# Patient Record
Sex: MALE | Race: WHITE | NOT HISPANIC OR LATINO | Employment: FULL TIME | URBAN - METROPOLITAN AREA
[De-identification: names, ages, dates, MRNs, and addresses within clinical notes are randomized per-mention and may not be internally consistent; named-entity substitution may affect disease eponyms.]

---

## 2017-01-04 ENCOUNTER — OFFICE VISIT (OUTPATIENT)
Dept: CARDIOLOGY | Facility: CLINIC | Age: 67
End: 2017-01-04
Payer: COMMERCIAL

## 2017-01-04 VITALS
SYSTOLIC BLOOD PRESSURE: 121 MMHG | WEIGHT: 274.25 LBS | HEIGHT: 74 IN | HEART RATE: 71 BPM | BODY MASS INDEX: 35.2 KG/M2 | DIASTOLIC BLOOD PRESSURE: 63 MMHG

## 2017-01-04 DIAGNOSIS — I48.0 PAROXYSMAL ATRIAL FIBRILLATION: Primary | ICD-10-CM

## 2017-01-04 DIAGNOSIS — I48.91 ATRIAL FIBRILLATION, UNSPECIFIED TYPE: Primary | ICD-10-CM

## 2017-01-04 DIAGNOSIS — Z79.4 TYPE 2 DIABETES MELLITUS WITH STAGE 3 CHRONIC KIDNEY DISEASE, WITH LONG-TERM CURRENT USE OF INSULIN: ICD-10-CM

## 2017-01-04 DIAGNOSIS — E11.22 TYPE 2 DIABETES MELLITUS WITH STAGE 3 CHRONIC KIDNEY DISEASE, WITH LONG-TERM CURRENT USE OF INSULIN: ICD-10-CM

## 2017-01-04 DIAGNOSIS — E78.5 HYPERLIPIDEMIA ASSOCIATED WITH TYPE 2 DIABETES MELLITUS: ICD-10-CM

## 2017-01-04 DIAGNOSIS — I15.2 HYPERTENSION ASSOCIATED WITH DIABETES: Chronic | ICD-10-CM

## 2017-01-04 DIAGNOSIS — E11.69 HYPERLIPIDEMIA ASSOCIATED WITH TYPE 2 DIABETES MELLITUS: ICD-10-CM

## 2017-01-04 DIAGNOSIS — I50.22 CHRONIC SYSTOLIC CONGESTIVE HEART FAILURE: ICD-10-CM

## 2017-01-04 DIAGNOSIS — N13.30 HYDRONEPHROSIS, UNSPECIFIED HYDRONEPHROSIS TYPE: ICD-10-CM

## 2017-01-04 DIAGNOSIS — E11.59 HYPERTENSION ASSOCIATED WITH DIABETES: Chronic | ICD-10-CM

## 2017-01-04 DIAGNOSIS — N18.30 TYPE 2 DIABETES MELLITUS WITH STAGE 3 CHRONIC KIDNEY DISEASE, WITH LONG-TERM CURRENT USE OF INSULIN: ICD-10-CM

## 2017-01-04 DIAGNOSIS — E66.9 OBESITY (BMI 30-39.9): ICD-10-CM

## 2017-01-04 PROCEDURE — 2022F DILAT RTA XM EVC RTNOPTHY: CPT | Mod: S$GLB,,, | Performed by: INTERNAL MEDICINE

## 2017-01-04 PROCEDURE — 3078F DIAST BP <80 MM HG: CPT | Mod: S$GLB,,, | Performed by: INTERNAL MEDICINE

## 2017-01-04 PROCEDURE — 3074F SYST BP LT 130 MM HG: CPT | Mod: S$GLB,,, | Performed by: INTERNAL MEDICINE

## 2017-01-04 PROCEDURE — 3066F NEPHROPATHY DOC TX: CPT | Mod: S$GLB,,, | Performed by: INTERNAL MEDICINE

## 2017-01-04 PROCEDURE — 99213 OFFICE O/P EST LOW 20 MIN: CPT | Mod: S$GLB,,, | Performed by: INTERNAL MEDICINE

## 2017-01-04 PROCEDURE — 3044F HG A1C LEVEL LT 7.0%: CPT | Mod: S$GLB,,, | Performed by: INTERNAL MEDICINE

## 2017-01-04 PROCEDURE — 99999 PR PBB SHADOW E&M-EST. PATIENT-LVL III: CPT | Mod: PBBFAC,,, | Performed by: INTERNAL MEDICINE

## 2017-01-04 PROCEDURE — 1159F MED LIST DOCD IN RCRD: CPT | Mod: S$GLB,,, | Performed by: INTERNAL MEDICINE

## 2017-01-04 NOTE — Clinical Note
Thank you for referring Jose Cruz Lira for evaluation of paroxysmal atrial fibrillation and congestive heart failure. Please see my note for details of this encounter. If you have any questions, please contact me.  Thank you again for the referral.

## 2017-01-04 NOTE — PROGRESS NOTES
Chart has been dictated using voice recognition software.  It is not been reviewed carefully for any transcriptional errors due to this technology.   Subjective:   Patient ID:  Jose Cruz Lira is a 66 y.o. 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 He was in his usual state of health until approximately 6 months ago when he began to notice exertional shortness of breath, fatigue and increasing weight. He presented to the ER for evaluation last month (11/2016) and was found to be in atrial fibrillation with rapid ventricular response and new onset systolic heart failure. Patient underwent DCCV 29-Nov-2016 and has been in sinus rhythm since then.  BP at home initially in the high 130 to low 140 systolic range, but recently in the 145-155 systolic range.  Diastolic BP has been controlled    Currently has 2 flight JAMES, sleeps on 45 degree wedge and on reclining sofa without dyspnea, but not clear if he can lay flat,  No PND, edema.  No palpitations, but had no palpitations with atrial fibrillation.  On the DASH diet - has lost 22-23 pounds since initially hospitalized     Echo (11/28/2016)    1 - Moderate left atrial enlargement.     2 - Severely depressed left ventricular systolic function (EF 20-25%).     3 - Normal left ventricular diastolic function.     4 - Normal right ventricular systolic function .     5 - The estimated PA systolic pressure is 39 mmHg.     6 - Mild to moderate mitral regurgitation.     7 - Mild tricuspid regurgitation.     8 - Intermediate central venous pressure    Cardiac risk factors: diabetes, hyperlipidemia, hypertension, obesity, positive family history    Past Medical History   Diagnosis Date    Diabetes mellitus, type 2 2010    Hydronephrosis of left  "kidney     Hypertension     Non-functioning kidney      left    Obesity (BMI 30-39.9)     Unspecified disorder of kidney and ureter        Past Surgical History   Procedure Laterality Date    Knee surgeory N/A 4 to 5 years ago    Arm surgery      Cardioversion  11/29/2016       Social History   Substance Use Topics    Smoking status: Never Smoker    Smokeless tobacco: Never Used    Alcohol use Yes         Current Outpatient Prescriptions:     amlodipine (NORVASC) 10 MG tablet, Take 1 tablet (10 mg total) by mouth once daily., Disp: 90 tablet, Rfl: 4    apixaban 5 mg Tab, Take 1 tablet (5 mg total) by mouth 2 (two) times daily., Disp: 180 tablet, Rfl: 3    blood sugar diagnostic Strp, For accucheck Patient checks BS twice a day. Please provide a 3 month supply., Disp: 200 strip, Rfl: 11    BYDUREON 2 mg/0.65 mL PnIj, INJECT 2MG WEEKLY, Disp: 4 each, Rfl: 11    carvedilol (COREG) 25 MG tablet, Take 2 tablets (50 mg total) by mouth 2 (two) times daily with meals., Disp: 360 tablet, Rfl: 3    doxazosin (CARDURA) 4 MG tablet, Take 0.5 tablets (2 mg total) by mouth once daily., Disp: , Rfl:     furosemide (LASIX) 40 MG tablet, Take 1 tablet (40 mg total) by mouth 2 (two) times daily., Disp: 180 tablet, Rfl: 3    insulin needles, disposable, (BD ULTRA-FINE RODRIGUEZ PEN NEEDLES) 32 x 5/32 " Ndle, Patient injects insulin once a day. Please provide 3 month supply., Disp: 90 each, Rfl: 4    lancets Misc, Patient checks BS twice a day. Please provide a 3 month supply., Disp: 180 each, Rfl: 10    LEVEMIR FLEXTOUCH 100 unit/mL (3 mL) InPn pen, INJECT 25 UNITS INTO THE SKIN EVERY EVENING, Disp: 15 mL, Rfl: 8    simvastatin (ZOCOR) 20 MG tablet, Take 1 tablet (20 mg total) by mouth every evening., Disp: 90 tablet, Rfl: 4    Review of patient's allergies indicates:  No Known Allergies    Review of Systems   Constitution: Negative for weight gain and weight loss.   HENT: Negative for headaches and nosebleeds.  " "  Eyes: Negative for blurred vision, double vision, photophobia, redness, vision loss in left eye, vision loss in right eye and visual disturbance.   Respiratory: Negative for sputum production.    Endocrine: Negative for polydipsia and polyuria.   Skin: Negative for color change.   Musculoskeletal: Negative for muscle cramps, muscle weakness and myalgias.   Gastrointestinal: Negative for abdominal pain, change in bowel habit, bowel incontinence, dysphagia, excessive appetite, heartburn and hematemesis.   Neurological: Negative for disturbances in coordination, dizziness, focal weakness, loss of balance, paresthesias and sensory change.   Psychiatric/Behavioral: Negative.    Allergic/Immunologic: Negative for environmental allergies.     Objective:   Physical Exam   Constitutional: He is oriented to person, place, and time. He appears well-developed and well-nourished.   /63 (BP Location: Left arm, Patient Position: Sitting, BP Method: Automatic)  Pulse 71  Ht 6' 2" (1.88 m)  Wt 124.4 kg (274 lb 4 oz)  BMI 35.21 kg/m2     Neck: Neck supple. No JVD present. Carotid bruit is not present.   Cardiovascular: Normal rate, regular rhythm, normal heart sounds and intact distal pulses.   Occasional extrasystoles are present. Exam reveals no gallop and no friction rub.    No murmur heard.  Pulmonary/Chest: Effort normal and breath sounds normal. He has no wheezes. He has no rales.   Abdominal: Soft. Bowel sounds are normal. He exhibits no abdominal bruit. There is no hepatomegaly. There is no tenderness.   Musculoskeletal: He exhibits no edema (1-2+ bilateral pretibial edema).   Neurological: He is alert and oriented to person, place, and time. He has normal strength.   Skin: No cyanosis. Nails show no clubbing.       Lab Results   Component Value Date    WBC 8.22 11/30/2016    HGB 13.9 (L) 11/30/2016    HCT 41.2 11/30/2016    MCV 90 11/30/2016     11/30/2016       Lab Results   Component Value Date     " 12/06/2016    K 4.4 12/06/2016    BUN 24 (H) 12/06/2016    CREATININE 1.8 (H) 12/06/2016     (H) 12/06/2016    HGBA1C 6.9 (H) 11/27/2016    CHOL 102 (L) 11/27/2016    HDL 35 (L) 11/27/2016    LDLCALC 37.4 (L) 11/27/2016    TRIG 148 11/27/2016    CHOLHDL 34.3 11/27/2016    HGB 13.9 (L) 11/30/2016    HCT 41.2 11/30/2016     11/30/2016     ECG (28-Dec-2016) ECG showed sinus rhythm and was essentially a normal ECG.  Assessment:     1. Paroxysmal atrial fibrillation    2. Hypertension associated with diabetes    3. Chronic systolic congestive heart failure    4. Type 2 diabetes mellitus with stage 3 chronic kidney disease, with long-term current use of insulin    5. Hydronephrosis, unspecified hydronephrosis type    6. Obesity (BMI 30-39.9)    7. Hyperlipidemia associated with type 2 diabetes mellitus      The patient remains in sinus rhythm with minimal symptoms of heart failure (NYHA class II).  He appears to be tolerating his current medical therapy.  He has no signs of ischemia or recurrent atrial fibrillation.  He has not had an evaluation for myocardial ischemia since presenting to the hospital.  Therefore, the patient will be evaluated using SPECT imaging and will have his LV function reevaluated by an echo.  These are scheduled, however, the stress test will be changed to an exercise stress test from a drug stimulated stress test and the echo will be done with 2-D color Doppler.  No changes in the patient's medication will be made at this time.  Plan:     Jose Cruz was seen today for atrial fibrillation.    Diagnoses and all orders for this visit:    Paroxysmal atrial fibrillation  -     NM Multi Tread Stress Cardiac Component; Future  -     EKG 12-LEAD; Future    Hypertension associated with diabetes  -     NM Multi Tread Stress Cardiac Component; Future  -     EKG 12-LEAD; Future    Chronic systolic congestive heart failure  -     NM Multi Tread Stress Cardiac Component; Future  -     EKG 12-LEAD;  Future    Type 2 diabetes mellitus with stage 3 chronic kidney disease, with long-term current use of insulin    Hydronephrosis, unspecified hydronephrosis type    Obesity (BMI 30-39.9)    Hyperlipidemia associated with type 2 diabetes mellitus      Patient to return in 6 weeks

## 2017-01-04 NOTE — MR AVS SNAPSHOT
[facility]  [address]  [facility] [address]  Phone: [phone]                  Jose Cruz Lira   2017 8:30 AM   Office Visit    Description:  Male : 1950   Provider:  Irvin Castanon MD   Department:  [facility]           Reason for Visit     Atrial Fibrillation           Diagnoses this Visit        Comments    Paroxysmal atrial fibrillation    -  Primary     Hypertension associated with diabetes         Chronic systolic congestive heart failure         Type 2 diabetes mellitus with stage 3 chronic kidney disease, with long-term current use of insulin         Hydronephrosis, unspecified hydronephrosis type         Obesity (BMI 30-39.9)         Hyperlipidemia associated with type 2 diabetes mellitus                To Do List           Future Appointments        Provider Department Dept Phone    2017 7:15 AM [facility] [phone]    2017 1:45 PM [facility] [phone]    2017 8:00 AM Grover Miranda NP [facility] - Sleep Clinic [phone]    3/28/2017 8:00 AM EKG, AP[facility] - [phone]    3/28/2017 8:40 AM Jamey Brown MD [facility] - Arrhythmia [phone]      Goals (5 Years of Data)     None      Follow-Up and Disposition     Return in about 6 weeks (around 2/15/2017).      [facility] On [facility]     [facility] Care [facility] - / Assistance  Registered nurses in the [facility] On Call Center provide clinical advisement, health education, appointment booking, and other advisory services.  Call for this free service at [phone].             Medications           Message regarding Medications     Verify the changes and/or additions to your medication regime listed below are the same as discussed with your clinician today.  If any of these changes or additions are incorrect, please notify your healthcare provider.             Verify that the  "below list of medications is an accurate representation of the medications you are currently taking.  If none reported, the list may be blank. If incorrect, please contact your healthcare provider. Carry this list with you in case of emergency.           Current Medications     amlodipine (NORVASC) 10 MG tablet Take 1 tablet (10 mg total) by mouth once daily.    apixaban 5 mg Tab Take 1 tablet (5 mg total) by mouth 2 (two) times daily.    blood sugar diagnostic Strp For accucheck Patient checks BS twice a day. Please provide a 3 month supply.    BYDUREON 2 mg/0.65 mL PnIj INJECT 2MG WEEKLY    carvedilol (COREG) 25 MG tablet Take 2 tablets (50 mg total) by mouth 2 (two) times daily with meals.    doxazosin (CARDURA) 4 MG tablet Take 0.5 tablets (2 mg total) by mouth once daily.    furosemide (LASIX) 40 MG tablet Take 1 tablet (40 mg total) by mouth 2 (two) times daily.    insulin needles, disposable, (BD ULTRA-FINE RODRIGUEZ PEN NEEDLES) 32 x 5/32 " Ndle Patient injects insulin once a day. Please provide 3 month supply.    lancets Misc Patient checks BS twice a day. Please provide a 3 month supply.    LEVEMIR FLEXTOUCH 100 unit/mL (3 mL) InPn pen INJECT 25 UNITS INTO THE SKIN EVERY EVENING    simvastatin (ZOCOR) 20 MG tablet Take 1 tablet (20 mg total) by mouth every evening.           Clinical Reference Information           Vital Signs - Last Recorded  Most recent update: 1/4/2017  8:36 AM by Annabelle Morrissey MA    BP Pulse Ht Wt BMI    121/63 (BP Location: Left arm, Patient Position: Sitting, BP Method: Automatic) 71 6' 2" (1.88 m) 124.4 kg (274 lb 4 oz) 35.21 kg/m2      Blood Pressure          Most Recent Value    Right Arm BP - Sitting  120/64    Left Arm BP - Sitting  121/63    BP  121/63      Allergies as of 1/4/2017     No Known Allergies      Immunizations Administered on Date of Encounter - 1/4/2017     None      "

## 2017-01-04 NOTE — LETTER
January 4, 2017      Tena Phillips MD  [address]  [facility] [address]           [facility]  [address]  [facility] [address]  Phone: [phone]          Patient: Jose Cruz Lira   MR Number: [id number]   YOB: 1950   Date of Visit: 1/4/2017       Dear Dr. Tena Phillips:    Thank you for referring Jose Cruz Lira to me for evaluation. Attached you will find relevant portions of my assessment and plan of care.    If you have questions, please do not hesitate to call me. I look forward to following Jose Cruz Lira along with you.    Sincerely,    Irvin Castanon MD    Enclosure  CC:  No Recipients    If you would like to receive this communication electronically, please contact [email or web address] or [phone] to request more information on [facility] Link access.    For providers and/or their staff who would like to refer a patient to [facility], please contact us through our one-stop-shop provider referral line, [facility] , at [phone].    If you feel you have received this communication in error or would no longer like to receive these types of communications, please e-mail [email or web address]

## 2017-01-17 ENCOUNTER — CLINICAL SUPPORT (OUTPATIENT)
Dept: CARDIOLOGY | Facility: CLINIC | Age: 67
End: 2017-01-17
Payer: COMMERCIAL

## 2017-01-17 DIAGNOSIS — I15.2 HYPERTENSION ASSOCIATED WITH DIABETES: Chronic | ICD-10-CM

## 2017-01-17 DIAGNOSIS — E11.59 HYPERTENSION ASSOCIATED WITH DIABETES: Chronic | ICD-10-CM

## 2017-01-17 DIAGNOSIS — I50.22 CHRONIC SYSTOLIC CONGESTIVE HEART FAILURE: ICD-10-CM

## 2017-01-17 DIAGNOSIS — I48.0 PAROXYSMAL ATRIAL FIBRILLATION: ICD-10-CM

## 2017-01-17 LAB — DIASTOLIC DYSFUNCTION: NO

## 2017-01-17 PROCEDURE — A9502 TC99M TETROFOSMIN: HCPCS | Mod: S$GLB,,, | Performed by: INTERNAL MEDICINE

## 2017-01-17 PROCEDURE — 78452 HT MUSCLE IMAGE SPECT MULT: CPT | Mod: S$GLB,,, | Performed by: INTERNAL MEDICINE

## 2017-01-17 PROCEDURE — 93015 CV STRESS TEST SUPVJ I&R: CPT | Mod: S$GLB,,, | Performed by: INTERNAL MEDICINE

## 2017-01-18 ENCOUNTER — TELEPHONE (OUTPATIENT)
Dept: CARDIOLOGY | Facility: CLINIC | Age: 67
End: 2017-01-18

## 2017-01-18 ENCOUNTER — HOSPITAL ENCOUNTER (OUTPATIENT)
Dept: CARDIOLOGY | Facility: CLINIC | Age: 67
Discharge: HOME | End: 2017-01-18
Payer: COMMERCIAL

## 2017-01-18 DIAGNOSIS — I50.22 CHRONIC SYSTOLIC CONGESTIVE HEART FAILURE: ICD-10-CM

## 2017-01-18 DIAGNOSIS — I48.0 PAROXYSMAL ATRIAL FIBRILLATION: Primary | ICD-10-CM

## 2017-01-18 DIAGNOSIS — I48.91 ATRIAL FIBRILLATION, UNSPECIFIED TYPE: ICD-10-CM

## 2017-01-18 LAB
DIASTOLIC DYSFUNCTION: YES
MITRAL VALVE MOBILITY: NORMAL
MITRAL VALVE REGURGITATION: ABNORMAL
RETIRED EF AND QEF - SEE NOTES: 55 (ref 55–65)

## 2017-01-18 PROCEDURE — 93306 TTE W/DOPPLER COMPLETE: CPT | Mod: S$GLB,,, | Performed by: INTERNAL MEDICINE

## 2017-01-18 NOTE — TELEPHONE ENCOUNTER
----- Message from Luis Miguel Clark sent at 1/18/2017  9:21 AM CST -----  Contact: patient wife/Jessica cmkeon pt wife at [phone]. Need results of Nuclear Treadmill test done on 01/17/17    Thank you

## 2017-01-19 ENCOUNTER — TELEPHONE (OUTPATIENT)
Dept: CARDIOLOGY | Facility: CLINIC | Age: 67
End: 2017-01-19

## 2017-01-19 NOTE — TELEPHONE ENCOUNTER
----- Message from Irvin Castanon MD sent at 1/18/2017  5:54 PM CST -----  Contact: patient wife/Jessica  Spoke with wife.  PET stress oedered.  Please see if it can be done by mid next week.  Thank you  ----- Message -----     From: Alesha Cervantes LPN     Sent: 1/18/2017   3:24 PM       To: Irvin Castanon MD        ----- Message -----     From: Luis Miguel Clark     Sent: 1/18/2017   9:21 AM       To: [facility] Staff    Please mike pt wife at [phone]. Need results of Nuclear Treadmill test done on 01/17/17    Thank you

## 2017-01-23 ENCOUNTER — OFFICE VISIT (OUTPATIENT)
Dept: SLEEP MEDICINE | Facility: CLINIC | Age: 67
End: 2017-01-23
Payer: COMMERCIAL

## 2017-01-23 ENCOUNTER — TELEPHONE (OUTPATIENT)
Dept: SLEEP MEDICINE | Facility: CLINIC | Age: 67
End: 2017-01-23

## 2017-01-23 ENCOUNTER — TELEPHONE (OUTPATIENT)
Dept: CARDIOLOGY | Facility: CLINIC | Age: 67
End: 2017-01-23

## 2017-01-23 VITALS
SYSTOLIC BLOOD PRESSURE: 130 MMHG | DIASTOLIC BLOOD PRESSURE: 80 MMHG | BODY MASS INDEX: 35.32 KG/M2 | HEIGHT: 74 IN | HEART RATE: 67 BPM | WEIGHT: 275.19 LBS

## 2017-01-23 DIAGNOSIS — E66.9 OBESITY (BMI 30-39.9): ICD-10-CM

## 2017-01-23 DIAGNOSIS — G47.30 SLEEP APNEA, UNSPECIFIED: Primary | ICD-10-CM

## 2017-01-23 PROCEDURE — 3079F DIAST BP 80-89 MM HG: CPT | Mod: S$GLB,,, | Performed by: NURSE PRACTITIONER

## 2017-01-23 PROCEDURE — 99999 PR PBB SHADOW E&M-EST. PATIENT-LVL IV: CPT | Mod: PBBFAC,,, | Performed by: NURSE PRACTITIONER

## 2017-01-23 PROCEDURE — 3075F SYST BP GE 130 - 139MM HG: CPT | Mod: S$GLB,,, | Performed by: NURSE PRACTITIONER

## 2017-01-23 PROCEDURE — 1159F MED LIST DOCD IN RCRD: CPT | Mod: S$GLB,,, | Performed by: NURSE PRACTITIONER

## 2017-01-23 PROCEDURE — 99204 OFFICE O/P NEW MOD 45 MIN: CPT | Mod: S$GLB,,, | Performed by: NURSE PRACTITIONER

## 2017-01-23 NOTE — PROGRESS NOTES
Jose Cruz Lira  was seen as a new patient at the request of  Jamey Brown MD for the evaluation of obstructive sleep apnea.    CHIEF COMPLAINT:    Chief Complaint   Patient presents with    Sleep Apnea       HISTORY OF PRESENT ILLNESS: Jose Cruz Lira is a 66 y.o. male is here for sleep evaluation.       Patient complaints include: loud snoring, air gasping, and daytime fatigue. Nocturia 4x (on diuretics). Side sleeper. No morning headaches. Reports sleep apnea symptoms started ~ 5 years ago and despite PCP advise, he never got evaluated for sleep apnea. After a fib event and dx heart failure, now highly motivated to treat MONICA.     Medical co-morbidities: HTN, atrial fibrillation, systolic CHF (1/18/17 EF 55-60%), DM2, HLD, obesity    Denies symptoms of restless legs or kicking during sleep.    Occupation: Salesperson, [employer]. Plan to retire in 3 years.     [place] Sleepiness Scale score during initial sleep evaluation was 4.    SLEEP ROUTINE:  Activity the hour prior to sleep: watch tv, occasionally read   Bed partner:  Wife  Time to bed:  9:30  pm  Lights off:  9:30 pm   Sleep onset latency:  <10 minutes        Disruptions or awakenings:    4 to 5 for bathroom    Wakeup time:  4:45 - 5 am   Perceived sleep quality:  2/5       Daytime naps:   none  Weekend sleep routine:   Bedtime at 10 pm, up at 5 am  Caffeine use: decaf coffee only   Exercise habit:  Stationary bike x2 weekly     01/18/2016 2D Echo:  CONCLUSIONS     1 - Eccentric LVH with normal left ventricular systolic function (EF 55-60%).     2 - Severe left atrial enlargement.     3 - Left ventricular diastolic dysfunction.     4 - Normal right ventricular systolic function .     PAST MEDICAL HISTORY:    Active Ambulatory Problems     Diagnosis Date Noted    Obesity (BMI 30-39.9) 05/28/2014    Hyperlipidemia associated with type 2 diabetes mellitus 05/28/2014    Type 2 diabetes mellitus with stage 3 chronic kidney disease, with long-term  current use of insulin 05/28/2014    Hypertension associated with diabetes 05/28/2014    Proteinuria 11/03/2014    Rectus diastasis 02/12/2015    Lipoma of abdominal wall 02/12/2015    UPJ (ureteropelvic junction) obstruction 04/16/2015    Non-functioning kidney 04/16/2015    Hydronephrosis 05/14/2015    Shortness of breath 11/27/2016    Paroxysmal atrial fibrillation 11/28/2016    Atrial fibrillation 11/29/2016    Chronic systolic congestive heart failure 12/28/2016    Sleep apnea, unspecified 01/23/2017     Resolved Ambulatory Problems     Diagnosis Date Noted    Type 2 diabetes, uncontrolled, with renal manifestation 05/28/2014    Acute decompensated heart failure secondary to afib with RVR  11/28/2016     Past Medical History   Diagnosis Date    Diabetes mellitus, type 2 2010    Hydronephrosis of left kidney     Hypertension     Unspecified disorder of kidney and ureter                 PAST SURGICAL HISTORY:    Past Surgical History   Procedure Laterality Date    Knee surgeory N/A 4 to 5 years ago    Arm surgery      Cardioversion  11/29/2016         FAMILY HISTORY:                Family History   Problem Relation Age of Onset    Cancer Father     Cancer Sister     Cancer Brother     Amblyopia Neg Hx     Blindness Neg Hx     Cataracts Neg Hx     Diabetes Neg Hx     Glaucoma Neg Hx     Hypertension Neg Hx     Macular degeneration Neg Hx     Retinal detachment Neg Hx     Strabismus Neg Hx     Stroke Neg Hx     Thyroid disease Neg Hx        SOCIAL HISTORY:          Tobacco:   History   Smoking Status    Never Smoker   Smokeless Tobacco    Never Used       Alcohol use:    History   Alcohol Use    Yes                 ALLERGIES:  Review of patient's allergies indicates:  No Known Allergies    CURRENT MEDICATIONS:    Current Outpatient Prescriptions   Medication Sig Dispense Refill    amlodipine (NORVASC) 10 MG tablet Take 1 tablet (10 mg total) by mouth once daily. 90 tablet 4  "   blood sugar diagnostic Strp For accucheck Patient checks BS twice a day. Please provide a 3 month supply. 200 strip 11    BYDUREON 2 mg/0.65 mL PnIj INJECT 2MG WEEKLY 4 each 11    carvedilol (COREG) 25 MG tablet Take 2 tablets (50 mg total) by mouth 2 (two) times daily with meals. 360 tablet 3    doxazosin (CARDURA) 4 MG tablet Take 0.5 tablets (2 mg total) by mouth once daily.      furosemide (LASIX) 40 MG tablet Take 1 tablet (40 mg total) by mouth 2 (two) times daily. 180 tablet 3    insulin needles, disposable, (BD ULTRA-FINE RODRIGUEZ PEN NEEDLES) 32 x 5/32 " Ndle Patient injects insulin once a day. Please provide 3 month supply. 90 each 4    lancets Misc Patient checks BS twice a day. Please provide a 3 month supply. 180 each 10    LEVEMIR FLEXTOUCH 100 unit/mL (3 mL) InPn pen INJECT 25 UNITS INTO THE SKIN EVERY EVENING 15 mL 8    simvastatin (ZOCOR) 20 MG tablet Take 1 tablet (20 mg total) by mouth every evening. 90 tablet 4    apixaban 5 mg Tab Take 1 tablet (5 mg total) by mouth 2 (two) times daily. 180 tablet 3     No current facility-administered medications for this visit.                   REVIEW OF SYSTEMS:     Sleep related symptoms as per HPI. CONST: Intentional weight loss ~20 lb (monitoring fluid intake and diet) HEENT: Denies sinus congestion PULM: Denies dyspnea CARD:  Denies palpitations GI:  Denies acid reflux : Denies polyuria  NEURO: Denies headaches PSYCH: Denies mood disturbance HEME: Denies anemia Otherwise, a balance of systems reviewed is negative.          PHYSICAL EXAM:  Vitals:    01/23/17 0824   BP: 130/80   Pulse: 67   Weight: 124.8 kg (275 lb 3.2 oz)   Height: 6' 2" (1.88 m)   PainSc: 0-No pain     Body mass index is 35.33 kg/(m^2).      GENERAL: Obese body habitus, well groomed  HEENT:  Conjunctivae are non-erythematous; Pupils equal, round, and reactive to light; Nose is symmetrical; Nasal mucosa is pink and moist; Septum is midline; Inferior turbinates are normal; " Nasal airflow is normal; Posterior pharynx is pink; Modified Mallampati: IV; Posterior palate is normal; Tonsils +1; Uvula is normal and pink;Tongue is normal; Dentition is fair; No TMJ tenderness; Jaw opening and protrusion without click and without discomfort.  NECK: Supple. Neck circumference is 19 inches. No thyromegaly. No palpable nodes.  SKIN: On face and neck: No abrasions, no rashes, no lesions.  No subcutaneous nodules are palpable.  RESPIRATORY: Chest is clear to auscultation.  Normal chest expansion and non-labored breathing at rest.  CARDIOVASCULAR: Normal S1, S2.  No murmurs, gallops or rubs. No carotid bruits bilaterally.  EXTREMITIES: No edema. No clubbing. No cyanosis. Station normal. Gait normal.        NEURO/PSYCH: Oriented to time, place and person. Normal attention span and concentration. Affect is full. Mood is normal.                                              ASSESSMENT:    Sleep apnea, unspecified. The patient symptomatically has loud snoring, air gasping, and daytime fatigue with findings of crowded oral airway and elevated body mass index. Medical co-morbidities: HTN, atrial fibrillation, systolic CHF, DM2, HLD, obesity This warrants further investigation for possible obstructive sleep apnea.      PLAN:    - Diagnostic: Polysomnogram. The nature of this procedure and its indication was discussed with the patient. HST will be ordered, if PSG denied by insurance. Patient will be contacted after sleep study is done and return to clinic to discuss results.      - Education: During our discussion today, we talked about the etiology of obstructive sleep apnea as well as the potential ramifications of untreated sleep apnea, which could include daytime sleepiness, hypertension, heart disease and/or stroke. We discussed potential treatment options, which could include weight loss, body positioning, continuous positive airway pressure (CPAP), or referral for surgical consideration.     -Referral  to [facility] [place].     - Precautions: The patient was advised to abstain from driving should they feel sleepy  or drowsy.     Thank you for allowing me the opportunity to participate in the care of your patient.

## 2017-01-23 NOTE — MR AVS SNAPSHOT
[facility]  [address]  [facility] [address]  Phone: [phone]                  Jose Cruz Lira   2017 8:00 AM   Office Visit    Description:  Male : 1950   Provider:  Grover Miranda NP   Department:  [facility]           Reason for Visit     Sleep Apnea           Diagnoses this Visit        Comments    Sleep apnea, unspecified    -  Primary     Obesity (BMI 30-39.9)                To Do List           Future Appointments        Provider Department Dept Phone    2017 7:45 AM CARDIAC, PET IMAGING [facility] Cardiac -[phone]    2017 8:30 AM Irvin Castanon MD [facility] - Cardiology [phone]    3/27/2017 1:40 PM Ilene Sanderson MD [facility] - Internal Medicine [phone]    3/28/2017 8:00 AM EKG, APPT [facility] -[phone]    3/28/2017 8:40 AM Jamey Brown MD [facility] - Arrhythmia [phone]      Goals (5 Years of Data)     None      Follow-Up and Disposition     Return if symptoms worsen or fail to improve.      [facility] On Call     [facility] On Call Nurse Care Line -  Assistance  Registered nurses in the [facility] On Call Center provide clinical advisement, health education, appointment booking, and other advisory services.  Call for this free service at [phone].             Medications           Message regarding Medications     Verify the changes and/or additions to your medication regime listed below are the same as discussed with your clinician today.  If any of these changes or additions are incorrect, please notify your healthcare provider.             Verify that the below list of medications is an accurate representation of the medications you are currently taking.  If none reported, the list may be blank. If incorrect, please contact your healthcare provider. Carry this list with you in case of emergency.           Current Medications     amlodipine (NORVASC) 10 MG tablet Take 1 tablet (10 mg total)  "by mouth once daily.    blood sugar diagnostic Strp For accucheck Patient checks BS twice a day. Please provide a 3 month supply.    BYDUREON 2 mg/0.65 mL PnIj INJECT 2MG WEEKLY    carvedilol (COREG) 25 MG tablet Take 2 tablets (50 mg total) by mouth 2 (two) times daily with meals.    doxazosin (CARDURA) 4 MG tablet Take 0.5 tablets (2 mg total) by mouth once daily.    furosemide (LASIX) 40 MG tablet Take 1 tablet (40 mg total) by mouth 2 (two) times daily.    insulin needles, disposable, (BD ULTRA-FINE RODRIGUEZ PEN NEEDLES) 32 x 5/32 " Ndle Patient injects insulin once a day. Please provide 3 month supply.    lancets Misc Patient checks BS twice a day. Please provide a 3 month supply.    LEVEMIR FLEXTOUCH 100 unit/mL (3 mL) InPn pen INJECT 25 UNITS INTO THE SKIN EVERY EVENING    simvastatin (ZOCOR) 20 MG tablet Take 1 tablet (20 mg total) by mouth every evening.    apixaban 5 mg Tab Take 1 tablet (5 mg total) by mouth 2 (two) times daily.           Clinical Reference Information           Vital Signs - Last Recorded  Most recent update: 1/23/2017  8:26 AM by Darrian Zuleta MA    BP Pulse Ht Wt BMI    130/80 67 6' 2" (1.88 m) 124.8 kg (275 lb 3.2 oz) 35.33 kg/m2      Blood Pressure          Most Recent Value    BP  130/80      Allergies as of 1/23/2017     No Known Allergies      Immunizations Administered on Date of Encounter - 1/23/2017     None      Orders Placed During Today's Visit      Normal Orders This Visit    Ambulatory consult to Bariatric Medicine     Ambulatory Referral to [facility] - [facility]     Future Labs/Procedures Expected by Expires    Polysomnogram (CPAP will be added if patient meets diagnostic criteria.)  As directed 1/23/2018      Instructions    Silvana or Tony will contact you to schedule your sleep study. Their number is [phone] (ext 1). The [facility] is located on 7th floor of the [facility].    We will call you when the sleep study results are ready - " if you have not heard from us by 2 weeks from the date of the study, please call [phone] (ext 2).    You are advised to abstain from driving should you feel sleepy or drowsy.

## 2017-01-23 NOTE — PATIENT INSTRUCTIONS
Silvana or Tony will contact you to schedule your sleep study. Their number is [phone] (ext 1). The [facility] is located on 7th floor of the [facility].    We will call you when the sleep study results are ready - if you have not heard from us by 2 weeks from the date of the study, please call [phone] (ext 2).    You are advised to abstain from driving should you feel sleepy or drowsy.

## 2017-01-23 NOTE — LETTER
January 23, 2017      Jamey Brown MD  [address]  [facility] [address]           [facility]  [address]  [facility] [address]  Phone: [phone]          Patient: Jose Cruz Lira   MR Number: [id number]   YOB: 1950   Date of Visit: 1/23/2017       Dear Dr. Jamey Brown:    Thank you for referring Jose Cruz Lira to me for evaluation. Attached you will find relevant portions of my assessment and plan of care.    If you have questions, please do not hesitate to call me. I look forward to following Jose Cruz Lira along with you.    Sincerely,    Grover Miranda, NP    Enclosure  CC:  No Recipients    If you would like to receive this communication electronically, please contact [email or web address] or [phone] to request more information on [employer] access.    For providers and/or their staff who would like to refer a patient to [facility], please contact us through our one-stop-shop provider referral line, [facility], at [phone].    If you feel you have received this communication in error or would no longer like to receive these types of communications, please e-mail [email or web address]

## 2017-01-23 NOTE — TELEPHONE ENCOUNTER
Called and spoke with pt wife regarding scheduling an appt to bariatric clinic. Mrs. Lira stated that her  does not need to go to the bariatric clinic. The pt goes to the cardiologist at the [facility] to maintain weight control due to CHF and insurance will not cover bariatrics. Pt wife rec a sleep study to be done and insurance will cover due to CHF.

## 2017-01-23 NOTE — TELEPHONE ENCOUNTER
----- Message from Staci Carlson sent at 1/23/2017  8:58 AM CST -----  Contact: Wife of pt called  Wife of pt called, requesting to reschedule pt's cardiac pet scan. She states they will be in court and is needing to speak with you at ph [phone]. Pt of Dr. Castanon and LOV 1/4/17. Thank you

## 2017-02-06 ENCOUNTER — OFFICE VISIT (OUTPATIENT)
Dept: INTERNAL MEDICINE | Facility: CLINIC | Age: 67
End: 2017-02-06
Payer: COMMERCIAL

## 2017-02-06 ENCOUNTER — HOSPITAL ENCOUNTER (OUTPATIENT)
Dept: RADIOLOGY | Facility: HOSPITAL | Age: 67
Discharge: HOME | End: 2017-02-06
Attending: INTERNAL MEDICINE
Payer: COMMERCIAL

## 2017-02-06 VITALS
BODY MASS INDEX: 35.53 KG/M2 | SYSTOLIC BLOOD PRESSURE: 122 MMHG | WEIGHT: 276.88 LBS | TEMPERATURE: 98 F | DIASTOLIC BLOOD PRESSURE: 80 MMHG | OXYGEN SATURATION: 96 % | HEART RATE: 70 BPM | HEIGHT: 74 IN

## 2017-02-06 DIAGNOSIS — J40 BRONCHITIS: Primary | ICD-10-CM

## 2017-02-06 DIAGNOSIS — E11.9 DIABETES MELLITUS TYPE 2, INSULIN DEPENDENT: ICD-10-CM

## 2017-02-06 DIAGNOSIS — Z79.4 DIABETES MELLITUS TYPE 2, INSULIN DEPENDENT: ICD-10-CM

## 2017-02-06 DIAGNOSIS — J40 BRONCHITIS: ICD-10-CM

## 2017-02-06 PROCEDURE — 3044F HG A1C LEVEL LT 7.0%: CPT | Mod: S$GLB,,, | Performed by: INTERNAL MEDICINE

## 2017-02-06 PROCEDURE — 3079F DIAST BP 80-89 MM HG: CPT | Mod: S$GLB,,, | Performed by: INTERNAL MEDICINE

## 2017-02-06 PROCEDURE — 3066F NEPHROPATHY DOC TX: CPT | Mod: S$GLB,,, | Performed by: INTERNAL MEDICINE

## 2017-02-06 PROCEDURE — 99213 OFFICE O/P EST LOW 20 MIN: CPT | Mod: S$GLB,,, | Performed by: INTERNAL MEDICINE

## 2017-02-06 PROCEDURE — 71020 XR CHEST PA AND LATERAL: CPT | Mod: 26,,, | Performed by: RADIOLOGY

## 2017-02-06 PROCEDURE — 71020 XR CHEST PA AND LATERAL: CPT | Mod: TC

## 2017-02-06 PROCEDURE — 2022F DILAT RTA XM EVC RTNOPTHY: CPT | Mod: S$GLB,,, | Performed by: INTERNAL MEDICINE

## 2017-02-06 PROCEDURE — 3074F SYST BP LT 130 MM HG: CPT | Mod: S$GLB,,, | Performed by: INTERNAL MEDICINE

## 2017-02-06 PROCEDURE — 99999 PR PBB SHADOW E&M-EST. PATIENT-LVL III: CPT | Mod: PBBFAC,,, | Performed by: INTERNAL MEDICINE

## 2017-02-06 RX ORDER — AMOXICILLIN 875 MG/1
875 TABLET, FILM COATED ORAL 2 TIMES DAILY
Qty: 20 TABLET | Refills: 0 | Status: SHIPPED | OUTPATIENT
Start: 2017-02-06 | End: 2017-02-16

## 2017-02-06 RX ORDER — PROMETHAZINE HYDROCHLORIDE AND CODEINE PHOSPHATE 6.25; 1 MG/5ML; MG/5ML
5 SOLUTION ORAL EVERY 6 HOURS PRN
Qty: 180 ML | Refills: 0 | Status: SHIPPED | OUTPATIENT
Start: 2017-02-06 | End: 2017-02-16

## 2017-02-06 NOTE — MR AVS SNAPSHOT
[facility]  [address] [facility] [address]  Phone: [phone]  Fax: [phone]                  Jose Cruz Lira   2017 6:40 PM   Office Visit    Description:  Male : 1950   Provider:  Alaina Murray MD   Department:  [facility]           Reason for Visit     URI           Diagnoses this Visit        Comments    Bronchitis    -  Primary     Diabetes mellitus type 2, insulin dependent                To Do List           Future Appointments        Provider Department Dept Phone    2017 10:00 AM CARDIAC, PET IMAGING [facility] [address] Cardiac [phone]    2017 8:30 AM Irvin Castanon MD [facility] [address] Cardiology [phone]    3/27/2017 1:40 PM Ilene Sanderson MD [facility] [address] Internal Medicine [phone]    3/28/2017 8:00 AM EKG, APPT [facility] [address] [phone]    3/28/2017 8:40 AM Jamey Brown MD [facility] [address] Arrhythmia [phone]      Goals (5 Years of Data)     None       These Medications        Disp Refills Start End    amoxicillin (AMOXIL) 875 MG tablet 20 tablet 0 2017    Take 1 tablet (875 mg total) by mouth 2 (two) times daily. - Oral    [email or web address] [id number] [place] [address] [address] AT [facility] Ph #: [phone]       promethazine-codeine 6.25-10 mg/5 ml (PHENERGAN WITH CODEINE) 6.25-10 mg/5 mL syrup 180 mL 0 2017    Take 5 mLs by mouth every 6 (six) hours as needed. - Oral    Pharmacy: [facility] [id number] [place] [address] [address] AT [facility] Ph #: [phone]         [facility] On [facility]     [facility] On Call Nurse Care Line -  Assistance  Registered nurses in the [facility] On Call Center provide clinical advisement, health education, appointment booking, and other advisory services.  Call for this free service at [phone].             Medications           Message regarding Medications     Verify  "the changes and/or additions to your medication regime listed below are the same as discussed with your clinician today.  If any of these changes or additions are incorrect, please notify your healthcare provider.        START taking these NEW medications        Refills    amoxicillin (AMOXIL) 875 MG tablet 0    Sig: Take 1 tablet (875 mg total) by mouth 2 (two) times daily.    Class: Normal    Route: Oral    promethazine-codeine 6.25-10 mg/5 ml (PHENERGAN WITH CODEINE) 6.25-10 mg/5 mL syrup 0    Sig: Take 5 mLs by mouth every 6 (six) hours as needed.    Class: Normal    Route: Oral      STOP taking these medications     apixaban 5 mg Tab Take 1 tablet (5 mg total) by mouth 2 (two) times daily.           Verify that the below list of medications is an accurate representation of the medications you are currently taking.  If none reported, the list may be blank. If incorrect, please contact your healthcare provider. Carry this list with you in case of emergency.           Current Medications     amlodipine (NORVASC) 10 MG tablet Take 1 tablet (10 mg total) by mouth once daily.    amoxicillin (AMOXIL) 875 MG tablet Take 1 tablet (875 mg total) by mouth 2 (two) times daily.    blood sugar diagnostic Strp For accucheck Patient checks BS twice a day. Please provide a 3 month supply.    BYDUREON 2 mg/0.65 mL PnIj INJECT 2MG WEEKLY    carvedilol (COREG) 25 MG tablet Take 2 tablets (50 mg total) by mouth 2 (two) times daily with meals.    doxazosin (CARDURA) 4 MG tablet Take 0.5 tablets (2 mg total) by mouth once daily.    furosemide (LASIX) 40 MG tablet Take 1 tablet (40 mg total) by mouth 2 (two) times daily.    insulin needles, disposable, (BD ULTRA-FINE RODRIGUEZ PEN NEEDLES) 32 x 5/32 " Ndle Patient injects insulin once a day. Please provide 3 month supply.    lancets Misc Patient checks BS twice a day. Please provide a 3 month supply.    LEVEMIR FLEXTOUCH 100 unit/mL (3 mL) InPn pen INJECT 25 UNITS INTO THE SKIN EVERY " "EVENING    promethazine-codeine 6.25-10 mg/5 ml (PHENERGAN WITH CODEINE) 6.25-10 mg/5 mL syrup Take 5 mLs by mouth every 6 (six) hours as needed.    simvastatin (ZOCOR) 20 MG tablet Take 1 tablet (20 mg total) by mouth every evening.           Clinical Reference Information           Your Vitals Were     BP Pulse Temp Height Weight SpO2    122/80 (BP Location: Right arm, Patient Position: Sitting, BP Method: Manual) 70 97.9 °F (36.6 °C) (Oral) 6' 2" (1.88 m) 125.6 kg (276 lb 14.4 oz) 96%    BMI                35.55 kg/m2          Blood Pressure          Most Recent Value    BP  122/80      Allergies as of 2/6/2017     No Known Allergies      Immunizations Administered on Date of Encounter - 2/6/2017     None      Orders Placed During Today's Visit     Future Labs/Procedures Expected by Expires    X-Ray Chest PA And Lateral  2/6/2017 2/6/2018      Language Assistance Services     ATTENTION: Language assistance services are available, free of charge. Please call [phone].      ATENCIÓN: Si habgay quezada, tiene a limon disposición servicios gratuitos de asistencia lingüística. Llame al [phone].     MARISSA Ý: N?u b?n nói Ti?ng Vi?t, có các d?ch v? h? tr? ngôn ng? mi?n phí dành cho b?n. G?i s? [phone].         [facility] complies with applicable Federal civil rights laws and does not discriminate on the basis of race, color, national origin, age, disability, or sex.        "

## 2017-02-07 ENCOUNTER — TELEPHONE (OUTPATIENT)
Dept: SLEEP MEDICINE | Facility: OTHER | Age: 67
End: 2017-02-07

## 2017-02-07 NOTE — PROGRESS NOTES
Subjective:       Patient ID: Jose Cruz Lira is a 66 y.o. male.    Chief Complaint: URI    HPI Comments: Recently dx'd with CHF    URI    This is a new problem. The current episode started in the past 7 days. The problem has been gradually worsening. There has been no fever. Associated symptoms include congestion and coughing. Pertinent negatives include no abdominal pain, chest pain, diarrhea, headaches, nausea, sore throat, vomiting or wheezing. Associated symptoms comments: Cough productive of green/brown sputum. He has tried nothing for the symptoms. The treatment provided no relief.     Review of Systems   Constitutional: Negative for activity change, appetite change and fever.   HENT: Positive for congestion. Negative for postnasal drip and sore throat.    Respiratory: Positive for cough. Negative for shortness of breath and wheezing.    Cardiovascular: Negative for chest pain and palpitations.   Gastrointestinal: Negative for abdominal pain, blood in stool, constipation, diarrhea, nausea and vomiting.   Genitourinary: Negative for decreased urine volume, difficulty urinating, flank pain and frequency.   Musculoskeletal: Negative for arthralgias.   Neurological: Negative for dizziness, weakness and headaches.       Objective:      Physical Exam   Constitutional: He is oriented to person, place, and time. He appears well-developed and well-nourished. No distress.   HENT:   Head: Normocephalic and atraumatic.   Right Ear: External ear normal.   Left Ear: External ear normal.   Eyes: Conjunctivae and EOM are normal. Pupils are equal, round, and reactive to light.   Neck: Normal range of motion. Neck supple. No thyromegaly present.   Cardiovascular: Normal rate and regular rhythm.    Pulmonary/Chest: Effort normal. He has rhonchi in the right lower field and the left lower field.   Abdominal: Soft. Bowel sounds are normal. He exhibits no mass. There is no tenderness. There is no rebound and no guarding.    Musculoskeletal: Normal range of motion.   Lymphadenopathy:     He has no cervical adenopathy.   Neurological: He is alert and oriented to person, place, and time. He has normal reflexes. He displays normal reflexes. No cranial nerve deficit. He exhibits normal muscle tone. Coordination normal.   Skin: Skin is warm and dry.       Assessment:       1. Bronchitis    2. Diabetes mellitus type 2, insulin dependent        Plan:   Jose Cruz was seen today for uri.    Diagnoses and all orders for this visit:    Bronchitis  -     X-Ray Chest PA And Lateral; Future    Diabetes mellitus type 2, insulin dependent    Other orders  -     amoxicillin (AMOXIL) 875 MG tablet; Take 1 tablet (875 mg total) by mouth 2 (two) times daily.  -     promethazine-codeine 6.25-10 mg/5 ml (PHENERGAN WITH CODEINE) 6.25-10 mg/5 mL syrup; Take 5 mLs by mouth every 6 (six) hours as needed.

## 2017-02-16 ENCOUNTER — CLINICAL SUPPORT (OUTPATIENT)
Dept: CARDIOLOGY | Facility: CLINIC | Age: 67
End: 2017-02-16
Payer: COMMERCIAL

## 2017-02-16 DIAGNOSIS — I48.0 PAROXYSMAL ATRIAL FIBRILLATION: ICD-10-CM

## 2017-02-16 DIAGNOSIS — I50.22 CHRONIC SYSTOLIC CONGESTIVE HEART FAILURE: ICD-10-CM

## 2017-02-16 LAB — DIASTOLIC DYSFUNCTION: NO

## 2017-02-16 PROCEDURE — A9555 RB82 RUBIDIUM: HCPCS | Mod: S$GLB,,, | Performed by: INTERNAL MEDICINE

## 2017-02-16 PROCEDURE — 93015 CV STRESS TEST SUPVJ I&R: CPT | Mod: S$GLB,,, | Performed by: INTERNAL MEDICINE

## 2017-02-16 PROCEDURE — 78492 MYOCRD IMG PET MLT RST&STRS: CPT | Mod: S$GLB,,, | Performed by: INTERNAL MEDICINE

## 2017-02-20 ENCOUNTER — PATIENT MESSAGE (OUTPATIENT)
Dept: CARDIOLOGY | Facility: HOSPITAL | Age: 67
End: 2017-02-20

## 2017-02-20 ENCOUNTER — OFFICE VISIT (OUTPATIENT)
Dept: CARDIOLOGY | Facility: CLINIC | Age: 67
End: 2017-02-20
Payer: COMMERCIAL

## 2017-02-20 ENCOUNTER — TELEPHONE (OUTPATIENT)
Dept: ELECTROPHYSIOLOGY | Facility: CLINIC | Age: 67
End: 2017-02-20

## 2017-02-20 VITALS
SYSTOLIC BLOOD PRESSURE: 108 MMHG | DIASTOLIC BLOOD PRESSURE: 58 MMHG | WEIGHT: 280 LBS | BODY MASS INDEX: 35.94 KG/M2 | HEIGHT: 74 IN | HEART RATE: 78 BPM

## 2017-02-20 DIAGNOSIS — Z79.4 TYPE 2 DIABETES MELLITUS WITH STAGE 3 CHRONIC KIDNEY DISEASE, WITH LONG-TERM CURRENT USE OF INSULIN: ICD-10-CM

## 2017-02-20 DIAGNOSIS — I15.2 HYPERTENSION ASSOCIATED WITH DIABETES: Chronic | ICD-10-CM

## 2017-02-20 DIAGNOSIS — E11.22 TYPE 2 DIABETES MELLITUS WITH STAGE 3 CHRONIC KIDNEY DISEASE, WITH LONG-TERM CURRENT USE OF INSULIN: ICD-10-CM

## 2017-02-20 DIAGNOSIS — E66.9 OBESITY (BMI 30-39.9): ICD-10-CM

## 2017-02-20 DIAGNOSIS — I48.19 PERSISTENT ATRIAL FIBRILLATION: Primary | ICD-10-CM

## 2017-02-20 DIAGNOSIS — I50.22 CHRONIC SYSTOLIC CONGESTIVE HEART FAILURE: ICD-10-CM

## 2017-02-20 DIAGNOSIS — E11.59 HYPERTENSION ASSOCIATED WITH DIABETES: Chronic | ICD-10-CM

## 2017-02-20 DIAGNOSIS — E78.5 HYPERLIPIDEMIA ASSOCIATED WITH TYPE 2 DIABETES MELLITUS: ICD-10-CM

## 2017-02-20 DIAGNOSIS — N13.30 HYDRONEPHROSIS, UNSPECIFIED HYDRONEPHROSIS TYPE: ICD-10-CM

## 2017-02-20 DIAGNOSIS — E11.69 HYPERLIPIDEMIA ASSOCIATED WITH TYPE 2 DIABETES MELLITUS: ICD-10-CM

## 2017-02-20 DIAGNOSIS — N18.30 TYPE 2 DIABETES MELLITUS WITH STAGE 3 CHRONIC KIDNEY DISEASE, WITH LONG-TERM CURRENT USE OF INSULIN: ICD-10-CM

## 2017-02-20 PROCEDURE — 99999 PR PBB SHADOW E&M-EST. PATIENT-LVL III: CPT | Mod: PBBFAC,,, | Performed by: INTERNAL MEDICINE

## 2017-02-20 PROCEDURE — 3078F DIAST BP <80 MM HG: CPT | Mod: S$GLB,,, | Performed by: INTERNAL MEDICINE

## 2017-02-20 PROCEDURE — 3044F HG A1C LEVEL LT 7.0%: CPT | Mod: S$GLB,,, | Performed by: INTERNAL MEDICINE

## 2017-02-20 PROCEDURE — 2022F DILAT RTA XM EVC RTNOPTHY: CPT | Mod: S$GLB,,, | Performed by: INTERNAL MEDICINE

## 2017-02-20 PROCEDURE — 99213 OFFICE O/P EST LOW 20 MIN: CPT | Mod: S$GLB,,, | Performed by: INTERNAL MEDICINE

## 2017-02-20 PROCEDURE — 3066F NEPHROPATHY DOC TX: CPT | Mod: S$GLB,,, | Performed by: INTERNAL MEDICINE

## 2017-02-20 PROCEDURE — 3074F SYST BP LT 130 MM HG: CPT | Mod: S$GLB,,, | Performed by: INTERNAL MEDICINE

## 2017-02-20 RX ORDER — CARVEDILOL 25 MG/1
25 TABLET ORAL 2 TIMES DAILY WITH MEALS
Qty: 360 TABLET | Refills: 3 | Status: SHIPPED | OUTPATIENT
Start: 2017-02-20 | End: 2017-08-30 | Stop reason: SDUPTHER

## 2017-02-20 RX ORDER — FLECAINIDE ACETATE 100 MG/1
100 TABLET ORAL EVERY 12 HOURS
Qty: 60 TABLET | Refills: 11 | Status: SHIPPED | OUTPATIENT
Start: 2017-02-20 | End: 2017-10-03 | Stop reason: SDUPTHER

## 2017-02-20 NOTE — TELEPHONE ENCOUNTER
Called patient to follow-up now that his cardiac testing is complete.  EF normalized with sinus rhythm and his PET stress was negative for ischemia/infarct.  Will start Flecainide 100mg q12h and continue his beta-blocker and eliquis as part of a rhythm control strategy.  Rx for flecainide was sent to his pharmacist.

## 2017-02-20 NOTE — Clinical Note
Thank you for referring Jose Cruz Lira for follow-up of paroxysmal atrial fibrillation. If you have any questions, please contact me.  Thank you again for the referral.

## 2017-02-20 NOTE — PROGRESS NOTES
Chart has been dictated using voice recognition software.  It is not been reviewed carefully for any transcriptional errors due to this technology.   Subjective:   Patient ID:  Jose Cruz Lira is a 66 y.o. male who presents for follow-up of Atrial Fibrillation (6 week f/u )      HPI: Patient in [employer] with paroxysmal atrial fibrillation, Hypertension, Chronic systolic congestive heart failure,Type 2 diabetes mellitus, stage 3 chronic kidney disease, Hydronephrosis, Obesity, and Hyperlipidemia associated with type 2 diabetes mellitus.  Patient recently underwent cardiovascular testing which showed:    Cardiac stress SPECT:  The patient exercised on a High Ramp protocol, corresponding to a functional capacity of 9 estimated METS, achieving a peak heart rate of 105 bpm, which is 67% of the age predicted maximum heart rate.  EKG Conclusions:  1. The EKG portion of this study is uninterpretable for ischemia at a moderate workload, and peak heart rate of 105 bpm (67% of predicted).   2. Sensitivity is impaired due to beta blocker therapy.   3. Blood pressure remained stable throughout the protocol  (Presenting BP: 150/91 Peak BP: 155/80).   4. No significant arrhythmias were present.   5. There were no symptoms of chest discomfort or significant dyspnea throughout the protocol.   Nuclear Quantitative Functional Analysis:   LVEF: 54 % (normal is >= 51%)  LVED Volume: 133 ml (normal is <=171)  LVES Volume: 62 ml (normal is <=70)  Impression: EQUIVOCAL MYOCARDIAL PERFUSION  1. There is a moderate mostly reversible inferoapical wall defect of uncertain significance. Consider PET-Stress imaging as a preferred modality. Sensitivity is impaired due to beta blocker therapy. Specificity is reduced secondary to body habitus.   2. Resting wall motion is physiologic.   3. Resting LV function is normal.  (normal is >= 51%)  4. The ventricular volumes are normal at rest and stress.   5. The extracardiac distribution of  radioactivity is normal.     Echo:    1 - Eccentric LVH (septum 1.2; posterior wall 1.1) with normal left ventricular systolic function (EF 55-60%).     2 - Severe left atrial enlargement.     3 - Left ventricular diastolic dysfunction.     4 - Normal right ventricular systolic function.    Cardiac stress PET:  EKG Conclusions:  1. The EKG portion of this study is negative for ischemia at a peak heart rate of 73 bpm (47% of predicted).   2. Blood pressure remained stable throughout the protocol  (Presenting BP: 117/74 Peak BP: 115/66).   3. The following arrhythmias were present: rare PVCs.   4. There were no symptoms of chest discomfort or significant dyspnea throughout the protocol.   Nuclear Quantitative Functional Analysis:   At rest:  LVEF: >= 70 % (normal is >= 51%)  LVED Volume: 124 ml (normal is <=171)  LVES Volume: 37 ml (normal is <=70)  At stress:  LVEF: >= 70 % (normal is >= 51%)  LVED Volume: 114 ml (normal is <=171)  LVES Volume: 34 ml (normal is <=70)  CONCLUSIONS: NO CLINICALLY SIGNIFICANT STRESS INDUCED PERFUSION DEFECTS as assessed with PET perfusion imaging.  1. There is no evidence of a discrete hemodynamically significant coronary stenosis.   2. Although no clinically significant stress defect is seen, there is resting flow heterogeneity that improves after Dipyridamole. These results suggest mild endothelial dysfunction due to elevated cholesterol, high blood pressure and diabetes without   clinically significant, localized perfusion defects.   3. Resting wall motion is physiologic. Stress wall motion is physiologic.   4. LV function is normal at rest and stress.  (normal is >= 51%)  5. The ventricular volumes are normal at rest and stress.   6. The extracardiac distribution of radioactivity is normal.     Patient notes he has been feeling tired lately.  Has 2 flight [place].  No orthopnea or PND. Patient denies any chest discomfort on exertion or at rest. Patient denies any palpitations,  "lightheadedness, or syncope. Patient denies lower extremity claudication.       Current Outpatient Prescriptions:     amlodipine (NORVASC) 10 MG tablet, Take 1 tablet (10 mg total) by mouth once daily., Disp: 90 tablet, Rfl: 4    apixaban (ELIQUIS) 5 mg Tab, Take 5 mg by mouth 2 (two) times daily., Disp: , Rfl:     blood sugar diagnostic Strp, For accucheck Patient checks BS twice a day. Please provide a 3 month supply., Disp: 200 strip, Rfl: 11    BYDUREON 2 mg/0.65 mL PnIj, INJECT 2MG WEEKLY, Disp: 4 each, Rfl: 11    carvedilol (COREG) 25 MG tablet, Take 1 tablet (25 mg total) by mouth 2 (two) times daily with meals., Disp: 360 tablet, Rfl: 3    doxazosin (CARDURA) 4 MG tablet, Take 0.5 tablets (2 mg total) by mouth once daily., Disp: , Rfl:     furosemide (LASIX) 40 MG tablet, Take 1 tablet (40 mg total) by mouth 2 (two) times daily., Disp: 180 tablet, Rfl: 3    insulin needles, disposable, (BD ULTRA-FINE RODRIGUEZ PEN NEEDLES) 32 x 5/32 " Ndle, Patient injects insulin once a day. Please provide 3 month supply., Disp: 90 each, Rfl: 4    lancets Misc, Patient checks BS twice a day. Please provide a 3 month supply., Disp: 180 each, Rfl: 10    LEVEMIR FLEXTOUCH 100 unit/mL (3 mL) InPn pen, INJECT 25 UNITS INTO THE SKIN EVERY EVENING, Disp: 15 mL, Rfl: 8    simvastatin (ZOCOR) 20 MG tablet, Take 1 tablet (20 mg total) by mouth every evening., Disp: 90 tablet, Rfl: 4    ROS - No change from prior visit in neurologic, respiratory, endocrine, GI, hematologic, or constitutional complaints   Objective:   Physical Exam   Constitutional: He is oriented to person, place, and time. He appears well-developed and well-nourished.   Obese  BP (!) 108/58 (BP Location: Left arm, Patient Position: Sitting, BP Method: Automatic)  Pulse 78  Ht 6' 2" (1.88 m)  Wt 127 kg (279 lb 15.8 oz)  BMI 35.95 kg/m2   Eyes: Pupils are equal, round, and reactive to light.   Neck: Neck supple. No JVD present. Carotid bruit is not present. No " thyromegaly present.   Cardiovascular: Normal rate, regular rhythm and intact distal pulses.   No extrasystoles are present. PMI is not displaced.  Exam reveals gallop and S4. Exam reveals no friction rub.    No murmur heard.  Pulmonary/Chest: Effort normal and breath sounds normal. He has no wheezes. He has no rales.   Abdominal: Soft. Bowel sounds are normal. There is no hepatosplenomegaly. There is no tenderness.   Musculoskeletal: He exhibits no edema.   Neurological: He is alert and oriented to person, place, and time. He has normal strength.   Skin: No cyanosis. Nails show no clubbing.         Lab Results   Component Value Date     12/06/2016    K 4.4 12/06/2016    BUN 24 (H) 12/06/2016    CREATININE 1.8 (H) 12/06/2016     (H) 12/06/2016    HGBA1C 6.9 (H) 11/27/2016    CHOL 102 (L) 11/27/2016    HDL 35 (L) 11/27/2016    LDLCALC 37.4 (L) 11/27/2016    TRIG 148 11/27/2016    CHOLHDL 34.3 11/27/2016    HGB 13.9 (L) 11/30/2016    HCT 41.2 11/30/2016     11/30/2016       Assessment:     1. Persistent atrial fibrillation    2. Hypertension associated with diabetes    3. Chronic systolic congestive heart failure    4. Type 2 diabetes mellitus with stage 3 chronic kidney disease, with long-term current use of insulin    5. Hydronephrosis, unspecified hydronephrosis type    6. Obesity (BMI 30-39.9)    7. Hyperlipidemia associated with type 2 diabetes mellitus      The patient appears to be doing well without evidence of ischemia or heart failure.  However, the patient is getting lightheaded and fatigued.  His blood pressure is relatively low.  This may be due to his beta blocker therapy as the patient is on doses slightly above recommended dosing.  Therefore, his carvedilol will be decreased.  The patient also shows no evidence of ischemia on stress testing.  The patient also has resolution of his systolic dysfunction suggesting that that may have been secondary to atrial fibrillation induced  tachycardia.  The patient will thus be reevaluated in 3 months there are no intervening problems.  Patient continues to try and lose weight and was instructed to exercise more, a recommendation strongly supported by his wife    Plan:     Jose Cruz was seen today for atrial fibrillation.    Diagnoses and all orders for this visit:    Persistent atrial fibrillation  -     carvedilol (COREG) 25 MG tablet; Take 1 tablet (25 mg total) by mouth 2 (two) times daily with meals.  -     Holter monitor - 48 hour    Hypertension associated with diabetes  -     carvedilol (COREG) 25 MG tablet; Take 1 tablet (25 mg total) by mouth 2 (two) times daily with meals.    Chronic systolic congestive heart failure  -     carvedilol (COREG) 25 MG tablet; Take 1 tablet (25 mg total) by mouth 2 (two) times daily with meals.    Type 2 diabetes mellitus with stage 3 chronic kidney disease, with long-term current use of insulin    Hydronephrosis, unspecified hydronephrosis type    Obesity (BMI 30-39.9)    Hyperlipidemia associated with type 2 diabetes mellitus    Other orders  -     apixaban (ELIQUIS) 5 mg Tab; Take 5 mg by mouth 2 (two) times daily.

## 2017-02-21 ENCOUNTER — TELEPHONE (OUTPATIENT)
Dept: SLEEP MEDICINE | Facility: OTHER | Age: 67
End: 2017-02-21

## 2017-03-03 ENCOUNTER — TELEPHONE (OUTPATIENT)
Dept: SLEEP MEDICINE | Facility: OTHER | Age: 67
End: 2017-03-03

## 2017-03-13 ENCOUNTER — PATIENT MESSAGE (OUTPATIENT)
Dept: ADMINISTRATIVE | Facility: OTHER | Age: 67
End: 2017-03-13

## 2017-03-13 ENCOUNTER — TELEPHONE (OUTPATIENT)
Dept: SLEEP MEDICINE | Facility: OTHER | Age: 67
End: 2017-03-13

## 2017-03-20 ENCOUNTER — TELEPHONE (OUTPATIENT)
Dept: SLEEP MEDICINE | Facility: OTHER | Age: 67
End: 2017-03-20

## 2017-03-20 NOTE — TELEPHONE ENCOUNTER
Talked to patient a few times and sent out a message through my [facility] to schedule.  No response.

## 2017-04-04 ENCOUNTER — LAB VISIT (OUTPATIENT)
Dept: LAB | Facility: HOSPITAL | Age: 67
End: 2017-04-04
Attending: INTERNAL MEDICINE
Payer: COMMERCIAL

## 2017-04-04 ENCOUNTER — TELEPHONE (OUTPATIENT)
Dept: CARDIOLOGY | Facility: CLINIC | Age: 67
End: 2017-04-04

## 2017-04-04 DIAGNOSIS — E11.22 TYPE 2 DIABETES MELLITUS WITH STAGE 3 CHRONIC KIDNEY DISEASE, WITH LONG-TERM CURRENT USE OF INSULIN: ICD-10-CM

## 2017-04-04 DIAGNOSIS — Z79.4 TYPE 2 DIABETES MELLITUS WITH STAGE 3 CHRONIC KIDNEY DISEASE, WITH LONG-TERM CURRENT USE OF INSULIN: ICD-10-CM

## 2017-04-04 DIAGNOSIS — N18.30 TYPE 2 DIABETES MELLITUS WITH STAGE 3 CHRONIC KIDNEY DISEASE, WITH LONG-TERM CURRENT USE OF INSULIN: ICD-10-CM

## 2017-04-04 LAB
ALBUMIN SERPL BCP-MCNC: 3.7 G/DL
ANION GAP SERPL CALC-SCNC: 10 MMOL/L
BUN SERPL-MCNC: 21 MG/DL
CALCIUM SERPL-MCNC: 9.1 MG/DL
CHLORIDE SERPL-SCNC: 107 MMOL/L
CO2 SERPL-SCNC: 27 MMOL/L
CREAT SERPL-MCNC: 1.7 MG/DL
EST. GFR  (AFRICAN AMERICAN): 48.2 ML/MIN/1.73 M^2
EST. GFR  (NON AFRICAN AMERICAN): 41.7 ML/MIN/1.73 M^2
GLUCOSE SERPL-MCNC: 128 MG/DL
PHOSPHATE SERPL-MCNC: 3.4 MG/DL
POTASSIUM SERPL-SCNC: 4.1 MMOL/L
SODIUM SERPL-SCNC: 144 MMOL/L

## 2017-04-04 PROCEDURE — 36415 COLL VENOUS BLD VENIPUNCTURE: CPT | Mod: PO

## 2017-04-04 PROCEDURE — 83036 HEMOGLOBIN GLYCOSYLATED A1C: CPT

## 2017-04-04 PROCEDURE — 80069 RENAL FUNCTION PANEL: CPT

## 2017-04-05 ENCOUNTER — OFFICE VISIT (OUTPATIENT)
Dept: ELECTROPHYSIOLOGY | Facility: CLINIC | Age: 67
End: 2017-04-05
Payer: COMMERCIAL

## 2017-04-05 ENCOUNTER — HOSPITAL ENCOUNTER (OUTPATIENT)
Dept: CARDIOLOGY | Facility: CLINIC | Age: 67
Discharge: HOME | End: 2017-04-05
Payer: COMMERCIAL

## 2017-04-05 VITALS
SYSTOLIC BLOOD PRESSURE: 136 MMHG | WEIGHT: 287.94 LBS | HEART RATE: 65 BPM | DIASTOLIC BLOOD PRESSURE: 78 MMHG | BODY MASS INDEX: 36.95 KG/M2 | HEIGHT: 74 IN

## 2017-04-05 DIAGNOSIS — E11.22 TYPE 2 DIABETES MELLITUS WITH STAGE 3 CHRONIC KIDNEY DISEASE, WITH LONG-TERM CURRENT USE OF INSULIN: ICD-10-CM

## 2017-04-05 DIAGNOSIS — I15.2 HYPERTENSION ASSOCIATED WITH DIABETES: Chronic | ICD-10-CM

## 2017-04-05 DIAGNOSIS — I48.19 PERSISTENT ATRIAL FIBRILLATION: Primary | ICD-10-CM

## 2017-04-05 DIAGNOSIS — Z79.4 TYPE 2 DIABETES MELLITUS WITH STAGE 3 CHRONIC KIDNEY DISEASE, WITH LONG-TERM CURRENT USE OF INSULIN: ICD-10-CM

## 2017-04-05 DIAGNOSIS — I48.91 ATRIAL FIBRILLATION, UNSPECIFIED TYPE: ICD-10-CM

## 2017-04-05 DIAGNOSIS — G47.30 SLEEP APNEA, UNSPECIFIED: ICD-10-CM

## 2017-04-05 DIAGNOSIS — E66.9 OBESITY (BMI 30-39.9): ICD-10-CM

## 2017-04-05 DIAGNOSIS — E11.69 HYPERLIPIDEMIA ASSOCIATED WITH TYPE 2 DIABETES MELLITUS: ICD-10-CM

## 2017-04-05 DIAGNOSIS — N18.30 TYPE 2 DIABETES MELLITUS WITH STAGE 3 CHRONIC KIDNEY DISEASE, WITH LONG-TERM CURRENT USE OF INSULIN: ICD-10-CM

## 2017-04-05 DIAGNOSIS — E78.5 HYPERLIPIDEMIA ASSOCIATED WITH TYPE 2 DIABETES MELLITUS: ICD-10-CM

## 2017-04-05 DIAGNOSIS — E11.59 HYPERTENSION ASSOCIATED WITH DIABETES: Chronic | ICD-10-CM

## 2017-04-05 LAB
ESTIMATED AVG GLUCOSE: 140 MG/DL
HBA1C MFR BLD HPLC: 6.5 %

## 2017-04-05 PROCEDURE — 3044F HG A1C LEVEL LT 7.0%: CPT | Mod: S$GLB,,, | Performed by: INTERNAL MEDICINE

## 2017-04-05 PROCEDURE — 3075F SYST BP GE 130 - 139MM HG: CPT | Mod: S$GLB,,, | Performed by: INTERNAL MEDICINE

## 2017-04-05 PROCEDURE — 3078F DIAST BP <80 MM HG: CPT | Mod: S$GLB,,, | Performed by: INTERNAL MEDICINE

## 2017-04-05 PROCEDURE — 3066F NEPHROPATHY DOC TX: CPT | Mod: S$GLB,,, | Performed by: INTERNAL MEDICINE

## 2017-04-05 PROCEDURE — 1160F RVW MEDS BY RX/DR IN RCRD: CPT | Mod: S$GLB,,, | Performed by: INTERNAL MEDICINE

## 2017-04-05 PROCEDURE — 93000 ELECTROCARDIOGRAM COMPLETE: CPT | Mod: S$GLB,,, | Performed by: INTERNAL MEDICINE

## 2017-04-05 PROCEDURE — 99999 PR PBB SHADOW E&M-EST. PATIENT-LVL III: CPT | Mod: PBBFAC,,, | Performed by: INTERNAL MEDICINE

## 2017-04-05 PROCEDURE — 99213 OFFICE O/P EST LOW 20 MIN: CPT | Mod: S$GLB,,, | Performed by: INTERNAL MEDICINE

## 2017-04-05 NOTE — MR AVS SNAPSHOT
[facility] - Arrhythmia  [address]  [facility] [address]  Phone: [phone]  Fax: [phone]                  Jose Cruz Lira   2017 9:40 AM   Office Visit    Description:  Male : 1950   Provider:  Jamey Brown MD   Department:  [facility] - Arrhythmia           Reason for Visit     Atrial Fibrillation           Diagnoses this Visit        Comments    Persistent atrial fibrillation    -  Primary     Obesity (BMI 30-39.9)         Hyperlipidemia associated with type 2 diabetes mellitus         Hypertension associated with diabetes         Type 2 diabetes mellitus with stage 3 chronic kidney disease, with long-term current use of insulin         Sleep apnea, unspecified                To Do List           Future Appointments        Provider Department Dept Phone    2017 10:30 AM Tena Phillips MD [facility] - Endo/Diab/Metab [phone]    5/10/2017 9:30 AM Lisa Capone MD [facility] - Internal Medicine [phone]    2017 8:30 AM Irvin Castanon MD [facility] - Cardiology [phone]      Goals (5 Years of Data)     None      Follow-Up and Disposition     Return in about 6 months (around 10/5/2017).       These Medications        Disp Refills Start End    apixaban (ELIQUIS) 5 mg Tab 60 tablet 11 2017     Take 1 tablet (5 mg total) by mouth 2 (two) times daily. - Oral    Pharmacy: [facility] [address] [place] [address] [address] [facility] Ph #: [phone]         [facility]     [facility] On Call Nurse Care Line -  Assistance  Unless otherwise directed by your provider, please contact [facility], our nurse care line that is available for  assistance.     Registered nurses in the [facility] provide: appointment scheduling, clinical advisement, health education, and other advisory services.  Call: [phone] (toll free)               Medications           Message regarding  "Medications     Verify the changes and/or additions to your medication regime listed below are the same as discussed with your clinician today.  If any of these changes or additions are incorrect, please notify your healthcare provider.        CHANGE how you are taking these medications     Start Taking Instead of    apixaban (ELIQUIS) 5 mg Tab apixaban (ELIQUIS) 5 mg Tab    Dosage:  Take 1 tablet (5 mg total) by mouth 2 (two) times daily. Dosage:  Take 5 mg by mouth 2 (two) times daily.    Reason for Change:  Reorder            Verify that the below list of medications is an accurate representation of the medications you are currently taking.  If none reported, the list may be blank. If incorrect, please contact your healthcare provider. Carry this list with you in case of emergency.           Current Medications     amlodipine (NORVASC) 10 MG tablet Take 1 tablet (10 mg total) by mouth once daily.    apixaban (ELIQUIS) 5 mg Tab Take 1 tablet (5 mg total) by mouth 2 (two) times daily.    blood sugar diagnostic Strp For accucheck Patient checks BS twice a day. Please provide a 3 month supply.    BYDUREON 2 mg/0.65 mL PnIj INJECT 2MG WEEKLY    carvedilol (COREG) 25 MG tablet Take 1 tablet (25 mg total) by mouth 2 (two) times daily with meals.    doxazosin (CARDURA) 4 MG tablet Take 0.5 tablets (2 mg total) by mouth once daily.    flecainide (TAMBOCOR) 100 MG Tab Take 1 tablet (100 mg total) by mouth every 12 (twelve) hours.    furosemide (LASIX) 40 MG tablet Take 1 tablet (40 mg total) by mouth 2 (two) times daily.    insulin needles, disposable, (BD ULTRA-FINE RODRIGUEZ PEN NEEDLES) 32 x 5/32 " Ndle Patient injects insulin once a day. Please provide 3 month supply.    lancets Misc Patient checks BS twice a day. Please provide a 3 month supply.    LEVEMIR FLEXTOUCH 100 unit/mL (3 mL) InPn pen INJECT 25 UNITS INTO THE SKIN EVERY EVENING    simvastatin (ZOCOR) 20 MG tablet Take 1 tablet (20 mg total) by mouth every evening. " "          Clinical Reference Information           Your Vitals Were     BP Pulse Height Weight BMI    136/78 65 6' 2" (1.88 m) 130.6 kg (287 lb 14.7 oz) 36.97 kg/m2      Blood Pressure          Most Recent Value    BP  136/78      Allergies as of 4/5/2017     No Known Allergies      Immunizations Administered on Date of Encounter - 4/5/2017     None      Orders Placed During Today's Visit     Future Labs/Procedures Expected by Expires    Basic metabolic panel  10/5/2017 6/4/2018    CBC auto differential  10/5/2017 6/4/2018      Language Assistance Services     ATTENTION: Language assistance services are available, free of charge. Please call [phone].      ATENCIÓN: Si habla pilar, tiene a limon disposición servicios gratuitos de asistencia lingüística. Llame al [phone].     MARISSA Ý: N?u b?n nói Ti?ng Vi?t, có các d?ch v? h? tr? ngôn ng? mi?n phí dành cho b?n. G?i s? [phone].         [facility] complies with applicable Federal civil rights laws and does not discriminate on the basis of race, color, national origin, age, disability, or sex.        "

## 2017-04-05 NOTE — PROGRESS NOTES
Subjective:    Patient ID:  Jose Cruz Lira is a 66 y.o. male who presents for follow-up of Atrial Fibrillation    Primary Care Physician: Tena Phillips MD  Primary Cardiologist: Irvin Castanon MD    HPI  Prior Hx:  I had the pleasure of seeing Mr. Lira in our electrophysiology clinic in follow-up for his atrial fibrillation. As you are aware he is a pleasant 66 year-old man with hypertension, obesity, chronic kidney disease with chronic hydronephrosis and type 2 diabetes mellitus. He was in his usual state of health until mid-2016 when he began to notice exertional shortness of breath, fatigue and increasing weight. He presented to the ER for evaluation (11/2016) and was found to be in atrial fibrillation with rapid ventricular response and new onset systolic heart failure. His EF was measured around 20% when in AF with RVR. He was diuresed and rate controlled. He underwent KADEN guided DCCV at which his EF appeared to be ~35% with improved rate control. He was cardioverted and presented for follow-up 12/2016. At that visit he noted he has lost ~20 pounds since his initial presentation. He had been watching his fluid intake and diet. He reported he felt he was back to normal and noted no palpitations, exertional dyspnea or chest discomfort. He had no orthopnea/PND. Wife noted symptoms consistent with sleep apnea when he was asleep (snoring, apneic spells). His 1 and 4 week post-DCCV ECG's show sinus rhythm.     Interim Hx:  Mr. Lira had a repeat echocardiogram after ~2 months of sinus rhythm that revealed a normal LVEF (results below).  PET stress was also performed showing no ischemia (results below).  We started him on daily bid flecainide.  A holter monitor was performed showing no atrial fibrillation but did show a few episodes of nonsustained atrial tachycardia.  He was seen in sleep clinic and is to undergo a sleep study.  He notes feeling well.  He is tolerating eliquis without melena or bleeding.  He is  attempting to exercise and lose weight.    ECHO 1/2017 CONCLUSIONS     1 - Eccentric LVH with normal left ventricular systolic function (EF 55-60%).     2 - Severe left atrial enlargement.     3 - Left ventricular diastolic dysfunction.     4 - Normal right ventricular systolic function     PET stress 2/2017: CONCLUSIONS: NO CLINICALLY SIGNIFICANT STRESS INDUCED PERFUSION DEFECTS as assessed with PET perfusion imaging.  1. There is no evidence of a discrete hemodynamically significant coronary stenosis.   2. Although no clinically significant stress defect is seen, there is resting flow heterogeneity that improves after Dipyridamole. These results suggest mild endothelial dysfunction due to elevated cholesterol, high blood pressure and diabetes without   clinically significant, localized perfusion defects.   3. Resting wall motion is physiologic. Stress wall motion is physiologic.   4. LV function is normal at rest and stress.  (normal is >= 51%)  5. The ventricular volumes are normal at rest and stress.   6. The extracardiac distribution of radioactivity is normal.   7. There was no previous study available to compare.    My interpretation of today's in clinic electrocardiogram is normal sinus rhythm at a rate of 65 bpm and normal intervals.     Review of Systems   Constitution: Negative. Negative for weakness and malaise/fatigue.   HENT: Negative.  Negative for congestion and headaches.    Eyes: Negative.  Negative for blurred vision and visual disturbance.   Cardiovascular: Negative.  Negative for chest pain, dyspnea on exertion, irregular heartbeat, leg swelling, near-syncope, orthopnea, palpitations and paroxysmal nocturnal dyspnea.   Respiratory: Positive for snoring. Negative for cough.    Endocrine: Negative.    Hematologic/Lymphatic: Negative.  Negative for bleeding problem. Does not bruise/bleed easily.   Skin: Negative.    Musculoskeletal: Negative.    Gastrointestinal: Negative.  Negative for hematemesis  and melena.   Genitourinary: Negative.    Neurological: Negative.  Negative for dizziness, focal weakness and light-headedness.   Psychiatric/Behavioral: Negative.         Objective:    Physical Exam   Constitutional: He is oriented to person, place, and time. He appears well-developed and well-nourished. No distress.   HENT:   Head: Normocephalic and atraumatic.   Eyes: Conjunctivae are normal. Right eye exhibits no discharge. Left eye exhibits no discharge. No scleral icterus.   Neck: Neck supple. No JVD present.   Cardiovascular: Normal rate, regular rhythm, normal heart sounds and intact distal pulses.  Exam reveals no gallop and no friction rub.    No murmur heard.  Pulmonary/Chest: Effort normal and breath sounds normal. No respiratory distress. He has no wheezes. He has no rales.   Abdominal: Soft. Bowel sounds are normal. He exhibits no distension. There is no tenderness.   Musculoskeletal: Normal range of motion. He exhibits no edema or tenderness.   Neurological: He is alert and oriented to person, place, and time.   Skin: Skin is warm and dry. No rash noted. He is not diaphoretic. No erythema. No pallor.   Psychiatric: He has a normal mood and affect. His behavior is normal. Judgment and thought content normal.   Vitals reviewed.        Assessment:       1. Persistent atrial fibrillation    2. Obesity (BMI 30-39.9)    3. Hyperlipidemia associated with type 2 diabetes mellitus    4. Hypertension associated with diabetes    5. Type 2 diabetes mellitus with stage 3 chronic kidney disease, with long-term current use of insulin    6. Sleep apnea, unspecified         Plan:       In summary, Mr. Lira is a pleasant 66 year-old man with hypertension, obesity, chronic kidney disease with chronic hydronephrosis and type 2 diabetes mellitus presenting for follow-up for newly diagnosed persistent atrial fibrillation with RVR and decompensated systolic heart failure (EF 20-35%). His EF normalized with sinus rhythm and  stress test showed no ischemia.  He has maintained sinus rhythm with flecainide/carvedilol.  He is on eliquis for primary stroke prevention and tolerating it well.  I had a long discussion with the patient about the pathophysiology and risks of atrial fibrillation and its basic pathophysiology, including its health implications and treatment options with the patient. Specifically, I addressed the need for CVA (stroke) prophylaxis with aspirin versus oral anticoagulation (warfarin vs NOACs, discussed bleeding risks, irreversibility of NOACs vs Vitamin K/plasma reversal of warfarin, and need to come to the ER for any head trauma for CT scanning even if asymptomatic).  I also discussed the goal to reduce symptomatic arrhythmic episodes by pharmacologic and/or procedural methods and utilizing a rhythm versus a rate control strategy.  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  Indicated at some point he may have breakthrough atrial fibrillation on flecainide at which point I would recommend PVI rather than trial of a second anti-arrhythmic due to improved efficacy of an ablation strategy in that situation.  For now continue pharmacologic rhythm control. We discuss warning symptoms of a stroke and he should call 911 immediately if he has any such symptoms.    RTC in 6 months with preclinic CBC/BMP    Thank you for allowing me to participate in the care of this patient. Please do not hesitate to call me with any questions or concerns.    Jamey Brown MD, PhD  Cardiac Electrophysiology

## 2017-04-11 ENCOUNTER — OFFICE VISIT (OUTPATIENT)
Dept: ENDOCRINOLOGY | Facility: CLINIC | Age: 67
End: 2017-04-11
Payer: COMMERCIAL

## 2017-04-11 VITALS
BODY MASS INDEX: 36.64 KG/M2 | DIASTOLIC BLOOD PRESSURE: 72 MMHG | SYSTOLIC BLOOD PRESSURE: 124 MMHG | HEART RATE: 72 BPM | HEIGHT: 74 IN | WEIGHT: 285.5 LBS

## 2017-04-11 DIAGNOSIS — I15.2 HYPERTENSION ASSOCIATED WITH DIABETES: Chronic | ICD-10-CM

## 2017-04-11 DIAGNOSIS — E78.5 HYPERLIPIDEMIA ASSOCIATED WITH TYPE 2 DIABETES MELLITUS: ICD-10-CM

## 2017-04-11 DIAGNOSIS — E11.59 HYPERTENSION ASSOCIATED WITH DIABETES: Chronic | ICD-10-CM

## 2017-04-11 DIAGNOSIS — E11.69 HYPERLIPIDEMIA ASSOCIATED WITH TYPE 2 DIABETES MELLITUS: ICD-10-CM

## 2017-04-11 DIAGNOSIS — Z79.4 TYPE 2 DIABETES MELLITUS WITH STAGE 3 CHRONIC KIDNEY DISEASE, WITH LONG-TERM CURRENT USE OF INSULIN: Primary | ICD-10-CM

## 2017-04-11 DIAGNOSIS — E66.9 OBESITY (BMI 30-39.9): ICD-10-CM

## 2017-04-11 DIAGNOSIS — E11.29 TYPE 2 DIABETES MELLITUS WITH OTHER DIABETIC KIDNEY COMPLICATION: ICD-10-CM

## 2017-04-11 DIAGNOSIS — E11.22 TYPE 2 DIABETES MELLITUS WITH STAGE 3 CHRONIC KIDNEY DISEASE, WITH LONG-TERM CURRENT USE OF INSULIN: Primary | ICD-10-CM

## 2017-04-11 DIAGNOSIS — N18.30 TYPE 2 DIABETES MELLITUS WITH STAGE 3 CHRONIC KIDNEY DISEASE, WITH LONG-TERM CURRENT USE OF INSULIN: Primary | ICD-10-CM

## 2017-04-11 PROCEDURE — 3044F HG A1C LEVEL LT 7.0%: CPT | Mod: S$GLB,,, | Performed by: INTERNAL MEDICINE

## 2017-04-11 PROCEDURE — 99999 PR PBB SHADOW E&M-EST. PATIENT-LVL III: CPT | Mod: PBBFAC,,, | Performed by: INTERNAL MEDICINE

## 2017-04-11 PROCEDURE — 3074F SYST BP LT 130 MM HG: CPT | Mod: S$GLB,,, | Performed by: INTERNAL MEDICINE

## 2017-04-11 PROCEDURE — 1160F RVW MEDS BY RX/DR IN RCRD: CPT | Mod: S$GLB,,, | Performed by: INTERNAL MEDICINE

## 2017-04-11 PROCEDURE — 3078F DIAST BP <80 MM HG: CPT | Mod: S$GLB,,, | Performed by: INTERNAL MEDICINE

## 2017-04-11 PROCEDURE — 3066F NEPHROPATHY DOC TX: CPT | Mod: S$GLB,,, | Performed by: INTERNAL MEDICINE

## 2017-04-11 PROCEDURE — 99214 OFFICE O/P EST MOD 30 MIN: CPT | Mod: S$GLB,,, | Performed by: INTERNAL MEDICINE

## 2017-04-11 RX ORDER — AMLODIPINE BESYLATE 10 MG/1
10 TABLET ORAL DAILY
Qty: 90 TABLET | Refills: 4 | Status: SHIPPED | OUTPATIENT
Start: 2017-04-11 | End: 2017-12-14 | Stop reason: SDUPTHER

## 2017-04-11 RX ORDER — SIMVASTATIN 20 MG/1
20 TABLET, FILM COATED ORAL NIGHTLY
Qty: 90 TABLET | Refills: 4 | Status: SHIPPED | OUTPATIENT
Start: 2017-04-11 | End: 2017-12-14 | Stop reason: SDUPTHER

## 2017-04-11 NOTE — MR AVS SNAPSHOT
[facility] - Endo/Diab/Metab  [address]  [facility] [address]  Phone: [phone]  Fax: [phone]                  Jose Cruz Lira   2017 10:30 AM   Office Visit    Description:  Male : 1950   Provider:  Tena Phillips MD   Department:  [facility] - Endo/Diab/Metab           Reason for Visit     Diabetes Mellitus           Diagnoses this Visit        Comments    Type 2 diabetes mellitus with stage 3 chronic kidney disease, with long-term current use of insulin    -  Primary     Hyperlipidemia associated with type 2 diabetes mellitus         Hypertension associated with diabetes                To Do List           Future Appointments        Provider Department Dept Phone    5/10/2017 9:30 AM Lisa Capone MD [facility] - Internal Medicine [phone]    2017 8:30 AM Irvin Castanon MD [facility] - Cardiology [phone]      Goals (5 Years of Data)     None      Follow-Up and Disposition     Return in about 6 months (around 10/11/2017).    Follow-up and Disposition History      [facility]     [facility] On Call Nurse Care Line -  Assistance  Unless otherwise directed by your provider, please contact [facility] On-Call, our nurse care line that is available for  assistance.     Registered nurses in the [facility] provide: appointment scheduling, clinical advisement, health education, and other advisory services.  Call: [phone] (toll free)               Medications           Message regarding Medications     Verify the changes and/or additions to your medication regime listed below are the same as discussed with your clinician today.  If any of these changes or additions are incorrect, please notify your healthcare provider.             Verify that the below list of medications is an accurate representation of the medications you are currently taking.  If none reported, the list may be blank. If incorrect, please contact your healthcare  "provider. Carry this list with you in case of emergency.           Current Medications     amlodipine (NORVASC) 10 MG tablet Take 1 tablet (10 mg total) by mouth once daily.    apixaban (ELIQUIS) 5 mg Tab Take 1 tablet (5 mg total) by mouth 2 (two) times daily.    blood sugar diagnostic Strp 1 strip by Misc.(Non-Drug; Combo Route) route once daily. For accucheck Patient checks BS twice a day. Please provide a 3 month supply.    BYDUREON 2 mg/0.65 mL PnIj INJECT 2MG WEEKLY    carvedilol (COREG) 25 MG tablet Take 1 tablet (25 mg total) by mouth 2 (two) times daily with meals.    doxazosin (CARDURA) 4 MG tablet Take 0.5 tablets (2 mg total) by mouth once daily.    flecainide (TAMBOCOR) 100 MG Tab Take 1 tablet (100 mg total) by mouth every 12 (twelve) hours.    furosemide (LASIX) 40 MG tablet Take 1 tablet (40 mg total) by mouth 2 (two) times daily.    insulin detemir (LEVEMIR FLEXTOUCH) 100 unit/mL (3 mL) SubQ InPn pen INJECT 25 UNITS INTO THE SKIN EVERY EVENING    insulin needles, disposable, (BD ULTRA-FINE RODRIGUEZ PEN NEEDLES) 32 x 5/32 " Ndle Patient injects insulin once a day. Please provide 3 month supply.    lancets Misc Patient checks BS twice a day. Please provide a 3 month supply.    simvastatin (ZOCOR) 20 MG tablet Take 1 tablet (20 mg total) by mouth every evening.           Clinical Reference Information           Your Vitals Were     BP Pulse Height Weight BMI    124/72 (BP Location: Right arm, Patient Position: Sitting, BP Method: Manual) 72 6' 2" (1.88 m) 129.5 kg (285 lb 7.9 oz) 36.66 kg/m2      Blood Pressure          Most Recent Value    BP  124/72      Allergies as of 4/11/2017     No Known Allergies      Immunizations Administered on Date of Encounter - 4/11/2017     None      Orders Placed During Today's Visit     Future Labs/Procedures Expected by Expires    Basic metabolic panel  4/11/2017 6/10/2018    Hemoglobin A1c  4/11/2017 6/10/2018    Vitamin D  4/11/2017 4/11/2018      Language Assistance " Services     ATTENTION: Language assistance services are available, free of charge. Please call [phone].      ATENCIÓN: Si habla español, tiene a limon disposición servicios gratuitos de asistencia lingüística. Llame al [phone].     CHÚ Ý: N?u b?n nói Ti?ng Vi?t, có các d?ch v? h? tr? ngôn ng? mi?n phí dành cho b?n. G?i s? [phone].         [facility] complies with applicable Federal civil rights laws and does not discriminate on the basis of race, color, national origin, age, disability, or sex.

## 2017-04-11 NOTE — PROGRESS NOTES
"Subjective:     Patient ID: Jose Cruz Khan is a 66 y.o. male.    Chief Complaint: Diabetes Mellitus    HPI:   Mr. Khan is a 66 y.o. male who is here for a follow-up visit for evaluation type 2 diabetes mellitus that is controlled and complicated by CKD stage 3.     Current medications:  Bydureon 2 mg weekly   levemir 25 units at bedtime    Denies any readings below 60. Denies symptoms of hypoglycemia.   Checking blood sugar    Admitted to hospital for atrial fibrillation,and is currently on anti arrhythmic with good response. He gained a bit of weight on his recent cruise, but his A1C has been well controlled. Reports excellent compliance with diet, salt, carbohydrates and checking BG.   Also has hydronephrosis and CKD stage 3 with close f/u with Dr. Anderson. Has nocturia every 3 to four times at night.      Review of Systems   Constitutional: Negative for chills and fever.   HENT: Negative for congestion and sinus pressure.    Eyes: Negative for visual disturbance.   Respiratory: Negative for chest tightness and shortness of breath.    Cardiovascular: Negative for chest pain and palpitations.   Gastrointestinal: Positive for diarrhea ( after eating out and eating new food, occurs one to two times a month). Negative for abdominal distention, nausea and vomiting.   Genitourinary: Negative for dysuria and flank pain.   Musculoskeletal: Negative for back pain.   Skin: Negative for rash.   Neurological: Negative for weakness.   Hematological: Does not bruise/bleed easily.   Psychiatric/Behavioral: Negative for sleep disturbance.          Objective:     Physical Exam    Vitals:    04/11/17 1023   BP: 124/72   BP Location: Right arm   Patient Position: Sitting   BP Method: Manual   Pulse: 72   Weight: 129.5 kg (285 lb 7.9 oz)   Height: 6' 2" (1.88 m)     Results for JOSE CRUZ KHAN (MRN [id number]) as of 4/11/2017 10:41   Ref. Range 4/4/2017 09:16   Sodium Latest Ref Range: 136 - 145 mmol/L 144   Potassium Latest " Ref Range: 3.5 - 5.1 mmol/L 4.1   Chloride Latest Ref Range: 95 - 110 mmol/L 107   CO2 Latest Ref Range: 23 - 29 mmol/L 27   Anion Gap Latest Ref Range: 8 - 16 mmol/L 10   BUN, Bld Latest Ref Range: 8 - 23 mg/dL 21   Creatinine Latest Ref Range: 0.5 - 1.4 mg/dL 1.7 (H)   eGFR if non African American Latest Ref Range: >60 mL/min/1.73 m^2 41.7 (A)   eGFR if African American Latest Ref Range: >60 mL/min/1.73 m^2 48.2 (A)   Glucose Latest Ref Range: 70 - 110 mg/dL 128 (H)   Calcium Latest Ref Range: 8.7 - 10.5 mg/dL 9.1   Phosphorus Latest Ref Range: 2.7 - 4.5 mg/dL 3.4   Albumin Latest Ref Range: 3.5 - 5.2 g/dL 3.7   Hemoglobin A1C Latest Ref Range: 4.5 - 6.2 % 6.5 (H)   Estimated Avg Glucose Latest Ref Range: 68 - 131 mg/dL 140 (H)   Results for ERIN ALIRIO DAVIES (MRN [id number]) as of 4/11/2017 10:41   Ref. Range 11/27/2016 18:11   Cholesterol Latest Ref Range: 120 - 199 mg/dL 102 (L)   HDL Latest Ref Range: 40 - 75 mg/dL 35 (L)   LDL Cholesterol Latest Ref Range: 63.0 - 159.0 mg/dL 37.4 (L)   Total Cholesterol/HDL Ratio Latest Ref Range: 2.0 - 5.0  2.9   Triglycerides Latest Ref Range: 30 - 150 mg/dL 148     Assessment/Plan:     1. Type 2 diabetes mellitus with stage 3 chronic kidney disease, with long-term current use of insulin  - continue bydureon and levemir at current dosing  - encouraged exercise  - Hemoglobin A1c; Future  - Basic metabolic panel; Future  - Vitamin D; Future    2. Hyperlipidemia associated with type 2 diabetes mellitus  - well controlled on simvastatin 20 mg daily   - LFTs normal  - Vitamin D; Future    3. Hypertension associated with diabetes  - well controlled amlodipine  - Vitamin D; Future

## 2017-06-19 ENCOUNTER — OFFICE VISIT (OUTPATIENT)
Dept: NEPHROLOGY | Facility: CLINIC | Age: 67
End: 2017-06-19
Payer: COMMERCIAL

## 2017-06-19 VITALS
WEIGHT: 281.31 LBS | TEMPERATURE: 98 F | BODY MASS INDEX: 36.1 KG/M2 | HEIGHT: 74 IN | HEART RATE: 65 BPM | OXYGEN SATURATION: 95 % | SYSTOLIC BLOOD PRESSURE: 136 MMHG | DIASTOLIC BLOOD PRESSURE: 84 MMHG

## 2017-06-19 DIAGNOSIS — E11.21 UNCONTROLLED TYPE 2 DIABETES MELLITUS WITH DIABETIC NEPHROPATHY, UNSPECIFIED LONG TERM INSULIN USE STATUS: ICD-10-CM

## 2017-06-19 DIAGNOSIS — I15.2 HYPERTENSION ASSOCIATED WITH DIABETES: ICD-10-CM

## 2017-06-19 DIAGNOSIS — E11.65 UNCONTROLLED TYPE 2 DIABETES MELLITUS WITH DIABETIC NEPHROPATHY, UNSPECIFIED LONG TERM INSULIN USE STATUS: ICD-10-CM

## 2017-06-19 DIAGNOSIS — N28.9 NON-FUNCTIONING KIDNEY: ICD-10-CM

## 2017-06-19 DIAGNOSIS — E11.59 HYPERTENSION ASSOCIATED WITH DIABETES: ICD-10-CM

## 2017-06-19 DIAGNOSIS — N18.30 CHRONIC KIDNEY DISEASE, STAGE III (MODERATE): Primary | ICD-10-CM

## 2017-06-19 DIAGNOSIS — R80.9 PROTEINURIA, UNSPECIFIED TYPE: ICD-10-CM

## 2017-06-19 PROCEDURE — 3066F NEPHROPATHY DOC TX: CPT | Mod: S$GLB,,, | Performed by: INTERNAL MEDICINE

## 2017-06-19 PROCEDURE — 3044F HG A1C LEVEL LT 7.0%: CPT | Mod: S$GLB,,, | Performed by: INTERNAL MEDICINE

## 2017-06-19 PROCEDURE — 99999 PR PBB SHADOW E&M-EST. PATIENT-LVL III: CPT | Mod: PBBFAC,,, | Performed by: INTERNAL MEDICINE

## 2017-06-19 PROCEDURE — 99214 OFFICE O/P EST MOD 30 MIN: CPT | Mod: S$GLB,,, | Performed by: INTERNAL MEDICINE

## 2017-06-19 RX ORDER — PENICILLIN V POTASSIUM 500 MG/1
TABLET, FILM COATED ORAL
Refills: 0 | COMMUNITY
Start: 2017-05-16 | End: 2017-06-19

## 2017-06-19 NOTE — PROGRESS NOTES
"Subjective:       Patient ID: Jose Cruz Lira is a 66 y.o. White male who presents for return patient evaluation for chronic renal failure.    He has been having some hematuria over the past two weeks along with some left flank pain.  There have been no recent illnesses, hospitalizations or procedures.  His edema has improved a bit.      Review of Systems   Constitutional: Positive for fatigue. Negative for appetite change, chills and fever.   Eyes: Negative for visual disturbance.   Respiratory: Positive for shortness of breath (with exertion). Negative for cough.    Cardiovascular: Positive for leg swelling. Negative for chest pain.   Gastrointestinal: Negative for nausea.   Genitourinary: Positive for flank pain (L at times), frequency (nocturia x 3-4) and hematuria. Negative for decreased urine volume, difficulty urinating and dysuria.   Musculoskeletal: Positive for arthralgias and back pain.   Skin: Negative for rash.         /84 (BP Location: Left arm, Patient Position: Sitting)   Pulse 65   Temp 98 °F (36.7 °C)   Ht 6' 2" (1.88 m)   Wt 127.6 kg (281 lb 4.9 oz)   SpO2 95%   BMI 36.12 kg/m²     Objective:      Physical Exam   Constitutional: He appears well-developed and well-nourished. No distress.   HENT:   Head: Normocephalic and atraumatic.   Eyes: Conjunctivae are normal. No scleral icterus.   Neck: Normal range of motion. No JVD present.   Cardiovascular: Normal rate, regular rhythm and normal heart sounds.  Exam reveals no gallop and no friction rub.    No murmur heard.  Pulmonary/Chest: Effort normal and breath sounds normal. No respiratory distress. He has no wheezes.   Abdominal: Soft. Bowel sounds are normal. He exhibits no distension (obese). There is no tenderness.   Musculoskeletal: He exhibits edema (1+ BLE).   Skin: Skin is warm and dry. No rash noted.   Psychiatric: He has a normal mood and affect.   Vitals reviewed.      Assessment:       1. Chronic kidney disease, stage III " (moderate)    2. Hypertension associated with diabetes    3. Uncontrolled type 2 diabetes mellitus with diabetic nephropathy, unspecified long term insulin use status    4. Non-functioning kidney    5. Proteinuria, unspecified type        Plan:   Return to clinic in 4 months.  Labs for next visit include rp, pth, ua, upc.  Baseline creatinine is 1.4-1.8 sine 2014.  I agree that he should have his kidney removed now that he is having symptoms.  He requests that it be done laparoscopically if possible since he needs to continue to work and he does not want to be out of work for a prolonged period of time. The split function study from 2015 showed R 92%, L 8%.    I will call [facility] to discuss his care plan and ask that they see his ASAP to evaluate this.

## 2017-06-22 ENCOUNTER — TELEPHONE (OUTPATIENT)
Dept: UROLOGY | Facility: CLINIC | Age: 67
End: 2017-06-22

## 2017-06-22 ENCOUNTER — OFFICE VISIT (OUTPATIENT)
Dept: UROLOGY | Facility: CLINIC | Age: 67
End: 2017-06-22
Payer: COMMERCIAL

## 2017-06-22 ENCOUNTER — HOSPITAL ENCOUNTER (OUTPATIENT)
Dept: RADIOLOGY | Facility: HOSPITAL | Age: 67
Discharge: HOME | End: 2017-06-22
Attending: UROLOGY
Payer: COMMERCIAL

## 2017-06-22 VITALS
WEIGHT: 271.63 LBS | DIASTOLIC BLOOD PRESSURE: 65 MMHG | HEIGHT: 74 IN | HEART RATE: 68 BPM | BODY MASS INDEX: 34.86 KG/M2 | SYSTOLIC BLOOD PRESSURE: 122 MMHG

## 2017-06-22 DIAGNOSIS — R31.0 GROSS HEMATURIA: Primary | ICD-10-CM

## 2017-06-22 DIAGNOSIS — N13.5 UPJ (URETEROPELVIC JUNCTION) OBSTRUCTION: ICD-10-CM

## 2017-06-22 DIAGNOSIS — N13.30 HYDRONEPHROSIS, UNSPECIFIED HYDRONEPHROSIS TYPE: ICD-10-CM

## 2017-06-22 DIAGNOSIS — N28.9 NON-FUNCTIONING KIDNEY: ICD-10-CM

## 2017-06-22 DIAGNOSIS — R31.0 HEMATURIA, GROSS: ICD-10-CM

## 2017-06-22 DIAGNOSIS — R31.0 HEMATURIA, GROSS: Primary | ICD-10-CM

## 2017-06-22 PROCEDURE — 99999 PR PBB SHADOW E&M-EST. PATIENT-LVL III: CPT | Mod: PBBFAC,,, | Performed by: UROLOGY

## 2017-06-22 PROCEDURE — 76770 US EXAM ABDO BACK WALL COMP: CPT | Mod: 26,,, | Performed by: RADIOLOGY

## 2017-06-22 PROCEDURE — 99214 OFFICE O/P EST MOD 30 MIN: CPT | Mod: S$GLB,,, | Performed by: UROLOGY

## 2017-06-22 PROCEDURE — 76770 US EXAM ABDO BACK WALL COMP: CPT | Mod: TC

## 2017-06-22 NOTE — PROGRESS NOTES
CHIEF COMPLAINT:    Mr. Lira is a 66 y.o. male presenting with a L non functioning kidney.    PRESENTING ILLNESS:    Jose Cruz Lira is a 66 y.o. male who had a renal u/s done, and an incidental L UPJ-O was found.  He underwent a CT and a mag 3.  I reviewed them personally.  The L kidney has 8% function.  The T1/2 on the L is infinity.  The R kidney has 92% function with a normal T 1/2.      A CT at that time showed massive L hydro. The L kidney crosses the midline and occupies part of the pelvis.  Because of this we discussed that a nephrectomy would be very difficult and require a large incision.  Therefore, we decided to place a JJ stent to see if the kidney would collapse down to a manageable size.  This was done on 5/14/15.  He then underwent a repeat CT on 6/16/15.  I reviewed this personally.  The L kidney has shrunk in size, but it is still very large.  It still occupies a large portion of the pelvis. He would likely need a very large incision.  Because of this, he was reluctant to undergo nephrectomy.  He now presents c/o hematuria and intermittent flank pain on the L.  He would like a nephrectomy.    He has nocturia x 3 and is on flomax.  No dysuria.    REVIEW OF SYSTEMS:    Patient denies bleeding diathesis, chills, decreased size/force of stream, dysuria, fever, flank pain, frequency or urgency, hematuria, hesitancy, intermittency or feeling of incomplete emptying, stones, stress or urgency incontinence, TB or genitourinary trauma and urethral discharge.    NOEMI  1. 1  2. 0  3. 0  4. 0  5. 0      PATIENT HISTORY:    Past Medical History:   Diagnosis Date    Diabetes mellitus, type 2 2010    Hydronephrosis of left kidney     Hypertension     Non-functioning kidney     left    Obesity (BMI 30-39.9)     Unspecified disorder of kidney and ureter        Past Surgical History:   Procedure Laterality Date    arm surgery      CARDIOVERSION  11/29/2016    knee surgeory N/A 4 to 5 years ago        Family History   Problem Relation Age of Onset    Cancer Father     Cancer Sister     Cancer Brother     Amblyopia Neg Hx     Blindness Neg Hx     Cataracts Neg Hx     Diabetes Neg Hx     Glaucoma Neg Hx     Hypertension Neg Hx     Macular degeneration Neg Hx     Retinal detachment Neg Hx     Strabismus Neg Hx     Stroke Neg Hx     Thyroid disease Neg Hx        Social History     Social History    Marital status:      Spouse name: N/A    Number of children: N/A    Years of education: N/A     Occupational History    Not on file.     Social History Main Topics    Smoking status: Never Smoker    Smokeless tobacco: Never Used    Alcohol use Yes    Drug use: No    Sexual activity: No      Comment:       Other Topics Concern    Not on file     Social History Narrative     in [employer]. No physical activities.     with 2 kids       Allergies:  Review of patient's allergies indicates no known allergies.    Medications:    Current Outpatient Prescriptions:     amlodipine (NORVASC) 10 MG tablet, Take 1 tablet (10 mg total) by mouth once daily., Disp: 90 tablet, Rfl: 4    apixaban (ELIQUIS) 5 mg Tab, Take 1 tablet (5 mg total) by mouth 2 (two) times daily., Disp: 60 tablet, Rfl: 11    blood sugar diagnostic Strp, 1 strip by Misc.(Non-Drug; Combo Route) route once daily. For accucheck Patient checks BS twice a day. Please provide a 3 month supply., Disp: 180 strip, Rfl: 4    BYDUREON 2 mg/0.65 mL PnIj, INJECT 2MG WEEKLY, Disp: 4 each, Rfl: 11    carvedilol (COREG) 25 MG tablet, Take 1 tablet (25 mg total) by mouth 2 (two) times daily with meals., Disp: 360 tablet, Rfl: 3    doxazosin (CARDURA) 4 MG tablet, Take 0.5 tablets (2 mg total) by mouth once daily., Disp: , Rfl:     flecainide (TAMBOCOR) 100 MG Tab, Take 1 tablet (100 mg total) by mouth every 12 (twelve) hours., Disp: 60 tablet, Rfl: 11    furosemide (LASIX) 40 MG tablet, Take 1 tablet (40  "mg total) by mouth 2 (two) times daily., Disp: 180 tablet, Rfl: 3    insulin detemir (LEVEMIR FLEXTOUCH) 100 unit/mL (3 mL) SubQ InPn pen, INJECT 25 UNITS INTO THE SKIN EVERY EVENING, Disp: 45 mL, Rfl: 4    insulin needles, disposable, (BD ULTRA-FINE RODRIGUEZ PEN NEEDLES) 32 x 5/32 " Ndle, Patient injects insulin once a day. Please provide 3 month supply., Disp: 90 each, Rfl: 4    lancets Misc, Patient checks BS twice a day. Please provide a 3 month supply., Disp: 180 each, Rfl: 10    simvastatin (ZOCOR) 20 MG tablet, Take 1 tablet (20 mg total) by mouth every evening., Disp: 90 tablet, Rfl: 4    PHYSICAL EXAMINATION:    The patient generally appears in good health, is appropriately interactive, and is in no apparent distress.    Skin: No lesions.    Mental: Cooperative with normal affect.    Neuro: Grossly intact.    HEENT: Normal. No evidence of lymphadenopathy.    Chest: Clear to ascultation bilaterally, normal inspiratory effort.    Heart: Regular rhythm.  No murmurs    Abdomen: BS active. Soft, non-tender. No masses or organomegaly. Bladder is not palpable. No evidence of flank discomfort. No evidence of inguinal hernia.    Genitourinary: Deferred as it was just done by Jarrod Simental.    Extremities: No clubbing, cyanosis. 2+ pitting edema bilaterally.      LABS:    UA dipped negative today    Lab Results   Component Value Date    PSA 1.5 05/09/2016    PSADIAG 1.1 03/13/2015       IMPRESSION:    Encounter Diagnoses   Name Primary?    Hematuria, gross Yes    UPJ (ureteropelvic junction) obstruction     Non-functioning kidney     Hydronephrosis, unspecified hydronephrosis type          PLAN:    1. Discussed that his L kidney does not function.  Recommend a nephrectomy.  However, before the nephrectomy, will workup the hematuria.  Will need a renal u/s and cysto, bilateral RPG.  Possible URS, possible TURBT. I  have explained the risk, benefits, and alternatives of the procedure in detail. The patient voices " understanding and all questions have been answered. The patient agrees to proceed as planned.   2. Will not place a stent at the time of his cysto as his pelvis did not decrease much last time.  3. If hematuria workup is negative, will consult Dr. Curtis for possible robotic nephrectomy as it is too large for me to do via a lap approach.  4. Will draw a PSA today.    Copy to:

## 2017-06-26 ENCOUNTER — TELEPHONE (OUTPATIENT)
Dept: UROLOGY | Facility: CLINIC | Age: 67
End: 2017-06-26

## 2017-06-27 ENCOUNTER — PATIENT MESSAGE (OUTPATIENT)
Dept: UROLOGY | Facility: CLINIC | Age: 67
End: 2017-06-27

## 2017-07-05 ENCOUNTER — OFFICE VISIT (OUTPATIENT)
Dept: UROLOGY | Facility: CLINIC | Age: 67
End: 2017-07-05
Payer: COMMERCIAL

## 2017-07-05 DIAGNOSIS — N28.9 NON-FUNCTIONING KIDNEY: Primary | ICD-10-CM

## 2017-07-05 PROCEDURE — 99499 UNLISTED E&M SERVICE: CPT | Mod: S$GLB,,, | Performed by: STUDENT IN AN ORGANIZED HEALTH CARE EDUCATION/TRAINING PROGRAM

## 2017-07-05 PROCEDURE — 99999 PR PBB SHADOW E&M-EST. PATIENT-LVL II: CPT | Mod: PBBFAC,,,

## 2017-07-05 NOTE — PROGRESS NOTES
Urology ([facility]) H&P  Staff: Dr. Erick Polanco MD    Is this patient in a research study?  No    CC: gross hematuria    HPI:  Jose Cruz Lira is a 66 y.o. male with history of DM, HTN, hydronephrosis and nonfunctioning of left kidney who presents with gross hematuria. He has had L sided flank pain that has become more severe recently. He has seen blood in his urine on occasion, but not every time he voids. He has no history of kidney stones or dysuria. He has a history of a very large left kidney with massive hydronephrosis. Was found to have L UPJ obstruction on renal US in 04/2015. CT and mag 3 show 8% functionality of left kidney, T1/2 on the L is infinity. Stent placement did not result in significant shrinking of the large kidney and the stent has since been removed. He will likely need a L nephrectomy but will need hematuria workup first.     He is not a smoker.   Has nocturia x 3, on flomax.    He is on eliquis 5mg daily for Afib.    ROS:  Neg except per HPI    Past Medical History:   Diagnosis Date    Diabetes mellitus, type 2 2010    Hydronephrosis of left kidney     Hypertension     Non-functioning kidney     left    Obesity (BMI 30-39.9)     Unspecified disorder of kidney and ureter        Past Surgical History:   Procedure Laterality Date    arm surgery      CARDIOVERSION  11/29/2016    knee surgeory N/A 4 to 5 years ago       Social History     Social History    Marital status:      Spouse name: N/A    Number of children: N/A    Years of education: N/A     Social History Main Topics    Smoking status: Never Smoker    Smokeless tobacco: Never Used    Alcohol use Yes    Drug use: No    Sexual activity: No      Comment:       Other Topics Concern    Not on file     Social History Narrative     [place] [employer] No physical activities.     with 2 kids       Family History   Problem Relation Age of Onset    Cancer Father     Cancer Sister   "   Cancer Brother     Amblyopia Neg Hx     Blindness Neg Hx     Cataracts Neg Hx     Diabetes Neg Hx     Glaucoma Neg Hx     Hypertension Neg Hx     Macular degeneration Neg Hx     Retinal detachment Neg Hx     Strabismus Neg Hx     Stroke Neg Hx     Thyroid disease Neg Hx        Review of patient's allergies indicates:  No Known Allergies    Current Outpatient Prescriptions on File Prior to Visit   Medication Sig Dispense Refill    amlodipine (NORVASC) 10 MG tablet Take 1 tablet (10 mg total) by mouth once daily. 90 tablet 4    apixaban (ELIQUIS) 5 mg Tab Take 1 tablet (5 mg total) by mouth 2 (two) times daily. 60 tablet 11    blood sugar diagnostic Strp 1 strip by Misc.(Non-Drug; Combo Route) route once daily. For accucheck Patient checks BS twice a day. Please provide a 3 month supply. 180 strip 4    BYDUREON 2 mg/0.65 mL PnIj INJECT 2MG WEEKLY 4 each 11    carvedilol (COREG) 25 MG tablet Take 1 tablet (25 mg total) by mouth 2 (two) times daily with meals. 360 tablet 3    doxazosin (CARDURA) 4 MG tablet Take 0.5 tablets (2 mg total) by mouth once daily.      flecainide (TAMBOCOR) 100 MG Tab Take 1 tablet (100 mg total) by mouth every 12 (twelve) hours. 60 tablet 11    furosemide (LASIX) 40 MG tablet Take 1 tablet (40 mg total) by mouth 2 (two) times daily. 180 tablet 3    insulin detemir (LEVEMIR FLEXTOUCH) 100 unit/mL (3 mL) SubQ InPn pen INJECT 25 UNITS INTO THE SKIN EVERY EVENING 45 mL 4    insulin needles, disposable, (BD ULTRA-FINE RODRIGUEZ PEN NEEDLES) 32 x 5/32 " Ndle Patient injects insulin once a day. Please provide 3 month supply. 90 each 4    lancets Misc Patient checks BS twice a day. Please provide a 3 month supply. 180 each 10    simvastatin (ZOCOR) 20 MG tablet Take 1 tablet (20 mg total) by mouth every evening. 90 tablet 4     No current facility-administered medications on file prior to visit.        Anticoagulation:  Yes Eliquis 5mg daily    Physical Exam:  weight 271 lb/123 " kg  BMI 34.8    AAOx4, NAD, WDWN  NC/AT, EOMI, PER, sclerae anicteric, speech normal, tongue midline  Nl effort, CTAB  RRR  Soft, non-tender, non-distended  Penis circumcised, no skin breakdown or erythema noted  DESEAN: 35g, no nodules, landmarks palpable    Labs:    Urine dipstick today - negative    Lab Results   Component Value Date    WBC 8.22 11/30/2016    HGB 13.9 (L) 11/30/2016    HCT 41.2 11/30/2016    MCV 90 11/30/2016     11/30/2016       BMP  Lab Results   Component Value Date     04/04/2017    K 4.1 04/04/2017     04/04/2017    CO2 27 04/04/2017    BUN 21 04/04/2017    CREATININE 1.7 (H) 04/04/2017    CALCIUM 9.1 04/04/2017    ANIONGAP 10 04/04/2017    ESTGFRAFRICA 48.2 (A) 04/04/2017    EGFRNONAA 41.7 (A) 04/04/2017       Lab Results   Component Value Date    PSA 1.5 05/09/2016       Imaging:    US Kidney 6/2017: Chronic severe left hydronephrosis. 3 simple appearing right renal cysts     Assessment:  66 y.o. male with history of DM, HTN, hydronephrosis and nonfunctioning of left kidney who presents with gross hematuria.    Plan:     1. To OR on 7/11/17 for cystoscopy with RGP, possible URS, possible TURBT  2. Consents signed   3. I have explained the risk, benefits, and alternatives of the procedure in detail. The patient voices understanding and all questions have been answered. The patient agrees to proceed as planned.       Shaheen Law MD

## 2017-07-07 ENCOUNTER — ANESTHESIA EVENT (OUTPATIENT)
Dept: SURGERY | Facility: HOSPITAL | Age: 67
End: 2017-07-07
Payer: COMMERCIAL

## 2017-07-07 DIAGNOSIS — Z01.818 PREOPERATIVE TESTING: Primary | ICD-10-CM

## 2017-07-07 NOTE — PRE ADMISSION SCREENING
Anesthesia Assessment: Preoperative EQUATION    Planned Procedure: Procedure(s) (LRB):  CYSTOSCOPY WITH RETROGRADE PYELOGRAM (Bilateral)  URETEROSCOPY  (possible) (N/A)  EXCISION-BLADDER TUMOR-TRANSURETHRAL (TURBT)   possible (N/A)  Requested Anesthesia Type:Monitor Anesthesia Care  Surgeon: Erick Polanco MD  Service: Urology  Known or anticipated Date of Surgery:7/11/2017    Surgeon notes: reviewed    Electronic QUestionnaire Assessment completed via nurse interview with patient.        NO AQ      Triage considerations:     The patient has no apparent active cardiac condition (No unstable coronary Syndrome such as severe unstable angina or recent [<1 month] myocardial infarction, decompensated CHF, severe valvular   disease or significant arrhythmia)    Previous anesthesia records:GETA, MAC and No problems   11/29/16 cardioversion  Airway/Jaw/Neck:  Airway Findings: Mouth Opening: Normal Tongue: Normal  General Airway Assessment: Adult  Mallampati: III  Improves to II with phonation.  TM Distance: 4 - 6 cm  Jaw/Neck Findings:  Neck ROM: Extension Decreased, Mild      Dental:  Dental Findings: In tact     Last PCP note: within 3 months , within [facility]   Subspecialty notes: Cardiology: General, Endocrinology, Nephrology, sleep medicine    Other important co-morbidities: CHF, Afib, HTN/HLP, Dm2, CKD3, possible MONICA     Tests already available:  Available tests,  3-6 months ago . 4/4/17 renal fxn panel. 11/2016 CBC. 2/2017 stress test. 1/2017 echo and EKG.            Instructions given. (See in Nurse's note)    Optimization:  Anesthesia Preop Clinic Assessment  Indicated-not required for this procedure    Medical Opinion Indicated-will ask cards for clearance and Eliquis instructions         Plan:    Testing:  Hematology Profile and BMP     Consultation:cards     Patient  has previously scheduled Medical Appointment:7/10 cards    Navigation: Tests Scheduled. 7/10             Consults scheduled.             Results  will be tracked by Preop Clinic.

## 2017-07-07 NOTE — ANESTHESIA PREPROCEDURE EVALUATION
Pre Admission Screening  Karina Mae RN      []Hide copied text  Anesthesia Assessment: Preoperative EQUATION  Patient Active Problem List   Diagnosis    Obesity (BMI 30-39.9)    Hyperlipidemia associated with type 2 diabetes mellitus    Type 2 diabetes mellitus with stage 3 chronic kidney disease, with long-term current use of insulin    Hypertension associated with diabetes    Proteinuria    Rectus diastasis    Lipoma of abdominal wall    UPJ (ureteropelvic junction) obstruction    Non-functioning kidney    Hydronephrosis    Shortness of breath    Persistent atrial fibrillation    Sleep apnea, unspecified    Hematuria, unspecified     echocardiogramCONCLUSIONS: NO CLINICALLY SIGNIFICANT STRESS INDUCED PERFUSION DEFECTS as assessed with PET  perfusion imaging.  1. There is no evidence of a discrete hemodynamically significant coronary stenosis.  2. Although no clinically significant stress defect is seen, there is resting flow heterogeneity that improves after  Dipyridamole. These results suggest mild endothelial dysfunction due to elevated cholesterol, high blood pressure  and diabetes without clinically significant, localized perfusion defects.  3. Resting wall motion is physiologic. Stress wall motion is physiologic.  4. LV function is normal at rest and stress. (normal is >= 51%)  5. The ventricular volumes are normal at rest and stress.  6. The extracardiac distribution of radioactivity is normal.  7. There was no previous study available to compare.    Planned Procedure: Procedure(s) (LRB):  CYSTOSCOPY WITH RETROGRADE PYELOGRAM (Bilateral)  URETEROSCOPY  (possible) (N/A)  EXCISION-BLADDER TUMOR-TRANSURETHRAL (TURBT)   possible (N/A)  Requested Anesthesia Type:Monitor Anesthesia Care  Surgeon: Erick Polanco MD  Service: Urology  Known or anticipated Date of Surgery:7/11/2017     Surgeon notes: reviewed     Electronic QUestionnaire Assessment completed via nurse interview with patient.          NO AQ        Triage considerations:      The patient has no apparent active cardiac condition (No unstable coronary Syndrome such as severe unstable angina or recent [<1 month] myocardial infarction, decompensated CHF, severe valvular   disease or significant arrhythmia)     Previous anesthesia records:GETA, MAC and No problems   11/29/16 cardioversion  Airway/Jaw/Neck:  Airway Findings: Mouth Opening: Normal Tongue: Normal  General Airway Assessment: Adult  Mallampati: III  Improves to II with phonation.  TM Distance: 4 - 6 cm  Jaw/Neck Findings:  Neck ROM: Extension Decreased, Mild      Dental:  Dental Findings: In tact      Last PCP note: within 3 months , within [facility]   Subspecialty notes: Cardiology: General, Endocrinology, Nephrology, sleep medicine     Other important co-morbidities: CHF, Afib, HTN/HLP, Dm2, CKD3, possible MONICA, obesity     Tests already available:  Available tests,  3-6 months ago . 4/4/17 renal fxn panel. 11/2016 CBC. 2/2017 stress test. 1/2017 echo and EKG.                                                Instructions given. (See in Nurse's note)     Optimization:  Anesthesia Preop Clinic Assessment  Indicated-not required for this procedure    Medical Opinion Indicated-will ask cards for clearance and Eliquis instructions                                                                      Plan:              Testing:  Hematology Profile and BMP                                               Consultation:cards                                               Patient  has previously scheduled Medical Appointment:7/10 cards     Navigation: Tests Scheduled. 7/10                                  Consults scheduled.-7/7 S/W MD Alesha typically lets pt hold Eliquis 48h prior to surgery, ok to give pt those instructions and will see pt 7/10                                  Results will be tracked by Preop Clinic.                                           Electronically signed by Karina  Tu, RN at 7/7/2017  9:46 AM        Pre-admit on 7/11/2017            Detailed Report         7/10/17 lab results noted, Dr Castanon note pendind                                                                                                               07/07/2017  Jose Cruz Lira is a 66 y.o., male  Anesthesia Evaluation    I have reviewed the Patient Summary Reports.    I have reviewed the Nursing Notes.   I have reviewed the Medications.     Review of Systems  Anesthesia Hx:  No problems with previous Anesthesia  History of prior surgery of interest to airway management or planning: Previous anesthesia: MAC  cardioversion 2016 with MAC.  Procedure performed at an [facility]. Denies Family Hx of Anesthesia complications.   Denies Personal Hx of Anesthesia complications.   Social:  Non-Smoker    Hematology/Oncology:  Hematology Normal   Oncology Normal     EENT/Dental:EENT/Dental Normal   Cardiovascular:    Denies Angina. hyperlipidemia  Functional Capacity good / => 4 METS  Congestive Heart Failure (CHF) , on chronic Rx, no recent change Hypertension  Disorder of Cardiac Rhythm, Atrial Fibrillation (on Eliquis), S/P electrical cardioversion    Pulmonary:   Denies Recent URI.  Pulmonary Symptoms:  are shortness of breath with activity.  Possible Obstructive Sleep Apnea    Renal/:  Kidney Function/Disease, Chronic Kidney Disease (CKD) , CKD Stage III (GFR 30-59)    Hepatic/GI:  Hepatic/GI Normal    Musculoskeletal:  Musculoskeletal Normal    Endocrine:   Diabetes, well controlled  Diabetes, Type 2 Diabetes , controlled by insulin. , most recent HgA1c value was 6.5    Dermatological:  Skin Normal    Psych:  Psychiatric Normal           Physical Exam  General:  Obesity, Well nourished    Airway/Jaw/Neck:  Airway Findings: Mouth Opening: Normal Tongue: Normal  General Airway Assessment: Adult  Mallampati: II  Improves to II with phonation.  TM Distance: Normal, at least 6 cm      Dental:  Dental  Findings: In tact   Chest/Lungs:  Chest/Lungs Findings: Clear to auscultation     Heart/Vascular:  Heart Findings: Rate: Normal  Rhythm: Regular Rhythm  Sounds: Normal        Mental Status:  Mental Status Findings:  Cooperative, Alert and Oriented         Anesthesia Plan  Type of Anesthesia, risks & benefits discussed:  Anesthesia Type:  general  Patient's Preference:   Intra-op Monitoring Plan: standard ASA monitors  Intra-op Monitoring Plan Comments:   Post Op Pain Control Plan:   Post Op Pain Control Plan Comments:   Induction:   IV  Beta Blocker:  Patient is on a Beta-Blocker and has received one dose within the past 24 hours (No further documentation required).       Informed Consent: Patient understands risks and agrees with Anesthesia plan.  Questions answered. Anesthesia consent signed with patient.  ASA Score: 3     Day of Surgery Review of History & Physical:            Ready For Surgery From Anesthesia Perspective.

## 2017-07-10 ENCOUNTER — TELEPHONE (OUTPATIENT)
Dept: UROLOGY | Facility: CLINIC | Age: 67
End: 2017-07-10

## 2017-07-10 ENCOUNTER — OFFICE VISIT (OUTPATIENT)
Dept: CARDIOLOGY | Facility: CLINIC | Age: 67
End: 2017-07-10
Payer: COMMERCIAL

## 2017-07-10 ENCOUNTER — LAB VISIT (OUTPATIENT)
Dept: LAB | Facility: HOSPITAL | Age: 67
End: 2017-07-10
Attending: ANESTHESIOLOGY
Payer: COMMERCIAL

## 2017-07-10 VITALS
WEIGHT: 279.13 LBS | BODY MASS INDEX: 35.82 KG/M2 | HEIGHT: 74 IN | SYSTOLIC BLOOD PRESSURE: 165 MMHG | DIASTOLIC BLOOD PRESSURE: 77 MMHG | HEART RATE: 69 BPM

## 2017-07-10 DIAGNOSIS — E78.5 HYPERLIPIDEMIA ASSOCIATED WITH TYPE 2 DIABETES MELLITUS: ICD-10-CM

## 2017-07-10 DIAGNOSIS — E11.59 HYPERTENSION ASSOCIATED WITH DIABETES: Chronic | ICD-10-CM

## 2017-07-10 DIAGNOSIS — Z79.4 TYPE 2 DIABETES MELLITUS WITH STAGE 3 CHRONIC KIDNEY DISEASE, WITH LONG-TERM CURRENT USE OF INSULIN: ICD-10-CM

## 2017-07-10 DIAGNOSIS — R31.9 HEMATURIA, UNSPECIFIED: ICD-10-CM

## 2017-07-10 DIAGNOSIS — E66.9 OBESITY (BMI 30-39.9): ICD-10-CM

## 2017-07-10 DIAGNOSIS — N18.30 TYPE 2 DIABETES MELLITUS WITH STAGE 3 CHRONIC KIDNEY DISEASE, WITH LONG-TERM CURRENT USE OF INSULIN: ICD-10-CM

## 2017-07-10 DIAGNOSIS — Z01.818 PREOPERATIVE TESTING: ICD-10-CM

## 2017-07-10 DIAGNOSIS — E11.22 TYPE 2 DIABETES MELLITUS WITH STAGE 3 CHRONIC KIDNEY DISEASE, WITH LONG-TERM CURRENT USE OF INSULIN: ICD-10-CM

## 2017-07-10 DIAGNOSIS — E11.69 HYPERLIPIDEMIA ASSOCIATED WITH TYPE 2 DIABETES MELLITUS: ICD-10-CM

## 2017-07-10 DIAGNOSIS — I48.19 PERSISTENT ATRIAL FIBRILLATION: Primary | ICD-10-CM

## 2017-07-10 DIAGNOSIS — G47.30 SLEEP APNEA, UNSPECIFIED: ICD-10-CM

## 2017-07-10 DIAGNOSIS — I15.2 HYPERTENSION ASSOCIATED WITH DIABETES: Chronic | ICD-10-CM

## 2017-07-10 LAB
ANION GAP SERPL CALC-SCNC: 9 MMOL/L
BUN SERPL-MCNC: 22 MG/DL
CALCIUM SERPL-MCNC: 8.6 MG/DL
CHLORIDE SERPL-SCNC: 108 MMOL/L
CO2 SERPL-SCNC: 24 MMOL/L
CREAT SERPL-MCNC: 1.6 MG/DL
ERYTHROCYTE [DISTWIDTH] IN BLOOD BY AUTOMATED COUNT: 13.9 %
EST. GFR  (AFRICAN AMERICAN): 51.9 ML/MIN/1.73 M^2
EST. GFR  (NON AFRICAN AMERICAN): 44.9 ML/MIN/1.73 M^2
GLUCOSE SERPL-MCNC: 185 MG/DL
HCT VFR BLD AUTO: 42.3 %
HGB BLD-MCNC: 14.5 G/DL
MCH RBC QN AUTO: 30.2 PG
MCHC RBC AUTO-ENTMCNC: 34.3 %
MCV RBC AUTO: 88 FL
PLATELET # BLD AUTO: 236 K/UL
PMV BLD AUTO: 10.1 FL
POTASSIUM SERPL-SCNC: 3.7 MMOL/L
RBC # BLD AUTO: 4.8 M/UL
SODIUM SERPL-SCNC: 141 MMOL/L
WBC # BLD AUTO: 8.09 K/UL

## 2017-07-10 PROCEDURE — 99999 PR PBB SHADOW E&M-EST. PATIENT-LVL III: CPT | Mod: PBBFAC,,, | Performed by: INTERNAL MEDICINE

## 2017-07-10 PROCEDURE — 36415 COLL VENOUS BLD VENIPUNCTURE: CPT

## 2017-07-10 PROCEDURE — 3044F HG A1C LEVEL LT 7.0%: CPT | Mod: S$GLB,,, | Performed by: INTERNAL MEDICINE

## 2017-07-10 PROCEDURE — 85027 COMPLETE CBC AUTOMATED: CPT

## 2017-07-10 PROCEDURE — 80048 BASIC METABOLIC PNL TOTAL CA: CPT

## 2017-07-10 PROCEDURE — 3066F NEPHROPATHY DOC TX: CPT | Mod: S$GLB,,, | Performed by: INTERNAL MEDICINE

## 2017-07-10 PROCEDURE — 99214 OFFICE O/P EST MOD 30 MIN: CPT | Mod: S$GLB,,, | Performed by: INTERNAL MEDICINE

## 2017-07-10 NOTE — TELEPHONE ENCOUNTER
Called pt to confirm arrival time 6:45am for procedure. Gave pt NPO instructions and gave pt opportunity to ask questions. Pt verbalized understanding.

## 2017-07-10 NOTE — PROGRESS NOTES
Chart has been dictated using voice recognition software.  It is not been reviewed carefully for any transcriptional errors due to this technology.   Subjective:   Patient ID:  Jose Cruz Lira is a 66 y.o. male who presents for follow-up of Pre-op Exam (cystoscopy)      HPI: Patient with hypertension, obesity, chronic kidney disease with chronic hydronephrosis, type 2 diabetes mellitus, and persistentl atrial fibrillation s/p DCCV Dec-2016.  Echo in Jan-2017 showed LVH with normal EF, diastolic dysfunction, and severe LA enlargement.  PET in Feb 2017 showed no hemodynamically significant stenosis with resting flow heterogeneity.      Patient denies any dyspnea at rest or on exertion, orthopnea, PND, or edema.  Patient continues to lose weight.  Able to ride bike without significant shortness of breath.  Patient denies any dyspnea at rest or on exertion, orthopnea, PND, or edema.  Patient denies any chest discomfort on exertion or at rest. Patient denies any palpitations, lightheadedness, or syncope.     Past Medical History:   Diagnosis Date    Diabetes mellitus, type 2 2010    Hydronephrosis of left kidney     Hypertension     Non-functioning kidney     left    Obesity (BMI 30-39.9)     Unspecified disorder of kidney and ureter        Outpatient Medications Prior to Visit   Medication Sig Dispense Refill    amlodipine (NORVASC) 10 MG tablet Take 1 tablet (10 mg total) by mouth once daily. 90 tablet 4    apixaban (ELIQUIS) 5 mg Tab Take 1 tablet (5 mg total) by mouth 2 (two) times daily. 60 tablet 11    blood sugar diagnostic Strp 1 strip by Misc.(Non-Drug; Combo Route) route once daily. For accucheck Patient checks BS twice a day. Please provide a 3 month supply. 180 strip 4    BYDUREON 2 mg/0.65 mL PnIj INJECT 2MG WEEKLY 4 each 11    carvedilol (COREG) 25 MG tablet Take 1 tablet (25 mg total) by mouth 2 (two) times daily with meals. 360 tablet 3    doxazosin (CARDURA) 4 MG tablet Take 0.5 tablets  "(2 mg total) by mouth once daily.      flecainide (TAMBOCOR) 100 MG Tab Take 1 tablet (100 mg total) by mouth every 12 (twelve) hours. 60 tablet 11    furosemide (LASIX) 40 MG tablet Take 1 tablet (40 mg total) by mouth 2 (two) times daily. 180 tablet 3    insulin detemir (LEVEMIR FLEXTOUCH) 100 unit/mL (3 mL) SubQ InPn pen INJECT 25 UNITS INTO THE SKIN EVERY EVENING 45 mL 4    insulin needles, disposable, (BD ULTRA-FINE RODRIGUEZ PEN NEEDLES) 32 x 5/32 " Ndle Patient injects insulin once a day. Please provide 3 month supply. 90 each 4    lancets Misc Patient checks BS twice a day. Please provide a 3 month supply. 180 each 10    simvastatin (ZOCOR) 20 MG tablet Take 1 tablet (20 mg total) by mouth every evening. 90 tablet 4     No facility-administered medications prior to visit.        Review of Systems   Constitution: Negative for weight gain and weight loss.   HENT: Negative for nosebleeds.    Eyes: Negative for vision loss in left eye and vision loss in right eye.   Cardiovascular: Negative for claudication.        As above   Respiratory: Negative for hemoptysis, shortness of breath, snoring, sputum production and wheezing.    Endocrine: Negative for polydipsia and polyuria.   Hematologic/Lymphatic: Does not bruise/bleed easily.   Musculoskeletal: Negative for myalgias.   Gastrointestinal: Negative for change in bowel habit, hematemesis, hematochezia, melena, nausea and vomiting.   Genitourinary: Positive for hematuria.   Neurological: Negative for focal weakness and numbness.     Objective:   Physical Exam   Constitutional: He is oriented to person, place, and time. He appears well-developed and well-nourished.   BP (!) 165/77 (BP Location: Left arm, Patient Position: Sitting, BP Method: Automatic)   Pulse 69   Ht 6' 2" (1.88 m)   Wt 126.6 kg (279 lb 1.6 oz)   BMI 35.83 kg/m²      Neck: Neck supple. No JVD present. Carotid bruit is not present.   Cardiovascular: Normal rate, regular rhythm, normal heart " sounds and intact distal pulses.  Exam reveals no gallop and no friction rub.    No murmur heard.  Pulmonary/Chest: Effort normal and breath sounds normal. He has no wheezes. He has no rales.   Abdominal: Soft. Bowel sounds are normal. He exhibits no abdominal bruit. There is no hepatomegaly. There is no tenderness.   Mid abdominal hernia   Musculoskeletal: He exhibits no edema.   Neurological: He is alert and oriented to person, place, and time. He has normal strength.   Skin: No cyanosis. Nails show no clubbing.         Lab Results   Component Value Date     07/10/2017    K 3.7 07/10/2017    BUN 22 07/10/2017    CREATININE 1.6 (H) 07/10/2017     (H) 07/10/2017    HGBA1C 6.5 (H) 04/04/2017    CHOL 102 (L) 11/27/2016    HDL 35 (L) 11/27/2016    LDLCALC 37.4 (L) 11/27/2016    TRIG 148 11/27/2016    CHOLHDL 34.3 11/27/2016    HGB 14.5 07/10/2017    HCT 42.3 07/10/2017     07/10/2017       Assessment:     1. Persistent atrial fibrillation    2. Sleep apnea, unspecified    3. Type 2 diabetes mellitus with stage 3 chronic kidney disease, with long-term current use of insulin    4. Hyperlipidemia associated with type 2 diabetes mellitus    5. Hypertension associated with diabetes    6. Obesity (BMI 30-39.9)    7. Hematuria, unspecified         The patient appears to be stable from a cardiovascular point of view.  Patient has no symptoms of cardiac ischemia, heart failure, or significant arrhythmias. His heart rate is regular and appears to be sinus on physical examination.  Patient remains anticoagulated for persistent atrial fibrillation.  However, with the upcoming cystoscopy on 11-July-2017 and subsequent left nephrectomy, his apixaban should be stopped 48 hours prior to the surgical procedure and restarted when adequate hemostasis basis has been achieved.    His estimated risk for an adverse cardiac outcome (myocardial infarction, pulmonary edema, ventricular fibrillation, cardiac arrest, or  complete heart block) with the proposed surgery is low (Revised Cardiac Risk Index [RCRI] - Myles Criteria - 0.9%).  No further cardiovascular evaluation is needed prior to his surgery.    Patient should return for reevaluation in 6 months if there are no intervening cardiac problems.    Plan:     Jose Cruz was seen today for pre-op exam.    Diagnoses and all orders for this visit:    Persistent atrial fibrillation    Sleep apnea, unspecified    Type 2 diabetes mellitus with stage 3 chronic kidney disease, with long-term current use of insulin    Hyperlipidemia associated with type 2 diabetes mellitus    Hypertension associated with diabetes    Obesity (BMI 30-39.9)    Hematuria, unspecified

## 2017-07-11 ENCOUNTER — ANESTHESIA (OUTPATIENT)
Dept: SURGERY | Facility: HOSPITAL | Age: 67
End: 2017-07-11
Payer: COMMERCIAL

## 2017-07-11 ENCOUNTER — SURGERY (OUTPATIENT)
Age: 67
End: 2017-07-11

## 2017-07-11 ENCOUNTER — TELEPHONE (OUTPATIENT)
Dept: UROLOGY | Facility: CLINIC | Age: 67
End: 2017-07-11

## 2017-07-11 ENCOUNTER — HOSPITAL ENCOUNTER (OUTPATIENT)
Facility: HOSPITAL | Age: 67
Discharge: HOME | End: 2017-07-11
Attending: UROLOGY | Admitting: UROLOGY
Payer: COMMERCIAL

## 2017-07-11 VITALS
WEIGHT: 270 LBS | DIASTOLIC BLOOD PRESSURE: 71 MMHG | OXYGEN SATURATION: 100 % | SYSTOLIC BLOOD PRESSURE: 138 MMHG | HEIGHT: 74 IN | TEMPERATURE: 98 F | HEART RATE: 62 BPM | BODY MASS INDEX: 34.65 KG/M2 | RESPIRATION RATE: 18 BRPM

## 2017-07-11 DIAGNOSIS — R31.0 HEMATURIA, GROSS: Primary | ICD-10-CM

## 2017-07-11 DIAGNOSIS — R31.9 HEMATURIA: ICD-10-CM

## 2017-07-11 DIAGNOSIS — N28.9 NON-FUNCTIONING KIDNEY: ICD-10-CM

## 2017-07-11 LAB
POCT GLUCOSE: 148 MG/DL (ref 70–110)
POCT GLUCOSE: 152 MG/DL (ref 70–110)

## 2017-07-11 PROCEDURE — 71000039 HC RECOVERY, EACH ADD'L HOUR: Performed by: UROLOGY

## 2017-07-11 PROCEDURE — 25000003 PHARM REV CODE 250

## 2017-07-11 PROCEDURE — 71000033 HC RECOVERY, INTIAL HOUR: Performed by: UROLOGY

## 2017-07-11 PROCEDURE — 25000003 PHARM REV CODE 250: Performed by: NURSE ANESTHETIST, CERTIFIED REGISTERED

## 2017-07-11 PROCEDURE — 37000009 HC ANESTHESIA EA ADD 15 MINS: Performed by: UROLOGY

## 2017-07-11 PROCEDURE — 25500020 PHARM REV CODE 255: Performed by: UROLOGY

## 2017-07-11 PROCEDURE — 37000008 HC ANESTHESIA 1ST 15 MINUTES: Performed by: UROLOGY

## 2017-07-11 PROCEDURE — C1758 CATHETER, URETERAL: HCPCS | Performed by: UROLOGY

## 2017-07-11 PROCEDURE — D9220A PRA ANESTHESIA: Mod: ANES,,, | Performed by: ANESTHESIOLOGY

## 2017-07-11 PROCEDURE — 25000003 PHARM REV CODE 250: Performed by: ANESTHESIOLOGY

## 2017-07-11 PROCEDURE — 36000707: Performed by: UROLOGY

## 2017-07-11 PROCEDURE — 82962 GLUCOSE BLOOD TEST: CPT | Performed by: UROLOGY

## 2017-07-11 PROCEDURE — D9220A PRA ANESTHESIA: Mod: CRNA,,, | Performed by: NURSE ANESTHETIST, CERTIFIED REGISTERED

## 2017-07-11 PROCEDURE — 71000015 HC POSTOP RECOV 1ST HR: Performed by: UROLOGY

## 2017-07-11 PROCEDURE — 74420 UROGRAPHY RTRGR +-KUB: CPT | Mod: 26,,, | Performed by: UROLOGY

## 2017-07-11 PROCEDURE — 63600175 PHARM REV CODE 636 W HCPCS: Performed by: STUDENT IN AN ORGANIZED HEALTH CARE EDUCATION/TRAINING PROGRAM

## 2017-07-11 PROCEDURE — 63600175 PHARM REV CODE 636 W HCPCS: Performed by: NURSE ANESTHETIST, CERTIFIED REGISTERED

## 2017-07-11 PROCEDURE — 36000706: Performed by: UROLOGY

## 2017-07-11 PROCEDURE — 52005 CYSTO W/URTRL CATHJ: CPT | Mod: ,,, | Performed by: UROLOGY

## 2017-07-11 RX ORDER — POLYETHYLENE GLYCOL 3350 17 G/17G
17 POWDER, FOR SOLUTION ORAL DAILY
Qty: 527 G | Refills: 0 | Status: SHIPPED | OUTPATIENT
Start: 2017-07-11 | End: 2017-08-11

## 2017-07-11 RX ORDER — LIDOCAINE HYDROCHLORIDE 10 MG/ML
1 INJECTION, SOLUTION EPIDURAL; INFILTRATION; INTRACAUDAL; PERINEURAL ONCE
Status: COMPLETED | OUTPATIENT
Start: 2017-07-11 | End: 2017-07-11

## 2017-07-11 RX ORDER — CEFAZOLIN SODIUM 2 G/50ML
2 SOLUTION INTRAVENOUS
Status: COMPLETED | OUTPATIENT
Start: 2017-07-11 | End: 2017-07-11

## 2017-07-11 RX ORDER — PROPOFOL 10 MG/ML
VIAL (ML) INTRAVENOUS CONTINUOUS PRN
Status: DISCONTINUED | OUTPATIENT
Start: 2017-07-11 | End: 2017-07-11

## 2017-07-11 RX ORDER — SODIUM CHLORIDE 0.9 % (FLUSH) 0.9 %
3 SYRINGE (ML) INJECTION
Status: DISCONTINUED | OUTPATIENT
Start: 2017-07-11 | End: 2017-07-11 | Stop reason: HOSPADM

## 2017-07-11 RX ORDER — FENTANYL CITRATE 50 UG/ML
INJECTION, SOLUTION INTRAMUSCULAR; INTRAVENOUS
Status: DISCONTINUED | OUTPATIENT
Start: 2017-07-11 | End: 2017-07-11

## 2017-07-11 RX ORDER — CEFAZOLIN SODIUM 2 G/50ML
2 SOLUTION INTRAVENOUS
Status: DISCONTINUED | OUTPATIENT
Start: 2017-07-11 | End: 2017-07-11 | Stop reason: SDUPTHER

## 2017-07-11 RX ORDER — SODIUM CHLORIDE 9 MG/ML
INJECTION, SOLUTION INTRAVENOUS CONTINUOUS
Status: DISCONTINUED | OUTPATIENT
Start: 2017-07-11 | End: 2017-07-11 | Stop reason: HOSPADM

## 2017-07-11 RX ORDER — LIDOCAINE HYDROCHLORIDE 10 MG/ML
INJECTION, SOLUTION EPIDURAL; INFILTRATION; INTRACAUDAL; PERINEURAL
Status: COMPLETED
Start: 2017-07-11 | End: 2017-07-11

## 2017-07-11 RX ORDER — OXYCODONE AND ACETAMINOPHEN 5; 325 MG/1; MG/1
1 TABLET ORAL EVERY 4 HOURS PRN
Qty: 31 TABLET | Refills: 0 | Status: SHIPPED | OUTPATIENT
Start: 2017-07-11 | End: 2017-09-07 | Stop reason: SDUPTHER

## 2017-07-11 RX ORDER — MIDAZOLAM HYDROCHLORIDE 1 MG/ML
INJECTION, SOLUTION INTRAMUSCULAR; INTRAVENOUS
Status: DISCONTINUED | OUTPATIENT
Start: 2017-07-11 | End: 2017-07-11

## 2017-07-11 RX ORDER — LIDOCAINE HCL/PF 100 MG/5ML
SYRINGE (ML) INTRAVENOUS
Status: DISCONTINUED | OUTPATIENT
Start: 2017-07-11 | End: 2017-07-11

## 2017-07-11 RX ORDER — HYDROCODONE BITARTRATE AND ACETAMINOPHEN 5; 325 MG/1; MG/1
1 TABLET ORAL EVERY 4 HOURS PRN
Status: DISCONTINUED | OUTPATIENT
Start: 2017-07-11 | End: 2017-07-11 | Stop reason: HOSPADM

## 2017-07-11 RX ADMIN — FENTANYL CITRATE 50 MCG: 50 INJECTION, SOLUTION INTRAMUSCULAR; INTRAVENOUS at 08:07

## 2017-07-11 RX ADMIN — FENTANYL CITRATE 25 MCG: 50 INJECTION, SOLUTION INTRAMUSCULAR; INTRAVENOUS at 08:07

## 2017-07-11 RX ADMIN — MIDAZOLAM HYDROCHLORIDE 2 MG: 1 INJECTION, SOLUTION INTRAMUSCULAR; INTRAVENOUS at 08:07

## 2017-07-11 RX ADMIN — LIDOCAINE HYDROCHLORIDE 0.1 ML: 10 INJECTION, SOLUTION EPIDURAL; INFILTRATION; INTRACAUDAL; PERINEURAL at 07:07

## 2017-07-11 RX ADMIN — LIDOCAINE HYDROCHLORIDE 100 MG: 20 INJECTION, SOLUTION INTRAVENOUS at 08:07

## 2017-07-11 RX ADMIN — PROPOFOL 150 MCG/KG/MIN: 10 INJECTION, EMULSION INTRAVENOUS at 08:07

## 2017-07-11 RX ADMIN — SODIUM CHLORIDE: 0.9 INJECTION, SOLUTION INTRAVENOUS at 07:07

## 2017-07-11 RX ADMIN — IOHEXOL 150 ML: 300 INJECTION, SOLUTION INTRAVENOUS at 08:07

## 2017-07-11 RX ADMIN — CEFAZOLIN SODIUM 2 G: 2 SOLUTION INTRAVENOUS at 08:07

## 2017-07-11 NOTE — OP NOTE
[facility]  Operative Note    Date: 07/11/2017    Pre-Op Diagnosis:   1. Hematuria  2. Left UPJ obstruction  3. Left non-functioning kidney  4. Left hydronephrosis    Patient Active Problem List   Diagnosis    Obesity (BMI 30-39.9)    Hyperlipidemia associated with type 2 diabetes mellitus    Type 2 diabetes mellitus with stage 3 chronic kidney disease, with long-term current use of insulin    Hypertension associated with diabetes    Proteinuria    Rectus diastasis    Lipoma of abdominal wall    UPJ (ureteropelvic junction) obstruction    Non-functioning kidney    Hydronephrosis    Shortness of breath    Persistent atrial fibrillation    Sleep apnea, unspecified    Hematuria, unspecified    Hematuria, gross       Post-Op Diagnosis: same    Procedure(s) Performed:   1.  Cystoscopy with bilateral retrograde pyelograms  2.  Fluoroscopy < 1 hour    Specimen(s): none    Staff Surgeon: Erick Polanco MD    Assistant Surgeon: Winifred Salugero MD    Anesthesia: General mask inhalational anesthesia    Indications: Jose Cruz Lira is a 66 y.o. male with hx of left UPJ obstruction resulting in severe hydronephrosis and left non-functioning kidney. Now presenting with gross hematuria.     Findings:   1. No urethral strictures or masses  2. Prostate with trilobar hyperplasia, large median lobe, some bleeding noted from prostate prior to entrance into bladder  3. Bilateral ureteral orifices in normal position, no bladder tumors or areas suspicious for malignancies   4. Right RGP showed no hydroureteronephrosis, no filling defects, delicate calyces   5. Left RPG showed no dilation of the ureter, severe dilation noted proximal to the UPJ, filled with approx 250 cc of contrast, no filling defects noted    Estimated Blood Loss: min    Drains: none    Procedure in Detail:  After risks, benefits and possible complications of cystoscopy were explained, the patient elected to undergo the procedure and  informed consent was obtained. All questions were answered in the thierry-operative area. The patient was transferred to the cystoscopy suite and placed in the supine position.  SCDs were applied and working.  MAC anesthesia was administered.  Once the patient was adequately sedated, he was placed in the dorsal lithotomy position and prepped and draped in the usual sterile fashion.  Time out was performed, thierry-procedural antibiotics were confirmed.     A rigid cystoscope in a 22 Fr sheath was introduced into the bladder per urethra. This passed easily.  The entire urethra was visualized which showed no masses or strictures. Trilobar hyperplasia of the prostate, large median lobe, some bleeding noted from prostate prior to entrance into bladder were noted. The right and left ureteral orifices were identified in the normal anatomic position and were seen effluxing clear urine. Both UO's were normal. Formal cystoscopy was performed no masses or lesions suspicious for malignancy, no trabeculations, no bladder stones and no bladder diverticuli.      The right UO was identified and cannulated with a Rutner tip catheter.  A RGP was performed which showed a no hydroureteronephrosis, no filling defects, delicate calyces  Left RPG showed no dilation of the ureter, severe hydronephrosis and dilation noted proximal to the UPJ, filled with appox 250 cc of contrast, no filling defects noted    The bladder was drained, and the patient was removed from lithotomy.     The patient tolerated the procedure well and was transferred to recovery in stable condition.    Disposition:  The patient will follow up with Dr. Curtis for discussion of left robotic nephrectomy.  The patient was given prescriptions for percocet.      Winifred Salguero MD

## 2017-07-11 NOTE — TRANSFER OF CARE
"Anesthesia Transfer of Care Note    Patient: Jose Cruz Lira    Procedure(s) Performed: Procedure(s) (LRB):  CYSTOSCOPY WITH RETROGRADE PYELOGRAM (Bilateral)    Patient location: [facility]    Anesthesia Type: general    Transport from OR: Transported from OR on 6-10 L/min O2 by face mask with adequate spontaneous ventilation    Post pain: adequate analgesia    Post assessment: no apparent anesthetic complications and tolerated procedure well    Post vital signs: stable    Level of consciousness: awake    Nausea/Vomiting: no nausea/vomiting    Complications: none    Transfer of care protocol was followed      Last vitals:   Visit Vitals  BP (!) 166/86   Pulse 63   Temp 37.1 °C (98.8 °F) (Oral)   Resp 16   Ht 6' 2" (1.88 m)   Wt 122.5 kg (270 lb)   SpO2 96%   BMI 34.67 kg/m²     "

## 2017-07-11 NOTE — TELEPHONE ENCOUNTER
appt made for 7/18 with dr gibbs to schedule this.  Spoke to pts wife - they are aware of the appt.    Ester will be with dr gibbs until 8/4/17

## 2017-07-11 NOTE — H&P (VIEW-ONLY)
Urology ([facility]) H&P  Staff: Dr. Erick Polanco MD    Is this patient in a research study?  No    CC: gross hematuria    HPI:  Jose Cruz Lira is a 66 y.o. male with history of DM, HTN, hydronephrosis and nonfunctioning of left kidney who presents with gross hematuria. He has had L sided flank pain that has become more severe recently. He has seen blood in his urine on occasion, but not every time he voids. He has no history of kidney stones or dysuria. He has a history of a very large left kidney with massive hydronephrosis. Was found to have L UPJ obstruction on renal US in 04/2015. CT and mag 3 show 8% functionality of left kidney, T1/2 on the L is infinity. Stent placement did not result in significant shrinking of the large kidney and the stent has since been removed. He will likely need a L nephrectomy but will need hematuria workup first.     He is not a smoker.   Has nocturia x 3, on flomax.    He is on eliquis 5mg daily for Afib.    ROS:  Neg except per HPI    Past Medical History:   Diagnosis Date    Diabetes mellitus, type 2 2010    Hydronephrosis of left kidney     Hypertension     Non-functioning kidney     left    Obesity (BMI 30-39.9)     Unspecified disorder of kidney and ureter        Past Surgical History:   Procedure Laterality Date    arm surgery      CARDIOVERSION  11/29/2016    knee surgeory N/A 4 to 5 years ago       Social History     Social History    Marital status:      Spouse name: N/A    Number of children: N/A    Years of education: N/A     Social History Main Topics    Smoking status: Never Smoker    Smokeless tobacco: Never Used    Alcohol use Yes    Drug use: No    Sexual activity: No      Comment:       Other Topics Concern    Not on file     Social History Narrative     in [employer]. No physical activities.     with 2 kids       Family History   Problem Relation Age of Onset    Cancer Father     Cancer Sister   "   Cancer Brother     Amblyopia Neg Hx     Blindness Neg Hx     Cataracts Neg Hx     Diabetes Neg Hx     Glaucoma Neg Hx     Hypertension Neg Hx     Macular degeneration Neg Hx     Retinal detachment Neg Hx     Strabismus Neg Hx     Stroke Neg Hx     Thyroid disease Neg Hx        Review of patient's allergies indicates:  No Known Allergies    Current Outpatient Prescriptions on File Prior to Visit   Medication Sig Dispense Refill    amlodipine (NORVASC) 10 MG tablet Take 1 tablet (10 mg total) by mouth once daily. 90 tablet 4    apixaban (ELIQUIS) 5 mg Tab Take 1 tablet (5 mg total) by mouth 2 (two) times daily. 60 tablet 11    blood sugar diagnostic Strp 1 strip by Misc.(Non-Drug; Combo Route) route once daily. For accucheck Patient checks BS twice a day. Please provide a 3 month supply. 180 strip 4    BYDUREON 2 mg/0.65 mL PnIj INJECT 2MG WEEKLY 4 each 11    carvedilol (COREG) 25 MG tablet Take 1 tablet (25 mg total) by mouth 2 (two) times daily with meals. 360 tablet 3    doxazosin (CARDURA) 4 MG tablet Take 0.5 tablets (2 mg total) by mouth once daily.      flecainide (TAMBOCOR) 100 MG Tab Take 1 tablet (100 mg total) by mouth every 12 (twelve) hours. 60 tablet 11    furosemide (LASIX) 40 MG tablet Take 1 tablet (40 mg total) by mouth 2 (two) times daily. 180 tablet 3    insulin detemir (LEVEMIR FLEXTOUCH) 100 unit/mL (3 mL) SubQ InPn pen INJECT 25 UNITS INTO THE SKIN EVERY EVENING 45 mL 4    insulin needles, disposable, (BD ULTRA-FINE RODRIGUEZ PEN NEEDLES) 32 x 5/32 " Ndle Patient injects insulin once a day. Please provide 3 month supply. 90 each 4    lancets Misc Patient checks BS twice a day. Please provide a 3 month supply. 180 each 10    simvastatin (ZOCOR) 20 MG tablet Take 1 tablet (20 mg total) by mouth every evening. 90 tablet 4     No current facility-administered medications on file prior to visit.        Anticoagulation:  Yes Eliquis 5mg daily    Physical Exam:  weight 271 lb/123 " kg  BMI 34.8    AAOx4, NAD, WDWN  NC/AT, EOMI, PER, sclerae anicteric, speech normal, tongue midline  Nl effort, CTAB  RRR  Soft, non-tender, non-distended  Penis circumcised, no skin breakdown or erythema noted  DESEAN: 35g, no nodules, landmarks palpable    Labs:    Urine dipstick today - negative    Lab Results   Component Value Date    WBC 8.22 11/30/2016    HGB 13.9 (L) 11/30/2016    HCT 41.2 11/30/2016    MCV 90 11/30/2016     11/30/2016       BMP  Lab Results   Component Value Date     04/04/2017    K 4.1 04/04/2017     04/04/2017    CO2 27 04/04/2017    BUN 21 04/04/2017    CREATININE 1.7 (H) 04/04/2017    CALCIUM 9.1 04/04/2017    ANIONGAP 10 04/04/2017    ESTGFRAFRICA 48.2 (A) 04/04/2017    EGFRNONAA 41.7 (A) 04/04/2017       Lab Results   Component Value Date    PSA 1.5 05/09/2016       Imaging:    US Kidney 6/2017: Chronic severe left hydronephrosis. 3 simple appearing right renal cysts     Assessment:  66 y.o. male with history of DM, HTN, hydronephrosis and nonfunctioning of left kidney who presents with gross hematuria.    Plan:     1. To OR on 7/11/17 for cystoscopy with RGP, possible URS, possible TURBT  2. Consents signed   3. I have explained the risk, benefits, and alternatives of the procedure in detail. The patient voices understanding and all questions have been answered. The patient agrees to proceed as planned.       Shaheen Law MD

## 2017-07-11 NOTE — ANESTHESIA POSTPROCEDURE EVALUATION
"Anesthesia Post Evaluation    Patient: Jose Cruz Lira    Procedure(s) Performed: Procedure(s) (LRB):  CYSTOSCOPY WITH RETROGRADE PYELOGRAM (Bilateral)    Final Anesthesia Type: general  Patient location during evaluation: PACU  Patient participation: Yes- Able to Participate  Level of consciousness: awake and alert  Post-procedure vital signs: reviewed and stable  Pain management: adequate  Airway patency: patent  PONV status at discharge: No PONV  Anesthetic complications: no      Cardiovascular status: blood pressure returned to baseline  Respiratory status: unassisted  Hydration status: euvolemic  Follow-up not needed.        Visit Vitals  /71   Pulse 62   Temp 36.5 °C (97.7 °F) (Temporal)   Resp 18   Ht 6' 2" (1.88 m)   Wt 122.5 kg (270 lb)   SpO2 100%   BMI 34.67 kg/m²       Pain/Vinny Score: Pain Assessment Performed: Yes (7/11/2017 10:48 AM)  Presence of Pain: denies (7/11/2017 10:48 AM)  Vinny Score: 10 (7/11/2017 10:48 AM)      "

## 2017-07-11 NOTE — INTERVAL H&P NOTE
The patient has been examined and the H&P has been reviewed:    I concur with the findings and no changes have occurred since H&P was written.    Anesthesia/Surgery risks, benefits and alternative options discussed and understood by patient/family.    Eliquis last taken Friday, 7/7/2017.  UA today: 1015, 6.5, +leuk, nitrite neg, ++ protein, > 250 RBC    Active Hospital Problems    Diagnosis  POA    Non-functioning kidney [N28.9]  Yes      Resolved Hospital Problems    Diagnosis Date Resolved POA   No resolved problems to display.

## 2017-07-11 NOTE — TELEPHONE ENCOUNTER
----- Message from Erick Polanco MD sent at 7/11/2017 11:11 AM CDT -----  Ester doesn't work with Dr. Curtis anymore.     Sara, please set this up.    Erick  ----- Message -----  From: Jodi Webster MD  Sent: 7/11/2017  10:20 AM  To: Erick Polanco MD, Ester [place]LER [place], N, #    Hey Ester,    Can you please schedule this patient with Dr. Curtis to discuss robotic nephrectomy of a non-functioning left kidney?    Thanks,   Jodi

## 2017-07-11 NOTE — DISCHARGE SUMMARY
[facility]  Discharge Note  Short Stay    Admit Date: 7/11/2017    Discharge Date and Time: 07/11/2017    Attending Physician: Erick Polacno MD     Discharge Provider: Jodi Webster    Diagnoses:  Active Hospital Problems    Diagnosis  POA    *Non-functioning kidney [N28.9]  Yes    Hematuria, gross [R31.0]  Yes    Hematuria, unspecified [R31.9]  Yes    Sleep apnea, unspecified [G47.30]  Yes    Persistent atrial fibrillation [I48.1]  Yes    Hypertension associated with diabetes [E11.59, I10]  Yes     Chronic    Hyperlipidemia associated with type 2 diabetes mellitus [E11.69, E78.5]  Yes    Type 2 diabetes mellitus with stage 3 chronic kidney disease, with long-term current use of insulin [E11.22, N18.3, Z79.4]  Not Applicable      Resolved Hospital Problems    Diagnosis Date Resolved POA   No resolved problems to display.       Discharged Condition: good    Hospital Course: Patient was admitted for an outpatient procedure and tolerated the procedure well with no complications.    Final Diagnoses: Same as principal problem.    Disposition: Home or Self Care    Follow up/Patient Instructions:    Medications:  Reconciled Home Medications:   Current Discharge Medication List      START taking these medications    Details   oxycodone-acetaminophen (PERCOCET) 5-325 mg per tablet Take 1 tablet by mouth every 4 (four) hours as needed for Pain.  Qty: 31 tablet, Refills: 0      polyethylene glycol (GLYCOLAX) 17 gram/dose powder Take 17 g by mouth once daily.  Qty: 527 g, Refills: 0         CONTINUE these medications which have NOT CHANGED    Details   amlodipine (NORVASC) 10 MG tablet Take 1 tablet (10 mg total) by mouth once daily.  Qty: 90 tablet, Refills: 4    Associated Diagnoses: Type 2 diabetes mellitus with other diabetic kidney complication      apixaban (ELIQUIS) 5 mg Tab Take 1 tablet (5 mg total) by mouth 2 (two) times daily.  Qty: 60 tablet, Refills: 11    Associated Diagnoses: Persistent  "atrial fibrillation      blood sugar diagnostic Strp 1 strip by Misc.(Non-Drug; Combo Route) route once daily. For accucheck Patient checks BS twice a day. Please provide a 3 month supply.  Qty: 180 strip, Refills: 4    Associated Diagnoses: Obesity (BMI 30-39.9); Type 2 diabetes, uncontrolled, with neuropathy; Hypertension associated with diabetes      BYDUREON 2 mg/0.65 mL PnIj INJECT 2MG WEEKLY  Qty: 4 each, Refills: 11      carvedilol (COREG) 25 MG tablet Take 1 tablet (25 mg total) by mouth 2 (two) times daily with meals.  Qty: 360 tablet, Refills: 3    Associated Diagnoses: Persistent atrial fibrillation; Hypertension associated with diabetes; Chronic systolic congestive heart failure      doxazosin (CARDURA) 4 MG tablet Take 0.5 tablets (2 mg total) by mouth once daily.      flecainide (TAMBOCOR) 100 MG Tab Take 1 tablet (100 mg total) by mouth every 12 (twelve) hours.  Qty: 60 tablet, Refills: 11    Associated Diagnoses: Persistent atrial fibrillation      furosemide (LASIX) 40 MG tablet Take 1 tablet (40 mg total) by mouth 2 (two) times daily.  Qty: 180 tablet, Refills: 3      insulin detemir (LEVEMIR FLEXTOUCH) 100 unit/mL (3 mL) SubQ InPn pen INJECT 25 UNITS INTO THE SKIN EVERY EVENING  Qty: 45 mL, Refills: 4      insulin needles, disposable, (BD ULTRA-FINE RODRIGUEZ PEN NEEDLES) 32 x 5/32 " Ndle Patient injects insulin once a day. Please provide 3 month supply.  Qty: 90 each, Refills: 4    Associated Diagnoses: Type 2 diabetes, uncontrolled, with renal manifestation; Type 2 diabetes, uncontrolled, with neuropathy      lancets Misc Patient checks BS twice a day. Please provide a 3 month supply.  Qty: 180 each, Refills: 10    Associated Diagnoses: Type 2 diabetes, uncontrolled, with renal manifestation; Obesity (BMI 30-39.9); Hyperlipidemia LDL goal < 100; Type 2 diabetes, uncontrolled, with neuropathy; Hypertension associated with diabetes      simvastatin (ZOCOR) 20 MG tablet Take 1 tablet (20 mg total) by " mouth every evening.  Qty: 90 tablet, Refills: 4    Associated Diagnoses: Type 2 diabetes mellitus with other diabetic kidney complication             Discharge Procedure Orders  Diet general     Activity as tolerated     Call MD for:  temperature >100.4     Call MD for:  persistent nausea and vomiting     Call MD for:  severe uncontrolled pain     Call MD for:   Order Comments: Urine that goes from clear yellow to red.       Follow-up Information     Schedule an appointment as soon as possible for a visit with Ernesto Curtis MD.    Specialty:  Urology  Why:  discuss nephrectomy  Contact information:  [address]  [address]  [facility] [address] [phone]                     Jodi Webster MD  Urology, PGY-3  Pager# [phone]

## 2017-07-17 ENCOUNTER — OFFICE VISIT (OUTPATIENT)
Dept: UROLOGY | Facility: CLINIC | Age: 67
End: 2017-07-17
Payer: COMMERCIAL

## 2017-07-17 ENCOUNTER — TELEPHONE (OUTPATIENT)
Dept: UROLOGY | Facility: CLINIC | Age: 67
End: 2017-07-17

## 2017-07-17 VITALS
DIASTOLIC BLOOD PRESSURE: 77 MMHG | SYSTOLIC BLOOD PRESSURE: 158 MMHG | RESPIRATION RATE: 16 BRPM | HEIGHT: 74 IN | WEIGHT: 289.44 LBS | HEART RATE: 65 BPM | BODY MASS INDEX: 37.15 KG/M2

## 2017-07-17 DIAGNOSIS — N28.9 NONFUNCTIONING KIDNEY: ICD-10-CM

## 2017-07-17 DIAGNOSIS — Q62.39 UPJ OBSTRUCTION, CONGENITAL: Primary | ICD-10-CM

## 2017-07-17 DIAGNOSIS — N28.9 NONFUNCTIONING KIDNEY: Primary | ICD-10-CM

## 2017-07-17 LAB
BACTERIA #/AREA URNS AUTO: ABNORMAL /HPF
BILIRUB UR QL STRIP: NEGATIVE
CLARITY UR REFRACT.AUTO: ABNORMAL
COLOR UR AUTO: YELLOW
GLUCOSE UR QL STRIP: NEGATIVE
HGB UR QL STRIP: ABNORMAL
HYALINE CASTS UR QL AUTO: 0 /LPF
KETONES UR QL STRIP: NEGATIVE
LEUKOCYTE ESTERASE UR QL STRIP: ABNORMAL
MICROSCOPIC COMMENT: ABNORMAL
NITRITE UR QL STRIP: NEGATIVE
PH UR STRIP: 6 [PH] (ref 5–8)
PROT UR QL STRIP: ABNORMAL
RBC #/AREA URNS AUTO: 16 /HPF (ref 0–4)
SP GR UR STRIP: 1.01 (ref 1–1.03)
URN SPEC COLLECT METH UR: ABNORMAL
UROBILINOGEN UR STRIP-ACNC: NEGATIVE EU/DL
WBC #/AREA URNS AUTO: 25 /HPF (ref 0–5)

## 2017-07-17 PROCEDURE — 99214 OFFICE O/P EST MOD 30 MIN: CPT | Mod: S$GLB,,, | Performed by: UROLOGY

## 2017-07-17 PROCEDURE — 81001 URINALYSIS AUTO W/SCOPE: CPT

## 2017-07-17 PROCEDURE — 99999 PR PBB SHADOW E&M-EST. PATIENT-LVL IV: CPT | Mod: PBBFAC,,, | Performed by: UROLOGY

## 2017-07-17 PROCEDURE — 87086 URINE CULTURE/COLONY COUNT: CPT

## 2017-07-17 NOTE — PROGRESS NOTES
Subjective:       Patient ID: Jose Cruz Lira is a 66 y.o. male.    Chief Complaint:  kidney nephrectomy (consult)      History of Present Illness  HPI  Patient is a 66 y.o. male who is established to our clinic and was initially referred by Dr. Polanco for evaluation of left non-functioning kidney.  He recently underwent a gross hematuria workup which was negative for any malignant causes.  He's had long-standing left-sided severe hydronephrosis with renal parenchymal thinning.  He is undergone previous stent placement in the remote past with no improvement in his hydronephrosis.  He occasionally has left-sided flank pain.  This is intermittent.  It is severe at onset.  However this does not occur often.  Nothing really seems to make it better or worse.    He takes Eliquis for atrial fibrillation.  He quit drinking.  He has lost a proximally 40 pounds over the course the past year through diet next size.    Denies any previous abdominal surgery.  He does think he has a hernia.       Review of Systems  Review of Systems  All other systems reviewed and negative except pertinent positives noted in HPI.       Objective:     Physical Exam   Constitutional: He is oriented to person, place, and time. He appears well-developed and well-nourished. No distress.   HENT:   Head: Normocephalic and atraumatic.   Eyes: No scleral icterus.   Neck: No tracheal deviation present.   Pulmonary/Chest: Effort normal. No respiratory distress.   Abdominal:       Neurological: He is alert and oriented to person, place, and time.   Psychiatric: He has a normal mood and affect. His behavior is normal. Judgment and thought content normal.       Lab Review  Lab Results   Component Value Date    COLORU Yellow 11/27/2016    SPECGRAV 1.015 11/27/2016    PHUR 6.0 11/27/2016    WBCUR n 03/13/2015    NITRITE Negative 11/27/2016    PROTEINUR + 03/13/2015    GLUCOSEUR n 03/13/2015    KETONESU Negative 11/27/2016    UROBILINOGEN Negative  11/27/2016    BILIRUBINUR n 03/13/2015    RBCUR n 03/13/2015         Assessment:        1. UPJ obstruction, congenital    2. Nonfunctioning kidney          Plan:     UPJ obstruction, congenital    Nonfunctioning kidney  -     Urinalysis  -     Urine culture    -I have explained the risk, benefits, and alternatives of the procedure in detail.  The risks including but not limited to bleeding, infection, injury to adjacent structures including the spleen, liver, lung, pancreas, colon and intestines, blood vessels in the abdomen including the renal artery or renal vein, urinoma, or need for conversion to open nephrectomy were explained to the patient in depth. The patient voices understanding and all questions have been answered. The patient agrees to proceed as planned with left robotic nephrectomy.    -Cardiology clearance  -Preop H&P clinic  -Plan for surgery on August 25.  -I spent 25 minutes with the patient of which more than half was spent in direct consultation with the patient in regards to our treatment and plan.

## 2017-07-18 LAB — BACTERIA UR CULT: NO GROWTH

## 2017-07-31 ENCOUNTER — TELEPHONE (OUTPATIENT)
Dept: ENDOCRINOLOGY | Facility: CLINIC | Age: 67
End: 2017-07-31

## 2017-07-31 NOTE — TELEPHONE ENCOUNTER
Left pt a message to give us a call back in regards to the following message below. Call back number was provided.     ----- Message from Kenyetta Tang sent at 7/29/2017  9:17 AM CDT -----  Contact: Pt's wife Jessica  Pt called asking for advice about scheduling an appointment per recall letter.  Pt is looking for an appt in August.      Please call pt at [phone].        Thanks!

## 2017-08-17 ENCOUNTER — OFFICE VISIT (OUTPATIENT)
Dept: UROLOGY | Facility: CLINIC | Age: 67
End: 2017-08-17
Payer: COMMERCIAL

## 2017-08-17 DIAGNOSIS — N28.9 NON-FUNCTIONING KIDNEY: ICD-10-CM

## 2017-08-17 PROCEDURE — 99999 PR PBB SHADOW E&M-EST. PATIENT-LVL II: CPT | Mod: PBBFAC,,,

## 2017-08-17 PROCEDURE — 99499 UNLISTED E&M SERVICE: CPT | Mod: S$GLB,,, | Performed by: ANESTHESIOLOGY

## 2017-08-17 NOTE — PROGRESS NOTES
Urology ([facility]) H&P for upcoming procedure  Staff:  Dr. Ernesto Curtis MD    Is this patient in a research study?  No    CC: left nonfunctional kidney    HPI:  Jose Cruz Lira is a 66 y.o. male with Afib,  insulin dependent DM2, CKD, and a left non-functional kidney.    He was initially referred to Dr. Curtis, by Dr. Polanco for evaluation of left non-functioning kidney. Recent gross hematuria workup was negative for any malignant causes. He's had long-standing left-sided severe hydronephrosis with renal parenchymal thinning.  He has undergone previous stent placement in the remote past with no improvement in his hydronephrosis.      He occasionally has left-sided flank aching pain. It is exacerbated by movement. However this does not occur often.     He takes Eliquis for atrial fibrillation and is s/p ablation. Hx of CHF.      He quit drinking.  He has lost a proximally 40 pounds over the course the past year through diet.     Denies any previous abdominal surgery.  He does think he has a hernia.        ROS: Negative except for as stated above    Past Medical History:   Diagnosis Date    Diabetes mellitus, type 2 2010    Hydronephrosis of left kidney     Hypertension     Non-functioning kidney     left    Obesity (BMI 30-39.9)     Unspecified disorder of kidney and ureter        Past Surgical History:   Procedure Laterality Date    arm surgery      CARDIOVERSION  11/29/2016    knee surgeory N/A 4 to 5 years ago       Social History     Social History    Marital status:      Spouse name: N/A    Number of children: N/A    Years of education: N/A     Social History Main Topics    Smoking status: Never Smoker    Smokeless tobacco: Never Used    Alcohol use Yes    Drug use: No    Sexual activity: No      Comment:       Other Topics Concern    Not on file     Social History Narrative     in [employer]. No physical activities.     with 2 kids  "      Family History   Problem Relation Age of Onset    Cancer Father     Prostate cancer Father     Cancer Sister     Cancer Brother     Amblyopia Neg Hx     Blindness Neg Hx     Cataracts Neg Hx     Diabetes Neg Hx     Glaucoma Neg Hx     Hypertension Neg Hx     Macular degeneration Neg Hx     Retinal detachment Neg Hx     Strabismus Neg Hx     Stroke Neg Hx     Thyroid disease Neg Hx        Review of patient's allergies indicates:  No Known Allergies    Current Outpatient Prescriptions on File Prior to Visit   Medication Sig Dispense Refill    amlodipine (NORVASC) 10 MG tablet Take 1 tablet (10 mg total) by mouth once daily. 90 tablet 4    apixaban (ELIQUIS) 5 mg Tab Take 1 tablet (5 mg total) by mouth 2 (two) times daily. 60 tablet 11    blood sugar diagnostic Strp 1 strip by Misc.(Non-Drug; Combo Route) route once daily. For accucheck Patient checks BS twice a day. Please provide a 3 month supply. 180 strip 4    BYDUREON 2 mg/0.65 mL PnIj INJECT 2MG WEEKLY 4 each 11    carvedilol (COREG) 25 MG tablet Take 1 tablet (25 mg total) by mouth 2 (two) times daily with meals. 360 tablet 3    doxazosin (CARDURA) 4 MG tablet Take 0.5 tablets (2 mg total) by mouth once daily.      flecainide (TAMBOCOR) 100 MG Tab Take 1 tablet (100 mg total) by mouth every 12 (twelve) hours. 60 tablet 11    furosemide (LASIX) 40 MG tablet Take 1 tablet (40 mg total) by mouth 2 (two) times daily. 180 tablet 3    insulin detemir (LEVEMIR FLEXTOUCH) 100 unit/mL (3 mL) SubQ InPn pen INJECT 25 UNITS INTO THE SKIN EVERY EVENING 45 mL 4    insulin needles, disposable, (BD ULTRA-FINE RODRIGUEZ PEN NEEDLES) 32 x 5/32 " Ndle Patient injects insulin once a day. Please provide 3 month supply. 90 each 4    lancets Misc Patient checks BS twice a day. Please provide a 3 month supply. 180 each 10    oxycodone-acetaminophen (PERCOCET) 5-325 mg per tablet Take 1 tablet by mouth every 4 (four) hours as needed for Pain. 31 tablet 0    " simvastatin (ZOCOR) 20 MG tablet Take 1 tablet (20 mg total) by mouth every evening. 90 tablet 4     No current facility-administered medications on file prior to visit.        Anticoagulation:  Yes, Eliquis    Physical Exam:  Weight 289 lb/131.3 kg  BMI 37.18    AAOx4, NAD  NC/AT, EOMI, PER, sclerae anicteric, speech normal, tongue midline  Nl effort, unlabored respirations  Regular rate  Soft, non-tender, distended abd, diastasis recti, no CVAT  Scars: none      Labs:    Urine dipstick today shows negative for all components.    Chemistry        Component Value Date/Time     07/10/2017 1348    K 3.7 07/10/2017 1348     07/10/2017 1348    CO2 24 07/10/2017 1348    BUN 22 07/10/2017 1348    CREATININE 1.6 (H) 07/10/2017 1348     (H) 07/10/2017 1348        Component Value Date/Time    CALCIUM 8.6 (L) 07/10/2017 1348    ALKPHOS 85 11/30/2016 0608    AST 12 11/30/2016 0608    ALT 17 11/30/2016 0608    BILITOT 1.6 (H) 11/30/2016 0608    ESTGFRAFRICA 51.9 (A) 07/10/2017 1348    EGFRNONAA 44.9 (A) 07/10/2017 1348        Lab Results   Component Value Date    WBC 8.09 07/10/2017    HGB 14.5 07/10/2017    HCT 42.3 07/10/2017    MCV 88 07/10/2017     07/10/2017         Lab Results   Component Value Date    PSA 1.5 05/09/2016       Imaging:   CTAP (date: 6/16/2015) - 1 left renal artery/ies, 1 left renal veins      Assessment: Jose Cruz Lira is a 66 y.o. male with left non-functional kidney.      Plan:   1. To OR on 8/25/2017 for left robotic nephrectomy.  2. Consents for surgery and blood transfusion signed.  3. I have explained the risk, benefits, and alternatives of the procedure in detail. The patient voices understanding and all questions have been answered. The patient agrees to proceed as planned.   4. Appreciate cardiovascular clearance - to be provided by patient's cardiologist, Dr. Irvin Castanon.  5. Type and screen ordered preoperatively.  6. Patient advised to have last dose of Eliquis  on Tuesday, 8/22/2017, pre-operatively.  7. Patient needs to see Anesthesia pre op clinic. Patient has requested he be contacted via his wife given the nature of his work.       Winifred Salguero MD

## 2017-08-18 ENCOUNTER — ANESTHESIA EVENT (OUTPATIENT)
Dept: SURGERY | Facility: HOSPITAL | Age: 67
DRG: 657 | End: 2017-08-18
Payer: COMMERCIAL

## 2017-08-18 DIAGNOSIS — Z01.818 PREOPERATIVE TESTING: Primary | ICD-10-CM

## 2017-08-18 NOTE — PRE ADMISSION SCREENING
Anesthesia Assessment: Preoperative EQUATION    Planned Procedure: Procedure(s) (LRB):  ROBOTIC ASSISTED LAPAROSCOPIC NEPHRECTOMY (Left)  Requested Anesthesia Type:General  Surgeon: Ernesto Curtis MD  Service: Urology  Known or anticipated Date of Surgery:8/25/2017    Surgeon notes: reviewed    Electronic QUestionnaire Assessment completed via nurse interview with patient.        NO AQ      Triage considerations:     The patient has no apparent active cardiac condition (No unstable coronary Syndrome such as severe unstable angina or recent [<1 month] myocardial infarction, decompensated CHF, severe valvular   disease or significant arrhythmia)    Previous anesthesia records:GETA, MAC and No problems   7/11/17 cysto:  Airway/Jaw/Neck:  Airway Findings: Mouth Opening: Normal Tongue: Normal  General Airway Assessment: Adult  Mallampati: II  Improves to II with phonation.  TM Distance: Normal, at least 6 cm      Dental:  Dental Findings: In tact   Last PCP note: 3-6 months ago , within [facility]   Subspecialty notes: Cardiology: General, Endocrinology, Nephrology    Other important co-morbidities: CHF, Afib, HTN/HLP, Dm2, CKD3, possible MONICA, obesity     Tests already available:  Available tests,  within 3 months . 7/10/17 BMP, heme prof. 4/4 A1c. 1/2017 EKG and echo            Instructions given. (See in Nurse's note)    Optimization:  Anesthesia Preop Clinic Assessment  Indicated-POC 8/24    Medical Opinion Indicated-will clarify eliquis instructions with cards. Discussed case with Dr Good, cards clearance 7/10/17 is relevant for this surgery, will not ask for another clearance.          Plan:    Testing:  T&S   Pre-anesthesia  visit       Visit focus: concerns in complex and/or prolonged anesthesia         Patient  has previously scheduled Medical Appointment:none    Navigation: Tests Scheduled. 8/24             Consults scheduled.             Results will be tracked by Preop Clinic.

## 2017-08-18 NOTE — ANESTHESIA PREPROCEDURE EVALUATION
Pre Admission Screening  Karina Mae RN      []Hide copied text  Anesthesia Assessment: Preoperative EQUATION     Planned Procedure: Procedure(s) (LRB):  ROBOTIC ASSISTED LAPAROSCOPIC NEPHRECTOMY (Left)  Requested Anesthesia Type:General  Surgeon: Ernesto Curtis MD  Service: Urology  Known or anticipated Date of Surgery:8/25/2017     Surgeon notes: reviewed     Electronic QUestionnaire Assessment completed via nurse interview with patient.         NO AQ        Triage considerations:      The patient has no apparent active cardiac condition (No unstable coronary Syndrome such as severe unstable angina or recent [<1 month] myocardial infarction, decompensated CHF, severe valvular   disease or significant arrhythmia)     Previous anesthesia records:GETA, MAC and No problems-PT CAN NOT LIE FLAT (has CHF and large hernia-needs head propped on pillow)   7/11/17 cysto:  Airway/Jaw/Neck:  Airway Findings: Mouth Opening: Normal Tongue: Normal  General Airway Assessment: Adult  Mallampati: II  Improves to II with phonation.  TM Distance: Normal, at least 6 cm      Dental:  Dental Findings: In tact   Last PCP note: 3-6 months ago , within [facility]   Subspecialty notes: Cardiology: General, Endocrinology, Nephrology  Dr Castanon cards clearance and Eliquis instructions 7/10/17:  The patient appears to be stable from a cardiovascular point of view.  Patient has no symptoms of cardiac ischemia, heart failure, or significant arrhythmias. His heart rate is regular and appears to be sinus on physical examination.  Patient remains anticoagulated for persistent atrial fibrillation.  However, with the upcoming cystoscopy on 11-July-2017 and subsequent left nephrectomy, his apixaban should be stopped 48 hours prior to the surgical procedure and restarted when adequate hemostasis basis has been achieved.     His estimated risk for an adverse cardiac outcome (myocardial infarction, pulmonary edema, ventricular fibrillation,  cardiac arrest, or complete heart block) with the proposed surgery is low (Revised Cardiac Risk Index [RCRI] - Myles Criteria - 0.9%).  No further cardiovascular evaluation is needed prior to his surgery.     Other important co-morbidities: CHF, Afib, HTN/HLP, Dm2, CKD3, possible MONICA, obesity     Tests already available:  Available tests,  within 3 months . 7/10/17 BMP, heme prof. 4/4 A1c. 1/2017 EKG and echo                                                Instructions given. (See in Nurse's note)     Optimization:  Anesthesia Preop Clinic Assessment  Indicated-POC 8/24    Medical Opinion Indicated-will clarify eliquis instructions with cards. Discussed case with Dr Good, cards clearance 7/10/17 is relevant for this surgery, will not ask for another clearance.                                                                      Plan:              Testing:  T&S   Pre-anesthesia  visit                                                                      Visit focus: concerns in complex and/or prolonged anesthesia                                                                                             Patient  has previously scheduled Medical Appointment:none     Navigation: Tests Scheduled. 8/24                                  Consults scheduled.                                  Results will be tracked by Preop Clinic.                                         Electronically signed by Karina Mae RN at 8/18/2017  3:15 PM        Pre-admit on 8/25/2017            Detailed Report     8/21/17-cards clearance and Eliquis instructions:   MD Karina Peralta, RONNY             As long as the patient has no cardiac symptoms, everything in my note from 10-Jul-2017 still applies, including instructions on how to handle his apixaban.                                                                                                                       08/18/2017  Jose Cruz Lira is a 66 y.o.,  male.    Anesthesia Evaluation         Review of Systems  Anesthesia Hx:  Hx of Anesthetic complications Hx of nausea after colonoscopy   Social:  Non-Smoker, Social Alcohol Use   Hematology/Oncology:  Hematology Normal   Oncology Normal     EENT/Dental:EENT/Dental Normal   Cardiovascular:   Hypertension hyperlipidemia Rides stationary bike for 20  mins 3 days a week. Denies chest pain or sob    Negative cardiac pet stress 2/2017 Congestive Heart Failure (CHF) (11/27/2016. Patient reports breathing better since hospitalization 11/2016.  Sleeps on 2 pillows. ) , LV Diastolic HF, on chronic Rx, no recent change EF was 20% in 11/2016 and now 1/2017 Hypertension  Disorder of Cardiac Rhythm, Atrial Fibrillation (on Eliquis), S/P electrical cardioversion    Pulmonary:   Denies Shortness of breath.  Pulmonary Symptoms:  are shortness of breath with activity.  Possible Obstructive Sleep Apnea , (STOP/BANG) Symptoms S - Snoring (loud), P - Pressure being treated for high BP, A - Age > 50, G - Gender (Male > Female), N - Neck circumference > 40 cms, B - BMI > 35 and T - Tired / daytime fatigue / somnolence 7/8 risk factors.  Never tested   Renal/:  Kidney Function/Disease, Chronic Kidney Disease (CKD) , CKD Stage III (GFR 30-59) Hydronephrosis and hematuria    Hepatic/GI:  Hepatic/GI Normal    Musculoskeletal:  Musculoskeletal Normal    Neurological:   Denies CVA. Denies Seizures.    Endocrine:   Diabetes, well controlled, type 2, using insulin A1c 7.1   Dermatological:  Skin Normal    Psych:  Psychiatric Normal           Physical Exam  General:  Obesity, Well nourished    Airway/Jaw/Neck:  Airway Findings: Mouth Opening: Normal Tongue: Normal  General Airway Assessment: Adult  Mallampati: III  Improves to II with phonation.  TM Distance: Normal, at least 6 cm  Jaw/Neck Findings:  Neck ROM: Normal ROM      Dental:  Dental Findings: In tact, molar caps   Chest/Lungs:  Chest/Lungs Findings: Clear to auscultation, Normal  Respiratory Rate     Heart/Vascular:  Heart Findings: Rate: Normal  Rhythm: Regular Rhythm  Sounds: Normal        Mental Status:  Mental Status Findings:  Cooperative, Alert and Oriented         Anesthesia Plan  Type of Anesthesia, risks & benefits discussed:  Anesthesia Type:  general  Patient's Preference: General   Intra-op Monitoring Plan: arterial line and standard ASA monitors  Intra-op Monitoring Plan Comments:   Post Op Pain Control Plan: multimodal analgesia  Post Op Pain Control Plan Comments:   Induction:   IV  Beta Blocker:  Patient is on a Beta-Blocker and has received one dose within the past 24 hours (No further documentation required).       Informed Consent: Patient understands risks and agrees with Anesthesia plan.  Questions answered. Anesthesia consent signed with patient.  ASA Score: 3     Day of Surgery Review of History & Physical:    H&P update referred to the surgeon.     Anesthesia Plan Notes: NPO confirmed.   No history of anesthesia problems.         Ready For Surgery From Anesthesia Perspective.        Labs pending    Cardiac clearance with Dr. Castanon. Instructed to hold Eliquis 48 hrs before sx.  Last dose 8/22    Nga Celeste RN 08/24/2017

## 2017-08-22 RX ORDER — ZOLPIDEM TARTRATE 10 MG/1
10 TABLET ORAL NIGHTLY PRN
Qty: 1 TABLET | Refills: 1 | Status: SHIPPED | OUTPATIENT
Start: 2017-08-22 | End: 2017-08-30 | Stop reason: HOSPADM

## 2017-08-24 ENCOUNTER — TELEPHONE (OUTPATIENT)
Dept: UROLOGY | Facility: CLINIC | Age: 67
End: 2017-08-24

## 2017-08-24 ENCOUNTER — HOSPITAL ENCOUNTER (OUTPATIENT)
Dept: PREADMISSION TESTING | Facility: HOSPITAL | Age: 67
Discharge: HOME | DRG: 657 | End: 2017-08-24
Attending: ANESTHESIOLOGY
Payer: COMMERCIAL

## 2017-08-24 VITALS
TEMPERATURE: 98 F | BODY MASS INDEX: 35.68 KG/M2 | DIASTOLIC BLOOD PRESSURE: 70 MMHG | SYSTOLIC BLOOD PRESSURE: 145 MMHG | RESPIRATION RATE: 18 BRPM | HEIGHT: 74 IN | HEART RATE: 58 BPM | WEIGHT: 278 LBS

## 2017-08-24 NOTE — DISCHARGE INSTRUCTIONS
Your surgery has been scheduled for:__________________________________________    You should report to:  ____[facility], located on the [place] side of the first floor of the           [facility] ([phone])  ____[facility], located on the [facility] side of the            Second floor of the [facility] ([phone])  ____3rd Floor [facility] located on the [facility] side of the [facility] [phone]  Please Note   - Tell your doctor if you take Aspirin, products containing Aspirin, herbal medications  or blood thinners, such as Coumadin, Ticlid, or Plavix.  (Consult your provider regarding holding or stopping before surgery).  - Arrange for someone to drive you home following surgery.  You will not be allowed to leave the surgical facility alone or drive yourself home following sedation and anesthesia.  Before Surgery  - Stop taking all herbal medications 14days prior to surgery  - No Motrin/Advil (Ibuprofen) 7 days before surgery  - No Aleve (Naproxen) 7 days before surgery  - Stop Taking Asprin, products containing Asprin _____days before surgery  - Stop taking blood thinners_______days before surgery  - Refrain from drinking alcoholic beverages for 24hours before and after surgery  - Stop or limit smoking _________days before surgery  Night before Surgery  - DO NOT EAT OR DRINK ANYTHING AFTER MIDNIGHT, INCLUDING GUM, HARD CANDY, MINTS, OR CHEWING TOBACCO.  - Take a shower or bath (shower is recommended).  Bathe with Hibiclens soap or an antibacterial soap from the neck down.  If not supplied by your surgeon, hibiclens soap will need to be purchased over the counter in pharmacy.  Rinse soap off thoroughly.  - Shampoo your hair with your regular shampoo  The Day of Surgery  - Take another bath or shower with hibiclens or any antibacterial soap, to reduce the chance of infection.  - Take heart and blood  pressure medications with a small sip of water, as advised by the perioperative team.  - Do not take fluid pills  - You may brush your teeth and rinse your mouth, but do not swall any additional water.   - Do not apply perfumes, powder, body lotions or deodorant on the day of surgery.  - Nail polish should be removed.  - Do not wear makeup or moisturizer  - Wear comfortable clothes, such as a button front shirt and loose fitting pants.  - Leave all jewelry, including body piercings, and valuables at home.    - Bring any devices you will neeed after surgery such as crutches or canes.  - If you have sleep apnea, please bring your CPAP machine  In the event that your physical condition changes including the onset of a cold or respiratory illness, or if you have to delay or cancel your surgery, please notify your surgeon.  Anesthesia: General Anesthesia  Youre due to have surgery. During surgery, youll be given medication called anesthesia. (It is also called anesthetic.) This will keep you comfortable and pain-free. Your anesthesia provider will use general anesthesia. This sheet tells you more about it.  What is general anesthesia?     You are watched continuously during your procedure by the anesthesia provider   General anesthesia puts you into a state like deep sleep. It goes into the bloodstream (IV anesthetics), into the lungs (gas anesthetics), or both. You feel nothing during the procedure. You will not remember it. During the procedure, the anesthesia provider monitors you continuously. He or she checks your heart rate and rhythm, blood pressure, breathing, and blood oxygen.  · IV Anesthetics. IV anesthetics are given through an IV line in your arm. Theyre often given first. This is so you are asleep before a gas anesthetic is started. Some kinds of IV anesthetics relieve pain. Others relax you. Your doctor will decide which kind is best in your case.  · Gas Anesthetics. Gas anesthetics are breathed into  the lungs. They are often used to keep you asleep. They can be given through a facemask or a tube placed in your larynx or trachea (breathing tube).  ? If you have a facemask, your anesthesia provider will most likely place it over your nose and mouth while youre still awake. Youll breathe oxygen through the mask as your IV anesthetic is started. Gas anesthetic may be added through the mask.  ? If you have a tube in the larynx or trachea, it will be inserted into your throat after youre asleep.  Anesthesia tools and medications  You will likely have:  · IV anesthetics. These are put into an IV line into your bloodstream.  · Gas anesthetics. You breathe these anesthetics into your lungs, where they pass into your bloodstream.  · Pulse oximeter. This is a small clip that is attached to the end of your finger. This measures your blood oxygen level.  · Electrocardiography leads (electrodes). These are small sticky pads that are placed on your chest. They record your heart rate and rhythm.  · Blood pressure cuff. This reads your blood pressure.  Risks and possible complications  General anesthesia has some risks. These include:  · Breathing problems  · Nausea and vomiting  · Sore throat or hoarseness (usually temporary)  · Allergic reaction to the anesthetic  · Irregular heartbeat (rare)  · Cardiac arrest (rare)   Anesthesia safety  · Follow all instructions you are given for how long not to eat or drink before your procedure.  · Be sure your doctor knows what medications and drugs you take. This includes over-the-counter medications, herbs, supplements, alcohol or other drugs. You will be asked when those were last taken.  · Have an adult family member or friend drive you home after the procedure.  · For the first 24 hours after your surgery:  ? Do not drive or use heavy equipment.  ? Have a trusted family member or spouse make important decisions or sign documents.  ? Avoid alcohol.  ? Have a responsible adult stay  with you. He or she can watch for problems and help keep you safe.  Date Last Reviewed: 10/16/2014  © 4329-5658 [employer]. [address] [place] [address], [place] [address]. All rights reserved. This information is not intended as a substitute for professional medical care. Always follow your healthcare professional's instructions.

## 2017-08-25 ENCOUNTER — HOSPITAL ENCOUNTER (INPATIENT)
Facility: HOSPITAL | Age: 67
LOS: 3 days | Discharge: HOME | DRG: 657 | End: 2017-08-28
Attending: UROLOGY | Admitting: UROLOGY
Payer: COMMERCIAL

## 2017-08-25 ENCOUNTER — ANESTHESIA (OUTPATIENT)
Dept: SURGERY | Facility: HOSPITAL | Age: 67
DRG: 657 | End: 2017-08-25
Payer: COMMERCIAL

## 2017-08-25 DIAGNOSIS — N28.9 NON-FUNCTIONING KIDNEY: ICD-10-CM

## 2017-08-25 LAB
ANION GAP SERPL CALC-SCNC: 12 MMOL/L
BASOPHILS # BLD AUTO: 0.01 K/UL
BASOPHILS NFR BLD: 0.1 %
BUN SERPL-MCNC: 27 MG/DL
CALCIUM SERPL-MCNC: 8.7 MG/DL
CHLORIDE SERPL-SCNC: 107 MMOL/L
CO2 SERPL-SCNC: 23 MMOL/L
CREAT SERPL-MCNC: 2.5 MG/DL
DIFFERENTIAL METHOD: ABNORMAL
EOSINOPHIL # BLD AUTO: 0 K/UL
EOSINOPHIL NFR BLD: 0.1 %
ERYTHROCYTE [DISTWIDTH] IN BLOOD BY AUTOMATED COUNT: 14.3 %
EST. GFR  (AFRICAN AMERICAN): 30.2 ML/MIN/1.73 M^2
EST. GFR  (NON AFRICAN AMERICAN): 26.2 ML/MIN/1.73 M^2
GLUCOSE SERPL-MCNC: 207 MG/DL
HCT VFR BLD AUTO: 38.6 %
HGB BLD-MCNC: 13.3 G/DL
LYMPHOCYTES # BLD AUTO: 0.6 K/UL
LYMPHOCYTES NFR BLD: 3.8 %
MCH RBC QN AUTO: 29.9 PG
MCHC RBC AUTO-ENTMCNC: 34.5 G/DL
MCV RBC AUTO: 87 FL
MONOCYTES # BLD AUTO: 0.9 K/UL
MONOCYTES NFR BLD: 6.3 %
NEUTROPHILS # BLD AUTO: 13 K/UL
NEUTROPHILS NFR BLD: 89.2 %
PLATELET # BLD AUTO: 198 K/UL
PMV BLD AUTO: 10.4 FL
POCT GLUCOSE: 172 MG/DL (ref 70–110)
POCT GLUCOSE: 176 MG/DL (ref 70–110)
POCT GLUCOSE: 208 MG/DL (ref 70–110)
POTASSIUM SERPL-SCNC: 4.6 MMOL/L
RBC # BLD AUTO: 4.45 M/UL
SODIUM SERPL-SCNC: 142 MMOL/L
WBC # BLD AUTO: 14.56 K/UL

## 2017-08-25 PROCEDURE — 25000003 PHARM REV CODE 250: Performed by: NURSE ANESTHETIST, CERTIFIED REGISTERED

## 2017-08-25 PROCEDURE — 80048 BASIC METABOLIC PNL TOTAL CA: CPT

## 2017-08-25 PROCEDURE — D9220A PRA ANESTHESIA: Mod: ANES,,, | Performed by: ANESTHESIOLOGY

## 2017-08-25 PROCEDURE — 71000033 HC RECOVERY, INTIAL HOUR: Performed by: UROLOGY

## 2017-08-25 PROCEDURE — 8E0W4CZ ROBOTIC ASSISTED PROCEDURE OF TRUNK REGION, PERCUTANEOUS ENDOSCOPIC APPROACH: ICD-10-PCS | Performed by: UROLOGY

## 2017-08-25 PROCEDURE — 88307 TISSUE EXAM BY PATHOLOGIST: CPT | Performed by: PATHOLOGY

## 2017-08-25 PROCEDURE — 94799 UNLISTED PULMONARY SVC/PX: CPT

## 2017-08-25 PROCEDURE — 25000242 PHARM REV CODE 250 ALT 637 W/ HCPCS: Performed by: NURSE ANESTHETIST, CERTIFIED REGISTERED

## 2017-08-25 PROCEDURE — 27100025 HC TUBING, SET FLUID WARMER: Performed by: NURSE ANESTHETIST, CERTIFIED REGISTERED

## 2017-08-25 PROCEDURE — P9045 ALBUMIN (HUMAN), 5%, 250 ML: HCPCS | Performed by: NURSE ANESTHETIST, CERTIFIED REGISTERED

## 2017-08-25 PROCEDURE — 63600175 PHARM REV CODE 636 W HCPCS: Performed by: NURSE ANESTHETIST, CERTIFIED REGISTERED

## 2017-08-25 PROCEDURE — 88307 TISSUE EXAM BY PATHOLOGIST: CPT | Mod: 26,,, | Performed by: PATHOLOGY

## 2017-08-25 PROCEDURE — 63600175 PHARM REV CODE 636 W HCPCS: Performed by: UROLOGY

## 2017-08-25 PROCEDURE — 12000002 HC ACUTE/MED SURGE SEMI-PRIVATE ROOM

## 2017-08-25 PROCEDURE — 25000003 PHARM REV CODE 250: Performed by: UROLOGY

## 2017-08-25 PROCEDURE — 50546 LAPAROSCOPIC NEPHRECTOMY: CPT | Mod: AS,LT,, | Performed by: PHYSICIAN ASSISTANT

## 2017-08-25 PROCEDURE — 36000712 HC OR TIME LEV V 1ST 15 MIN: Performed by: UROLOGY

## 2017-08-25 PROCEDURE — 50546 LAPAROSCOPIC NEPHRECTOMY: CPT | Mod: 22,LT,, | Performed by: UROLOGY

## 2017-08-25 PROCEDURE — 82962 GLUCOSE BLOOD TEST: CPT | Performed by: UROLOGY

## 2017-08-25 PROCEDURE — 37000009 HC ANESTHESIA EA ADD 15 MINS: Performed by: UROLOGY

## 2017-08-25 PROCEDURE — 63600175 PHARM REV CODE 636 W HCPCS: Performed by: ANESTHESIOLOGY

## 2017-08-25 PROCEDURE — 71000039 HC RECOVERY, EACH ADD'L HOUR: Performed by: UROLOGY

## 2017-08-25 PROCEDURE — 36620 INSERTION CATHETER ARTERY: CPT | Mod: 59,,, | Performed by: ANESTHESIOLOGY

## 2017-08-25 PROCEDURE — 63600175 PHARM REV CODE 636 W HCPCS: Performed by: STUDENT IN AN ORGANIZED HEALTH CARE EDUCATION/TRAINING PROGRAM

## 2017-08-25 PROCEDURE — 27800505 HC CATH, RADIAL ARTERY KIT: Performed by: NURSE ANESTHETIST, CERTIFIED REGISTERED

## 2017-08-25 PROCEDURE — 86920 COMPATIBILITY TEST SPIN: CPT

## 2017-08-25 PROCEDURE — D9220A PRA ANESTHESIA: Mod: CRNA,,, | Performed by: NURSE ANESTHETIST, CERTIFIED REGISTERED

## 2017-08-25 PROCEDURE — 11000001 HC ACUTE MED/SURG PRIVATE ROOM

## 2017-08-25 PROCEDURE — 25000242 PHARM REV CODE 250 ALT 637 W/ HCPCS: Performed by: STUDENT IN AN ORGANIZED HEALTH CARE EDUCATION/TRAINING PROGRAM

## 2017-08-25 PROCEDURE — 25000003 PHARM REV CODE 250: Performed by: STUDENT IN AN ORGANIZED HEALTH CARE EDUCATION/TRAINING PROGRAM

## 2017-08-25 PROCEDURE — 27200677 HC TRANSDUCER MONITOR KIT SINGLE: Performed by: NURSE ANESTHETIST, CERTIFIED REGISTERED

## 2017-08-25 PROCEDURE — 94760 N-INVAS EAR/PLS OXIMETRY 1: CPT

## 2017-08-25 PROCEDURE — 27000221 HC OXYGEN, UP TO 24 HOURS

## 2017-08-25 PROCEDURE — 0TT14ZZ RESECTION OF LEFT KIDNEY, PERCUTANEOUS ENDOSCOPIC APPROACH: ICD-10-PCS | Performed by: UROLOGY

## 2017-08-25 PROCEDURE — 94640 AIRWAY INHALATION TREATMENT: CPT

## 2017-08-25 PROCEDURE — 27201037 HC PRESSURE MONITORING SET UP

## 2017-08-25 PROCEDURE — 85025 COMPLETE CBC W/AUTO DIFF WBC: CPT

## 2017-08-25 PROCEDURE — 37000008 HC ANESTHESIA 1ST 15 MINUTES: Performed by: UROLOGY

## 2017-08-25 PROCEDURE — 36000713 HC OR TIME LEV V EA ADD 15 MIN: Performed by: UROLOGY

## 2017-08-25 PROCEDURE — 27201423 OPTIME MED/SURG SUP & DEVICES STERILE SUPPLY: Performed by: UROLOGY

## 2017-08-25 RX ORDER — CEFAZOLIN SODIUM 2 G/50ML
2 SOLUTION INTRAVENOUS
Status: COMPLETED | OUTPATIENT
Start: 2017-08-25 | End: 2017-08-26

## 2017-08-25 RX ORDER — CARVEDILOL 25 MG/1
25 TABLET ORAL 2 TIMES DAILY WITH MEALS
Status: DISCONTINUED | OUTPATIENT
Start: 2017-08-25 | End: 2017-08-28 | Stop reason: HOSPADM

## 2017-08-25 RX ORDER — ONDANSETRON 2 MG/ML
4 INJECTION INTRAMUSCULAR; INTRAVENOUS EVERY 8 HOURS PRN
Status: DISCONTINUED | OUTPATIENT
Start: 2017-08-25 | End: 2017-08-28 | Stop reason: HOSPADM

## 2017-08-25 RX ORDER — FAMOTIDINE 20 MG/1
20 TABLET, FILM COATED ORAL 2 TIMES DAILY
Status: DISCONTINUED | OUTPATIENT
Start: 2017-08-25 | End: 2017-08-28 | Stop reason: HOSPADM

## 2017-08-25 RX ORDER — IBUPROFEN 200 MG
24 TABLET ORAL
Status: DISCONTINUED | OUTPATIENT
Start: 2017-08-25 | End: 2017-08-28 | Stop reason: HOSPADM

## 2017-08-25 RX ORDER — ONDANSETRON 2 MG/ML
INJECTION INTRAMUSCULAR; INTRAVENOUS
Status: DISCONTINUED | OUTPATIENT
Start: 2017-08-25 | End: 2017-08-25

## 2017-08-25 RX ORDER — ONDANSETRON 2 MG/ML
4 INJECTION INTRAMUSCULAR; INTRAVENOUS DAILY PRN
Status: DISCONTINUED | OUTPATIENT
Start: 2017-08-25 | End: 2017-08-25 | Stop reason: HOSPADM

## 2017-08-25 RX ORDER — IPRATROPIUM BROMIDE AND ALBUTEROL SULFATE 2.5; .5 MG/3ML; MG/3ML
3 SOLUTION RESPIRATORY (INHALATION) ONCE
Status: COMPLETED | OUTPATIENT
Start: 2017-08-25 | End: 2017-08-25

## 2017-08-25 RX ORDER — OXYCODONE HYDROCHLORIDE 5 MG/1
10 TABLET ORAL EVERY 4 HOURS PRN
Status: DISCONTINUED | OUTPATIENT
Start: 2017-08-25 | End: 2017-08-28 | Stop reason: HOSPADM

## 2017-08-25 RX ORDER — LIDOCAINE HCL/PF 100 MG/5ML
SYRINGE (ML) INTRAVENOUS
Status: DISCONTINUED | OUTPATIENT
Start: 2017-08-25 | End: 2017-08-25

## 2017-08-25 RX ORDER — ACETAMINOPHEN 10 MG/ML
1000 INJECTION, SOLUTION INTRAVENOUS EVERY 8 HOURS
Status: COMPLETED | OUTPATIENT
Start: 2017-08-25 | End: 2017-08-26

## 2017-08-25 RX ORDER — DIPHENHYDRAMINE HCL 25 MG
25 CAPSULE ORAL EVERY 6 HOURS PRN
Status: DISCONTINUED | OUTPATIENT
Start: 2017-08-25 | End: 2017-08-28 | Stop reason: HOSPADM

## 2017-08-25 RX ORDER — IBUPROFEN 200 MG
16 TABLET ORAL
Status: DISCONTINUED | OUTPATIENT
Start: 2017-08-25 | End: 2017-08-28 | Stop reason: HOSPADM

## 2017-08-25 RX ORDER — FLECAINIDE ACETATE 50 MG/1
100 TABLET ORAL EVERY 12 HOURS
Status: DISCONTINUED | OUTPATIENT
Start: 2017-08-25 | End: 2017-08-28 | Stop reason: HOSPADM

## 2017-08-25 RX ORDER — SODIUM CHLORIDE 9 MG/ML
INJECTION, SOLUTION INTRAVENOUS CONTINUOUS
Status: DISCONTINUED | OUTPATIENT
Start: 2017-08-25 | End: 2017-08-26

## 2017-08-25 RX ORDER — SIMVASTATIN 20 MG/1
20 TABLET, FILM COATED ORAL NIGHTLY
Status: DISCONTINUED | OUTPATIENT
Start: 2017-08-25 | End: 2017-08-28 | Stop reason: HOSPADM

## 2017-08-25 RX ORDER — ACETAMINOPHEN 10 MG/ML
1000 INJECTION, SOLUTION INTRAVENOUS
Status: COMPLETED | OUTPATIENT
Start: 2017-08-25 | End: 2017-08-25

## 2017-08-25 RX ORDER — KETAMINE HYDROCHLORIDE 100 MG/ML
INJECTION, SOLUTION INTRAMUSCULAR; INTRAVENOUS
Status: DISCONTINUED | OUTPATIENT
Start: 2017-08-25 | End: 2017-08-25

## 2017-08-25 RX ORDER — GLYCOPYRROLATE 0.2 MG/ML
INJECTION INTRAMUSCULAR; INTRAVENOUS
Status: DISCONTINUED | OUTPATIENT
Start: 2017-08-25 | End: 2017-08-25

## 2017-08-25 RX ORDER — OXYCODONE HYDROCHLORIDE 5 MG/1
5 TABLET ORAL EVERY 4 HOURS PRN
Status: DISCONTINUED | OUTPATIENT
Start: 2017-08-25 | End: 2017-08-28 | Stop reason: HOSPADM

## 2017-08-25 RX ORDER — POLYETHYLENE GLYCOL 3350 17 G/17G
17 POWDER, FOR SOLUTION ORAL 2 TIMES DAILY
Status: DISCONTINUED | OUTPATIENT
Start: 2017-08-25 | End: 2017-08-28 | Stop reason: HOSPADM

## 2017-08-25 RX ORDER — LIDOCAINE HYDROCHLORIDE 10 MG/ML
1 INJECTION, SOLUTION EPIDURAL; INFILTRATION; INTRACAUDAL; PERINEURAL ONCE
Status: COMPLETED | OUTPATIENT
Start: 2017-08-25 | End: 2017-08-25

## 2017-08-25 RX ORDER — VECURONIUM BROMIDE FOR INJECTION 1 MG/ML
INJECTION, POWDER, LYOPHILIZED, FOR SOLUTION INTRAVENOUS
Status: DISCONTINUED | OUTPATIENT
Start: 2017-08-25 | End: 2017-08-25

## 2017-08-25 RX ORDER — MIDAZOLAM HYDROCHLORIDE 1 MG/ML
INJECTION, SOLUTION INTRAMUSCULAR; INTRAVENOUS
Status: DISCONTINUED | OUTPATIENT
Start: 2017-08-25 | End: 2017-08-25

## 2017-08-25 RX ORDER — INSULIN ASPART 100 [IU]/ML
1-10 INJECTION, SOLUTION INTRAVENOUS; SUBCUTANEOUS
Status: DISCONTINUED | OUTPATIENT
Start: 2017-08-25 | End: 2017-08-28 | Stop reason: HOSPADM

## 2017-08-25 RX ORDER — ROCURONIUM BROMIDE 10 MG/ML
INJECTION, SOLUTION INTRAVENOUS
Status: DISCONTINUED | OUTPATIENT
Start: 2017-08-25 | End: 2017-08-25

## 2017-08-25 RX ORDER — SUCCINYLCHOLINE CHLORIDE 20 MG/ML
INJECTION INTRAMUSCULAR; INTRAVENOUS
Status: DISCONTINUED | OUTPATIENT
Start: 2017-08-25 | End: 2017-08-25

## 2017-08-25 RX ORDER — GLUCAGON 1 MG
1 KIT INJECTION
Status: DISCONTINUED | OUTPATIENT
Start: 2017-08-25 | End: 2017-08-28 | Stop reason: HOSPADM

## 2017-08-25 RX ORDER — NEOSTIGMINE METHYLSULFATE 1 MG/ML
INJECTION, SOLUTION INTRAVENOUS
Status: DISCONTINUED | OUTPATIENT
Start: 2017-08-25 | End: 2017-08-25

## 2017-08-25 RX ORDER — HYDROMORPHONE HYDROCHLORIDE 1 MG/ML
1 INJECTION, SOLUTION INTRAMUSCULAR; INTRAVENOUS; SUBCUTANEOUS
Status: DISCONTINUED | OUTPATIENT
Start: 2017-08-25 | End: 2017-08-28 | Stop reason: HOSPADM

## 2017-08-25 RX ORDER — SODIUM CHLORIDE 9 MG/ML
INJECTION, SOLUTION INTRAVENOUS CONTINUOUS
Status: DISCONTINUED | OUTPATIENT
Start: 2017-08-25 | End: 2017-08-25

## 2017-08-25 RX ORDER — ALBUTEROL SULFATE 90 UG/1
AEROSOL, METERED RESPIRATORY (INHALATION)
Status: DISCONTINUED | OUTPATIENT
Start: 2017-08-25 | End: 2017-08-25

## 2017-08-25 RX ORDER — FUROSEMIDE 40 MG/1
40 TABLET ORAL 2 TIMES DAILY
Status: DISCONTINUED | OUTPATIENT
Start: 2017-08-26 | End: 2017-08-28 | Stop reason: HOSPADM

## 2017-08-25 RX ORDER — HYDROMORPHONE HYDROCHLORIDE 1 MG/ML
0.2 INJECTION, SOLUTION INTRAMUSCULAR; INTRAVENOUS; SUBCUTANEOUS EVERY 5 MIN PRN
Status: DISCONTINUED | OUTPATIENT
Start: 2017-08-25 | End: 2017-08-25 | Stop reason: HOSPADM

## 2017-08-25 RX ORDER — ALBUMIN HUMAN 50 G/1000ML
SOLUTION INTRAVENOUS CONTINUOUS PRN
Status: DISCONTINUED | OUTPATIENT
Start: 2017-08-25 | End: 2017-08-25

## 2017-08-25 RX ORDER — SODIUM CHLORIDE 0.9 % (FLUSH) 0.9 %
3 SYRINGE (ML) INJECTION
Status: DISCONTINUED | OUTPATIENT
Start: 2017-08-25 | End: 2017-08-25 | Stop reason: HOSPADM

## 2017-08-25 RX ORDER — PHENYLEPHRINE HYDROCHLORIDE 10 MG/ML
INJECTION INTRAVENOUS
Status: DISCONTINUED | OUTPATIENT
Start: 2017-08-25 | End: 2017-08-25

## 2017-08-25 RX ORDER — AMLODIPINE BESYLATE 10 MG/1
10 TABLET ORAL DAILY
Status: DISCONTINUED | OUTPATIENT
Start: 2017-08-26 | End: 2017-08-28 | Stop reason: HOSPADM

## 2017-08-25 RX ORDER — ZOLPIDEM TARTRATE 5 MG/1
5 TABLET ORAL NIGHTLY PRN
Status: DISCONTINUED | OUTPATIENT
Start: 2017-08-25 | End: 2017-08-28 | Stop reason: HOSPADM

## 2017-08-25 RX ORDER — DOXAZOSIN 1 MG/1
2 TABLET ORAL DAILY
Status: DISCONTINUED | OUTPATIENT
Start: 2017-08-26 | End: 2017-08-28 | Stop reason: HOSPADM

## 2017-08-25 RX ORDER — FENTANYL CITRATE 50 UG/ML
INJECTION, SOLUTION INTRAMUSCULAR; INTRAVENOUS
Status: DISCONTINUED | OUTPATIENT
Start: 2017-08-25 | End: 2017-08-25

## 2017-08-25 RX ORDER — PROPOFOL 10 MG/ML
VIAL (ML) INTRAVENOUS
Status: DISCONTINUED | OUTPATIENT
Start: 2017-08-25 | End: 2017-08-25

## 2017-08-25 RX ORDER — BISACODYL 10 MG
10 SUPPOSITORY, RECTAL RECTAL 2 TIMES DAILY
Status: DISCONTINUED | OUTPATIENT
Start: 2017-08-25 | End: 2017-08-28 | Stop reason: HOSPADM

## 2017-08-25 RX ADMIN — EPHEDRINE SULFATE 5 MG: 50 INJECTION, SOLUTION INTRAMUSCULAR; INTRAVENOUS; SUBCUTANEOUS at 10:08

## 2017-08-25 RX ADMIN — FENTANYL CITRATE 50 MCG: 50 INJECTION, SOLUTION INTRAMUSCULAR; INTRAVENOUS at 11:08

## 2017-08-25 RX ADMIN — KETAMINE HYDROCHLORIDE 20 MG: 100 INJECTION, SOLUTION, CONCENTRATE INTRAMUSCULAR; INTRAVENOUS at 10:08

## 2017-08-25 RX ADMIN — SODIUM CHLORIDE: 0.9 INJECTION, SOLUTION INTRAVENOUS at 02:08

## 2017-08-25 RX ADMIN — HYDROMORPHONE HYDROCHLORIDE 0.2 MG: 1 INJECTION, SOLUTION INTRAMUSCULAR; INTRAVENOUS; SUBCUTANEOUS at 02:08

## 2017-08-25 RX ADMIN — CARVEDILOL 25 MG: 25 TABLET, FILM COATED ORAL at 05:08

## 2017-08-25 RX ADMIN — MINERAL OIL AND WHITE PETROLATUM 1 TUBE: 150; 830 OINTMENT OPHTHALMIC at 07:08

## 2017-08-25 RX ADMIN — ALBUTEROL SULFATE 2 PUFF: 90 AEROSOL, METERED RESPIRATORY (INHALATION) at 07:08

## 2017-08-25 RX ADMIN — EPHEDRINE SULFATE 5 MG: 50 INJECTION, SOLUTION INTRAMUSCULAR; INTRAVENOUS; SUBCUTANEOUS at 08:08

## 2017-08-25 RX ADMIN — PHENYLEPHRINE HYDROCHLORIDE 100 MCG: 10 INJECTION INTRAVENOUS at 12:08

## 2017-08-25 RX ADMIN — NEOSTIGMINE METHYLSULFATE 5 MG: 1 INJECTION INTRAVENOUS at 12:08

## 2017-08-25 RX ADMIN — SIMVASTATIN 20 MG: 20 TABLET, FILM COATED ORAL at 09:08

## 2017-08-25 RX ADMIN — Medication 3 G: at 11:08

## 2017-08-25 RX ADMIN — VECURONIUM BROMIDE FOR INJECTION 2 MG: 1 INJECTION, POWDER, LYOPHILIZED, FOR SOLUTION INTRAVENOUS at 11:08

## 2017-08-25 RX ADMIN — HYDROMORPHONE HYDROCHLORIDE 1 MG: 1 INJECTION, SOLUTION INTRAMUSCULAR; INTRAVENOUS; SUBCUTANEOUS at 07:08

## 2017-08-25 RX ADMIN — FLECAINIDE ACETATE 100 MG: 50 TABLET ORAL at 09:08

## 2017-08-25 RX ADMIN — SODIUM CHLORIDE 1000 ML: 0.9 INJECTION, SOLUTION INTRAVENOUS at 05:08

## 2017-08-25 RX ADMIN — VECURONIUM BROMIDE FOR INJECTION 2 MG: 1 INJECTION, POWDER, LYOPHILIZED, FOR SOLUTION INTRAVENOUS at 09:08

## 2017-08-25 RX ADMIN — GLYCOPYRROLATE 0.6 MG: 0.2 INJECTION, SOLUTION INTRAMUSCULAR; INTRAVENOUS at 12:08

## 2017-08-25 RX ADMIN — PROPOFOL 200 MG: 10 INJECTION, EMULSION INTRAVENOUS at 07:08

## 2017-08-25 RX ADMIN — CEFAZOLIN SODIUM 2 G: 2 SOLUTION INTRAVENOUS at 07:08

## 2017-08-25 RX ADMIN — VECURONIUM BROMIDE FOR INJECTION 2 MG: 1 INJECTION, POWDER, LYOPHILIZED, FOR SOLUTION INTRAVENOUS at 08:08

## 2017-08-25 RX ADMIN — PHENYLEPHRINE HYDROCHLORIDE 100 MCG: 10 INJECTION INTRAVENOUS at 07:08

## 2017-08-25 RX ADMIN — ACETAMINOPHEN 1000 MG: 10 INJECTION, SOLUTION INTRAVENOUS at 08:08

## 2017-08-25 RX ADMIN — SUCCINYLCHOLINE CHLORIDE 200 MG: 20 INJECTION, SOLUTION INTRAMUSCULAR; INTRAVENOUS at 07:08

## 2017-08-25 RX ADMIN — EPHEDRINE SULFATE 10 MG: 50 INJECTION, SOLUTION INTRAMUSCULAR; INTRAVENOUS; SUBCUTANEOUS at 12:08

## 2017-08-25 RX ADMIN — LIDOCAINE HYDROCHLORIDE 100 MG: 20 INJECTION, SOLUTION INTRAVENOUS at 07:08

## 2017-08-25 RX ADMIN — OXYCODONE HYDROCHLORIDE 5 MG: 5 TABLET ORAL at 02:08

## 2017-08-25 RX ADMIN — SODIUM CHLORIDE, SODIUM GLUCONATE, SODIUM ACETATE, POTASSIUM CHLORIDE, MAGNESIUM CHLORIDE, SODIUM PHOSPHATE, DIBASIC, AND POTASSIUM PHOSPHATE: .53; .5; .37; .037; .03; .012; .00082 INJECTION, SOLUTION INTRAVENOUS at 07:08

## 2017-08-25 RX ADMIN — LIDOCAINE HYDROCHLORIDE 10 MG: 10 INJECTION, SOLUTION EPIDURAL; INFILTRATION; INTRACAUDAL; PERINEURAL at 06:08

## 2017-08-25 RX ADMIN — MIDAZOLAM HYDROCHLORIDE 2 MG: 1 INJECTION, SOLUTION INTRAMUSCULAR; INTRAVENOUS at 06:08

## 2017-08-25 RX ADMIN — ACETAMINOPHEN 1000 MG: 10 INJECTION, SOLUTION INTRAVENOUS at 10:08

## 2017-08-25 RX ADMIN — ALBUMIN (HUMAN): 12.5 SOLUTION INTRAVENOUS at 12:08

## 2017-08-25 RX ADMIN — FAMOTIDINE 20 MG: 20 TABLET, FILM COATED ORAL at 09:08

## 2017-08-25 RX ADMIN — SODIUM CHLORIDE: 0.9 INJECTION, SOLUTION INTRAVENOUS at 06:08

## 2017-08-25 RX ADMIN — ROCURONIUM BROMIDE 30 MG: 10 INJECTION, SOLUTION INTRAVENOUS at 07:08

## 2017-08-25 RX ADMIN — VECURONIUM BROMIDE FOR INJECTION 1 MG: 1 INJECTION, POWDER, LYOPHILIZED, FOR SOLUTION INTRAVENOUS at 11:08

## 2017-08-25 RX ADMIN — VECURONIUM BROMIDE FOR INJECTION 1 MG: 1 INJECTION, POWDER, LYOPHILIZED, FOR SOLUTION INTRAVENOUS at 12:08

## 2017-08-25 RX ADMIN — IPRATROPIUM BROMIDE AND ALBUTEROL SULFATE 3 ML: .5; 3 SOLUTION RESPIRATORY (INHALATION) at 02:08

## 2017-08-25 RX ADMIN — VECURONIUM BROMIDE FOR INJECTION 1 MG: 1 INJECTION, POWDER, LYOPHILIZED, FOR SOLUTION INTRAVENOUS at 10:08

## 2017-08-25 RX ADMIN — SODIUM CHLORIDE, SODIUM GLUCONATE, SODIUM ACETATE, POTASSIUM CHLORIDE, MAGNESIUM CHLORIDE, SODIUM PHOSPHATE, DIBASIC, AND POTASSIUM PHOSPHATE: .53; .5; .37; .037; .03; .012; .00082 INJECTION, SOLUTION INTRAVENOUS at 09:08

## 2017-08-25 RX ADMIN — Medication 3 G: at 07:08

## 2017-08-25 RX ADMIN — FENTANYL CITRATE 50 MCG: 50 INJECTION, SOLUTION INTRAMUSCULAR; INTRAVENOUS at 01:08

## 2017-08-25 RX ADMIN — ONDANSETRON 4 MG: 2 INJECTION INTRAMUSCULAR; INTRAVENOUS at 12:08

## 2017-08-25 RX ADMIN — OXYCODONE HYDROCHLORIDE 5 MG: 5 TABLET ORAL at 11:08

## 2017-08-25 RX ADMIN — SODIUM CHLORIDE, SODIUM GLUCONATE, SODIUM ACETATE, POTASSIUM CHLORIDE, MAGNESIUM CHLORIDE, SODIUM PHOSPHATE, DIBASIC, AND POTASSIUM PHOSPHATE: .53; .5; .37; .037; .03; .012; .00082 INJECTION, SOLUTION INTRAVENOUS at 12:08

## 2017-08-25 RX ADMIN — FENTANYL CITRATE 50 MCG: 50 INJECTION, SOLUTION INTRAMUSCULAR; INTRAVENOUS at 07:08

## 2017-08-25 RX ADMIN — INSULIN ASPART 1 UNITS: 100 INJECTION, SOLUTION INTRAVENOUS; SUBCUTANEOUS at 10:08

## 2017-08-25 RX ADMIN — BISACODYL 10 MG: 10 SUPPOSITORY RECTAL at 09:08

## 2017-08-25 RX ADMIN — POLYETHYLENE GLYCOL 3350 17 G: 17 POWDER, FOR SOLUTION ORAL at 09:08

## 2017-08-25 RX ADMIN — KETAMINE HYDROCHLORIDE 45 MG: 100 INJECTION, SOLUTION, CONCENTRATE INTRAMUSCULAR; INTRAVENOUS at 08:08

## 2017-08-25 RX ADMIN — EPHEDRINE SULFATE 10 MG: 50 INJECTION, SOLUTION INTRAMUSCULAR; INTRAVENOUS; SUBCUTANEOUS at 07:08

## 2017-08-25 RX ADMIN — FENTANYL CITRATE 50 MCG: 50 INJECTION, SOLUTION INTRAMUSCULAR; INTRAVENOUS at 12:08

## 2017-08-25 RX ADMIN — ACETAMINOPHEN 1000 MG: 10 INJECTION, SOLUTION INTRAVENOUS at 03:08

## 2017-08-25 RX ADMIN — ROCURONIUM BROMIDE 20 MG: 10 INJECTION, SOLUTION INTRAVENOUS at 07:08

## 2017-08-25 NOTE — ANESTHESIA PROCEDURE NOTES
Arterial    Diagnosis: non functioning kidney    Patient location during procedure: done in OR  Procedure start time: 8/25/2017 7:20 AM  Timeout: 8/25/2017 7:14 AM  Staffing  Anesthesiologist: AMILCAR OG  Performed: anesthesiologist   Anesthesiologist was present at the time of the procedure.  Preanesthetic Checklist  Completed: patient identified, site marked, surgical consent, pre-op evaluation, timeout performed, IV checked, risks and benefits discussed, monitors and equipment checked and anesthesia consent givenArterial  Skin Prep: chlorhexidine gluconate  Orientation: left  Location: radial  Catheter Size: 20 G  Catheter placement by Anatomical landmarks. Heme positive aspiration all ports.Insertion Attempts: 3  Assessment  Dressing: secured with tape and tegaderm  Patient: Tolerated well

## 2017-08-25 NOTE — H&P (VIEW-ONLY)
Urology ([place]) H&P for upcoming procedure  Staff:  Dr. Ernesto Curtis MD    Is this patient in a research study?  No    CC: left nonfunctional kidney    HPI:  Jose Cruz Lira is a 66 y.o. male with Afib,  insulin dependent DM2, CKD, and a left non-functional kidney.    He was initially referred to Dr. Curtis, by Dr. Polanco for evaluation of left non-functioning kidney. Recent gross hematuria workup was negative for any malignant causes. He's had long-standing left-sided severe hydronephrosis with renal parenchymal thinning.  He has undergone previous stent placement in the remote past with no improvement in his hydronephrosis.      He occasionally has left-sided flank aching pain. It is exacerbated by movement. However this does not occur often.     He takes Eliquis for atrial fibrillation and is s/p ablation. Hx of CHF.      He quit drinking.  He has lost a proximally 40 pounds over the course the past year through diet.     Denies any previous abdominal surgery.  He does think he has a hernia.        ROS: Negative except for as stated above    Past Medical History:   Diagnosis Date    Diabetes mellitus, type 2 2010    Hydronephrosis of left kidney     Hypertension     Non-functioning kidney     left    Obesity (BMI 30-39.9)     Unspecified disorder of kidney and ureter        Past Surgical History:   Procedure Laterality Date    arm surgery      CARDIOVERSION  11/29/2016    knee surgeory N/A 4 to 5 years ago       Social History     Social History    Marital status:      Spouse name: N/A    Number of children: N/A    Years of education: N/A     Social History Main Topics    Smoking status: Never Smoker    Smokeless tobacco: Never Used    Alcohol use Yes    Drug use: No    Sexual activity: No      Comment:       Other Topics Concern    Not on file     Social History Narrative     in [employer]. No physical activities.     with 2 kids  "      Family History   Problem Relation Age of Onset    Cancer Father     Prostate cancer Father     Cancer Sister     Cancer Brother     Amblyopia Neg Hx     Blindness Neg Hx     Cataracts Neg Hx     Diabetes Neg Hx     Glaucoma Neg Hx     Hypertension Neg Hx     Macular degeneration Neg Hx     Retinal detachment Neg Hx     Strabismus Neg Hx     Stroke Neg Hx     Thyroid disease Neg Hx        Review of patient's allergies indicates:  No Known Allergies    Current Outpatient Prescriptions on File Prior to Visit   Medication Sig Dispense Refill    amlodipine (NORVASC) 10 MG tablet Take 1 tablet (10 mg total) by mouth once daily. 90 tablet 4    apixaban (ELIQUIS) 5 mg Tab Take 1 tablet (5 mg total) by mouth 2 (two) times daily. 60 tablet 11    blood sugar diagnostic Strp 1 strip by Misc.(Non-Drug; Combo Route) route once daily. For accucheck Patient checks BS twice a day. Please provide a 3 month supply. 180 strip 4    BYDUREON 2 mg/0.65 mL PnIj INJECT 2MG WEEKLY 4 each 11    carvedilol (COREG) 25 MG tablet Take 1 tablet (25 mg total) by mouth 2 (two) times daily with meals. 360 tablet 3    doxazosin (CARDURA) 4 MG tablet Take 0.5 tablets (2 mg total) by mouth once daily.      flecainide (TAMBOCOR) 100 MG Tab Take 1 tablet (100 mg total) by mouth every 12 (twelve) hours. 60 tablet 11    furosemide (LASIX) 40 MG tablet Take 1 tablet (40 mg total) by mouth 2 (two) times daily. 180 tablet 3    insulin detemir (LEVEMIR FLEXTOUCH) 100 unit/mL (3 mL) SubQ InPn pen INJECT 25 UNITS INTO THE SKIN EVERY EVENING 45 mL 4    insulin needles, disposable, (BD ULTRA-FINE RODRIGUEZ PEN NEEDLES) 32 x 5/32 " Ndle Patient injects insulin once a day. Please provide 3 month supply. 90 each 4    lancets Misc Patient checks BS twice a day. Please provide a 3 month supply. 180 each 10    oxycodone-acetaminophen (PERCOCET) 5-325 mg per tablet Take 1 tablet by mouth every 4 (four) hours as needed for Pain. 31 tablet 0    " simvastatin (ZOCOR) 20 MG tablet Take 1 tablet (20 mg total) by mouth every evening. 90 tablet 4     No current facility-administered medications on file prior to visit.        Anticoagulation:  Yes, Eliquis    Physical Exam:  Weight 289 lb/131.3 kg  BMI 37.18    AAOx4, NAD  NC/AT, EOMI, PER, sclerae anicteric, speech normal, tongue midline  Nl effort, unlabored respirations  Regular rate  Soft, non-tender, distended abd, diastasis recti, no CVAT  Scars: none      Labs:    Urine dipstick today shows negative for all components.    Chemistry        Component Value Date/Time     07/10/2017 1348    K 3.7 07/10/2017 1348     07/10/2017 1348    CO2 24 07/10/2017 1348    BUN 22 07/10/2017 1348    CREATININE 1.6 (H) 07/10/2017 1348     (H) 07/10/2017 1348        Component Value Date/Time    CALCIUM 8.6 (L) 07/10/2017 1348    ALKPHOS 85 11/30/2016 0608    AST 12 11/30/2016 0608    ALT 17 11/30/2016 0608    BILITOT 1.6 (H) 11/30/2016 0608    ESTGFRAFRICA 51.9 (A) 07/10/2017 1348    EGFRNONAA 44.9 (A) 07/10/2017 1348        Lab Results   Component Value Date    WBC 8.09 07/10/2017    HGB 14.5 07/10/2017    HCT 42.3 07/10/2017    MCV 88 07/10/2017     07/10/2017         Lab Results   Component Value Date    PSA 1.5 05/09/2016       Imaging:   CTAP (date: 6/16/2015) - 1 left renal artery/ies, 1 left renal veins      Assessment: Jose Cruz Lira is a 66 y.o. male with left non-functional kidney.      Plan:   1. To OR on 8/25/2017 for left robotic nephrectomy.  2. Consents for surgery and blood transfusion signed.  3. I have explained the risk, benefits, and alternatives of the procedure in detail. The patient voices understanding and all questions have been answered. The patient agrees to proceed as planned.   4. Appreciate cardiovascular clearance - to be provided by patient's cardiologist, Dr. Irvin Castanon.  5. Type and screen ordered preoperatively.  6. Patient advised to have last dose of Eliquis  on Tuesday, 8/22/2017, pre-operatively.  7. Patient needs to see Anesthesia pre op clinic. Patient has requested he be contacted via his wife given the nature of his work.       Winifred Salguero MD

## 2017-08-25 NOTE — NURSING TRANSFER
Nursing Transfer Note      8/25/2017     Transfer To: 543 A From PACU    Transfer via bed    Transfer with IV pump    Transported by RN x 2    Medicines sent: none    Chart send with patient: Yes    Notified: spouse    Patient reassessed at: 8/25/17 @ 1800    Upon arrival to floor:

## 2017-08-25 NOTE — OR NURSING
Rotbot time out completed with pre incision time out.  All DaVinci instruments inspected before case by Michela .  All instruments appear to be intact.

## 2017-08-25 NOTE — PLAN OF CARE
Family updated via nurse liaison @ 5277, [phone]. Nurse liaison called into room for update @3331, informed procedure still in progess

## 2017-08-25 NOTE — OP NOTE
Certification of Assistant at Surgery       Surgery Date: 8/25/2017     Participating Surgeons:  Surgeon(s) and Role:     * Ernesto Curtis MD - Primary     * Jarrod Meraz MD - Resident - Assisting    Procedures:  Procedure(s) (LRB):  XI ROBOTIC ASSISTED LAPAROSCOPIC NEPHRECTOMY (Left)    Assistant Surgeon's Certification of Necessity:  I understand that section 1842 (b) (6) (d) of the Social Security Act generally prohibits Medicare Part B reasonable charge payment for the services of assistants at surgery in teaching hospitals when qualified residents are available to furnish such services. I certify that the services for which payment is claimed were medically necessary, and that no qualified resident was available to perform the services. I further understand that these services are subject to post-payment review by the Medicare carrier.      Jailene Anthony PA-C    08/25/2017  1:58 PM

## 2017-08-25 NOTE — TRANSFER OF CARE
Anesthesia Transfer of Care Note    Patient: Jose Cruz Lira    Procedure(s) Performed: Procedure(s) (LRB):  XI ROBOTIC ASSISTED LAPAROSCOPIC NEPHRECTOMY (Left)    Patient location: PACU    Anesthesia Type: general    Transport from OR: Transported from OR on 6-10 L/min O2 by face mask with adequate spontaneous ventilation    Post pain: adequate analgesia    Post assessment: no apparent anesthetic complications    Post vital signs: stable    Level of consciousness: awake    Nausea/Vomiting: no nausea/vomiting    Complications: none    Transfer of care protocol was followed      Last vitals:   Visit Vitals  BP (!) 151/71   Pulse 74   Temp 36.8 °C (98.2 °F) (Axillary)   Resp (!) 21   SpO2 (!) 93%

## 2017-08-25 NOTE — INTERVAL H&P NOTE
The patient has been examined and the H&P has been reviewed:    I concur with the findings and no changes have occurred since H&P was written.    Anesthesia/Surgery risks, benefits and alternative options discussed and understood by patient/family.          Active Hospital Problems    Diagnosis  POA    Non-functioning kidney [N28.9]  Yes      Resolved Hospital Problems    Diagnosis Date Resolved POA   No resolved problems to display.

## 2017-08-26 LAB
ANION GAP SERPL CALC-SCNC: 10 MMOL/L
BASOPHILS # BLD AUTO: 0.01 K/UL
BASOPHILS NFR BLD: 0.1 %
BUN SERPL-MCNC: 22 MG/DL
CALCIUM SERPL-MCNC: 8.1 MG/DL
CHLORIDE SERPL-SCNC: 105 MMOL/L
CO2 SERPL-SCNC: 23 MMOL/L
CREAT SERPL-MCNC: 1.9 MG/DL
DIFFERENTIAL METHOD: ABNORMAL
EOSINOPHIL # BLD AUTO: 0 K/UL
EOSINOPHIL NFR BLD: 0.2 %
ERYTHROCYTE [DISTWIDTH] IN BLOOD BY AUTOMATED COUNT: 14.5 %
EST. GFR  (AFRICAN AMERICAN): 42.1 ML/MIN/1.73 M^2
EST. GFR  (NON AFRICAN AMERICAN): 36.4 ML/MIN/1.73 M^2
GLUCOSE SERPL-MCNC: 193 MG/DL
HCT VFR BLD AUTO: 43.6 %
HGB BLD-MCNC: 14.6 G/DL
LYMPHOCYTES # BLD AUTO: 0.7 K/UL
LYMPHOCYTES NFR BLD: 5.3 %
MCH RBC QN AUTO: 29.9 PG
MCHC RBC AUTO-ENTMCNC: 33.5 G/DL
MCV RBC AUTO: 89 FL
MONOCYTES # BLD AUTO: 1.5 K/UL
MONOCYTES NFR BLD: 11.8 %
NEUTROPHILS # BLD AUTO: 10.5 K/UL
NEUTROPHILS NFR BLD: 82.4 %
PLATELET # BLD AUTO: 198 K/UL
PMV BLD AUTO: 10.4 FL
POCT GLUCOSE: 178 MG/DL (ref 70–110)
POCT GLUCOSE: 182 MG/DL (ref 70–110)
POCT GLUCOSE: 47 MG/DL (ref 70–110)
POTASSIUM SERPL-SCNC: 4.3 MMOL/L
RBC # BLD AUTO: 4.88 M/UL
SODIUM SERPL-SCNC: 138 MMOL/L
WBC # BLD AUTO: 12.7 K/UL

## 2017-08-26 PROCEDURE — 85025 COMPLETE CBC W/AUTO DIFF WBC: CPT

## 2017-08-26 PROCEDURE — 80048 BASIC METABOLIC PNL TOTAL CA: CPT

## 2017-08-26 PROCEDURE — 63600175 PHARM REV CODE 636 W HCPCS: Performed by: UROLOGY

## 2017-08-26 PROCEDURE — 11000001 HC ACUTE MED/SURG PRIVATE ROOM

## 2017-08-26 PROCEDURE — 12000002 HC ACUTE/MED SURGE SEMI-PRIVATE ROOM

## 2017-08-26 PROCEDURE — 63600175 PHARM REV CODE 636 W HCPCS: Performed by: STUDENT IN AN ORGANIZED HEALTH CARE EDUCATION/TRAINING PROGRAM

## 2017-08-26 PROCEDURE — 25000003 PHARM REV CODE 250: Performed by: UROLOGY

## 2017-08-26 PROCEDURE — 36415 COLL VENOUS BLD VENIPUNCTURE: CPT

## 2017-08-26 RX ORDER — ACETAMINOPHEN 10 MG/ML
1000 INJECTION, SOLUTION INTRAVENOUS EVERY 8 HOURS
Status: COMPLETED | OUTPATIENT
Start: 2017-08-26 | End: 2017-08-27

## 2017-08-26 RX ADMIN — POLYETHYLENE GLYCOL 3350 17 G: 17 POWDER, FOR SOLUTION ORAL at 09:08

## 2017-08-26 RX ADMIN — BISACODYL 10 MG: 10 SUPPOSITORY RECTAL at 09:08

## 2017-08-26 RX ADMIN — OXYCODONE HYDROCHLORIDE 10 MG: 5 TABLET ORAL at 09:08

## 2017-08-26 RX ADMIN — ACETAMINOPHEN 1000 MG: 10 INJECTION, SOLUTION INTRAVENOUS at 09:08

## 2017-08-26 RX ADMIN — ACETAMINOPHEN 1000 MG: 10 INJECTION, SOLUTION INTRAVENOUS at 02:08

## 2017-08-26 RX ADMIN — CEFAZOLIN SODIUM 2 G: 2 SOLUTION INTRAVENOUS at 12:08

## 2017-08-26 RX ADMIN — FLECAINIDE ACETATE 100 MG: 50 TABLET ORAL at 09:08

## 2017-08-26 RX ADMIN — OXYCODONE HYDROCHLORIDE 10 MG: 5 TABLET ORAL at 06:08

## 2017-08-26 RX ADMIN — OXYCODONE HYDROCHLORIDE 10 MG: 5 TABLET ORAL at 02:08

## 2017-08-26 RX ADMIN — AMLODIPINE BESYLATE 10 MG: 10 TABLET ORAL at 09:08

## 2017-08-26 RX ADMIN — CARVEDILOL 25 MG: 25 TABLET, FILM COATED ORAL at 09:08

## 2017-08-26 RX ADMIN — INSULIN ASPART 1 UNITS: 100 INJECTION, SOLUTION INTRAVENOUS; SUBCUTANEOUS at 09:08

## 2017-08-26 RX ADMIN — FAMOTIDINE 20 MG: 20 TABLET, FILM COATED ORAL at 09:08

## 2017-08-26 RX ADMIN — DOXAZOSIN 2 MG: 1 TABLET ORAL at 09:08

## 2017-08-26 RX ADMIN — FUROSEMIDE 40 MG: 40 TABLET ORAL at 09:08

## 2017-08-26 RX ADMIN — SIMVASTATIN 20 MG: 20 TABLET, FILM COATED ORAL at 09:08

## 2017-08-26 RX ADMIN — ACETAMINOPHEN 1000 MG: 10 INJECTION, SOLUTION INTRAVENOUS at 06:08

## 2017-08-26 RX ADMIN — CARVEDILOL 25 MG: 25 TABLET, FILM COATED ORAL at 06:08

## 2017-08-26 RX ADMIN — CEFAZOLIN SODIUM 2 G: 2 SOLUTION INTRAVENOUS at 03:08

## 2017-08-26 NOTE — ASSESSMENT & PLAN NOTE
- IV tylenol, PO oxycodone for pain control  - diabetic diet  - dc MIVF  - CBC, BMP daily  - Ambulate pm of surgery and qid  - Drains: Donato removed  - Voiding trial  - Prophylaxis: IS, SCDs, GI ppx    Plan for DC tomorrow

## 2017-08-26 NOTE — PROGRESS NOTES
[facility]  Urology  Progress Note    Patient Name: Jose Cruz Lira  MRN: [id number]  Admission Date: 8/25/2017  Hospital Length of Stay: 1 days  Code Status: Prior   Attending Provider: Ernesto Curtis MD   Primary Care Physician: Tena Phillips MD    Subjective:     HPI:  Jose Cruz Lira is a 66 y.o. male s/p left nephrectomy on 8/25    Interval History:   No acute events overnight  Pain well controlled  Has not yet ambulated  No nausea, minimal appetite  Felt weak getting up to chair    Review of Systems  Objective:     Temp:  [96.8 °F (36 °C)-98.7 °F (37.1 °C)] 96.8 °F (36 °C)  Pulse:  [] 84  Resp:  [13-21] 18  SpO2:  [92 %-96 %] 95 %  BP: (132-178)/(59-90) 175/83     Body mass index is 35.69 kg/m².            Drains     Drain                 Ureteral Drain/Stent 05/14/15 Left ureter 6 Fr. 835 days                Physical Exam   Constitutional: No distress.   HENT:   Head: Normocephalic and atraumatic.   Eyes: Conjunctivae are normal. No scleral icterus.   Neck: Normal range of motion.   Cardiovascular: Normal rate.    Pulmonary/Chest: Effort normal. No respiratory distress.   Abdominal: Soft. He exhibits no distension. There is tenderness (appropriate). There is no rebound and no guarding.   Incisions c/d/i  16 Fr ayala draining clear yellow   Musculoskeletal: Normal range of motion.   SCDs in place   Neurological: He is alert.   Skin: Skin is warm and dry. He is not diaphoretic.     Psychiatric: He has a normal mood and affect.       Significant Labs:    BMP:    Recent Labs  Lab 08/25/17  1340 08/26/17  0402    138   K 4.6 4.3    105   CO2 23 23   BUN 27* 22   CREATININE 2.5* 1.9*   CALCIUM 8.7 8.1*       CBC:     Recent Labs  Lab 08/25/17  1340 08/26/17  0402   WBC 14.56* 12.70   HGB 13.3* 14.6   HCT 38.6* 43.6    198       All pertinent labs results from the past 24 hours have been reviewed.    Significant Imaging:  All pertinent imaging results/findings  from the past 24 hours have been reviewed.                  Assessment/Plan:     Non-functioning kidney    - IV tylenol, PO oxycodone for pain control  - diabetic diet  - dc MIVF  - CBC, BMP daily  - Ambulate pm of surgery and qid  - Drains: Donato removed  - Voiding trial  - Prophylaxis: IS, SCDs, GI ppx    Plan for DC tomorrow            VTE Risk Mitigation         Ordered     Medium Risk of VTE  Once      08/25/17 1341     Place sequential compression device  Until discontinued      08/25/17 1340          Jodi Webster MD  Urology  [facility]

## 2017-08-26 NOTE — OP NOTE
[facility]  Operative Note     Date: 08/25/2017     Pre-Op Diagnosis:   1. Left non-functioning kidney  2. Left UPJ obstruction      Patient Active Problem List   Diagnosis    Obesity (BMI 30-39.9)    Hyperlipidemia associated with type 2 diabetes mellitus    Type 2 diabetes mellitus with stage 3 chronic kidney disease, with long-term current use of insulin    Hypertension associated with diabetes    Proteinuria    Rectus diastasis    Lipoma of abdominal wall    UPJ (ureteropelvic junction) obstruction    Non-functioning kidney    Hydronephrosis    Shortness of breath    Persistent atrial fibrillation    Sleep apnea, unspecified    Hematuria, unspecified    Hematuria, gross      Post-Op Diagnosis: same     Procedure(s) Performed:   1. Robotic left nephrectomy - adrenal-sparing--Modifier 22     Specimen(s):  1. left kidney     Staff Surgeon: Ernesto Curtis MD     Assistant Surgeon: Jarrod Meraz MD    Bedside Assistant: Jailene Anthony PA-C (no qualified resident available for bedside assistant).     Anesthesia: General endotracheal anesthesia     Indications: Jose Cruz Lira is a 66 y.o. male with a long-standing left renal obstruction and a non-functioning kidney.  After thorough discussion of management options and risks and benefits associated with both, the patient has elected for a left robotic nephrectomy     Findings:   - extremely large kidney that crossed midline due to severe distension of the collecting system  -~1800mL of fluid drained from collecting system to facilitate procedure     Estimated Blood Loss: 50 mL     Drains: 16 Yi Ayala     Procedure in Detail: After informed consent was obtained, the patient was brought to the operating suite and placed in the supine position. SCDs were applied and working. General anesthesia was administered. A ayala cathter was placed in the standard fashion. The patient was then placed in the lateral decubitus position  with the left side up. The patient was appropriately padded and secured to the table. The patient was prepped and draped in the usual sterile fashion. Timeout was performed and preoperative antibiotics were confirmed.      A veress needle was used to gain access to the peritoneum. A drop test and opening pressure <6 confirmed position within the peritoneum. No succus or blood was aspirated. The abdomen was insufflated. Port placement along the left midclavicular line was marked with a marking pen. The camera port was inserted after the skin was incised with Bovie. Inspection of the abdomen revealed no evidence of bowel or vascular injury. The other robotic ports were placed under laparoscopic observation. An assistant port was placed in the upper midline.     The robot was then docked. The colon was then reflected medially after its lateral attachments were incised at the white line of Toldt. The ureter and gonadal vessels were identified and mobilized. The ureter was identified and due to the extremely abberrant anatomy, the ureter was clipped with a hemolock clip and divided. The kidney was mobilized off the psoas posteriorly, but given the extreme size of the kidney, upward traction sufficient to expose the renal hilum was not feasible.  At this point, the decision was made to drain the collecting system and thus a hole was made in the lower pole renal pelvis.  1800cc of fluid was drained, and then the hole was closed with hemolock clips.     This drainage of fluid allowed for some additional upward traction on the kidney.  The main renal hilum was identified. A window above and below the hilum was bluntly and carefully cleared.  An endoscopic stapler with vascular load was used to take the hilum, first the main renal artery followed by the main renal vein.  An additional lower pole accessory renal artery and vein were also identified and ligated with a single vascular staple load.     The kidney was released of  its remaining attachments. Hemostasis was confirmed. The specimen was too large to fit in a specimen bag. The robot was then undocked.     The specimen was extracted through a midline incision from the previously placed assistant port. The fascia was closed using 0-PDS. Exparel was used to anesthetize the fascial layers, subcutaneous layer and skin. The skin was closed with 4-0 monocryl in a running subcuticular fashion. Port sites were closed using 4-0 monocryl. Dermabond was applied to the incisions.     The patient tolerated the procedure well and was transferred to the recovery room in stable condition.  Given the extreme nature of his anatomy with a large blown out kidney, a significantly longer amount of time was required for completion of this case (>100% longer than is typical).     Disposition:  The patient will be admitted overnight for monitoring.

## 2017-08-26 NOTE — SUBJECTIVE & OBJECTIVE
Interval History:   No acute events overnight  Pain well controlled  Has not yet ambulated  No nausea, minimal appetite  Felt weak getting up to chair    Review of Systems  Objective:     Temp:  [96.8 °F (36 °C)-98.7 °F (37.1 °C)] 96.8 °F (36 °C)  Pulse:  [] 84  Resp:  [13-21] 18  SpO2:  [92 %-96 %] 95 %  BP: (132-178)/(59-90) 175/83     Body mass index is 35.69 kg/m².            Drains     Drain                 Ureteral Drain/Stent 05/14/15 Left ureter 6 Fr. 835 days                Physical Exam   Constitutional: No distress.   HENT:   Head: Normocephalic and atraumatic.   Eyes: Conjunctivae are normal. No scleral icterus.   Neck: Normal range of motion.   Cardiovascular: Normal rate.    Pulmonary/Chest: Effort normal. No respiratory distress.   Abdominal: Soft. He exhibits no distension. There is tenderness (appropriate). There is no rebound and no guarding.   Incisions c/d/i  16 Fr ayala draining clear yellow   Musculoskeletal: Normal range of motion.   SCDs in place   Neurological: He is alert.   Skin: Skin is warm and dry. He is not diaphoretic.     Psychiatric: He has a normal mood and affect.       Significant Labs:    BMP:    Recent Labs  Lab 08/25/17  1340 08/26/17  0402    138   K 4.6 4.3    105   CO2 23 23   BUN 27* 22   CREATININE 2.5* 1.9*   CALCIUM 8.7 8.1*       CBC:     Recent Labs  Lab 08/25/17  1340 08/26/17  0402   WBC 14.56* 12.70   HGB 13.3* 14.6   HCT 38.6* 43.6    198       All pertinent labs results from the past 24 hours have been reviewed.    Significant Imaging:  All pertinent imaging results/findings from the past 24 hours have been reviewed.

## 2017-08-26 NOTE — PROGRESS NOTES
Pt settled in room,  no immediate needs, call light explained and given to patient, SCDs intact, I. S at bedside.

## 2017-08-27 LAB
ANION GAP SERPL CALC-SCNC: 6 MMOL/L
BASOPHILS # BLD AUTO: 0.01 K/UL
BASOPHILS NFR BLD: 0.1 %
BUN SERPL-MCNC: 21 MG/DL
CALCIUM SERPL-MCNC: 8.2 MG/DL
CHLORIDE SERPL-SCNC: 103 MMOL/L
CO2 SERPL-SCNC: 29 MMOL/L
CREAT SERPL-MCNC: 1.9 MG/DL
DIFFERENTIAL METHOD: ABNORMAL
EOSINOPHIL # BLD AUTO: 0.1 K/UL
EOSINOPHIL NFR BLD: 1.3 %
ERYTHROCYTE [DISTWIDTH] IN BLOOD BY AUTOMATED COUNT: 14.1 %
EST. GFR  (AFRICAN AMERICAN): 42.1 ML/MIN/1.73 M^2
EST. GFR  (NON AFRICAN AMERICAN): 36.4 ML/MIN/1.73 M^2
GLUCOSE SERPL-MCNC: 160 MG/DL
HCT VFR BLD AUTO: 37.7 %
HGB BLD-MCNC: 12.8 G/DL
LYMPHOCYTES # BLD AUTO: 0.9 K/UL
LYMPHOCYTES NFR BLD: 7.9 %
MCH RBC QN AUTO: 29.7 PG
MCHC RBC AUTO-ENTMCNC: 34 G/DL
MCV RBC AUTO: 88 FL
MONOCYTES # BLD AUTO: 1.2 K/UL
MONOCYTES NFR BLD: 11.5 %
NEUTROPHILS # BLD AUTO: 8.5 K/UL
NEUTROPHILS NFR BLD: 78.8 %
PLATELET # BLD AUTO: 175 K/UL
PMV BLD AUTO: 10 FL
POCT GLUCOSE: 157 MG/DL (ref 70–110)
POCT GLUCOSE: 223 MG/DL (ref 70–110)
POTASSIUM SERPL-SCNC: 4.4 MMOL/L
RBC # BLD AUTO: 4.31 M/UL
SODIUM SERPL-SCNC: 138 MMOL/L
WBC # BLD AUTO: 10.82 K/UL

## 2017-08-27 PROCEDURE — 36415 COLL VENOUS BLD VENIPUNCTURE: CPT

## 2017-08-27 PROCEDURE — 25000003 PHARM REV CODE 250: Performed by: UROLOGY

## 2017-08-27 PROCEDURE — 80048 BASIC METABOLIC PNL TOTAL CA: CPT

## 2017-08-27 PROCEDURE — 63600175 PHARM REV CODE 636 W HCPCS: Performed by: UROLOGY

## 2017-08-27 PROCEDURE — 63600175 PHARM REV CODE 636 W HCPCS: Performed by: STUDENT IN AN ORGANIZED HEALTH CARE EDUCATION/TRAINING PROGRAM

## 2017-08-27 PROCEDURE — 12000002 HC ACUTE/MED SURGE SEMI-PRIVATE ROOM

## 2017-08-27 PROCEDURE — 11000001 HC ACUTE MED/SURG PRIVATE ROOM

## 2017-08-27 PROCEDURE — 85025 COMPLETE CBC W/AUTO DIFF WBC: CPT

## 2017-08-27 RX ORDER — POLYETHYLENE GLYCOL 3350 17 G/17G
17 POWDER, FOR SOLUTION ORAL DAILY
Qty: 30 PACKET | Refills: 0 | Status: SHIPPED | OUTPATIENT
Start: 2017-08-27 | End: 2017-09-11

## 2017-08-27 RX ORDER — ACETAMINOPHEN 10 MG/ML
1000 INJECTION, SOLUTION INTRAVENOUS EVERY 8 HOURS
Status: COMPLETED | OUTPATIENT
Start: 2017-08-27 | End: 2017-08-28

## 2017-08-27 RX ORDER — OXYCODONE AND ACETAMINOPHEN 5; 325 MG/1; MG/1
1 TABLET ORAL EVERY 4 HOURS PRN
Qty: 41 TABLET | Refills: 0 | Status: SHIPPED | OUTPATIENT
Start: 2017-08-27 | End: 2017-10-11

## 2017-08-27 RX ADMIN — OXYCODONE HYDROCHLORIDE 10 MG: 5 TABLET ORAL at 11:08

## 2017-08-27 RX ADMIN — POLYETHYLENE GLYCOL 3350 17 G: 17 POWDER, FOR SOLUTION ORAL at 08:08

## 2017-08-27 RX ADMIN — CARVEDILOL 25 MG: 25 TABLET, FILM COATED ORAL at 08:08

## 2017-08-27 RX ADMIN — POLYETHYLENE GLYCOL 3350 17 G: 17 POWDER, FOR SOLUTION ORAL at 10:08

## 2017-08-27 RX ADMIN — OXYCODONE HYDROCHLORIDE 10 MG: 5 TABLET ORAL at 10:08

## 2017-08-27 RX ADMIN — FLECAINIDE ACETATE 100 MG: 50 TABLET ORAL at 10:08

## 2017-08-27 RX ADMIN — ACETAMINOPHEN 1000 MG: 10 INJECTION, SOLUTION INTRAVENOUS at 02:08

## 2017-08-27 RX ADMIN — APIXABAN 5 MG: 2.5 TABLET, FILM COATED ORAL at 11:08

## 2017-08-27 RX ADMIN — FAMOTIDINE 20 MG: 20 TABLET, FILM COATED ORAL at 08:08

## 2017-08-27 RX ADMIN — DOXAZOSIN 2 MG: 1 TABLET ORAL at 08:08

## 2017-08-27 RX ADMIN — SIMVASTATIN 20 MG: 20 TABLET, FILM COATED ORAL at 10:08

## 2017-08-27 RX ADMIN — INSULIN ASPART 4 UNITS: 100 INJECTION, SOLUTION INTRAVENOUS; SUBCUTANEOUS at 06:08

## 2017-08-27 RX ADMIN — AMLODIPINE BESYLATE 10 MG: 10 TABLET ORAL at 08:08

## 2017-08-27 RX ADMIN — FLECAINIDE ACETATE 100 MG: 50 TABLET ORAL at 08:08

## 2017-08-27 RX ADMIN — ACETAMINOPHEN 1000 MG: 10 INJECTION, SOLUTION INTRAVENOUS at 10:08

## 2017-08-27 RX ADMIN — BISACODYL 10 MG: 10 SUPPOSITORY RECTAL at 08:08

## 2017-08-27 RX ADMIN — FUROSEMIDE 40 MG: 40 TABLET ORAL at 10:08

## 2017-08-27 RX ADMIN — APIXABAN 5 MG: 2.5 TABLET, FILM COATED ORAL at 10:08

## 2017-08-27 RX ADMIN — FUROSEMIDE 40 MG: 40 TABLET ORAL at 08:08

## 2017-08-27 RX ADMIN — CARVEDILOL 25 MG: 25 TABLET, FILM COATED ORAL at 05:08

## 2017-08-27 RX ADMIN — ACETAMINOPHEN 1000 MG: 10 INJECTION, SOLUTION INTRAVENOUS at 05:08

## 2017-08-27 RX ADMIN — FAMOTIDINE 20 MG: 20 TABLET, FILM COATED ORAL at 10:08

## 2017-08-27 RX ADMIN — INSULIN ASPART 1 UNITS: 100 INJECTION, SOLUTION INTRAVENOUS; SUBCUTANEOUS at 11:08

## 2017-08-27 NOTE — PROGRESS NOTES
[facility]  Urology  Progress Note    Patient Name: Jose Cruz Lira  MRN: [id number]  Admission Date: 8/25/2017  Hospital Length of Stay: 2 days  Code Status: Prior   Attending Provider: Ernesto Curtis MD   Primary Care Physician: Tena Phillips MD    Subjective:     HPI:  Jose Cruz Lira is a 66 y.o. male s/p left nephrectomy on 8/25    Interval History:   No acute events overnight  Pain well controlled  Voiding well  No nausea, tolerating diet  Ambulated yesterday with walker    Review of Systems  Objective:     Temp:  [97.3 °F (36.3 °C)-98.6 °F (37 °C)] 98 °F (36.7 °C)  Pulse:  [70-90] 70  Resp:  [14-18] 14  SpO2:  [91 %-98 %] 94 %  BP: (134-169)/(63-78) 138/65     Body mass index is 35.69 kg/m².            Drains     Drain                 Ureteral Drain/Stent 05/14/15 Left ureter 6 Fr. 836 days                Physical Exam   Constitutional: No distress.   HENT:   Head: Normocephalic and atraumatic.   Eyes: Conjunctivae are normal. No scleral icterus.   Neck: Normal range of motion.   Cardiovascular: Normal rate.    Pulmonary/Chest: Effort normal. No respiratory distress.   Abdominal: Soft. He exhibits no distension. There is tenderness (appropriate). There is no rebound and no guarding.   Incisions c/d/i   Musculoskeletal: Normal range of motion.   SCDs in place   Neurological: He is alert.   Skin: Skin is warm and dry. He is not diaphoretic.     Psychiatric: He has a normal mood and affect.       Significant Labs:    BMP:    Recent Labs  Lab 08/25/17  1340 08/26/17  0402 08/27/17  0425    138 138   K 4.6 4.3 4.4    105 103   CO2 23 23 29   BUN 27* 22 21   CREATININE 2.5* 1.9* 1.9*   CALCIUM 8.7 8.1* 8.2*       CBC:     Recent Labs  Lab 08/25/17  1340 08/26/17  0402 08/27/17  0426   WBC 14.56* 12.70 10.82   HGB 13.3* 14.6 12.8*   HCT 38.6* 43.6 37.7*    198 175       All pertinent labs results from the past 24 hours have been reviewed.    Significant Imaging:  All  pertinent imaging results/findings from the past 24 hours have been reviewed.                  Assessment/Plan:     Non-functioning kidney    - IV tylenol, PO oxycodone for pain control  - diabetic diet  - dc MIVF  - CBC, BMP daily  - Ambulate pm of surgery and qid  - restart eliquis   - Drains: Donato removed  - Prophylaxis: IS, SCDs, GI ppx    Plan for DC tomorrow            VTE Risk Mitigation         Ordered     apixaban tablet 5 mg  2 times daily     Route:  Oral        08/27/17 0939     Medium Risk of VTE  Once      08/25/17 1341     Place sequential compression device  Until discontinued      08/25/17 1340          Jodi Webster MD  Urology  [facility]

## 2017-08-27 NOTE — SUBJECTIVE & OBJECTIVE
Interval History:   No acute events overnight  Pain well controlled  Voiding well  No nausea, tolerating diet  Ambulated yesterday with walker    Review of Systems  Objective:     Temp:  [97.3 °F (36.3 °C)-98.6 °F (37 °C)] 98 °F (36.7 °C)  Pulse:  [70-90] 70  Resp:  [14-18] 14  SpO2:  [91 %-98 %] 94 %  BP: (134-169)/(63-78) 138/65     Body mass index is 35.69 kg/m².            Drains     Drain                 Ureteral Drain/Stent 05/14/15 Left ureter 6 Fr. 836 days                Physical Exam   Constitutional: No distress.   HENT:   Head: Normocephalic and atraumatic.   Eyes: Conjunctivae are normal. No scleral icterus.   Neck: Normal range of motion.   Cardiovascular: Normal rate.    Pulmonary/Chest: Effort normal. No respiratory distress.   Abdominal: Soft. He exhibits no distension. There is tenderness (appropriate). There is no rebound and no guarding.   Incisions c/d/i   Musculoskeletal: Normal range of motion.   SCDs in place   Neurological: He is alert.   Skin: Skin is warm and dry. He is not diaphoretic.     Psychiatric: He has a normal mood and affect.       Significant Labs:    BMP:    Recent Labs  Lab 08/25/17  1340 08/26/17  0402 08/27/17  0425    138 138   K 4.6 4.3 4.4    105 103   CO2 23 23 29   BUN 27* 22 21   CREATININE 2.5* 1.9* 1.9*   CALCIUM 8.7 8.1* 8.2*       CBC:     Recent Labs  Lab 08/25/17  1340 08/26/17  0402 08/27/17  0426   WBC 14.56* 12.70 10.82   HGB 13.3* 14.6 12.8*   HCT 38.6* 43.6 37.7*    198 175       All pertinent labs results from the past 24 hours have been reviewed.    Significant Imaging:  All pertinent imaging results/findings from the past 24 hours have been reviewed.

## 2017-08-27 NOTE — ASSESSMENT & PLAN NOTE
- IV tylenol, PO oxycodone for pain control  - diabetic diet  - dc MIVF  - CBC, BMP daily  - Ambulate pm of surgery and qid  - restart eliquis   - Drains: Donato removed  - Prophylaxis: IS, SCDs, GI ppx    Plan for DC tomorrow

## 2017-08-27 NOTE — PLAN OF CARE
Problem: Patient Care Overview  Goal: Plan of Care Review  Outcome: Ongoing (interventions implemented as appropriate)  Patient resting at bedside chair comfortably. IV intact and infusing free of infection and irration,fall precautions maintained no falls noted. Call light in reach bed locked and in lowest position. Non skid socks on while out of bed. Patient instructed to call for assistance. Skin integrity maintained as patient is independent with frequent positioning, C/o pain managed with PRN meds, No other complaints or concerns. Progressing towards goals. Will continue to monitor and follow careplan of care.

## 2017-08-28 VITALS
DIASTOLIC BLOOD PRESSURE: 73 MMHG | BODY MASS INDEX: 35.68 KG/M2 | WEIGHT: 278 LBS | HEART RATE: 69 BPM | HEIGHT: 74 IN | SYSTOLIC BLOOD PRESSURE: 145 MMHG | RESPIRATION RATE: 16 BRPM | OXYGEN SATURATION: 95 % | TEMPERATURE: 98 F

## 2017-08-28 LAB
ANION GAP SERPL CALC-SCNC: 8 MMOL/L
BASOPHILS # BLD AUTO: 0.01 K/UL
BASOPHILS NFR BLD: 0.1 %
BUN SERPL-MCNC: 18 MG/DL
CALCIUM SERPL-MCNC: 8.3 MG/DL
CHLORIDE SERPL-SCNC: 105 MMOL/L
CO2 SERPL-SCNC: 24 MMOL/L
CREAT SERPL-MCNC: 1.5 MG/DL
DIFFERENTIAL METHOD: ABNORMAL
EOSINOPHIL # BLD AUTO: 0.2 K/UL
EOSINOPHIL NFR BLD: 2.7 %
ERYTHROCYTE [DISTWIDTH] IN BLOOD BY AUTOMATED COUNT: 13.7 %
EST. GFR  (AFRICAN AMERICAN): 56.1 ML/MIN/1.73 M^2
EST. GFR  (NON AFRICAN AMERICAN): 48.5 ML/MIN/1.73 M^2
GLUCOSE SERPL-MCNC: 140 MG/DL
HCT VFR BLD AUTO: 35.5 %
HGB BLD-MCNC: 12.2 G/DL
LYMPHOCYTES # BLD AUTO: 0.8 K/UL
LYMPHOCYTES NFR BLD: 9 %
MCH RBC QN AUTO: 30.1 PG
MCHC RBC AUTO-ENTMCNC: 34.4 G/DL
MCV RBC AUTO: 88 FL
MONOCYTES # BLD AUTO: 0.8 K/UL
MONOCYTES NFR BLD: 9.7 %
NEUTROPHILS # BLD AUTO: 6.7 K/UL
NEUTROPHILS NFR BLD: 78.1 %
PLATELET # BLD AUTO: 175 K/UL
PMV BLD AUTO: 10.1 FL
POCT GLUCOSE: 143 MG/DL (ref 70–110)
POCT GLUCOSE: 168 MG/DL (ref 70–110)
POTASSIUM SERPL-SCNC: 3.9 MMOL/L
RBC # BLD AUTO: 4.05 M/UL
SODIUM SERPL-SCNC: 137 MMOL/L
WBC # BLD AUTO: 8.57 K/UL

## 2017-08-28 PROCEDURE — 85025 COMPLETE CBC W/AUTO DIFF WBC: CPT

## 2017-08-28 PROCEDURE — 63600175 PHARM REV CODE 636 W HCPCS: Performed by: UROLOGY

## 2017-08-28 PROCEDURE — 36415 COLL VENOUS BLD VENIPUNCTURE: CPT

## 2017-08-28 PROCEDURE — 80048 BASIC METABOLIC PNL TOTAL CA: CPT

## 2017-08-28 PROCEDURE — 25000003 PHARM REV CODE 250: Performed by: UROLOGY

## 2017-08-28 RX ADMIN — ACETAMINOPHEN 1000 MG: 10 INJECTION, SOLUTION INTRAVENOUS at 05:08

## 2017-08-28 RX ADMIN — FUROSEMIDE 40 MG: 40 TABLET ORAL at 08:08

## 2017-08-28 RX ADMIN — FLECAINIDE ACETATE 100 MG: 50 TABLET ORAL at 08:08

## 2017-08-28 RX ADMIN — AMLODIPINE BESYLATE 10 MG: 10 TABLET ORAL at 08:08

## 2017-08-28 RX ADMIN — DOXAZOSIN 2 MG: 1 TABLET ORAL at 08:08

## 2017-08-28 RX ADMIN — APIXABAN 5 MG: 2.5 TABLET, FILM COATED ORAL at 08:08

## 2017-08-28 RX ADMIN — FAMOTIDINE 20 MG: 20 TABLET, FILM COATED ORAL at 08:08

## 2017-08-28 RX ADMIN — CARVEDILOL 25 MG: 25 TABLET, FILM COATED ORAL at 08:08

## 2017-08-28 NOTE — NURSING
Patient and spouse verbalized understanding of discharge instructions. IV removed catheter intact. No questions or concerns. Wheelchair requested.

## 2017-08-28 NOTE — SUBJECTIVE & OBJECTIVE
Interval History:   No acute events overnight  Pain well controlled  Voiding well  No nausea, tolerating diet  Ambulated yesterday with walker  Showered yesterday  +flatus, no BM    Review of Systems    Objective:     Temp:  [96.6 °F (35.9 °C)-98.8 °F (37.1 °C)] 96.6 °F (35.9 °C)  Pulse:  [68-75] 69  Resp:  [14-17] 17  SpO2:  [90 %-94 %] 92 %  BP: (138-167)/(65-74) 138/65     Body mass index is 35.69 kg/m².            Drains     Drain                 Ureteral Drain/Stent 05/14/15 Left ureter 6 Fr. 836 days                Physical Exam   Constitutional: No distress.   HENT:   Head: Normocephalic and atraumatic.   Eyes: Conjunctivae are normal. No scleral icterus.   Neck: Normal range of motion.   Cardiovascular: Normal rate.    Pulmonary/Chest: Effort normal. No respiratory distress.   Abdominal: Soft. He exhibits no distension. There is tenderness (appropriate). There is no rebound and no guarding.   Incisions c/d/i   Musculoskeletal: Normal range of motion.   SCDs in place   Neurological: He is alert.   Skin: Skin is warm and dry. He is not diaphoretic.     Psychiatric: He has a normal mood and affect.       Significant Labs:    BMP:    Recent Labs  Lab 08/26/17  0402 08/27/17 0425 08/28/17  0456    138 137   K 4.3 4.4 3.9    103 105   CO2 23 29 24   BUN 22 21 18   CREATININE 1.9* 1.9* 1.5*   CALCIUM 8.1* 8.2* 8.3*       CBC:     Recent Labs  Lab 08/26/17 0402 08/27/17  0426 08/28/17  0456   WBC 12.70 10.82 8.57   HGB 14.6 12.8* 12.2*   HCT 43.6 37.7* 35.5*    175 175       All pertinent labs results from the past 24 hours have been reviewed.    Significant Imaging:  All pertinent imaging results/findings from the past 24 hours have been reviewed.

## 2017-08-28 NOTE — ASSESSMENT & PLAN NOTE
- IV tylenol, PO oxycodone for pain control  - diabetic diet  - dc MIVF  - CBC, BMP daily  - Ambulate pm of surgery and qid  - restart eliquis   - Drains: Donato removed  - Prophylaxis: IS, SCDs, GI ppx    DC home this AM

## 2017-08-28 NOTE — PLAN OF CARE
Problem: Patient Care Overview  Goal: Plan of Care Review  Outcome: Ongoing (interventions implemented as appropriate)  Patient progressing with POC. Pain controlled with PRN medication. Vital signs stable. Afebrile. Safety and fall precautions active. SCDs in place. Call light in reach. Will continue to monitor per frequent rounding.

## 2017-08-28 NOTE — PROGRESS NOTES
[facility]  Urology  Progress Note    Patient Name: Jose Cruz Lira  MRN: [id number]  Admission Date: 8/25/2017  Hospital Length of Stay: 3 days  Code Status: Prior   Attending Provider: Ernesto Curtis MD   Primary Care Physician: Tena Phillips MD    Subjective:     HPI:  Jose Cruz Lira is a 66 y.o. male s/p left nephrectomy on 8/25    Interval History:   No acute events overnight  Pain well controlled  Voiding well  No nausea, tolerating diet  Ambulated yesterday with walker  Showered yesterday  +flatus, no BM    Review of Systems    Objective:     Temp:  [96.6 °F (35.9 °C)-98.8 °F (37.1 °C)] 96.6 °F (35.9 °C)  Pulse:  [68-75] 69  Resp:  [14-17] 17  SpO2:  [90 %-94 %] 92 %  BP: (138-167)/(65-74) 138/65     Body mass index is 35.69 kg/m².            Drains     Drain                 Ureteral Drain/Stent 05/14/15 Left ureter 6 Fr. 836 days                Physical Exam   Constitutional: No distress.   HENT:   Head: Normocephalic and atraumatic.   Eyes: Conjunctivae are normal. No scleral icterus.   Neck: Normal range of motion.   Cardiovascular: Normal rate.    Pulmonary/Chest: Effort normal. No respiratory distress.   Abdominal: Soft. He exhibits no distension. There is tenderness (appropriate). There is no rebound and no guarding.   Incisions c/d/i   Musculoskeletal: Normal range of motion.   SCDs in place   Neurological: He is alert.   Skin: Skin is warm and dry. He is not diaphoretic.     Psychiatric: He has a normal mood and affect.       Significant Labs:    BMP:    Recent Labs  Lab 08/26/17  0402 08/27/17  0425 08/28/17  0456    138 137   K 4.3 4.4 3.9    103 105   CO2 23 29 24   BUN 22 21 18   CREATININE 1.9* 1.9* 1.5*   CALCIUM 8.1* 8.2* 8.3*       CBC:     Recent Labs  Lab 08/26/17  0402 08/27/17  0426 08/28/17  0456   WBC 12.70 10.82 8.57   HGB 14.6 12.8* 12.2*   HCT 43.6 37.7* 35.5*    175 175       All pertinent labs results from the past 24 hours have  been reviewed.    Significant Imaging:  All pertinent imaging results/findings from the past 24 hours have been reviewed.                  Assessment/Plan:     * Non-functioning kidney    - IV tylenol, PO oxycodone for pain control  - diabetic diet  - SLIV  - CBC, BMP daily  - Ambulate qid  - restart eliquis   - Drains: Donato removed, voiding well  - Prophylaxis: IS, SCDs, GI ppx    DC home this AM            VTE Risk Mitigation         Ordered     apixaban tablet 5 mg  2 times daily     Route:  Oral        08/27/17 0939     Medium Risk of VTE  Once      08/25/17 1341     Place sequential compression device  Until discontinued      08/25/17 1340          Jodi Webster MD  Urology  [facility]

## 2017-08-29 ENCOUNTER — PATIENT MESSAGE (OUTPATIENT)
Dept: UROLOGY | Facility: CLINIC | Age: 67
End: 2017-08-29

## 2017-08-29 ENCOUNTER — TELEPHONE (OUTPATIENT)
Dept: UROLOGY | Facility: CLINIC | Age: 67
End: 2017-08-29

## 2017-08-29 LAB
BLD PROD TYP BPU: NORMAL
BLD PROD TYP BPU: NORMAL
BLOOD UNIT EXPIRATION DATE: NORMAL
BLOOD UNIT EXPIRATION DATE: NORMAL
BLOOD UNIT TYPE CODE: 9500
BLOOD UNIT TYPE CODE: 9500
BLOOD UNIT TYPE: NORMAL
BLOOD UNIT TYPE: NORMAL
CODING SYSTEM: NORMAL
CODING SYSTEM: NORMAL
DISPENSE STATUS: NORMAL
DISPENSE STATUS: NORMAL
TRANS ERYTHROCYTES VOL PATIENT: NORMAL ML
TRANS ERYTHROCYTES VOL PATIENT: NORMAL ML

## 2017-08-29 NOTE — TELEPHONE ENCOUNTER
----- Message from Hilaria Liang sent at 8/29/2017 10:53 AM CDT -----  Contact: Wife  Wife is calling to schedule the pt an appt for a Post-Op appt.    She can be reached at [phone].    Thank you.

## 2017-08-30 ENCOUNTER — PATIENT OUTREACH (OUTPATIENT)
Dept: ADMINISTRATIVE | Facility: CLINIC | Age: 67
End: 2017-08-30

## 2017-08-30 ENCOUNTER — TELEPHONE (OUTPATIENT)
Dept: UROLOGY | Facility: CLINIC | Age: 67
End: 2017-08-30

## 2017-08-30 DIAGNOSIS — E11.59 HYPERTENSION ASSOCIATED WITH DIABETES: Chronic | ICD-10-CM

## 2017-08-30 DIAGNOSIS — I50.22 CHRONIC SYSTOLIC CONGESTIVE HEART FAILURE: ICD-10-CM

## 2017-08-30 DIAGNOSIS — I48.19 PERSISTENT ATRIAL FIBRILLATION: ICD-10-CM

## 2017-08-30 DIAGNOSIS — I15.2 HYPERTENSION ASSOCIATED WITH DIABETES: Chronic | ICD-10-CM

## 2017-08-30 NOTE — TELEPHONE ENCOUNTER
----- Message from Rocio Sánchez sent at 8/30/2017  8:50 AM CDT -----  Contact: [phone]/wife  Pt wife states she's returning your call.  Please call and advise

## 2017-08-30 NOTE — PATIENT INSTRUCTIONS
Discharge Instructions for Nephrectomy  You had a procedure to remove a kidney. This is called a nephrectomy. This procedure was done because one of your kidneys was not working properly or because there was a tumor. You can live a normal, healthy life with one kidney.  Home care  · Shower as necessary.  · Dont swim or use a bathtub or hot tub until after your follow-up appointment.  · Keep your incision clean and dry. Wash your incision gently with mild soap and warm water and pat it dry.  · Eat a healthy, well-balanced diet.  · Drink 6 to 8 glasses of water a day unless your doctor tells you to limit your fluids.  · Avoid constipation.  ¨ Use laxatives, stool softeners, or enemas as directed by your doctor.  ¨ Eat more high-fiber foods.  · Dont drive until you are off your pain medication and pain-free. This may take 2 to 4 weeks.  · Dont worry if you feel more tired than usual. Fatigue and weakness are common for a few weeks after this surgery.  · Limit your activity to short walks. Gradually increase your pace and distance as you feel able.  · Avoid strenuous activities, such as mowing the lawn, vacuuming, or playing sports.  · Dont lift anything heavier than 10 pounds for 4 weeks.  · Listen to your body. If an activity causes pain, stop.  · If you were asked to stop any medicines before the surgery, be sure to ask the doctor when you may restart taking them. This is especially important in the case of blood thinners (anticoagulants or antiplatelet agents).  Follow-up care  Make a follow-up appointment as directed by our staff.  When to seek medical care  Call your healthcare provider right away if you have any of the following:  · Fever of 100.4°F (38°C) or higher, or as directed by your healthcare provider  · Redness, swelling, warmth, or pain at your incision site  · Drainage or pus from your incision  · Nausea or vomiting  · Increased pain  · Noticeable decrease in urine output  · Blood in your urine    Date Last Reviewed: 1/6/2015  © 3203-7628 [employer], [employer]. [address] [place] [address], [place] [address]. All rights reserved. This information is not intended as a substitute for professional medical care. Always follow your healthcare professional's instructions.

## 2017-08-31 RX ORDER — CARVEDILOL 25 MG/1
25 TABLET ORAL 2 TIMES DAILY WITH MEALS
Qty: 360 TABLET | Refills: 3 | Status: SHIPPED | OUTPATIENT
Start: 2017-08-31 | End: 2017-12-14 | Stop reason: SDUPTHER

## 2017-09-06 NOTE — PLAN OF CARE
"Received phone call from [facility] to send clinicals for 8/26 and 8/27. CM faxed progress notes for these days. Confirmation fax was received.       Your fax has been successfully sent to [phone] at [phone].  ------------------------------------------------------------  From: [employer]osta  ------------------------------------------------------------  Time: 9/6/2017 10:11:36 AM  Sent to [phone] with remote ID "Fax "  Result: (0/339;0/0) Successful Send  Page record: 1 - 7  Elapsed time: 03:22 on channel 49  "

## 2017-09-07 ENCOUNTER — OFFICE VISIT (OUTPATIENT)
Dept: UROLOGY | Facility: CLINIC | Age: 67
End: 2017-09-07
Payer: COMMERCIAL

## 2017-09-07 VITALS — BODY MASS INDEX: 35.68 KG/M2 | WEIGHT: 278 LBS | HEIGHT: 74 IN

## 2017-09-07 DIAGNOSIS — R60.0 EDEMA OF RIGHT LOWER EXTREMITY: ICD-10-CM

## 2017-09-07 DIAGNOSIS — N28.9 NON-FUNCTIONING KIDNEY: Primary | ICD-10-CM

## 2017-09-07 PROCEDURE — 99024 POSTOP FOLLOW-UP VISIT: CPT | Mod: S$GLB,,, | Performed by: UROLOGY

## 2017-09-07 PROCEDURE — 99999 PR PBB SHADOW E&M-EST. PATIENT-LVL II: CPT | Mod: PBBFAC,,, | Performed by: UROLOGY

## 2017-09-07 RX ORDER — OXYCODONE AND ACETAMINOPHEN 5; 325 MG/1; MG/1
1 TABLET ORAL EVERY 4 HOURS PRN
Qty: 30 TABLET | Refills: 0 | Status: SHIPPED | OUTPATIENT
Start: 2017-09-07 | End: 2017-10-11

## 2017-09-08 ENCOUNTER — CLINICAL SUPPORT (OUTPATIENT)
Dept: CARDIOLOGY | Facility: CLINIC | Age: 67
End: 2017-09-08
Payer: COMMERCIAL

## 2017-09-08 DIAGNOSIS — R60.0 EDEMA OF RIGHT LOWER EXTREMITY: ICD-10-CM

## 2017-09-08 PROCEDURE — 93970 EXTREMITY STUDY: CPT | Mod: S$GLB,,, | Performed by: INTERNAL MEDICINE

## 2017-09-11 ENCOUNTER — OFFICE VISIT (OUTPATIENT)
Dept: CARDIOLOGY | Facility: CLINIC | Age: 67
End: 2017-09-11
Payer: COMMERCIAL

## 2017-09-11 VITALS
BODY MASS INDEX: 35.53 KG/M2 | WEIGHT: 276.88 LBS | HEIGHT: 74 IN | HEART RATE: 62 BPM | SYSTOLIC BLOOD PRESSURE: 134 MMHG | DIASTOLIC BLOOD PRESSURE: 65 MMHG

## 2017-09-11 DIAGNOSIS — R60.0 LOWER EXTREMITY EDEMA: Primary | ICD-10-CM

## 2017-09-11 PROCEDURE — 3008F BODY MASS INDEX DOCD: CPT | Mod: S$GLB,,, | Performed by: INTERNAL MEDICINE

## 2017-09-11 PROCEDURE — 3075F SYST BP GE 130 - 139MM HG: CPT | Mod: S$GLB,,, | Performed by: INTERNAL MEDICINE

## 2017-09-11 PROCEDURE — 99999 PR PBB SHADOW E&M-EST. PATIENT-LVL IV: CPT | Mod: PBBFAC,,, | Performed by: INTERNAL MEDICINE

## 2017-09-11 PROCEDURE — 99214 OFFICE O/P EST MOD 30 MIN: CPT | Mod: S$GLB,,, | Performed by: INTERNAL MEDICINE

## 2017-09-11 PROCEDURE — 3078F DIAST BP <80 MM HG: CPT | Mod: S$GLB,,, | Performed by: INTERNAL MEDICINE

## 2017-09-11 NOTE — PROGRESS NOTES
"Subjective:    Patient ID:  Jose Cruz Lira is a 66 y.o. male who presents for evaluation of Edema (Right leg )      HPI    Patient of Dr. Castanon    This is a 65 y/o man with PMHx of HTN, CKD with hydronephrosis, DM II, and aFib who presents to clinic for evaluation of LE edema. Patient had his left kidney removed (15 lb kidney) 2 weeks ago. Since that procedure, he noted that his RLE was swollen > LLE. He was seen by his Urologist recently who ordered a DVT U/S that was negative for DVT. Since that time, he states that the swelling has improved significantly.    Feels well today and has no major complaints. Denies JAMES, PND, and orthopnea. Denies weight gain. Taking his medications appropriately.    Echo in Jan-2017 showed LVH with normal EF, diastolic dysfunction, and severe LA enlargement. PET in Feb 2017 showed no hemodynamically significant stenosis with resting flow heterogeneity.     Review of Systems   Constitution: Negative for decreased appetite, fever, weakness and malaise/fatigue.   Cardiovascular: Negative for chest pain, dyspnea on exertion, irregular heartbeat and leg swelling.   Respiratory: Negative for cough, hemoptysis, shortness of breath and wheezing.    Endocrine: Negative for cold intolerance and heat intolerance.   Musculoskeletal: Negative for muscle weakness and myalgias.   Gastrointestinal: Negative for abdominal pain, constipation and diarrhea.   Genitourinary: Negative for bladder incontinence.   Psychiatric/Behavioral: Negative for depression.        Objective:  Vitals:    09/11/17 1026   BP: 134/65   BP Location: Left arm   Patient Position: Sitting   BP Method: Medium (Automatic)   Pulse: 62   Weight: 125.6 kg (276 lb 14.4 oz)   Height: 6' 2" (1.88 m)       Physical Exam   Constitutional: He is oriented to person, place, and time. He appears well-developed and well-nourished.   HENT:   Head: Normocephalic and atraumatic.   Neck: Normal range of motion. Neck supple. JVD (Minimal " "JVD) present.   Cardiovascular: Normal rate, regular rhythm and normal heart sounds.  Exam reveals no gallop and no friction rub.    No murmur heard.  Pulmonary/Chest: Effort normal and breath sounds normal. No respiratory distress. He has no wheezes. He has no rales.   Abdominal: Soft. Bowel sounds are normal. There is no tenderness. There is no rebound and no guarding.   Musculoskeletal: Normal range of motion. He exhibits edema (Trace BLE edema).   Neurological: He is alert and oriented to person, place, and time.     Current Outpatient Prescriptions on File Prior to Visit   Medication Sig    amlodipine (NORVASC) 10 MG tablet Take 1 tablet (10 mg total) by mouth once daily.    apixaban (ELIQUIS) 5 mg Tab Take 1 tablet (5 mg total) by mouth 2 (two) times daily.    blood sugar diagnostic Strp 1 strip by Misc.(Non-Drug; Combo Route) route once daily. For accucheck Patient checks BS twice a day. Please provide a 3 month supply.    BYDUREON 2 mg/0.65 mL PnIj INJECT 2MG WEEKLY    carvedilol (COREG) 25 MG tablet Take 1 tablet (25 mg total) by mouth 2 (two) times daily with meals.    doxazosin (CARDURA) 4 MG tablet Take 0.5 tablets (2 mg total) by mouth once daily.    flecainide (TAMBOCOR) 100 MG Tab Take 1 tablet (100 mg total) by mouth every 12 (twelve) hours.    furosemide (LASIX) 40 MG tablet Take 1 tablet (40 mg total) by mouth 2 (two) times daily.    insulin detemir (LEVEMIR FLEXTOUCH) 100 unit/mL (3 mL) SubQ InPn pen INJECT 25 UNITS INTO THE SKIN EVERY EVENING    insulin needles, disposable, (BD ULTRA-FINE RODRIGUEZ PEN NEEDLES) 32 x 5/32 " Ndle Patient injects insulin once a day. Please provide 3 month supply.    lancets Misc Patient checks BS twice a day. Please provide a 3 month supply.    oxycodone-acetaminophen (PERCOCET) 5-325 mg per tablet Take 1 tablet by mouth every 4 (four) hours as needed for Pain.    oxycodone-acetaminophen (PERCOCET) 5-325 mg per tablet Take 1 tablet by mouth every 4 (four) " hours as needed for Pain.    simvastatin (ZOCOR) 20 MG tablet Take 1 tablet (20 mg total) by mouth every evening.    [DISCONTINUED] polyethylene glycol (GLYCOLAX) 17 gram PwPk Take 17 g by mouth once daily.     No current facility-administered medications on file prior to visit.        DVT U/S 9/8/17  TEST DESCRIPTION   Impression:    RIGHT:  No evidence of Right lower extremity DVT.      LEFT:  No evidence of Left lower extremity DVT.        Assessment and Plan:   #LE Edema - Swelling relatively equal bilaterally on today's exam. DVT study negative. Swelling subsided significantly since he was seen by his Urologist secondary to compression stockings. No evidence of ADHF or significant volume overload. Swelling likely related to the immediate post operative period and thierry procedural fluids. Should improve with increased mobility.  - Daily weights  - Low sodium diet  - No medication changes needed at this time  - F/u with  as previously scheduled    Patient discussed and examined with attending physician, Dr. Blanco.    F/u with Dr. Castanon as scheduled.    Signed:    Jose Kennedy MD  Chief Cardiology Fellow  Pager: [phone]  9/11/2017 11:01 AM

## 2017-09-14 NOTE — PHYSICIAN QUERY
PT Name: Jose Cruz Lira  MR #: [id number]    Physician Query Form - Pathology Findings Clarification     CDS/: Khushboo Zuleta RN  CCDS               Contact information: [email or web address]  This form is a permanent document in the medical record.     Query Date: September 14, 2017      By submitting this query, we are merely seeking further clarification of documentation.  Please utilize your independent clinical judgment when addressing the question(s) below.      The medical record contains the following:     Findings Supporting Clinical Information Location in Medical Record   PRINCIPAL PROBLEM: Non-functioning left kidney              FINAL PATHOLOGIC DIAGNOSIS  1. 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  - Papillary adenoma.  - Nephrolithiasis. Discharge Summary    Pathology report 8/30     Please document the clinical significance of the Pathologists findings of left kidney papillary adenoma.          [x  ] I agree with the Pathology Findings        [  ] I do not agree with the Pathology Findings        [  ] Clinically Insignificant        [  ] Clinically Undetermined        [  ] Other/Clarification of Findings: ______________________________________________    Please document in your progress notes daily for the duration of treatment until resolved and include in your discharge summary.

## 2017-09-28 ENCOUNTER — LAB VISIT (OUTPATIENT)
Dept: LAB | Facility: HOSPITAL | Age: 67
End: 2017-09-28
Attending: INTERNAL MEDICINE
Payer: COMMERCIAL

## 2017-09-28 DIAGNOSIS — E11.59 HYPERTENSION ASSOCIATED WITH DIABETES: Chronic | ICD-10-CM

## 2017-09-28 DIAGNOSIS — I15.2 HYPERTENSION ASSOCIATED WITH DIABETES: Chronic | ICD-10-CM

## 2017-09-28 DIAGNOSIS — E11.22 TYPE 2 DIABETES MELLITUS WITH STAGE 3 CHRONIC KIDNEY DISEASE, WITH LONG-TERM CURRENT USE OF INSULIN: ICD-10-CM

## 2017-09-28 DIAGNOSIS — Z79.4 TYPE 2 DIABETES MELLITUS WITH STAGE 3 CHRONIC KIDNEY DISEASE, WITH LONG-TERM CURRENT USE OF INSULIN: ICD-10-CM

## 2017-09-28 DIAGNOSIS — E78.5 HYPERLIPIDEMIA ASSOCIATED WITH TYPE 2 DIABETES MELLITUS: ICD-10-CM

## 2017-09-28 DIAGNOSIS — E11.69 HYPERLIPIDEMIA ASSOCIATED WITH TYPE 2 DIABETES MELLITUS: ICD-10-CM

## 2017-09-28 DIAGNOSIS — N18.30 TYPE 2 DIABETES MELLITUS WITH STAGE 3 CHRONIC KIDNEY DISEASE, WITH LONG-TERM CURRENT USE OF INSULIN: ICD-10-CM

## 2017-09-28 LAB
25(OH)D3+25(OH)D2 SERPL-MCNC: 19 NG/ML
ANION GAP SERPL CALC-SCNC: 8 MMOL/L
BUN SERPL-MCNC: 21 MG/DL
CALCIUM SERPL-MCNC: 9.4 MG/DL
CHLORIDE SERPL-SCNC: 108 MMOL/L
CO2 SERPL-SCNC: 27 MMOL/L
CREAT SERPL-MCNC: 1.9 MG/DL
EST. GFR  (AFRICAN AMERICAN): 42.1 ML/MIN/1.73 M^2
EST. GFR  (NON AFRICAN AMERICAN): 36.4 ML/MIN/1.73 M^2
GLUCOSE SERPL-MCNC: 126 MG/DL
POTASSIUM SERPL-SCNC: 4.4 MMOL/L
SODIUM SERPL-SCNC: 143 MMOL/L

## 2017-09-28 PROCEDURE — 36415 COLL VENOUS BLD VENIPUNCTURE: CPT | Mod: PO

## 2017-09-28 PROCEDURE — 82306 VITAMIN D 25 HYDROXY: CPT

## 2017-09-28 PROCEDURE — 83036 HEMOGLOBIN GLYCOSYLATED A1C: CPT

## 2017-09-28 PROCEDURE — 80048 BASIC METABOLIC PNL TOTAL CA: CPT

## 2017-09-29 LAB
ESTIMATED AVG GLUCOSE: 146 MG/DL
HBA1C MFR BLD HPLC: 6.7 %

## 2017-10-03 ENCOUNTER — HOSPITAL ENCOUNTER (OUTPATIENT)
Dept: CARDIOLOGY | Facility: CLINIC | Age: 67
Discharge: HOME | End: 2017-10-03
Payer: COMMERCIAL

## 2017-10-03 ENCOUNTER — OFFICE VISIT (OUTPATIENT)
Dept: ELECTROPHYSIOLOGY | Facility: CLINIC | Age: 67
End: 2017-10-03
Payer: COMMERCIAL

## 2017-10-03 VITALS
HEIGHT: 74 IN | HEART RATE: 63 BPM | BODY MASS INDEX: 34.01 KG/M2 | SYSTOLIC BLOOD PRESSURE: 126 MMHG | DIASTOLIC BLOOD PRESSURE: 74 MMHG | WEIGHT: 265 LBS

## 2017-10-03 DIAGNOSIS — E11.59 HYPERTENSION ASSOCIATED WITH DIABETES: Chronic | ICD-10-CM

## 2017-10-03 DIAGNOSIS — I48.19 PERSISTENT ATRIAL FIBRILLATION: Primary | ICD-10-CM

## 2017-10-03 DIAGNOSIS — N18.30 TYPE 2 DIABETES MELLITUS WITH STAGE 3 CHRONIC KIDNEY DISEASE, WITH LONG-TERM CURRENT USE OF INSULIN: ICD-10-CM

## 2017-10-03 DIAGNOSIS — I48.91 ATRIAL FIBRILLATION, UNSPECIFIED TYPE: ICD-10-CM

## 2017-10-03 DIAGNOSIS — I15.2 HYPERTENSION ASSOCIATED WITH DIABETES: Chronic | ICD-10-CM

## 2017-10-03 DIAGNOSIS — Z79.4 TYPE 2 DIABETES MELLITUS WITH STAGE 3 CHRONIC KIDNEY DISEASE, WITH LONG-TERM CURRENT USE OF INSULIN: ICD-10-CM

## 2017-10-03 DIAGNOSIS — G47.30 SLEEP APNEA, UNSPECIFIED TYPE: ICD-10-CM

## 2017-10-03 DIAGNOSIS — E11.22 TYPE 2 DIABETES MELLITUS WITH STAGE 3 CHRONIC KIDNEY DISEASE, WITH LONG-TERM CURRENT USE OF INSULIN: ICD-10-CM

## 2017-10-03 PROCEDURE — 93000 ELECTROCARDIOGRAM COMPLETE: CPT | Mod: S$GLB,,, | Performed by: INTERNAL MEDICINE

## 2017-10-03 PROCEDURE — 99999 PR PBB SHADOW E&M-EST. PATIENT-LVL III: CPT | Mod: PBBFAC,,, | Performed by: INTERNAL MEDICINE

## 2017-10-03 PROCEDURE — 99214 OFFICE O/P EST MOD 30 MIN: CPT | Mod: S$GLB,,, | Performed by: INTERNAL MEDICINE

## 2017-10-03 RX ORDER — FLECAINIDE ACETATE 50 MG/1
50 TABLET ORAL EVERY 12 HOURS
Qty: 60 TABLET | Refills: 11 | Status: SHIPPED | OUTPATIENT
Start: 2017-10-03 | End: 2017-12-14 | Stop reason: SDUPTHER

## 2017-10-03 NOTE — PROGRESS NOTES
Subjective:    Patient ID:  Jose Cruz Lira is a 66 y.o. male who presents for follow-up of Persistent atrial fibrillation    Primary Care Physician: Tena Phillips MD  Primary Cardiologist: Irvin Castanon MD    HPI  Prior Hx:  I had the pleasure of seeing Mr. Lira in our electrophysiology clinic in follow-up for his atrial fibrillation. As you are aware he is a pleasant 66 year-old man with hypertension, obesity, chronic kidney disease with chronic hydronephrosis now s/p left nephrectomy,  and type 2 diabetes mellitus. He was in his usual state of health until mid-2016 when he began to notice exertional shortness of breath, fatigue and increasing weight. He presented to the ER for evaluation (11/2016) and was found to be in atrial fibrillation with rapid ventricular response and new onset systolic heart failure. His EF was measured around 20% when in AF with RVR. He was diuresed and rate controlled. He underwent KADEN guided DCCV at which his EF appeared to be ~35% with improved rate control. He was cardioverted and presented for follow-up 12/2016. At that visit he noted he has lost ~20 pounds since his initial presentation. He had been watching his fluid intake and diet. He reported he felt he was back to normal and noted no palpitations, exertional dyspnea or chest discomfort.     Mr. Lira had a repeat echocardiogram after ~2 months of sinus rhythm that revealed a normal LVEF (results below).  PET stress was also performed showing no ischemia.  We started him on daily bid flecainide.      ECHO 1/2017 CONCLUSIONS     1 - Eccentric LVH with normal left ventricular systolic function (EF 55-60%).     2 - Severe left atrial enlargement.     3 - Left ventricular diastolic dysfunction.     4 - Normal right ventricular systolic function      Interim Hx:  Mr. Lira is feeling well. He has no current complaints. He denies any palpitations, fatigue, dyspnea or chest discomfort. His creatinine clearance has been around 35.       My interpretation of today's in clinic electrocardiogram is normal sinus rhythm at a rate of 63 bpm and normal intervals.    Review of Systems   Constitution: Negative for fever, weakness and malaise/fatigue.   HENT: Negative.    Eyes: Negative.  Negative for blurred vision and visual disturbance.   Cardiovascular: Negative for chest pain, dyspnea on exertion, irregular heartbeat, leg swelling, near-syncope, orthopnea, palpitations, paroxysmal nocturnal dyspnea and syncope.   Respiratory: Negative.    Hematologic/Lymphatic: Negative for bleeding problem. Does not bruise/bleed easily.   Gastrointestinal: Negative for hematochezia and melena.   Neurological: Negative for dizziness and focal weakness.        Objective:    Physical Exam   Constitutional: He is oriented to person, place, and time. He appears well-developed and well-nourished. No distress.   HENT:   Head: Normocephalic and atraumatic.   Eyes: Conjunctivae are normal. Right eye exhibits no discharge. Left eye exhibits no discharge. No scleral icterus.   Neck: Neck supple. No JVD present.   Cardiovascular: Normal rate and regular rhythm.  Exam reveals no gallop and no friction rub.    No murmur heard.  Pulmonary/Chest: Effort normal and breath sounds normal. No respiratory distress. He has no wheezes. He has no rales.   Abdominal: Soft. Bowel sounds are normal. He exhibits no distension. There is no tenderness. There is no rebound.   Musculoskeletal: He exhibits no edema or tenderness.   Neurological: He is alert and oriented to person, place, and time.   Skin: Skin is warm and dry. He is not diaphoretic.   Psychiatric: He has a normal mood and affect. His behavior is normal. Judgment and thought content normal.   Vitals reviewed.        Assessment:       1. Persistent atrial fibrillation    2. Hypertension associated with diabetes    3. Type 2 diabetes mellitus with stage 3 chronic kidney disease, with long-term current use of insulin    4. Sleep  apnea, unspecified type         Plan:       In summary, Mr. Lira is a pleasant 66 year-old man with hypertension, obesity, chronic kidney disease with chronic hydronephrosis and type 2 diabetes mellitus presenting for follow-up for persistent atrial fibrillation with RVR and decompensated systolic heart failure (EF 20-35%). His EF normalized with sinus rhythm and stress test showed no ischemia.  He has maintained sinus rhythm with flecainide/carvedilol.  He is on eliquis for primary stroke prevention and tolerating it well.  I had a long discussion with the patient about the pathophysiology and risks of atrial fibrillation and its basic pathophysiology, including its health implications and treatment options with the patient. Specifically, I addressed the need for CVA (stroke) prophylaxis with aspirin versus oral anticoagulation (warfarin vs NOACs, discussed bleeding risks, irreversibility of NOACs vs Vitamin K/plasma reversal of warfarin, and need to come to the ER for any head trauma for CT scanning even if asymptomatic).  I also discussed the goal to reduce symptomatic arrhythmic episodes by pharmacologic and/or procedural methods and utilizing a rhythm versus a rate control strategy.  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  Due to renal function being ~35 recommend reducing flecainide to 50mg bid.     RTC in 6 months with preclinic CBC/BMP     Thank you for allowing me to participate in the care of this patient. Please do not hesitate to call me with any questions or concerns.     Jamey Brown MD, PhD  Cardiac Electrophysiology

## 2017-10-11 ENCOUNTER — OFFICE VISIT (OUTPATIENT)
Dept: ENDOCRINOLOGY | Facility: CLINIC | Age: 67
End: 2017-10-11
Payer: COMMERCIAL

## 2017-10-11 VITALS
WEIGHT: 265 LBS | DIASTOLIC BLOOD PRESSURE: 76 MMHG | SYSTOLIC BLOOD PRESSURE: 130 MMHG | HEIGHT: 74 IN | BODY MASS INDEX: 34.01 KG/M2 | HEART RATE: 75 BPM

## 2017-10-11 DIAGNOSIS — E66.9 OBESITY (BMI 30-39.9): ICD-10-CM

## 2017-10-11 DIAGNOSIS — E11.59 HYPERTENSION ASSOCIATED WITH DIABETES: Chronic | ICD-10-CM

## 2017-10-11 DIAGNOSIS — E55.9 VITAMIN D DEFICIENCY: ICD-10-CM

## 2017-10-11 DIAGNOSIS — N18.30 TYPE 2 DIABETES MELLITUS WITH STAGE 3 CHRONIC KIDNEY DISEASE, WITH LONG-TERM CURRENT USE OF INSULIN: Primary | ICD-10-CM

## 2017-10-11 DIAGNOSIS — I15.2 HYPERTENSION ASSOCIATED WITH DIABETES: Chronic | ICD-10-CM

## 2017-10-11 DIAGNOSIS — Z79.4 TYPE 2 DIABETES MELLITUS WITH STAGE 3 CHRONIC KIDNEY DISEASE, WITH LONG-TERM CURRENT USE OF INSULIN: Primary | ICD-10-CM

## 2017-10-11 DIAGNOSIS — E11.22 TYPE 2 DIABETES MELLITUS WITH STAGE 3 CHRONIC KIDNEY DISEASE, WITH LONG-TERM CURRENT USE OF INSULIN: Primary | ICD-10-CM

## 2017-10-11 PROCEDURE — 99214 OFFICE O/P EST MOD 30 MIN: CPT | Mod: S$GLB,,, | Performed by: INTERNAL MEDICINE

## 2017-10-11 PROCEDURE — 99999 PR PBB SHADOW E&M-EST. PATIENT-LVL III: CPT | Mod: PBBFAC,,, | Performed by: INTERNAL MEDICINE

## 2017-10-11 RX ORDER — INSULIN PUMP SYRINGE, 3 ML
EACH MISCELLANEOUS
Qty: 1 EACH | Refills: 1 | Status: ON HOLD | OUTPATIENT
Start: 2017-10-11 | End: 2018-02-21 | Stop reason: HOSPADM

## 2017-10-11 RX ORDER — ERGOCALCIFEROL 1.25 MG/1
50000 CAPSULE ORAL
Qty: 4 CAPSULE | Refills: 1 | Status: SHIPPED | OUTPATIENT
Start: 2017-10-11 | End: 2017-12-04 | Stop reason: SDUPTHER

## 2017-10-11 RX ORDER — LANCETS
EACH MISCELLANEOUS
Qty: 100 EACH | Refills: 11 | Status: SHIPPED | OUTPATIENT
Start: 2017-10-11 | End: 2018-02-07 | Stop reason: SDUPTHER

## 2017-10-11 NOTE — PROGRESS NOTES
"Subjective:     Patient ID: Jose Cruz Lira is a 66 y.o. male.    Chief Complaint: Diabetes Mellitus    HPI:   Mr. Lira is a 66 y.o. male who is here for a follow-up visit for evaluation type 2 diabetes mellitus that is well controlled.   Underwent nephrectomy six weeks ago. Feels a little dizzy since surgery, occurs after he's been sitting /lying supine and gets up.     Has lost twenty pounds since his last visit six months ago.     Regimen:  Levemir 25 units  Bydureon 2 mg daily weekly     A1C has declined steadily over the past 15 months. A1c is 6.7%  Denies any hypoglycemic episodes.     Review of Systems   Constitutional: Negative for chills and fever.   HENT: Negative for congestion and sinus pressure.    Eyes: Negative for visual disturbance.   Respiratory: Negative for chest tightness and shortness of breath.    Cardiovascular: Negative for chest pain and palpitations.   Gastrointestinal: Negative for abdominal distention, diarrhea, nausea and vomiting.   Genitourinary: Negative for dysuria and flank pain.   Musculoskeletal: Negative for back pain.   Skin: Negative for rash.   Neurological: Negative for weakness.   Hematological: Does not bruise/bleed easily.   Psychiatric/Behavioral: Negative for sleep disturbance.        Objective:     Physical Exam    Vitals:    10/11/17 1118   BP: 130/76   Pulse: 75   Weight: 120.2 kg (265 lb)   Height: 6' 2" (1.88 m)   Body mass index is 34.02 kg/m².      Assessment/Plan:     1. Type 2 diabetes mellitus with stage 3 chronic kidney disease, with long-term current use of insulin    - A1C is 6.7%, have decreased levemir to 22 units at bedtime. Keep close eye for fasting hypoglycemia.   - continue checking two to three times a day  - continue bydureon weekly  - continue weight loss and healthy meals    2. BMI 34.0-34.9,adult      3. Hypertension associated with diabetes      4. Uncontrolled type 2 diabetes mellitus with stage 3 chronic kidney disease, with long-term " current use of insulin

## 2017-10-11 NOTE — PATIENT INSTRUCTIONS
Meter, strips and lancets prescription have been sent to your pharmacy.     Please start ergocalciferol 50,000 units once a week for two months.    Please start taking cholecalciferol (over the counter vitamin D3) 1000 IUs daily.     Lower dose of levemir 22 units at bedtime.     Check your blood sugars two to three times daily     Let's repeat your levels in two months.

## 2017-10-12 ENCOUNTER — OFFICE VISIT (OUTPATIENT)
Dept: INTERNAL MEDICINE | Facility: CLINIC | Age: 67
End: 2017-10-12
Payer: COMMERCIAL

## 2017-10-12 VITALS
HEART RATE: 76 BPM | HEIGHT: 74 IN | BODY MASS INDEX: 35.11 KG/M2 | SYSTOLIC BLOOD PRESSURE: 132 MMHG | WEIGHT: 273.56 LBS | DIASTOLIC BLOOD PRESSURE: 72 MMHG

## 2017-10-12 DIAGNOSIS — Z79.01 LONG TERM CURRENT USE OF ANTICOAGULANT: ICD-10-CM

## 2017-10-12 DIAGNOSIS — E78.5 HYPERLIPIDEMIA, UNSPECIFIED HYPERLIPIDEMIA TYPE: ICD-10-CM

## 2017-10-12 DIAGNOSIS — Z79.4 CONTROLLED TYPE 2 DIABETES MELLITUS WITH STAGE 3 CHRONIC KIDNEY DISEASE, WITH LONG-TERM CURRENT USE OF INSULIN: ICD-10-CM

## 2017-10-12 DIAGNOSIS — I70.0 ATHEROSCLEROSIS OF ABDOMINAL AORTA: ICD-10-CM

## 2017-10-12 DIAGNOSIS — Z86.010 HISTORY OF COLON POLYPS: ICD-10-CM

## 2017-10-12 DIAGNOSIS — N18.30 CONTROLLED TYPE 2 DIABETES MELLITUS WITH STAGE 3 CHRONIC KIDNEY DISEASE, WITH LONG-TERM CURRENT USE OF INSULIN: ICD-10-CM

## 2017-10-12 DIAGNOSIS — E66.01 SEVERE OBESITY (BMI 35.0-39.9) WITH COMORBIDITY: ICD-10-CM

## 2017-10-12 DIAGNOSIS — Z11.59 NEED FOR HEPATITIS C SCREENING TEST: ICD-10-CM

## 2017-10-12 DIAGNOSIS — D64.9 NORMOCYTIC ANEMIA: ICD-10-CM

## 2017-10-12 DIAGNOSIS — G47.30 SLEEP-DISORDERED BREATHING: ICD-10-CM

## 2017-10-12 DIAGNOSIS — Z12.5 PROSTATE CANCER SCREENING: ICD-10-CM

## 2017-10-12 DIAGNOSIS — J00 COMMON COLD VIRUS: ICD-10-CM

## 2017-10-12 DIAGNOSIS — E11.22 CONTROLLED TYPE 2 DIABETES MELLITUS WITH STAGE 3 CHRONIC KIDNEY DISEASE, WITH LONG-TERM CURRENT USE OF INSULIN: ICD-10-CM

## 2017-10-12 DIAGNOSIS — I11.0 HYPERTENSIVE HEART DISEASE WITH DIASTOLIC HEART FAILURE: ICD-10-CM

## 2017-10-12 DIAGNOSIS — Z00.00 ROUTINE MEDICAL EXAM: Primary | ICD-10-CM

## 2017-10-12 DIAGNOSIS — I50.30 HYPERTENSIVE HEART DISEASE WITH DIASTOLIC HEART FAILURE: ICD-10-CM

## 2017-10-12 DIAGNOSIS — I48.19 PERSISTENT ATRIAL FIBRILLATION: ICD-10-CM

## 2017-10-12 PROCEDURE — 99396 PREV VISIT EST AGE 40-64: CPT | Mod: S$GLB,,, | Performed by: INTERNAL MEDICINE

## 2017-10-12 PROCEDURE — 99999 PR PBB SHADOW E&M-EST. PATIENT-LVL III: CPT | Mod: PBBFAC,,, | Performed by: INTERNAL MEDICINE

## 2017-10-13 NOTE — PROGRESS NOTES
"Subjective:       Patient ID: Jose Cruz Lira is a 66 y.o. male.    Chief Complaint: Establish Care    He is new to the clinic aside from one urgent visit for bronchitis 8 months ago. No prior PCP in recent years. Followed primarily by Endocrinology for Diabetes with complications. Presents to establish care. Only complaint at this time is mild cold symptoms x few days after exposure to grandchildren sick with same. Nasal congestion, runny nose, scratchy throat, light cough. No meds tried.     PMH:   Diabetes Type 2 with CKD stage 3. HbA1c 6.7% Sep. '17, GFR 36.  Hypertensive Heart Disease with Diastolic Dysfunction, EF 55% Jan. '17.  Atrial Fibrillation on long term anticoagulation.  Abdominal Aortic Atherosclerosis, mild on CT Abdom 6/15.  Left Hydronephrosis, s/p Nephrectomy 8/17, benign.   Vitamin D insufficiency.   Mild Normocytic Anemia on last CBC (post-op) H/H 12/35.5.  Ventral Hernia.   Suspected MONICA, has not completed work up.   Morbid Obesity. TSH normal 2.80 Nov. '16.  H/O Colon Polyps.     PSH: Tonsillectomy. Left Forearm Fracture. Knee Arthroscopy. Left Nephrectomy.     Colonoscopy "4-5 times", last about 2013 by [facility]. PSA 1.5 May '16. Eye exam 7/14, Podiatry 7/14. PET Stress negative for ischemia 2/17. Cystoscopy 6/17. Flu shot and Pneumonia shot 2016 at [facility]. No Zostavax. Tetanus 2006? TChol 102, , HDL 35, LDL 37 Nov. '16. Sleep Clinic 1/17, Nephrology 6/17, Urology 9/17, Cardiology 10/17, Endocrinology 10/17.     Social: Non-smoker, rare alcohol.  with 2 children. 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     FMH: Colon cancer in father. Liver cancer in brother. Ovarian cancer in sister. Heart dis in grandfather.     NKDA.     Medications: list reviewed and reconciled. No OTC meds.       Review of Systems   Constitutional: Positive for fatigue. Negative for activity change, appetite change, chills, fever and unexpected weight change.   HENT: Positive " "for congestion, postnasal drip, rhinorrhea, sneezing and sore throat. Negative for ear pain, hearing loss, sinus pain, sinus pressure, tinnitus, trouble swallowing and voice change.    Eyes: Negative for pain, itching and visual disturbance.   Respiratory: Positive for apnea. Negative for cough, chest tightness, shortness of breath and wheezing.    Cardiovascular: Negative for chest pain, palpitations and leg swelling.   Gastrointestinal: Negative for abdominal pain, blood in stool, constipation, diarrhea, nausea and vomiting.   Genitourinary: Positive for frequency. Negative for difficulty urinating, dysuria, hematuria and urgency.   Musculoskeletal: Negative for arthralgias, back pain, gait problem, myalgias, neck pain and neck stiffness.   Skin: Negative for rash and wound.   Neurological: Negative for dizziness, seizures, syncope, weakness, numbness and headaches.   Hematological: Negative for adenopathy. Does not bruise/bleed easily.   Psychiatric/Behavioral: Negative for agitation, decreased concentration, dysphoric mood and sleep disturbance. The patient is not nervous/anxious.        Objective:    /72, Pulse 76, Ht 6' 2", Wt 273.6 lbs, BMI=35.   Physical Exam   Constitutional: He is oriented to person, place, and time. He appears well-developed and well-nourished. No distress.   HENT:   Head: Normocephalic and atraumatic.   Right Ear: External ear normal.   Left Ear: External ear normal.   Nose: Nose normal.   Mouth/Throat: Oropharynx is clear and moist.   Minimal cerumen in ear canals, TMs normal.    Eyes: Conjunctivae and EOM are normal. Pupils are equal, round, and reactive to light. No scleral icterus.   Neck: Normal range of motion. Neck supple. No JVD present. Carotid bruit is not present. No thyromegaly present.   Cardiovascular: Normal rate, regular rhythm, normal heart sounds and intact distal pulses.  Exam reveals no gallop and no friction rub.    No murmur heard.  Pulmonary/Chest: Effort " normal and breath sounds normal. No respiratory distress. He has no wheezes. He has no rales.   Abdominal: Soft. Bowel sounds are normal. He exhibits no distension and no mass. There is no tenderness. There is no rebound and no guarding.   Abdominal incisions healed well. Mild upper abdominal ventral hernia.    Musculoskeletal: Normal range of motion. He exhibits no edema, tenderness or deformity.   Lymphadenopathy:     He has no cervical adenopathy.   Neurological: He is alert and oriented to person, place, and time. He has normal reflexes. No cranial nerve deficit. He exhibits normal muscle tone. Coordination normal.   Skin: Skin is warm and dry. No rash noted. He is not diaphoretic. No pallor.   Psychiatric: He has a normal mood and affect. His behavior is normal. Judgment and thought content normal.       Assessment:       1. Routine medical exam    2. Controlled type 2 diabetes mellitus with stage 3 chronic kidney disease, with long-term current use of insulin    3. Hypertensive heart disease with diastolic heart failure    4. Sleep-disordered breathing    5. Persistent atrial fibrillation    6. Long term current use of anticoagulant    7. Hyperlipidemia, unspecified hyperlipidemia type    8. Atherosclerosis of abdominal aorta    9. Normocytic anemia    10. History of colon polyps    11. Prostate cancer screening    12. Common cold virus    13. Need for hepatitis C screening test    14. Severe obesity (BMI 35.0-39.9) with comorbidity        Plan:       Routine medical exam    Controlled type 2 diabetes mellitus with stage 3 chronic kidney disease, with long-term current use of insulin  -     Ambulatory Referral to Optometry    Hypertensive heart disease with diastolic heart failure - clinically stable.     Sleep-disordered breathing - f/u with Sleep Clinic to complete evaluation.     Persistent atrial fibrillation - currently in sinus rhythm, continue Eliquis, Flecainide.    Long term current use of  anticoagulant - no complications.     Hyperlipidemia, unspecified hyperlipidemia type  -     Lipid panel; Future; Expected date: 10/12/2017    Atherosclerosis of abdominal aorta - medical management as above.     Normocytic anemia  -     CBC auto differential; Future; Expected date: 10/12/2017    History of colon polyps        -     Obtain last endoscopy report, recommended follow up.     Prostate cancer screening  -     PSA, Screening; Future; Expected date: 10/12/2017    Common cold virus - Coricidin HBP or other med for symptom relief not containing a decongestant.     Need for hepatitis C screening test  -     Hepatitis C antibody; Future; Expected date: 10/12/2017    Severe obesity (BMI 35.0-39.9) with comorbidity        -     Counseled on diet and exercise for weight reduction, glucose control, heart health.      Flu shot, Tetanus and possibly another Pneumovax due - deferred today due to acute illness.

## 2017-10-16 ENCOUNTER — LAB VISIT (OUTPATIENT)
Dept: LAB | Facility: HOSPITAL | Age: 67
End: 2017-10-16
Attending: INTERNAL MEDICINE
Payer: COMMERCIAL

## 2017-10-16 DIAGNOSIS — R80.9 PROTEINURIA, UNSPECIFIED TYPE: ICD-10-CM

## 2017-10-16 DIAGNOSIS — I15.2 HYPERTENSION ASSOCIATED WITH DIABETES: ICD-10-CM

## 2017-10-16 DIAGNOSIS — E11.21 UNCONTROLLED TYPE 2 DIABETES MELLITUS WITH DIABETIC NEPHROPATHY, UNSPECIFIED LONG TERM INSULIN USE STATUS: ICD-10-CM

## 2017-10-16 DIAGNOSIS — E11.65 UNCONTROLLED TYPE 2 DIABETES MELLITUS WITH DIABETIC NEPHROPATHY, UNSPECIFIED LONG TERM INSULIN USE STATUS: ICD-10-CM

## 2017-10-16 DIAGNOSIS — E11.59 HYPERTENSION ASSOCIATED WITH DIABETES: ICD-10-CM

## 2017-10-16 DIAGNOSIS — N18.30 CHRONIC KIDNEY DISEASE, STAGE III (MODERATE): ICD-10-CM

## 2017-10-16 DIAGNOSIS — N28.9 NON-FUNCTIONING KIDNEY: ICD-10-CM

## 2017-10-16 LAB
BACTERIA #/AREA URNS AUTO: ABNORMAL /HPF
BILIRUB UR QL STRIP: NEGATIVE
CLARITY UR REFRACT.AUTO: ABNORMAL
COLOR UR AUTO: YELLOW
GLUCOSE UR QL STRIP: NEGATIVE
HGB UR QL STRIP: NEGATIVE
HYALINE CASTS UR QL AUTO: 4 /LPF
KETONES UR QL STRIP: NEGATIVE
LEUKOCYTE ESTERASE UR QL STRIP: NEGATIVE
MICROSCOPIC COMMENT: ABNORMAL
NITRITE UR QL STRIP: NEGATIVE
PH UR STRIP: 5 [PH] (ref 5–8)
PROT UR QL STRIP: ABNORMAL
RBC #/AREA URNS AUTO: 1 /HPF (ref 0–4)
SP GR UR STRIP: 1.02 (ref 1–1.03)
SQUAMOUS #/AREA URNS AUTO: 0 /HPF
URN SPEC COLLECT METH UR: ABNORMAL
UROBILINOGEN UR STRIP-ACNC: NEGATIVE EU/DL
WBC #/AREA URNS AUTO: 2 /HPF (ref 0–5)

## 2017-10-16 PROCEDURE — 81001 URINALYSIS AUTO W/SCOPE: CPT

## 2017-10-16 PROCEDURE — 84156 ASSAY OF PROTEIN URINE: CPT

## 2017-10-17 LAB
CREAT UR-MCNC: 225 MG/DL
PROT UR-MCNC: 133 MG/DL
PROT/CREAT RATIO, UR: 0.59

## 2017-10-20 NOTE — ANESTHESIA POSTPROCEDURE EVALUATION
"Anesthesia Post Evaluation    Patient: Jose Cruz Lira    Procedure(s) Performed: Procedure(s) (LRB):  XI ROBOTIC ASSISTED LAPAROSCOPIC NEPHRECTOMY (Left)    Final Anesthesia Type: general  Patient location during evaluation: PACU  Patient participation: Yes- Able to Participate  Level of consciousness: awake and alert and oriented  Post-procedure vital signs: reviewed and stable  Pain management: adequate  Airway patency: patent  PONV status at discharge: No PONV  Anesthetic complications: no      Cardiovascular status: blood pressure returned to baseline  Respiratory status: unassisted, spontaneous ventilation and room air  Hydration status: euvolemic  Follow-up not needed.        Visit Vitals  BP (!) 151/78 (BP Location: Left arm, Patient Position: Sitting)   Pulse 90   Temp 36.7 °C (98 °F) (Oral)   Resp 18   Ht 6' 2" (1.88 m)   Wt 126.1 kg (278 lb)   SpO2 98%   BMI 35.69 kg/m²       Pain/Vinny Score: Pain Assessment Performed: Yes (8/26/2017  6:00 PM)  Presence of Pain: denies (8/26/2017  6:00 PM)  Pain Rating Prior to Med Admin: 6 (8/26/2017  2:50 PM)  Pain Rating Post Med Admin: 4 (8/25/2017  4:00 PM)  Vinny Score: 10 (8/26/2017  7:13 AM)      "
full weight-bearing

## 2017-10-23 ENCOUNTER — OFFICE VISIT (OUTPATIENT)
Dept: NEPHROLOGY | Facility: CLINIC | Age: 67
End: 2017-10-23
Payer: COMMERCIAL

## 2017-10-23 VITALS
HEIGHT: 74 IN | RESPIRATION RATE: 17 BRPM | WEIGHT: 266 LBS | HEART RATE: 65 BPM | SYSTOLIC BLOOD PRESSURE: 152 MMHG | DIASTOLIC BLOOD PRESSURE: 79 MMHG | BODY MASS INDEX: 34.14 KG/M2

## 2017-10-23 DIAGNOSIS — I15.2 HYPERTENSION ASSOCIATED WITH DIABETES: ICD-10-CM

## 2017-10-23 DIAGNOSIS — E11.21 UNCONTROLLED TYPE 2 DIABETES MELLITUS WITH DIABETIC NEPHROPATHY, UNSPECIFIED LONG TERM INSULIN USE STATUS: ICD-10-CM

## 2017-10-23 DIAGNOSIS — N28.9 NON-FUNCTIONING KIDNEY: ICD-10-CM

## 2017-10-23 DIAGNOSIS — R80.9 PROTEINURIA, UNSPECIFIED TYPE: ICD-10-CM

## 2017-10-23 DIAGNOSIS — E11.65 UNCONTROLLED TYPE 2 DIABETES MELLITUS WITH DIABETIC NEPHROPATHY, UNSPECIFIED LONG TERM INSULIN USE STATUS: ICD-10-CM

## 2017-10-23 DIAGNOSIS — E11.59 HYPERTENSION ASSOCIATED WITH DIABETES: ICD-10-CM

## 2017-10-23 DIAGNOSIS — N18.30 CHRONIC KIDNEY DISEASE, STAGE III (MODERATE): Primary | ICD-10-CM

## 2017-10-23 PROCEDURE — 99999 PR PBB SHADOW E&M-EST. PATIENT-LVL III: CPT | Mod: PBBFAC,,, | Performed by: INTERNAL MEDICINE

## 2017-10-23 PROCEDURE — 99214 OFFICE O/P EST MOD 30 MIN: CPT | Mod: S$GLB,,, | Performed by: INTERNAL MEDICINE

## 2017-10-23 NOTE — PROGRESS NOTES
"Subjective:       Patient ID: Jose Cruz Lira is a 66 y.o. White male who presents for return patient evaluation for chronic renal failure.    He has no uremic or urinary symptoms and is in his usual state of health.  There have been no recent illnesses, hospitalizations.  He is s/p a left nephrectomy.  He reports he has been evaluated for cpap.      Review of Systems   Constitutional: Positive for fatigue. Negative for appetite change, chills and fever.   Eyes: Negative for visual disturbance.   Respiratory: Positive for shortness of breath (with exertion). Negative for cough.    Cardiovascular: Positive for leg swelling. Negative for chest pain.   Gastrointestinal: Negative for abdominal pain, diarrhea, nausea and vomiting.   Genitourinary: Positive for frequency (nocturia x 3-4). Negative for decreased urine volume, difficulty urinating, dysuria, flank pain and hematuria.   Musculoskeletal: Positive for arthralgias and back pain. Negative for myalgias.   Skin: Negative for rash.   Neurological: Negative for headaches.   Psychiatric/Behavioral: Positive for sleep disturbance.       The past medical, family and social histories were reviewed for this encounter.     BP (!) 152/79   Pulse 65   Resp 17   Ht 6' 2" (1.88 m)   Wt 120.7 kg (266 lb)   BMI 34.15 kg/m²     Objective:      Physical Exam   Constitutional: He appears well-developed and well-nourished. No distress.   HENT:   Head: Normocephalic and atraumatic.   Eyes: Conjunctivae are normal. No scleral icterus.   Neck: Normal range of motion. No JVD present.   Cardiovascular: Normal rate, regular rhythm and normal heart sounds.  Exam reveals no gallop and no friction rub.    No murmur heard.  Pulmonary/Chest: Effort normal and breath sounds normal. No respiratory distress. He has no wheezes.   Abdominal: Soft. Bowel sounds are normal. He exhibits no distension (obese). There is no tenderness.   Musculoskeletal: He exhibits edema (1+ BLE).   Skin: Skin " is warm and dry. No rash noted.   Psychiatric: He has a normal mood and affect.   Vitals reviewed.      Assessment:       1. Chronic kidney disease, stage III (moderate)    2. Hypertension associated with diabetes    3. Uncontrolled type 2 diabetes mellitus with diabetic nephropathy, unspecified long term insulin use status    4. Non-functioning kidney    5. Proteinuria, unspecified type        Plan:   Return to clinic in 4 months.  Labs for next visit include rp, pth, upc.  Baseline creatinine is 1.4-1.8 since 2014.  UPC is 0.59.  PTH is 87 with a calcium of 9.2.  Blood pressure is controlled on the current regimen.  Please have patient follow-up with your PCP or Endocrinology regarding the control and management of diabetes.

## 2017-11-12 ENCOUNTER — PATIENT MESSAGE (OUTPATIENT)
Dept: INTERNAL MEDICINE | Facility: CLINIC | Age: 67
End: 2017-11-12

## 2017-11-21 RX ORDER — INSULIN PUMP SYRINGE, 3 ML
EACH MISCELLANEOUS
Qty: 1 EACH | Refills: 0 | Status: ON HOLD | OUTPATIENT
Start: 2017-11-21 | End: 2018-02-21 | Stop reason: HOSPADM

## 2017-11-21 RX ORDER — LANCETS
EACH MISCELLANEOUS
Qty: 100 EACH | Refills: 11 | Status: ON HOLD | OUTPATIENT
Start: 2017-11-21 | End: 2018-02-21 | Stop reason: HOSPADM

## 2017-12-05 RX ORDER — ERGOCALCIFEROL 1.25 MG/1
CAPSULE ORAL
Qty: 4 CAPSULE | Refills: 3 | Status: SHIPPED | OUTPATIENT
Start: 2017-12-05 | End: 2017-12-14 | Stop reason: SDUPTHER

## 2017-12-05 NOTE — TELEPHONE ENCOUNTER
Results for orders placed or performed in visit on 10/16/17   PTH, intact   Result Value Ref Range    PTH, Intact 87.0 (H) 9.0 - 77.0 pg/mL   Renal function panel   Result Value Ref Range    Glucose 236 (H) 70 - 110 mg/dL    Sodium 142 136 - 145 mmol/L    Potassium 4.2 3.5 - 5.1 mmol/L    Chloride 107 95 - 110 mmol/L    CO2 27 23 - 29 mmol/L    BUN, Bld 23 8 - 23 mg/dL    Calcium 9.2 8.7 - 10.5 mg/dL    Creatinine 1.9 (H) 0.5 - 1.4 mg/dL    Albumin 3.6 3.5 - 5.2 g/dL    Phosphorus 3.7 2.7 - 4.5 mg/dL    eGFR if  42.1 (A) >60 mL/min/1.73 m^2    eGFR if non  36.4 (A) >60 mL/min/1.73 m^2    Anion Gap 8 8 - 16 mmol/L   Lipid panel   Result Value Ref Range    Cholesterol 115 (L) 120 - 199 mg/dL    Triglycerides 165 (H) 30 - 150 mg/dL    HDL 38 (L) 40 - 75 mg/dL    LDL Cholesterol 44.0 (L) 63.0 - 159.0 mg/dL    HDL/Chol Ratio 33.0 20.0 - 50.0 %    Total Cholesterol/HDL Ratio 3.0 2.0 - 5.0    Non-HDL Cholesterol 77 mg/dL   PSA, Screening   Result Value Ref Range    PSA, SCREEN 1.7 0.00 - 4.00 ng/mL   Hepatitis C antibody   Result Value Ref Range    Hepatitis C Ab Negative    CBC auto differential   Result Value Ref Range    WBC 10.20 3.90 - 12.70 K/uL    RBC 4.77 4.60 - 6.20 M/uL    Hemoglobin 13.9 (L) 14.0 - 18.0 g/dL    Hematocrit 42.4 40.0 - 54.0 %    MCV 89 82 - 98 fL    MCH 29.1 27.0 - 31.0 pg    MCHC 32.8 32.0 - 36.0 g/dL    RDW 13.4 11.5 - 14.5 %    Platelets 316 150 - 350 K/uL    MPV 10.4 9.2 - 12.9 fL    Immature Granulocytes 0.8 (H) 0.0 - 0.5 %    Gran # 7.8 (H) 1.8 - 7.7 K/uL    Immature Grans (Abs) 0.08 (H) 0.00 - 0.04 K/uL    Lymph # 1.3 1.0 - 4.8 K/uL    Mono # 0.7 0.3 - 1.0 K/uL    Eos # 0.3 0.0 - 0.5 K/uL    Baso # 0.05 0.00 - 0.20 K/uL    nRBC 0 0 /100 WBC    Gran% 75.9 (H) 38.0 - 73.0 %    Lymph% 12.5 (L) 18.0 - 48.0 %    Mono% 7.0 4.0 - 15.0 %    Eosinophil% 3.3 0.0 - 8.0 %    Basophil% 0.5 0.0 - 1.9 %    Differential Method Automated      Normal Calcium. Has CKD stage  III, with vitamin D deficiency. To continue to monitor calcium while on ergo.

## 2017-12-14 DIAGNOSIS — I48.19 PERSISTENT ATRIAL FIBRILLATION: ICD-10-CM

## 2017-12-14 DIAGNOSIS — I50.22 CHRONIC SYSTOLIC CONGESTIVE HEART FAILURE: ICD-10-CM

## 2017-12-14 DIAGNOSIS — E11.59 HYPERTENSION ASSOCIATED WITH DIABETES: Chronic | ICD-10-CM

## 2017-12-14 DIAGNOSIS — I15.2 HYPERTENSION ASSOCIATED WITH DIABETES: Chronic | ICD-10-CM

## 2017-12-14 RX ORDER — FLECAINIDE ACETATE 50 MG/1
50 TABLET ORAL EVERY 12 HOURS
Qty: 60 TABLET | Refills: 11 | Status: SHIPPED | OUTPATIENT
Start: 2017-12-14 | End: 2018-12-14

## 2017-12-14 RX ORDER — FUROSEMIDE 40 MG/1
40 TABLET ORAL 2 TIMES DAILY
Qty: 180 TABLET | Refills: 3 | Status: ON HOLD | OUTPATIENT
Start: 2017-12-14 | End: 2018-02-21 | Stop reason: HOSPADM

## 2017-12-14 RX ORDER — CARVEDILOL 25 MG/1
25 TABLET ORAL 2 TIMES DAILY WITH MEALS
Qty: 360 TABLET | Refills: 3 | Status: ON HOLD | OUTPATIENT
Start: 2017-12-14 | End: 2018-02-21 | Stop reason: HOSPADM

## 2017-12-14 NOTE — TELEPHONE ENCOUNTER
----- Message from Natalie Murray sent at 12/14/2017 12:22 PM CST -----  Contact: wife / [phone]  ..Refill requests:  BYDUREON 2 mg/0.65 mL PnIj  insulin detemir (LEVEMIR FLEXTOUCH) 100 unit/mL (3 mL) SubQ InPn pen  ergocalciferol (ERGOCALCIFEROL) 50,000 unit Cap  amlodipine (NORVASC) 10 MG tablet  doxazosin (CARDURA) 4 MG tablet  simvastatin (ZOCOR) 20 MG tablet    .  [facility] [id number] [place] [address] [address] [facility] [place] [address] [address]  [facility] [id number]  Phone: [phone] Fax: [phone]

## 2017-12-15 RX ORDER — DOXAZOSIN 4 MG/1
2 TABLET ORAL DAILY
Start: 2017-12-15 | End: 2017-12-26 | Stop reason: SDUPTHER

## 2017-12-15 RX ORDER — SIMVASTATIN 20 MG/1
20 TABLET, FILM COATED ORAL NIGHTLY
Qty: 90 TABLET | Refills: 4 | Status: ON HOLD | OUTPATIENT
Start: 2017-12-15 | End: 2018-02-21 | Stop reason: HOSPADM

## 2017-12-15 RX ORDER — AMLODIPINE BESYLATE 10 MG/1
10 TABLET ORAL DAILY
Qty: 90 TABLET | Refills: 4 | Status: ON HOLD | OUTPATIENT
Start: 2017-12-15 | End: 2018-02-21 | Stop reason: HOSPADM

## 2017-12-15 RX ORDER — ERGOCALCIFEROL 1.25 MG/1
50000 CAPSULE ORAL
Qty: 4 CAPSULE | Refills: 3 | Status: ON HOLD | OUTPATIENT
Start: 2017-12-15 | End: 2018-02-21 | Stop reason: HOSPADM

## 2017-12-26 RX ORDER — DOXAZOSIN 4 MG/1
2 TABLET ORAL DAILY
Qty: 15 TABLET | Refills: 6 | Status: SHIPPED | OUTPATIENT
Start: 2017-12-26

## 2017-12-26 NOTE — TELEPHONE ENCOUNTER
----- Message from Mariana Avendaño sent at 12/26/2017  8:40 AM CST -----  Contact: self   Patient states [facility] have not yet received his prescription for   doxazosin (CARDURA) 4 MG tablet                   [facility] [address] [facility] [address]

## 2018-01-29 ENCOUNTER — HOSPITAL ENCOUNTER (OUTPATIENT)
Dept: CARDIOLOGY | Facility: CLINIC | Age: 68
Discharge: HOME | DRG: 291 | End: 2018-01-29
Payer: COMMERCIAL

## 2018-01-29 ENCOUNTER — OFFICE VISIT (OUTPATIENT)
Dept: CARDIOLOGY | Facility: CLINIC | Age: 68
DRG: 291 | End: 2018-01-29
Payer: COMMERCIAL

## 2018-01-29 VITALS
OXYGEN SATURATION: 97 % | DIASTOLIC BLOOD PRESSURE: 68 MMHG | HEART RATE: 74 BPM | HEIGHT: 74 IN | WEIGHT: 275 LBS | BODY MASS INDEX: 35.29 KG/M2 | SYSTOLIC BLOOD PRESSURE: 127 MMHG

## 2018-01-29 DIAGNOSIS — N18.30 TYPE 2 DIABETES MELLITUS WITH STAGE 3 CHRONIC KIDNEY DISEASE, WITH LONG-TERM CURRENT USE OF INSULIN: Chronic | ICD-10-CM

## 2018-01-29 DIAGNOSIS — Z51.81 ENCOUNTER FOR MONITORING FLECAINIDE THERAPY: ICD-10-CM

## 2018-01-29 DIAGNOSIS — E78.5 HYPERLIPIDEMIA ASSOCIATED WITH TYPE 2 DIABETES MELLITUS: Chronic | ICD-10-CM

## 2018-01-29 DIAGNOSIS — Z79.899 ENCOUNTER FOR MONITORING FLECAINIDE THERAPY: ICD-10-CM

## 2018-01-29 DIAGNOSIS — R06.83 SNORING: ICD-10-CM

## 2018-01-29 DIAGNOSIS — R53.83 FATIGUE, UNSPECIFIED TYPE: Primary | ICD-10-CM

## 2018-01-29 DIAGNOSIS — R29.818 SUSPECTED SLEEP APNEA: ICD-10-CM

## 2018-01-29 DIAGNOSIS — E11.22 TYPE 2 DIABETES MELLITUS WITH STAGE 3 CHRONIC KIDNEY DISEASE, WITH LONG-TERM CURRENT USE OF INSULIN: Chronic | ICD-10-CM

## 2018-01-29 DIAGNOSIS — I48.19 PERSISTENT ATRIAL FIBRILLATION: Chronic | ICD-10-CM

## 2018-01-29 DIAGNOSIS — Z79.01 CURRENT USE OF LONG TERM ANTICOAGULATION: Chronic | ICD-10-CM

## 2018-01-29 DIAGNOSIS — E11.69 HYPERLIPIDEMIA ASSOCIATED WITH TYPE 2 DIABETES MELLITUS: Chronic | ICD-10-CM

## 2018-01-29 DIAGNOSIS — Z79.4 TYPE 2 DIABETES MELLITUS WITH STAGE 3 CHRONIC KIDNEY DISEASE, WITH LONG-TERM CURRENT USE OF INSULIN: Chronic | ICD-10-CM

## 2018-01-29 DIAGNOSIS — E11.59 HYPERTENSION ASSOCIATED WITH DIABETES: Chronic | ICD-10-CM

## 2018-01-29 DIAGNOSIS — R06.02 SOB (SHORTNESS OF BREATH): ICD-10-CM

## 2018-01-29 DIAGNOSIS — E66.01 SEVERE OBESITY (BMI 35.0-35.9 WITH COMORBIDITY): ICD-10-CM

## 2018-01-29 DIAGNOSIS — R06.02 SOB (SHORTNESS OF BREATH): Primary | ICD-10-CM

## 2018-01-29 DIAGNOSIS — E55.9 VITAMIN D INSUFFICIENCY: ICD-10-CM

## 2018-01-29 DIAGNOSIS — N18.30 CKD (CHRONIC KIDNEY DISEASE), STAGE III: ICD-10-CM

## 2018-01-29 DIAGNOSIS — I15.2 HYPERTENSION ASSOCIATED WITH DIABETES: Chronic | ICD-10-CM

## 2018-01-29 PROCEDURE — 99999 PR PBB SHADOW E&M-EST. PATIENT-LVL V: CPT | Mod: PBBFAC,,, | Performed by: NURSE PRACTITIONER

## 2018-01-29 PROCEDURE — 93000 ELECTROCARDIOGRAM COMPLETE: CPT | Mod: S$GLB,,, | Performed by: INTERNAL MEDICINE

## 2018-01-29 PROCEDURE — 3008F BODY MASS INDEX DOCD: CPT | Mod: S$GLB,,, | Performed by: NURSE PRACTITIONER

## 2018-01-29 PROCEDURE — 99214 OFFICE O/P EST MOD 30 MIN: CPT | Mod: S$GLB,,, | Performed by: NURSE PRACTITIONER

## 2018-01-29 NOTE — PATIENT INSTRUCTIONS
You can take the second dose of lasix (furosemide) 6-8 hours after the first dose.    Consider talking to your primary care provider about your prostate and urinating so much at night.    Schedule sleep clinic consult.

## 2018-01-29 NOTE — PROGRESS NOTES
Mr. Lira is a patient of Dr. Castanon and was last seen in [facility] 9/11/2017.      Subjective:   Patient ID:  Jose Cruz Lira is a 67 y.o. male who presents for follow-up of Shortness of Breath (with exertion )    Problems:  DM type 2   HTN  Persistent atrial fibrillation s/p DCCV in 2016  -long term flecainnide    HPI  Mr. Lira is in clinic today for extreme fatigue and JAMES for the last 3 weeks.  Reports snoring, waking tired, and daytime somnolence.  States that he had been referred to sleep clinic a few years ago but did not want to spend the night to have the sleep study performed. H/H wnl in 10/2017.  Last TSH in 2016 wnl. Last B12    Patient denies chest pain with exertion or at rest, palpitations, SOB, JAMES, dizziness, syncope, edema, orthopnea, PND, or claudication.  Reports no routine exercise.  He is treated with low dose ASA and low intensity statin.  Patient is taking simvastatin 20mg and LDL is 44.        Review of Systems   Constitution: Negative for decreased appetite, diaphoresis, weakness, malaise/fatigue, weight gain and weight loss.   Eyes: Negative for visual disturbance.   Cardiovascular: Negative for chest pain, claudication, dyspnea on exertion, irregular heartbeat, leg swelling, near-syncope, orthopnea, palpitations, paroxysmal nocturnal dyspnea and syncope.        Denies chest pressure   Respiratory: Negative for cough, hemoptysis, shortness of breath, sleep disturbances due to breathing and snoring.    Endocrine: Negative for cold intolerance and heat intolerance.   Hematologic/Lymphatic: Negative for bleeding problem. Does not bruise/bleed easily.   Musculoskeletal: Negative for myalgias.   Gastrointestinal: Negative for bloating, abdominal pain, anorexia, change in bowel habit, constipation, diarrhea, nausea and vomiting.   Neurological: Negative for difficulty with concentration, disturbances in coordination, excessive daytime sleepiness, dizziness, headaches, light-headedness,  "loss of balance and numbness.   Psychiatric/Behavioral: The patient does not have insomnia.        Allergies and current medications updated and reviewed:  Review of patient's allergies indicates:  No Known Allergies  Current Outpatient Prescriptions   Medication Sig    amLODIPine (NORVASC) 10 MG tablet Take 1 tablet (10 mg total) by mouth once daily.    apixaban (ELIQUIS) 5 mg Tab Take 1 tablet (5 mg total) by mouth 2 (two) times daily.    blood sugar diagnostic Strp To check BG 3 times daily, to use with insurance preferred meter    blood sugar diagnostic Strp To check BG 3 times daily, to use with insurance preferred meter    blood-glucose meter kit To check BG 3 times daily, to use with insurance preferred meter    blood-glucose meter kit To check BG 3 times daily, to use with insurance preferred meter    carvedilol (COREG) 25 MG tablet Take 1 tablet (25 mg total) by mouth 2 (two) times daily with meals.    doxazosin (CARDURA) 4 MG tablet Take 0.5 tablets (2 mg total) by mouth once daily.    ergocalciferol (ERGOCALCIFEROL) 50,000 unit Cap Take 1 capsule (50,000 Units total) by mouth every 7 days.    exenatide microspheres (BYDUREON) 2 mg/0.65 mL PnIj Inject 2 mg into the muscle once a week.    flecainide (TAMBOCOR) 50 MG Tab Take 1 tablet (50 mg total) by mouth every 12 (twelve) hours.    furosemide (LASIX) 40 MG tablet Take 1 tablet (40 mg total) by mouth 2 (two) times daily.    insulin detemir (LEVEMIR FLEXTOUCH) 100 unit/mL (3 mL) SubQ InPn pen INJECT 22 UNITS INTO THE SKIN EVERY EVENING    insulin needles, disposable, (BD ULTRA-FINE RODRIGUEZ PEN NEEDLES) 32 x 5/32 " Ndle Patient injects insulin once a day. Please provide 3 month supply.    lancets Misc To check BG 3 times daily, to use with insurance preferred meter    lancets Misc To check BG 3 times daily, to use with insurance preferred meter    simvastatin (ZOCOR) 20 MG tablet Take 1 tablet (20 mg total) by mouth every evening.     No " "current facility-administered medications for this visit.      Objective:     Right Arm BP - Sittin/65 (18 1521)  Left Arm BP - Sittin/68 (18 1521)    /68 (BP Location: Left arm, Patient Position: Sitting, BP Method: Large (Automatic))   Pulse 74   Ht 6' 2" (1.88 m)   Wt 124.7 kg (275 lb)   SpO2 97%   BMI 35.31 kg/m²       Physical Exam   Constitutional: He is oriented to person, place, and time. Vital signs are normal. He appears well-developed and well-nourished. He is active. No distress.   HENT:   Head: Normocephalic and atraumatic.   Eyes: Conjunctivae and lids are normal. No scleral icterus.   Neck: Neck supple. Normal carotid pulses, no hepatojugular reflux and no JVD present. Carotid bruit is not present.   Cardiovascular: Normal rate, regular rhythm, S1 normal, S2 normal and intact distal pulses.  PMI is not displaced.  Exam reveals no gallop and no friction rub.    No murmur heard.  Pulses:       Carotid pulses are 2+ on the right side, and 2+ on the left side.       Radial pulses are 2+ on the right side, and 2+ on the left side.        Dorsalis pedis pulses are 2+ on the right side, and 2+ on the left side.        Posterior tibial pulses are 1+ on the right side, and 1+ on the left side.   Pulmonary/Chest: Effort normal and breath sounds normal. No respiratory distress. He has no decreased breath sounds. He has no wheezes. He has no rhonchi. He has no rales. He exhibits no tenderness.   Abdominal: Soft. Normal appearance and bowel sounds are normal. He exhibits no distension, no fluid wave, no abdominal bruit, no ascites and no pulsatile midline mass. There is no hepatosplenomegaly. There is no tenderness.   Musculoskeletal: He exhibits no edema.   Neurological: He is alert and oriented to person, place, and time. Gait normal.   Skin: Skin is warm, dry and intact. No rash noted. He is not diaphoretic. Nails show no clubbing.   Psychiatric: He has a normal mood and " affect. His speech is normal and behavior is normal. Judgment and thought content normal. Cognition and memory are normal.   Nursing note and vitals reviewed.      Chemistry        Component Value Date/Time     10/16/2017 1508    K 4.2 10/16/2017 1508     10/16/2017 1508    CO2 27 10/16/2017 1508    BUN 23 10/16/2017 1508    CREATININE 1.9 (H) 10/16/2017 1508     (H) 10/16/2017 1508        Component Value Date/Time    CALCIUM 9.2 10/16/2017 1508    ALKPHOS 64 09/08/2017 0900    AST 17 09/08/2017 0900    ALT 21 09/08/2017 0900    BILITOT 0.7 09/08/2017 0900    ESTGFRAFRICA 42.1 (A) 10/16/2017 1508    EGFRNONAA 36.4 (A) 10/16/2017 1508        Lab Results   Component Value Date    HGBA1C 6.7 (H) 09/28/2017       Recent Labs  Lab 11/27/16  1811  12/06/16  1311  10/16/17  1508   WHITE BLOOD CELL COUNT 10.55  < >  --   < > 10.20   HEMOGLOBIN 13.5 L  < >  --   < > 13.9 L   HEMATOCRIT 42.5  < >  --   < > 42.4   MCV 91  < >  --   < > 89   PLATELETS 231  < >  --   < > 316    H  --  164 H  --   --    TSH 2.798  --   --   --   --    CHOLESTEROL 102 L  --   --   --  115 L   HDL 35 L  --   --   --  38 L   LDL CHOLESTEROL 37.4 L  --   --   --  44.0 L   TRIGLYCERIDES 148  --   --   --  165 H   HDL/CHOLESTEROL RATIO 34.3  --   --   --  33.0   < > = values in this interval not displayed.         Lab Results   Component Value Date    [id number] 286 05/28/2014       Test(s) Reviewed  I have reviewed the following in detail:  [] Stress test   [] Angiography   [x] Echocardiogram   [x] Labs   [] Other:       Assessment/Plan:     Fatigue, unspecified type  Comments:  Not anemic. Last B12 2014 wnl. TSH wnl in 2016. vitamin D being replaced. Likely secondary to untreated sleep apnea given multiple sx associated with MONICA.   Orders:  -     TSH; Future; Expected date: 01/30/2018  -     Vitamin B12; Future; Expected date: 01/30/2018  -     CBC auto differential; Future; Expected date: 01/30/2018    Snoring  -      Ambulatory consult to Sleep Disorders    Suspected sleep apnea  -     Ambulatory consult to Sleep Disorders    Severe obesity (BMI 35.0-35.9 with comorbidity)  Comments:  BMI 35.3  Orders:  -     Ambulatory consult to Sleep Disorders  -     TSH; Future; Expected date: 01/30/2018    Type 2 diabetes mellitus with stage 3 chronic kidney disease, with long-term current use of insulin  Comments:  A1C at goal <7. Follow up with endocrine as planned.   Orders:  -     Comprehensive metabolic panel; Future; Expected date: 04/30/2018  -     Lipid panel; Future; Expected date: 04/30/2018    Hypertension associated with diabetes  Comments:  Well controlled. No changes to current regimen.   Orders:  -     Comprehensive metabolic panel; Future; Expected date: 04/30/2018    Hyperlipidemia associated with type 2 diabetes mellitus  Comments:  LDL at goal <70. Continue simvastatin 20mg  Orders:  -     Comprehensive metabolic panel; Future; Expected date: 04/30/2018  -     Lipid panel; Future; Expected date: 04/30/2018    Persistent atrial fibrillation  Comments:  Anticoagulated and on rhythm controling medication. No changes.     Encounter for monitoring flecainide therapy  Comments:  ECG does not reveal prolonged QT    Current use of long term anticoagulation  Comments:  Denies bleeding. Continue apixaban 5mg BID    Vitamin D insufficiency  Comments:  Vitamin D 19. Taking ergocalcipherol replacement. Follow up with endocrine as planned.     CKD (chronic kidney disease), stage III  -     Vitamin B12; Future; Expected date: 01/30/2018         Follow-up in about 3 months (around 4/29/2018). with labs before

## 2018-01-30 PROBLEM — E55.9 VITAMIN D INSUFFICIENCY: Status: ACTIVE | Noted: 2018-01-30

## 2018-01-30 PROBLEM — N18.30 CKD (CHRONIC KIDNEY DISEASE), STAGE III: Status: ACTIVE | Noted: 2018-01-30

## 2018-02-01 ENCOUNTER — TELEPHONE (OUTPATIENT)
Dept: INTERNAL MEDICINE | Facility: CLINIC | Age: 68
End: 2018-02-01

## 2018-02-01 ENCOUNTER — HOSPITAL ENCOUNTER (INPATIENT)
Facility: HOSPITAL | Age: 68
LOS: 2 days | Discharge: HOME | DRG: 291 | End: 2018-02-03
Attending: EMERGENCY MEDICINE | Admitting: EMERGENCY MEDICINE
Payer: COMMERCIAL

## 2018-02-01 ENCOUNTER — TELEPHONE (OUTPATIENT)
Dept: CARDIOLOGY | Facility: CLINIC | Age: 68
End: 2018-02-01

## 2018-02-01 ENCOUNTER — NURSE TRIAGE (OUTPATIENT)
Dept: ADMINISTRATIVE | Facility: CLINIC | Age: 68
End: 2018-02-01

## 2018-02-01 DIAGNOSIS — Z79.4 TYPE 2 DIABETES MELLITUS WITH STAGE 3 CHRONIC KIDNEY DISEASE, WITH LONG-TERM CURRENT USE OF INSULIN: ICD-10-CM

## 2018-02-01 DIAGNOSIS — N18.30 TYPE 2 DIABETES MELLITUS WITH STAGE 3 CHRONIC KIDNEY DISEASE, WITH LONG-TERM CURRENT USE OF INSULIN: ICD-10-CM

## 2018-02-01 DIAGNOSIS — E11.22 TYPE 2 DIABETES MELLITUS WITH STAGE 3 CHRONIC KIDNEY DISEASE, WITH LONG-TERM CURRENT USE OF INSULIN: ICD-10-CM

## 2018-02-01 DIAGNOSIS — R79.89 ELEVATED TROPONIN: Primary | ICD-10-CM

## 2018-02-01 DIAGNOSIS — I50.33 ACUTE ON CHRONIC DIASTOLIC CHF (CONGESTIVE HEART FAILURE): Chronic | ICD-10-CM

## 2018-02-01 DIAGNOSIS — I50.33 ACUTE ON CHRONIC DIASTOLIC HEART FAILURE: ICD-10-CM

## 2018-02-01 DIAGNOSIS — R06.02 SOB (SHORTNESS OF BREATH): ICD-10-CM

## 2018-02-01 DIAGNOSIS — I50.9 ACUTE ON CHRONIC CONGESTIVE HEART FAILURE, UNSPECIFIED CONGESTIVE HEART FAILURE TYPE: ICD-10-CM

## 2018-02-01 LAB
ALBUMIN SERPL BCP-MCNC: 2.4 G/DL
ALP SERPL-CCNC: 251 U/L
ALT SERPL W/O P-5'-P-CCNC: 53 U/L
ANION GAP SERPL CALC-SCNC: 10 MMOL/L
AST SERPL-CCNC: 53 U/L
BACTERIA #/AREA URNS AUTO: ABNORMAL /HPF
BASOPHILS # BLD AUTO: 0.03 K/UL
BASOPHILS NFR BLD: 0.2 %
BILIRUB SERPL-MCNC: 1.3 MG/DL
BILIRUB UR QL STRIP: NEGATIVE
BNP SERPL-MCNC: 389 PG/ML
BUN SERPL-MCNC: 23 MG/DL
CALCIUM SERPL-MCNC: 8.4 MG/DL
CHLORIDE SERPL-SCNC: 104 MMOL/L
CLARITY UR REFRACT.AUTO: ABNORMAL
CO2 SERPL-SCNC: 21 MMOL/L
COLOR UR AUTO: YELLOW
CREAT SERPL-MCNC: 1.9 MG/DL
DIFFERENTIAL METHOD: ABNORMAL
EOSINOPHIL # BLD AUTO: 0.1 K/UL
EOSINOPHIL NFR BLD: 1 %
ERYTHROCYTE [DISTWIDTH] IN BLOOD BY AUTOMATED COUNT: 12.7 %
EST. GFR  (AFRICAN AMERICAN): 41.8 ML/MIN/1.73 M^2
EST. GFR  (NON AFRICAN AMERICAN): 36.2 ML/MIN/1.73 M^2
ESTIMATED AVG GLUCOSE: 171 MG/DL
FLUAV AG SPEC QL IA: NEGATIVE
FLUBV AG SPEC QL IA: NEGATIVE
GLUCOSE SERPL-MCNC: 168 MG/DL
GLUCOSE UR QL STRIP: NEGATIVE
HBA1C MFR BLD HPLC: 7.6 %
HCT VFR BLD AUTO: 34.7 %
HGB BLD-MCNC: 11.7 G/DL
HGB UR QL STRIP: ABNORMAL
HYALINE CASTS UR QL AUTO: 0 /LPF
IMM GRANULOCYTES # BLD AUTO: 0.06 K/UL
IMM GRANULOCYTES NFR BLD AUTO: 0.5 %
INR PPP: 1.2
KETONES UR QL STRIP: NEGATIVE
LEUKOCYTE ESTERASE UR QL STRIP: NEGATIVE
LYMPHOCYTES # BLD AUTO: 0.7 K/UL
LYMPHOCYTES NFR BLD: 5.4 %
MAGNESIUM SERPL-MCNC: 2.3 MG/DL
MCH RBC QN AUTO: 28.7 PG
MCHC RBC AUTO-ENTMCNC: 33.7 G/DL
MCV RBC AUTO: 85 FL
MICROSCOPIC COMMENT: ABNORMAL
MONOCYTES # BLD AUTO: 1.6 K/UL
MONOCYTES NFR BLD: 12.8 %
NEUTROPHILS # BLD AUTO: 9.8 K/UL
NEUTROPHILS NFR BLD: 80.1 %
NITRITE UR QL STRIP: NEGATIVE
NRBC BLD-RTO: 0 /100 WBC
PH UR STRIP: 5 [PH] (ref 5–8)
PHOSPHATE SERPL-MCNC: 2.1 MG/DL
PLATELET # BLD AUTO: 331 K/UL
PMV BLD AUTO: 10.3 FL
POCT GLUCOSE: 132 MG/DL (ref 70–110)
POCT GLUCOSE: 171 MG/DL (ref 70–110)
POTASSIUM SERPL-SCNC: 5.4 MMOL/L
PROT SERPL-MCNC: 7.3 G/DL
PROT UR QL STRIP: ABNORMAL
PROTHROMBIN TIME: 12.2 SEC
RBC # BLD AUTO: 4.07 M/UL
RBC #/AREA URNS AUTO: 18 /HPF (ref 0–4)
SODIUM SERPL-SCNC: 135 MMOL/L
SP GR UR STRIP: 1.01 (ref 1–1.03)
SPECIMEN SOURCE: NORMAL
TROPONIN I SERPL DL<=0.01 NG/ML-MCNC: 0.02 NG/ML
TROPONIN I SERPL DL<=0.01 NG/ML-MCNC: 0.03 NG/ML
TSH SERPL DL<=0.005 MIU/L-ACNC: 1.79 UIU/ML
URN SPEC COLLECT METH UR: ABNORMAL
UROBILINOGEN UR STRIP-ACNC: 4 EU/DL
VIT B12 SERPL-MCNC: >2000 PG/ML
WBC # BLD AUTO: 12.22 K/UL
WBC #/AREA URNS AUTO: 1 /HPF (ref 0–5)

## 2018-02-01 PROCEDURE — 83735 ASSAY OF MAGNESIUM: CPT

## 2018-02-01 PROCEDURE — 81001 URINALYSIS AUTO W/SCOPE: CPT

## 2018-02-01 PROCEDURE — 84484 ASSAY OF TROPONIN QUANT: CPT

## 2018-02-01 PROCEDURE — 63600175 PHARM REV CODE 636 W HCPCS: Performed by: NURSE PRACTITIONER

## 2018-02-01 PROCEDURE — 25000003 PHARM REV CODE 250: Performed by: EMERGENCY MEDICINE

## 2018-02-01 PROCEDURE — 82962 GLUCOSE BLOOD TEST: CPT

## 2018-02-01 PROCEDURE — 11000001 HC ACUTE MED/SURG PRIVATE ROOM

## 2018-02-01 PROCEDURE — 94761 N-INVAS EAR/PLS OXIMETRY MLT: CPT

## 2018-02-01 PROCEDURE — 93005 ELECTROCARDIOGRAM TRACING: CPT

## 2018-02-01 PROCEDURE — 63600175 PHARM REV CODE 636 W HCPCS: Performed by: PHYSICIAN ASSISTANT

## 2018-02-01 PROCEDURE — 84484 ASSAY OF TROPONIN QUANT: CPT | Mod: 91

## 2018-02-01 PROCEDURE — 82607 VITAMIN B-12: CPT

## 2018-02-01 PROCEDURE — 99285 EMERGENCY DEPT VISIT HI MDM: CPT | Mod: ,,, | Performed by: NURSE PRACTITIONER

## 2018-02-01 PROCEDURE — 93010 ELECTROCARDIOGRAM REPORT: CPT | Mod: ,,, | Performed by: INTERNAL MEDICINE

## 2018-02-01 PROCEDURE — 36415 COLL VENOUS BLD VENIPUNCTURE: CPT

## 2018-02-01 PROCEDURE — 85025 COMPLETE CBC W/AUTO DIFF WBC: CPT

## 2018-02-01 PROCEDURE — 99284 EMERGENCY DEPT VISIT MOD MDM: CPT | Mod: 25

## 2018-02-01 PROCEDURE — 80053 COMPREHEN METABOLIC PANEL: CPT

## 2018-02-01 PROCEDURE — 25000003 PHARM REV CODE 250: Performed by: PHYSICIAN ASSISTANT

## 2018-02-01 PROCEDURE — 83880 ASSAY OF NATRIURETIC PEPTIDE: CPT

## 2018-02-01 PROCEDURE — 94640 AIRWAY INHALATION TREATMENT: CPT

## 2018-02-01 PROCEDURE — 99222 1ST HOSP IP/OBS MODERATE 55: CPT | Mod: ,,, | Performed by: PHYSICIAN ASSISTANT

## 2018-02-01 PROCEDURE — 83036 HEMOGLOBIN GLYCOSYLATED A1C: CPT

## 2018-02-01 PROCEDURE — 85610 PROTHROMBIN TIME: CPT

## 2018-02-01 PROCEDURE — 25000242 PHARM REV CODE 250 ALT 637 W/ HCPCS: Performed by: PHYSICIAN ASSISTANT

## 2018-02-01 PROCEDURE — 84443 ASSAY THYROID STIM HORMONE: CPT

## 2018-02-01 PROCEDURE — 84100 ASSAY OF PHOSPHORUS: CPT

## 2018-02-01 PROCEDURE — 87400 INFLUENZA A/B EACH AG IA: CPT | Mod: 59

## 2018-02-01 PROCEDURE — 96374 THER/PROPH/DIAG INJ IV PUSH: CPT

## 2018-02-01 RX ORDER — ACETAMINOPHEN 325 MG/1
650 TABLET ORAL EVERY 4 HOURS PRN
Status: DISCONTINUED | OUTPATIENT
Start: 2018-02-01 | End: 2018-02-03 | Stop reason: HOSPADM

## 2018-02-01 RX ORDER — ONDANSETRON 8 MG/1
8 TABLET, ORALLY DISINTEGRATING ORAL EVERY 8 HOURS PRN
Status: DISCONTINUED | OUTPATIENT
Start: 2018-02-01 | End: 2018-02-03 | Stop reason: HOSPADM

## 2018-02-01 RX ORDER — INSULIN ASPART 100 [IU]/ML
0-5 INJECTION, SOLUTION INTRAVENOUS; SUBCUTANEOUS
Status: DISCONTINUED | OUTPATIENT
Start: 2018-02-01 | End: 2018-02-03 | Stop reason: HOSPADM

## 2018-02-01 RX ORDER — FUROSEMIDE 10 MG/ML
20 INJECTION INTRAMUSCULAR; INTRAVENOUS
Status: COMPLETED | OUTPATIENT
Start: 2018-02-01 | End: 2018-02-01

## 2018-02-01 RX ORDER — FUROSEMIDE 40 MG/1
40 TABLET ORAL 2 TIMES DAILY
Status: DISCONTINUED | OUTPATIENT
Start: 2018-02-01 | End: 2018-02-02

## 2018-02-01 RX ORDER — IBUPROFEN 200 MG
24 TABLET ORAL
Status: DISCONTINUED | OUTPATIENT
Start: 2018-02-01 | End: 2018-02-03 | Stop reason: HOSPADM

## 2018-02-01 RX ORDER — ASPIRIN 325 MG
325 TABLET ORAL
Status: COMPLETED | OUTPATIENT
Start: 2018-02-01 | End: 2018-02-01

## 2018-02-01 RX ORDER — GLUCAGON 1 MG
1 KIT INJECTION
Status: DISCONTINUED | OUTPATIENT
Start: 2018-02-01 | End: 2018-02-03 | Stop reason: HOSPADM

## 2018-02-01 RX ORDER — ACETAMINOPHEN 325 MG/1
650 TABLET ORAL EVERY 8 HOURS PRN
Status: DISCONTINUED | OUTPATIENT
Start: 2018-02-01 | End: 2018-02-03 | Stop reason: HOSPADM

## 2018-02-01 RX ORDER — ENOXAPARIN SODIUM 100 MG/ML
40 INJECTION SUBCUTANEOUS EVERY 24 HOURS
Status: DISCONTINUED | OUTPATIENT
Start: 2018-02-01 | End: 2018-02-02

## 2018-02-01 RX ORDER — POLYETHYLENE GLYCOL 3350 17 G/17G
17 POWDER, FOR SOLUTION ORAL 2 TIMES DAILY
Status: DISCONTINUED | OUTPATIENT
Start: 2018-02-01 | End: 2018-02-03 | Stop reason: HOSPADM

## 2018-02-01 RX ORDER — RAMELTEON 8 MG/1
8 TABLET ORAL NIGHTLY PRN
Status: DISCONTINUED | OUTPATIENT
Start: 2018-02-01 | End: 2018-02-03 | Stop reason: HOSPADM

## 2018-02-01 RX ORDER — ONDANSETRON 2 MG/ML
4 INJECTION INTRAMUSCULAR; INTRAVENOUS EVERY 8 HOURS PRN
Status: DISCONTINUED | OUTPATIENT
Start: 2018-02-01 | End: 2018-02-03 | Stop reason: HOSPADM

## 2018-02-01 RX ORDER — AMOXICILLIN 250 MG
1 CAPSULE ORAL 2 TIMES DAILY
Status: DISCONTINUED | OUTPATIENT
Start: 2018-02-01 | End: 2018-02-03 | Stop reason: HOSPADM

## 2018-02-01 RX ORDER — SODIUM CHLORIDE 0.9 % (FLUSH) 0.9 %
5 SYRINGE (ML) INJECTION
Status: DISCONTINUED | OUTPATIENT
Start: 2018-02-01 | End: 2018-02-03 | Stop reason: HOSPADM

## 2018-02-01 RX ORDER — IPRATROPIUM BROMIDE AND ALBUTEROL SULFATE 2.5; .5 MG/3ML; MG/3ML
3 SOLUTION RESPIRATORY (INHALATION) EVERY 4 HOURS
Status: DISCONTINUED | OUTPATIENT
Start: 2018-02-01 | End: 2018-02-03 | Stop reason: HOSPADM

## 2018-02-01 RX ORDER — INSULIN ASPART 100 [IU]/ML
9 INJECTION, SOLUTION INTRAVENOUS; SUBCUTANEOUS
Status: DISCONTINUED | OUTPATIENT
Start: 2018-02-02 | End: 2018-02-03 | Stop reason: HOSPADM

## 2018-02-01 RX ORDER — IBUPROFEN 200 MG
16 TABLET ORAL
Status: DISCONTINUED | OUTPATIENT
Start: 2018-02-01 | End: 2018-02-03 | Stop reason: HOSPADM

## 2018-02-01 RX ORDER — BISACODYL 10 MG
10 SUPPOSITORY, RECTAL RECTAL DAILY PRN
Status: DISCONTINUED | OUTPATIENT
Start: 2018-02-01 | End: 2018-02-03 | Stop reason: HOSPADM

## 2018-02-01 RX ADMIN — ASPIRIN 325 MG ORAL TABLET 325 MG: 325 PILL ORAL at 02:02

## 2018-02-01 RX ADMIN — IPRATROPIUM BROMIDE AND ALBUTEROL SULFATE 3 ML: .5; 3 SOLUTION RESPIRATORY (INHALATION) at 04:02

## 2018-02-01 RX ADMIN — FUROSEMIDE 20 MG: 10 INJECTION, SOLUTION INTRAMUSCULAR; INTRAVENOUS at 02:02

## 2018-02-01 RX ADMIN — IPRATROPIUM BROMIDE AND ALBUTEROL SULFATE 3 ML: .5; 3 SOLUTION RESPIRATORY (INHALATION) at 11:02

## 2018-02-01 RX ADMIN — INSULIN DETEMIR 9 UNITS: 100 INJECTION, SOLUTION SUBCUTANEOUS at 09:02

## 2018-02-01 RX ADMIN — ENOXAPARIN SODIUM 40 MG: 100 INJECTION SUBCUTANEOUS at 06:02

## 2018-02-01 RX ADMIN — STANDARDIZED SENNA CONCENTRATE AND DOCUSATE SODIUM 1 TABLET: 8.6; 5 TABLET, FILM COATED ORAL at 09:02

## 2018-02-01 RX ADMIN — FUROSEMIDE 40 MG: 40 TABLET ORAL at 06:02

## 2018-02-01 RX ADMIN — IPRATROPIUM BROMIDE AND ALBUTEROL SULFATE 3 ML: .5; 3 SOLUTION RESPIRATORY (INHALATION) at 07:02

## 2018-02-01 NOTE — PROVIDER PROGRESS NOTES - EMERGENCY DEPT.
Encounter Date: 2/1/2018    ED Physician Progress Notes         EKG - STEMI Decision  Initial Reading: No STEMI present.  Response: No Action Needed.

## 2018-02-01 NOTE — TELEPHONE ENCOUNTER
Reason for Disposition   Fever > 100.5 F (38.1 C) and over 60 years of age    Protocols used:  INFLUENZA - SEASONAL-A-OH    Daughter, Winifred, states that pt was seen by Dr Lovell for SOB and feeling like he was going to pass out 1/29. In the last 24 hours pt has developed fever of 102, chills, and decreased appetite. Care advice given. Winifred would like to see if pt can get apt at PCP office before she brings him to urgent care. Please call Winifred at [phone] to advise.

## 2018-02-01 NOTE — ASSESSMENT & PLAN NOTE
- Acute HF likely due to noncompliance with meds  - Patient on RA on admit with SpO2 96-97%  - Last echo 1/18/2017, EF 55%, (+)DD  - Repeat ECHO (2/2) > wnl   - will restart home medications:  - beta blocker: carvedilol (COREG) 25 MG tablet BID  - home diuretic:  furosemide (LASIX) 40 MG tablet BI  - hospital diuresis: IV furosemide (LASIX) 40 MG BID > 80 mg IV bid  - monitor response with daily weights, strict I/Os, oxygen requirements  - I/O not recorded   - Improved

## 2018-02-01 NOTE — ASSESSMENT & PLAN NOTE
- Patient decompensated on admit (2/1/2018). (+)Subjective SOB, (+)orthnopnea.   - CXR showing no acute process , EKG with NSR and changes in V1-V2,     - Troponin 0.033, in the setting of obesity and CKD, previously 0.017--- will trend    - Patient on RA on admit with SpO2 96-97%  - Last echo 1/18/2017, EF 55%, (+)DD  - will restart home medications:   - beta blocker: carvedilol (COREG) 25 MG tablet BID   - home diuretic:  furosemide (LASIX) 40 MG tablet BID   - hospital diuresis: IV furosemide (LASIX) 40 MG BID  - monitor response with daily weights, strict I/Os, oxygen requirements  No intake or output data in the 24 hours ending 02/01/18 1647  Vitals over last 24H  Temp:  [98.5 °F (36.9 °C)-99.2 °F (37.3 °C)]   Pulse:  [87-88]   Resp:  [20]   BP: (125-156)/(69-70)   SpO2:  [96 %-97 %]

## 2018-02-01 NOTE — TELEPHONE ENCOUNTER
Received message from triage nurse this morning that patient has fever, chills, SOB and nearly fainted - he should be going straight to the ER, may need more than Tamiflu.

## 2018-02-01 NOTE — ED PROVIDER NOTES
"Encounter Date: 2/1/2018       History     Chief Complaint   Patient presents with    Shortness of Breath     Pt c/o SOB-onset several weeks ago.  Hx CHF.     Pt is a 68 yo male with a medical history of CHF, diabetes and HTN presenting to the ED for increased shortness of breath.  Pt states that "for the last few weeks" he has had increasing dyspnea with exertion.  Pt states that on Monday he attempted to walk [place] and had to stop to catch his breath. Pt states the shortness of breath worse while getting dressed, after one flight stairs.  Symptoms include dyspnea on exertion and post nasal drip. Symptoms began a few weeks ago, gradually worsening since that time.  Patient denies chest pain, constant cough, frequent throat clearing, post nasal drip, productive cough, tightness in chest and wheezing.           Review of patient's allergies indicates:  No Known Allergies  Past Medical History:   Diagnosis Date    CHF (congestive heart failure)     Diabetes mellitus, type 2 2010    Hydronephrosis of left kidney     Hypertension     Non-functioning kidney     Followed by Dr. Silver Anderson    Obesity (BMI 30-39.9)     Unspecified disorder of kidney and ureter      Past Surgical History:   Procedure Laterality Date    arm surgery      CARDIOVERSION  11/29/2016    kidney removed Left 0825/2017    knee surgeory N/A 4 to 5 years ago     Family History   Problem Relation Age of Onset    Colon cancer Father     Ovarian cancer Sister     Liver cancer Brother     Heart disease Paternal Grandfather     Amblyopia Neg Hx     Blindness Neg Hx     Cataracts Neg Hx     Diabetes Neg Hx     Glaucoma Neg Hx     Hypertension Neg Hx     Macular degeneration Neg Hx     Retinal detachment Neg Hx     Strabismus Neg Hx     Stroke Neg Hx     Thyroid disease Neg Hx      Social History   Substance Use Topics    Smoking status: Never Smoker    Smokeless tobacco: Never Used    Alcohol " use No     Review of Systems   Constitutional: Negative for activity change, appetite change, chills and fever.   HENT: Negative for congestion, ear discharge, sinus pain, sinus pressure, sneezing and trouble swallowing.    Eyes: Negative for photophobia and discharge.   Respiratory: Positive for shortness of breath. Negative for apnea, cough, chest tightness and wheezing.    Cardiovascular: Negative for chest pain, palpitations and leg swelling.   Gastrointestinal: Negative for abdominal distention, abdominal pain, constipation, diarrhea, nausea and vomiting.   Genitourinary: Negative for difficulty urinating, flank pain and urgency.   Musculoskeletal: Positive for arthralgias and myalgias. Negative for back pain, gait problem, joint swelling, neck pain and neck stiffness.   Skin: Negative for pallor and rash.   Allergic/Immunologic: Negative.    Neurological: Negative for dizziness, syncope, weakness, numbness and headaches.   Psychiatric/Behavioral: Negative.        Physical Exam     Initial Vitals [02/01/18 1254]   BP Pulse Resp Temp SpO2   (!) 156/70 88 20 98.5 °F (36.9 °C) 97 %      MAP       98.67         Physical Exam    Nursing note and vitals reviewed.  Constitutional: Vital signs are normal. He appears well-developed and well-nourished. He is not diaphoretic. He is cooperative. No distress.   HENT:   Head: Normocephalic and atraumatic.   Eyes: Conjunctivae, EOM and lids are normal. Pupils are equal, round, and reactive to light.   Neck: Trachea normal, normal range of motion, full passive range of motion without pain and phonation normal. Neck supple.   Cardiovascular: Normal rate, regular rhythm, normal heart sounds and normal pulses.   Pulses:       Radial pulses are 2+ on the right side, and 2+ on the left side.        Dorsalis pedis pulses are 2+ on the right side, and 2+ on the left side.   Pulmonary/Chest: Effort normal. He has decreased breath sounds in the right upper field, the right middle  field, the right lower field, the left upper field, the left middle field and the left lower field.   Abdominal: Soft. Normal appearance and bowel sounds are normal. There is no tenderness.   Musculoskeletal: Normal range of motion.   Neurological: He is alert and oriented to person, place, and time. He has normal strength. GCS eye subscore is 4. GCS verbal subscore is 5. GCS motor subscore is 6.   Skin: Skin is warm, dry and intact. Capillary refill takes less than 2 seconds. No cyanosis. Nails show no clubbing.   Psychiatric: He has a normal mood and affect. His speech is normal and behavior is normal. Judgment and thought content normal. Cognition and memory are normal.         ED Course   Procedures  Labs Reviewed   CBC W/ AUTO DIFFERENTIAL - Abnormal; Notable for the following:        Result Value    RBC 4.07 (*)     Hemoglobin 11.7 (*)     Hematocrit 34.7 (*)     Gran # (ANC) 9.8 (*)     Immature Grans (Abs) 0.06 (*)     Lymph # 0.7 (*)     Mono # 1.6 (*)     Gran% 80.1 (*)     Lymph% 5.4 (*)     All other components within normal limits   COMPREHENSIVE METABOLIC PANEL - Abnormal; Notable for the following:     Sodium 135 (*)     Potassium 5.4 (*)     CO2 21 (*)     Glucose 168 (*)     Creatinine 1.9 (*)     Calcium 8.4 (*)     Albumin 2.4 (*)     Total Bilirubin 1.3 (*)     Alkaline Phosphatase 251 (*)     AST 53 (*)     ALT 53 (*)     eGFR if  41.8 (*)     eGFR if non  36.2 (*)     All other components within normal limits   TROPONIN I - Abnormal; Notable for the following:     Troponin I 0.033 (*)     All other components within normal limits   B-TYPE NATRIURETIC PEPTIDE - Abnormal; Notable for the following:      (*)     All other components within normal limits   POCT GLUCOSE - Abnormal; Notable for the following:     POCT Glucose 171 (*)     All other components within normal limits   PROTIME-INR   INFLUENZA A AND B ANTIGEN   HEMOGLOBIN A1C   COMPREHENSIVE METABOLIC  PANEL   MAGNESIUM   PHOSPHORUS   TSH   POCT GLUCOSE MONITORING CONTINUOUS             Medical Decision Making:   History:   Old Medical Records: I decided to obtain old medical records.  Initial Assessment:   Pt is a 66 yo male with a medical history of CHF, diabetes and HTN presenting to the ED for increased shortness of breath.  Pt denies chest pain, constant cough, frequent throat clearing, post nasal drip, productive cough, tightness in chest and wheezing.     Differential Diagnosis:   Influenza, CHF exacerbation, URI, pneumonia,     I considered but do not suspect pulmonary embolism  Clinical Tests:   Lab Tests: Ordered and Reviewed  The following lab test(s) were unremarkable: CBC, BMP, BNP, Urinalysis and PT  Radiological Study: Ordered and Reviewed  Medical Tests: Ordered and Reviewed  ED Management:  ASA 325mg PO  Lasix 20mg IV    Imaging Results          X-Ray Chest PA And Lateral (Final result)  Result time 02/01/18 14:42:27    Final result by Jessica Chávez MD (02/01/18 14:42:27)                 Impression:      No acute disease identified.  The source of the patient's shortness of breath is not established on this study..      Electronically signed by: Jessica Chávez MD  Date:     02/01/18  Time:    14:42              Narrative:    Time of Procedure: 02/01/18 14:37:12  Accession # [id number]    Comparison: 2/6/2017.    Number of views: 2.     Findings:  X-ray beam attenuation and scatter occur in generous overlying soft tissues.  Intrathoracic structures are magnified by the patient's thick body wall.  Soft tissues of the patient's arms project over the lateral image obscuring some detail of the retrosternal airspace and mediastinal margins.    Lung volumes are normal and symmetric.  Mediastinal structures are midline.  The patient is mildly kyphotic posture as seen on current and prior lateral views.    I detect no convincing evidence of pulmonary disease, pleural fluid, lymph node enlargement,  cardiac decompensation, pneumothorax, pneumomediastinum, pneumoperitoneum or significant osseous abnormality.                                 APC / Resident Notes:   1500- At bedside to update pt.  Advised pt that we recommend admission for further testing.  Pt understands and agrees with decision.      I discussed the care of this patient with my supervising MD.                ED Course      Clinical Impression:   The primary encounter diagnosis was Elevated troponin. Diagnoses of SOB (shortness of breath) and Acute on chronic congestive heart failure, unspecified congestive heart failure type were also pertinent to this visit.                           Araseli Gaona NP  02/01/18 1800

## 2018-02-01 NOTE — ASSESSMENT & PLAN NOTE
Followed by nephrology as an outpatient, last seen 10/23/2016  - Cr 1.9 and GFR 36.2 on admit, baseline 1.5-1.9 since 2016   - s/p L nephrectomy in 2016   - will arrange follow-up at discharge  - CMP daily, will monitor for acute changes

## 2018-02-01 NOTE — TELEPHONE ENCOUNTER
----- Message from Sandeep Bergeron MA sent at 2/1/2018  1:11 PM CST -----  Contact: Stacie Lira (Wife)  Please call Mrs. Lira at [phone].  She would like to speak to you in reference to   Mr. Lira feeling Lethargic, having a hard time catching his breath with a fever with possible fluid.  She states he drove himself to the ER.  Mr. Lira is a patient of Dr. Castanon.  LOV 01/29/2018  Guadalupe Lovell    Thank You,  Luana

## 2018-02-01 NOTE — ASSESSMENT & PLAN NOTE
S/p DCCV 2016. Restart home medications.  - EKG with NSR on admit   - apixaban (ELIQUIS) 5 mg Tab BID  - flecainide (TAMBOCOR) 50 MG Tab BID  - TSH ordered

## 2018-02-01 NOTE — ED TRIAGE NOTES
Presents to ER with SOB and prickly type chest pain off and on for 1 month.    Pt identifiers checked and correct  LOC: The patient is awake, alert, aware of environment with an appropriate affect. Oriented x3, speaking appropriately  APPEARANCE: Pt resting comfortably, in no acute distress, pt is clean and well groomed, clothing properly fastened  SKIN: Skin warm, dry and intact, normal skin turgor, moist mucus membranes  RESPIRATORY: Airway is open and patent, respirations are spontaneous, even and unlabored, normal effort and rate  MUSCULOSKELETAL: No obvious deformities.

## 2018-02-01 NOTE — TELEPHONE ENCOUNTER
Dr. Castanon, The pt's wife was calling to let you know that he drove himself to the ED today - was having difficulty breathing, and an elevated temp.  Says that she has recently had a hip replacement - wasn't able to drive him.  I informed her that you are out of the office, but the doctors would have access to all of the pt's info in the computer and another cardiologist would be called to take care of him if necessary.

## 2018-02-01 NOTE — HPI
Jose Cruz Lira is a 67 y.o. presenting with CKD (s/p nephrectomy August 2016), HTN, DMII (long term insulin use), atrial fibrillation (s/p DCCV 2016) for SOB/JAMES and fatigue for the past 3-4 months. Symptoms have become progressively worse since onset. Patient reports being unable to walk 10 feet without becoming SOB. He also reports occasional headaches, blurry vision and lower extremity edema. Overnight, the patient experienced chills, nausea and a fever (~102). He denies sick contacts, vomiting, diarrhea, dysuria, cough or chest pain. Patient reports noncompliance with medications since Monday afternoon as he left them at his daughters home and has been staying with his mother temporarily. The patient was seen in the cardiology clinic on 1/29 for similar symptoms; he was advised to take an additional dose of Lasix 6-8 hours after his first dose and follow-up in 3 months. He denies tobacco, alcohol or drug use.     In the ED, troponin elevated 0.033, , K 5.3, EKG with changes in V1-V2 from previous study, CXR without acute process.

## 2018-02-01 NOTE — ASSESSMENT & PLAN NOTE
Troponin 0.033, in the setting of obesity and CKD, previously 0.017--- will trend    - no chest pain on admit  - EKG with NSR and changes in V1-V2, repeat with acute change

## 2018-02-01 NOTE — ASSESSMENT & PLAN NOTE
Glucose 171 on admit  - does not monitor glucose levels at home  - no oral anti-hyperglycemics  Home regimen:    - insulin detemir, 22 units nightly   Hospital regimen:   - Insulin detemir 9u nightly   - insulin novolog 12u TIDWM   - low dose sliding scale   - hypo/hyper glycemic protocols in place  - will monitor for titration as needed

## 2018-02-01 NOTE — H&P
[facility] Hospital Medicine  History & Physical    Patient Name: Jose Cruz Lira  MRN: [id number]  Admission Date: 2/1/2018  Attending Physician: Mike Foote MD  Primary Care Provider: Lisa Capone MD    [facility] Medicine Team: [facility] HOSP MED E Kristi Gore PA-C     Patient information was obtained from patient, past medical records and ER records.     Subjective:     Principal Problem:Acute on chronic diastolic CHF (congestive heart failure)    Chief Complaint:   Chief Complaint   Patient presents with    Shortness of Breath     Pt c/o SOB-onset several weeks ago.  Hx CHF.        HPI: Jose Cruz Lira is a 67 y.o. presenting with CKD (s/p nephrectomy August 2016), HTN, DMII (long term insulin use), atrial fibrillation (s/p DCCV 2016) for SOB/JAMES and fatigue for the past 3-4 months. Symptoms have become progressively worse since onset. Patient reports being unable to walk 10 feet without becoming SOB. He also reports occasional headaches, blurry vision and lower extremity edema. Overnight, the patient experienced chills, nausea and a fever (~102). He denies sick contacts, vomiting, diarrhea, dysuria, cough or chest pain. Patient reports noncompliance with medications since Monday afternoon as he left them at his daughters home and has been staying with his mother temporarily. The patient was seen in the cardiology clinic on 1/29 for similar symptoms; he was advised to take an additional dose of Lasix 6-8 hours after his first dose and follow-up in 3 months. He denies tobacco, alcohol or drug use.     In the ED, troponin elevated 0.033, , K 5.3, EKG with changes in V1-V2 from previous study, CXR without acute process.     Review of Systems   Constitutional: Positive for activity change, chills, fatigue and fever.   HENT: Negative for congestion, sore throat and trouble swallowing.    Eyes: Positive for visual disturbance (occasional blurry vison ). Negative for  photophobia.   Respiratory: Positive for shortness of breath (worse w/ exertion). Negative for cough, chest tightness and wheezing.    Cardiovascular: Positive for leg swelling. Negative for chest pain and palpitations.   Gastrointestinal: Positive for nausea. Negative for abdominal pain, constipation, diarrhea and vomiting.   Endocrine: Positive for polyuria.   Genitourinary: Positive for frequency (nocturia). Negative for difficulty urinating, dysuria, flank pain, hematuria and urgency.        Inability to fully evacuate bladder    Musculoskeletal: Negative for arthralgias, gait problem and myalgias.   Skin: Negative for color change.   Neurological: Positive for numbness (R fingers) and headaches. Negative for dizziness, syncope, facial asymmetry, speech difficulty, weakness and light-headedness.   Psychiatric/Behavioral: Negative for confusion and hallucinations. The patient is not nervous/anxious.      Objective:     Vital Signs (Most Recent):  Temp: 97.8 °F (36.6 °C) (02/03/18 1200)  Pulse: 83 (02/03/18 1200)  Resp: 18 (02/03/18 1200)  BP: 115/61 (02/03/18 1200)  SpO2: (!) 93 % (02/03/18 1200) Vital Signs (24h Range):  Temp:  [97.8 °F (36.6 °C)] 97.8 °F (36.6 °C)  Pulse:  [83] 83  Resp:  [18] 18  SpO2:  [93 %] 93 %  BP: (115)/(61) 115/61     Weight: 124.3 kg (274 lb 0.5 oz)  Body mass index is 35.18 kg/m².  No intake or output data in the 24 hours ending 02/04/18 1146   Physical Exam   Constitutional: He is oriented to person, place, and time. He appears well-developed and well-nourished. No distress.   HENT:   Head: Normocephalic and atraumatic.   Eyes: EOM are normal. Right eye exhibits no discharge. Left eye exhibits no discharge.   Neck: Normal range of motion. No tracheal deviation present.   Cardiovascular: Normal rate and regular rhythm.    Pulmonary/Chest: Effort normal and breath sounds normal. No stridor. No respiratory distress. He has no wheezes.   Abdominal: Soft. He exhibits no distension.    Musculoskeletal: Normal range of motion. He exhibits edema (mild, R>L). He exhibits no tenderness.   Neurological: He is alert and oriented to person, place, and time. A cranial nerve deficit is present. No sensory deficit.   Skin: Skin is warm. Capillary refill takes less than 2 seconds.   Psychiatric: He has a normal mood and affect. His behavior is normal.   Nursing note and vitals reviewed.      Significant Labs: All pertinent labs within the past 24 hours have been reviewed.    Significant Imaging: I have reviewed all pertinent imaging results/findings within the past 24 hours.    Assessment/Plan:     * Acute on chronic diastolic CHF (congestive heart failure)    - Patient decompensated on admit (2/1/2018). (+)Subjective SOB, (+)orthnopnea.   - CXR showing no acute process , EKG with NSR and changes in V1-V2,     - Troponin 0.033, in the setting of obesity and CKD, previously 0.017--- will trend    - Patient on RA on admit with SpO2 96-97%  - Last echo 1/18/2017, EF 55%, (+)DD-- ECHO ordered   - will restart home medications:   - beta blocker: carvedilol (COREG) 25 MG tablet BID   - home diuretic:  furosemide (LASIX) 40 MG tablet BID   - hospital diuresis: IV furosemide (LASIX) 40 MG BID  - monitor response with daily weights, strict I/Os, oxygen requirements  No intake or output data in the 24 hours ending 02/01/18 1719  Vitals over last 24H  Temp:  [98.5 °F (36.9 °C)-99.2 °F (37.3 °C)]   Pulse:  [87-88]   Resp:  [20]   BP: (125-156)/(69-70)   SpO2:  [96 %-97 %]         Elevated troponin    Troponin 0.033, in the setting of obesity and CKD, previously 0.017--- will trend    - no chest pain on admit  - EKG with NSR and changes in V1-V2, repeat with acute change        Type 2 diabetes mellitus with stage 3 chronic kidney disease, with long-term current use of insulin    Glucose 171 on admit  - does not monitor glucose levels at home  - no oral anti-hyperglycemics  Home regimen:    - insulin detemir, 22  units nightly   Hospital regimen:   - Insulin detemir 9u nightly   - insulin novolog 12u TIDWM   - low dose sliding scale   - hypo/hyper glycemic protocols in place  - will monitor for titration as needed         Persistent atrial fibrillation    S/p DCCV 2016. Restart home medications.  - EKG with NSR on admit   - apixaban (ELIQUIS) 5 mg Tab BID  - flecainide (TAMBOCOR) 50 MG Tab BID  - TSH ordered         CKD (chronic kidney disease), stage III    Followed by nephrology as an outpatient, last seen 10/23/2016  - Cr 1.9 and GFR 36.2 on admit, baseline 1.5-1.9 since 2016   - s/p L nephrectomy in 2016   - will arrange follow-up at discharge  - CMP daily, will monitor for acute changes         Sleep apnea, unspecified    Patient with appointment on Tuesday 2/6/18  - no CPAP use yet        Hypertension associated with diabetes    Mildly elevated on admit, will continue home meds  - amLODIPine (NORVASC) 10 MG tablet daily         Hyperlipidemia associated with type 2 diabetes mellitus    Last lipid panel in October 2017  - simvastatin (ZOCOR) 20 MG tablet          VTE Risk Mitigation         Ordered     enoxaparin injection 40 mg  Daily     Route:  Subcutaneous        02/01/18 1510     Place sequential compression device  Until discontinued      02/01/18 1510     High Risk of VTE  Once      02/01/18 1510             Kristi Gore PA-C  Department of Hospital Medicine   [facility]

## 2018-02-01 NOTE — SUBJECTIVE & OBJECTIVE
Review of Systems   Constitutional: Positive for activity change, chills, fatigue and fever.   HENT: Negative for congestion, sore throat and trouble swallowing.    Eyes: Positive for visual disturbance (occasional blurry vison ). Negative for photophobia.   Respiratory: Positive for shortness of breath (worse w/ exertion). Negative for cough, chest tightness and wheezing.    Cardiovascular: Positive for leg swelling. Negative for chest pain and palpitations.   Gastrointestinal: Positive for nausea. Negative for abdominal pain, constipation, diarrhea and vomiting.   Endocrine: Positive for polyuria.   Genitourinary: Positive for frequency (nocturia). Negative for difficulty urinating, dysuria, flank pain, hematuria and urgency.        Inability to fully evacuate bladder    Musculoskeletal: Negative for arthralgias, gait problem and myalgias.   Skin: Negative for color change.   Neurological: Positive for numbness (R fingers) and headaches. Negative for dizziness, syncope, facial asymmetry, speech difficulty, weakness and light-headedness.   Psychiatric/Behavioral: Negative for confusion and hallucinations. The patient is not nervous/anxious.      Objective:     Vital Signs (Most Recent):  Temp: 99.2 °F (37.3 °C) (02/01/18 1510)  Pulse: 88 (02/01/18 1626)  Resp: 20 (02/01/18 1626)  BP: 125/69 (02/01/18 1510)  SpO2: 96 % (02/01/18 1510) Vital Signs (24h Range):  Temp:  [98.5 °F (36.9 °C)-99.2 °F (37.3 °C)] 99.2 °F (37.3 °C)  Pulse:  [87-88] 88  Resp:  [20] 20  SpO2:  [96 %-97 %] 96 %  BP: (125-156)/(69-70) 125/69     Weight: 124.3 kg (274 lb)  Body mass index is 35.18 kg/m².  No intake or output data in the 24 hours ending 02/01/18 1637   Physical Exam   Constitutional: He is oriented to person, place, and time. He appears well-developed and well-nourished. No distress.   HENT:   Head: Normocephalic and atraumatic.   Eyes: EOM are normal. Right eye exhibits no discharge. Left eye exhibits no discharge.   Neck:  Normal range of motion. No tracheal deviation present.   Cardiovascular: Normal rate and regular rhythm.    Pulmonary/Chest: Effort normal and breath sounds normal. No stridor. No respiratory distress. He has no wheezes.   Abdominal: Soft. He exhibits no distension.   Musculoskeletal: Normal range of motion. He exhibits edema (mild, R>L). He exhibits no tenderness.   Neurological: He is alert and oriented to person, place, and time. A cranial nerve deficit is present. No sensory deficit.   Skin: Skin is warm. Capillary refill takes less than 2 seconds.   Psychiatric: He has a normal mood and affect. His behavior is normal.   Nursing note and vitals reviewed.      Significant Labs:   CBC:   Recent Labs  Lab 02/01/18  1338   WBC 12.22   HGB 11.7*   HCT 34.7*        CMP:   Recent Labs  Lab 02/01/18  1338   *   K 5.4*      CO2 21*   *   BUN 23   CREATININE 1.9*   CALCIUM 8.4*   PROT 7.3   ALBUMIN 2.4*   BILITOT 1.3*   ALKPHOS 251*   AST 53*   ALT 53*   ANIONGAP 10   EGFRNONAA 36.2*     Cardiac Markers:   Recent Labs  Lab 02/01/18  1338   *     Magnesium: No results for input(s): MG in the last 48 hours.  TSH: No results for input(s): TSH in the last 4320 hours.  All pertinent labs within the past 24 hours have been reviewed.    Significant Imaging: I have reviewed all pertinent imaging results/findings within the past 24 hours.     X-ray Chest Pa And Lateral  Result Date: 2/1/2018  Time of Procedure: 02/01/18 14:37:12 Accession # [id number] Comparison: 2/6/2017. Number of views: 2. Findings: X-ray beam attenuation and scatter occur in generous overlying soft tissues.  Intrathoracic structures are magnified by the patient's thick body wall.  Soft tissues of the patient's arms project over the lateral image obscuring some detail of the retrosternal airspace and mediastinal margins. Lung volumes are normal and symmetric.  Mediastinal structures are midline.  The patient is mildly kyphotic  posture as seen on current and prior lateral views. I detect no convincing evidence of pulmonary disease, pleural fluid, lymph node enlargement, cardiac decompensation, pneumothorax, pneumomediastinum, pneumoperitoneum or significant osseous abnormality.    No acute disease identified.  The source of the patient's shortness of breath is not established on this study

## 2018-02-02 LAB
ALBUMIN SERPL BCP-MCNC: 2.3 G/DL
ALP SERPL-CCNC: 255 U/L
ALT SERPL W/O P-5'-P-CCNC: 47 U/L
ANION GAP SERPL CALC-SCNC: 10 MMOL/L
ANION GAP SERPL CALC-SCNC: 10 MMOL/L
AST SERPL-CCNC: 39 U/L
BASOPHILS # BLD AUTO: 0.03 K/UL
BASOPHILS NFR BLD: 0.2 %
BILIRUB SERPL-MCNC: 1.4 MG/DL
BUN SERPL-MCNC: 23 MG/DL
BUN SERPL-MCNC: 23 MG/DL
CALCIUM SERPL-MCNC: 8.4 MG/DL
CALCIUM SERPL-MCNC: 8.4 MG/DL
CHLORIDE SERPL-SCNC: 102 MMOL/L
CHLORIDE SERPL-SCNC: 102 MMOL/L
CHOLEST SERPL-MCNC: 49 MG/DL
CHOLEST/HDLC SERPL: 2.9 {RATIO}
CO2 SERPL-SCNC: 20 MMOL/L
CO2 SERPL-SCNC: 20 MMOL/L
CREAT SERPL-MCNC: 1.7 MG/DL
CREAT SERPL-MCNC: 1.7 MG/DL
DIASTOLIC DYSFUNCTION: NO
DIFFERENTIAL METHOD: ABNORMAL
EOSINOPHIL # BLD AUTO: 0.1 K/UL
EOSINOPHIL NFR BLD: 0.7 %
ERYTHROCYTE [DISTWIDTH] IN BLOOD BY AUTOMATED COUNT: 12.7 %
EST. GFR  (AFRICAN AMERICAN): 47.9 ML/MIN/1.73 M^2
EST. GFR  (AFRICAN AMERICAN): 47.9 ML/MIN/1.73 M^2
EST. GFR  (NON AFRICAN AMERICAN): 41.4 ML/MIN/1.73 M^2
EST. GFR  (NON AFRICAN AMERICAN): 41.4 ML/MIN/1.73 M^2
GLOBAL PERICARDIAL EFFUSION: NORMAL
GLUCOSE SERPL-MCNC: 166 MG/DL
GLUCOSE SERPL-MCNC: 166 MG/DL
HAV IGM SERPL QL IA: NEGATIVE
HBV CORE IGM SERPL QL IA: NEGATIVE
HBV SURFACE AG SERPL QL IA: NEGATIVE
HCT VFR BLD AUTO: 33.3 %
HCV AB SERPL QL IA: NEGATIVE
HDLC SERPL-MCNC: 17 MG/DL
HDLC SERPL: 34.7 %
HGB BLD-MCNC: 11 G/DL
IMM GRANULOCYTES # BLD AUTO: 0.08 K/UL
IMM GRANULOCYTES NFR BLD AUTO: 0.6 %
LDLC SERPL CALC-MCNC: 21 MG/DL
LYMPHOCYTES # BLD AUTO: 0.7 K/UL
LYMPHOCYTES NFR BLD: 5 %
MAGNESIUM SERPL-MCNC: 1.9 MG/DL
MCH RBC QN AUTO: 28.8 PG
MCHC RBC AUTO-ENTMCNC: 33 G/DL
MCV RBC AUTO: 87 FL
MITRAL VALVE MOBILITY: NORMAL
MONOCYTES # BLD AUTO: 1.4 K/UL
MONOCYTES NFR BLD: 11.1 %
NEUTROPHILS # BLD AUTO: 10.7 K/UL
NEUTROPHILS NFR BLD: 82.4 %
NONHDLC SERPL-MCNC: 32 MG/DL
NRBC BLD-RTO: 0 /100 WBC
PHOSPHATE SERPL-MCNC: 2 MG/DL
PLATELET # BLD AUTO: 312 K/UL
PMV BLD AUTO: 10.5 FL
POCT GLUCOSE: 144 MG/DL (ref 70–110)
POCT GLUCOSE: 156 MG/DL (ref 70–110)
POCT GLUCOSE: 158 MG/DL (ref 70–110)
POCT GLUCOSE: 199 MG/DL (ref 70–110)
POCT GLUCOSE: 223 MG/DL (ref 70–110)
POTASSIUM SERPL-SCNC: 4.1 MMOL/L
POTASSIUM SERPL-SCNC: 4.1 MMOL/L
PROT SERPL-MCNC: 6.7 G/DL
RBC # BLD AUTO: 3.82 M/UL
RETIRED EF AND QEF - SEE NOTES: 55 (ref 55–65)
SODIUM SERPL-SCNC: 132 MMOL/L
SODIUM SERPL-SCNC: 132 MMOL/L
TRIGL SERPL-MCNC: 55 MG/DL
WBC # BLD AUTO: 12.94 K/UL

## 2018-02-02 PROCEDURE — 82962 GLUCOSE BLOOD TEST: CPT

## 2018-02-02 PROCEDURE — 99232 SBSQ HOSP IP/OBS MODERATE 35: CPT | Mod: ,,, | Performed by: HOSPITALIST

## 2018-02-02 PROCEDURE — 80061 LIPID PANEL: CPT

## 2018-02-02 PROCEDURE — G8980 MOBILITY D/C STATUS: HCPCS | Mod: CH

## 2018-02-02 PROCEDURE — 84100 ASSAY OF PHOSPHORUS: CPT

## 2018-02-02 PROCEDURE — 94640 AIRWAY INHALATION TREATMENT: CPT

## 2018-02-02 PROCEDURE — G8978 MOBILITY CURRENT STATUS: HCPCS | Mod: CH

## 2018-02-02 PROCEDURE — 94761 N-INVAS EAR/PLS OXIMETRY MLT: CPT

## 2018-02-02 PROCEDURE — 63600175 PHARM REV CODE 636 W HCPCS: Performed by: PHYSICIAN ASSISTANT

## 2018-02-02 PROCEDURE — 85025 COMPLETE CBC W/AUTO DIFF WBC: CPT

## 2018-02-02 PROCEDURE — 80053 COMPREHEN METABOLIC PANEL: CPT

## 2018-02-02 PROCEDURE — 25000003 PHARM REV CODE 250: Performed by: PHYSICIAN ASSISTANT

## 2018-02-02 PROCEDURE — 63600175 PHARM REV CODE 636 W HCPCS: Performed by: HOSPITALIST

## 2018-02-02 PROCEDURE — 93306 TTE W/DOPPLER COMPLETE: CPT

## 2018-02-02 PROCEDURE — 11000001 HC ACUTE MED/SURG PRIVATE ROOM

## 2018-02-02 PROCEDURE — 97116 GAIT TRAINING THERAPY: CPT

## 2018-02-02 PROCEDURE — 97161 PT EVAL LOW COMPLEX 20 MIN: CPT

## 2018-02-02 PROCEDURE — 80074 ACUTE HEPATITIS PANEL: CPT

## 2018-02-02 PROCEDURE — 36415 COLL VENOUS BLD VENIPUNCTURE: CPT

## 2018-02-02 PROCEDURE — 83735 ASSAY OF MAGNESIUM: CPT

## 2018-02-02 PROCEDURE — 25000242 PHARM REV CODE 250 ALT 637 W/ HCPCS: Performed by: PHYSICIAN ASSISTANT

## 2018-02-02 PROCEDURE — G8979 MOBILITY GOAL STATUS: HCPCS | Mod: CH

## 2018-02-02 PROCEDURE — 93306 TTE W/DOPPLER COMPLETE: CPT | Mod: 26,,, | Performed by: INTERNAL MEDICINE

## 2018-02-02 RX ORDER — DOXAZOSIN 2 MG/1
2 TABLET ORAL DAILY
Status: DISCONTINUED | OUTPATIENT
Start: 2018-02-02 | End: 2018-02-03 | Stop reason: HOSPADM

## 2018-02-02 RX ORDER — FUROSEMIDE 10 MG/ML
80 INJECTION INTRAMUSCULAR; INTRAVENOUS 2 TIMES DAILY
Status: DISCONTINUED | OUTPATIENT
Start: 2018-02-02 | End: 2018-02-02

## 2018-02-02 RX ORDER — FUROSEMIDE 10 MG/ML
80 INJECTION INTRAMUSCULAR; INTRAVENOUS 2 TIMES DAILY
Status: DISCONTINUED | OUTPATIENT
Start: 2018-02-02 | End: 2018-02-03 | Stop reason: HOSPADM

## 2018-02-02 RX ORDER — FLECAINIDE ACETATE 50 MG/1
50 TABLET ORAL EVERY 12 HOURS
Status: DISCONTINUED | OUTPATIENT
Start: 2018-02-02 | End: 2018-02-03 | Stop reason: HOSPADM

## 2018-02-02 RX ORDER — CARVEDILOL 25 MG/1
25 TABLET ORAL 2 TIMES DAILY WITH MEALS
Status: DISCONTINUED | OUTPATIENT
Start: 2018-02-02 | End: 2018-02-03 | Stop reason: HOSPADM

## 2018-02-02 RX ORDER — AMLODIPINE BESYLATE 10 MG/1
10 TABLET ORAL DAILY
Status: DISCONTINUED | OUTPATIENT
Start: 2018-02-02 | End: 2018-02-03 | Stop reason: HOSPADM

## 2018-02-02 RX ADMIN — IPRATROPIUM BROMIDE AND ALBUTEROL SULFATE 3 ML: .5; 3 SOLUTION RESPIRATORY (INHALATION) at 04:02

## 2018-02-02 RX ADMIN — AMLODIPINE BESYLATE 10 MG: 10 TABLET ORAL at 09:02

## 2018-02-02 RX ADMIN — IPRATROPIUM BROMIDE AND ALBUTEROL SULFATE 3 ML: .5; 3 SOLUTION RESPIRATORY (INHALATION) at 12:02

## 2018-02-02 RX ADMIN — INSULIN DETEMIR 9 UNITS: 100 INJECTION, SOLUTION SUBCUTANEOUS at 10:02

## 2018-02-02 RX ADMIN — CARVEDILOL 25 MG: 25 TABLET, FILM COATED ORAL at 09:02

## 2018-02-02 RX ADMIN — IPRATROPIUM BROMIDE AND ALBUTEROL SULFATE 3 ML: .5; 3 SOLUTION RESPIRATORY (INHALATION) at 09:02

## 2018-02-02 RX ADMIN — STANDARDIZED SENNA CONCENTRATE AND DOCUSATE SODIUM 1 TABLET: 8.6; 5 TABLET, FILM COATED ORAL at 09:02

## 2018-02-02 RX ADMIN — RAMELTEON 8 MG: 8 TABLET, FILM COATED ORAL at 10:02

## 2018-02-02 RX ADMIN — FUROSEMIDE 80 MG: 10 INJECTION, SOLUTION INTRAMUSCULAR; INTRAVENOUS at 02:02

## 2018-02-02 RX ADMIN — STANDARDIZED SENNA CONCENTRATE AND DOCUSATE SODIUM 1 TABLET: 8.6; 5 TABLET, FILM COATED ORAL at 10:02

## 2018-02-02 RX ADMIN — FLECAINIDE ACETATE 50 MG: 50 TABLET ORAL at 09:02

## 2018-02-02 RX ADMIN — CARVEDILOL 25 MG: 25 TABLET, FILM COATED ORAL at 05:02

## 2018-02-02 RX ADMIN — POLYETHYLENE GLYCOL 3350 17 G: 17 POWDER, FOR SOLUTION ORAL at 11:02

## 2018-02-02 RX ADMIN — APIXABAN 5 MG: 5 TABLET, FILM COATED ORAL at 11:02

## 2018-02-02 RX ADMIN — APIXABAN 5 MG: 5 TABLET, FILM COATED ORAL at 10:02

## 2018-02-02 RX ADMIN — DOXAZOSIN MESYLATE 2 MG: 2 TABLET ORAL at 09:02

## 2018-02-02 RX ADMIN — INSULIN ASPART 1 UNITS: 100 INJECTION, SOLUTION INTRAVENOUS; SUBCUTANEOUS at 10:02

## 2018-02-02 RX ADMIN — IPRATROPIUM BROMIDE AND ALBUTEROL SULFATE 3 ML: .5; 3 SOLUTION RESPIRATORY (INHALATION) at 03:02

## 2018-02-02 RX ADMIN — FUROSEMIDE 40 MG: 40 TABLET ORAL at 09:02

## 2018-02-02 RX ADMIN — FLECAINIDE ACETATE 50 MG: 50 TABLET ORAL at 10:02

## 2018-02-02 NOTE — SUBJECTIVE & OBJECTIVE
Review of Systems   Respiratory: Positive for shortness of breath. Negative for chest tightness.    Cardiovascular: Positive for leg swelling. Negative for chest pain.   Gastrointestinal: Negative for abdominal pain, constipation and diarrhea.   Genitourinary: Negative for dysuria.   Neurological: Positive for weakness. Negative for dizziness and headaches.   Psychiatric/Behavioral: Negative for confusion. The patient is not nervous/anxious.      Objective:     Vital Signs (Most Recent):  Temp: 98 °F (36.7 °C) (02/02/18 1222)  Pulse: 86 (02/02/18 1222)  Resp: 16 (02/02/18 1222)  BP: (!) 133/55 (02/02/18 1222)  SpO2: 95 % (02/02/18 1222) Vital Signs (24h Range):  Temp:  [96.4 °F (35.8 °C)-99.9 °F (37.7 °C)] 98 °F (36.7 °C)  Pulse:  [80-88] 86  Resp:  [14-20] 16  SpO2:  [92 %-99 %] 95 %  BP: (118-160)/(55-74) 133/55     Weight: 124.3 kg (274 lb 0.5 oz)  Body mass index is 35.18 kg/m².    Intake/Output Summary (Last 24 hours) at 02/02/18 1349  Last data filed at 02/02/18 0600   Gross per 24 hour   Intake              900 ml   Output              775 ml   Net              125 ml      Physical Exam   Constitutional: He is oriented to person, place, and time.   Cardiovascular: Normal rate, regular rhythm and normal heart sounds.    Pulmonary/Chest: Effort normal. He has rales.   Abdominal: Soft. Bowel sounds are normal. He exhibits no distension. There is no tenderness.   Musculoskeletal: He exhibits edema.   Neurological: He is alert and oriented to person, place, and time.   Skin: Skin is warm and dry.   Psychiatric: He has a normal mood and affect.     Labs      Recent Labs  Lab 02/01/18  1338 02/02/18  0622   WBC 12.22 12.94*   HGB 11.7* 11.0*   HCT 34.7* 33.3*    312       Recent Labs  Lab 02/01/18  1338 02/02/18  0622   * 132*  132*   K 5.4* 4.1  4.1    102  102   CO2 21* 20*  20*   BUN 23 23  23   CREATININE 1.9* 1.7*  1.7*   * 166*  166*   CALCIUM 8.4* 8.4*  8.4*   MG 2.3 1.9    PHOS 2.1* 2.0*       Recent Labs  Lab 02/01/18  1338 02/02/18  0622   ALKPHOS 251* 255*   ALT 53* 47*   AST 53* 39   ALBUMIN 2.4* 2.3*   PROT 7.3 6.7   BILITOT 1.3* 1.4*   INR 1.2  --    .  Trop 0.033 > 0.021  ECG wnl    Recent Labs  Lab 02/01/18  1338   *     2D ECHO (2/1)      1 - Concentric remodeling.     2 - No wall motion abnormalities.     3 - Normal left ventricular systolic function (EF 55-60%).     4 - Biatrial enlargement.     5 - Enlarged aortic root.     6 - Normal left ventricular diastolic function.     7 - Right ventricular enlargement with normal systolic function.     8 - Trivial pericardial effusion.     9 - Intermediate central venous pressure.

## 2018-02-02 NOTE — PT/OT/SLP EVAL
"Physical Therapy Evaluation and Discharge Note    Patient Name:  Jose Cruz Lira   MRN:  [id number]    Recommendations:     Discharge Recommendations:  outpatient PT   Discharge Equipment Recommendations: glucometer (Pt states that his current glucometer is not functioning properly.)   Barriers to discharge: None    Assessment:     Jose Cruz Lira is a 67 y.o. male admitted with a medical diagnosis of Acute on chronic diastolic CHF (congestive heart failure).  At this time, patient is functioning at their prior level of function and does not require further acute PT services.  Pt is independent with all functional mobility assessed.  Recommendation for pt to receive skilled PT services in the outpatient setting upon discharge to address deficits in aerobic capacity, trunk and hip mm weakness, and safety with stair amb for work.      Recent Surgery: * No surgery found *      Plan:     During this hospitalization, patient does not require further acute PT services.  Please re-consult if situation changes.     Plan of Care Reviewed with: patient, family    Subjective     Communicated with nursing prior to session.  Patient found in supine upon PT entry to room, agreeable to evaluation.      "Well its been a couple of months, but I just didn't do anything about it."  "My biggest fear is trying to get up to the second level and not being able to." - regarding stair ambulation  Pt states that he can't walk from the parking lot to a building.    Chief Complaint: JAMES  Patient comments/goals: To be able to perform job duties that require amb and stairs  Pain/Comfort:  · Pain Rating 1: 0/10    Patients cultural, spiritual, Temple conflicts given the current situation: no    Living Environment:  Pt lives with spouse is a new condo, however pts wife recently had a THR sec to a fall and is unable to provide assistance.  Pts wife is currently living with pts daughter.  Pt expects to be discharged to his mother's condo, " mother is 89yo, 1 curb to enter, elevator in the building.  Pt and family are able to assist with transportation.    Prior to admission, patients level of function was independent with all gait, functional transfers, and ADLs.  Patient has the following equipment: glucometer.  DME owned (not currently used): none.  Upon discharge, patient will have assistance from family.    Objective:     Patient found with: telemetry, peripheral IV     General Precautions: Standard, fall, diabetic, respiratory   Orthopedic Precautions:    Braces: N/A     Exams:    · Cognitive Exam:  Patient is oriented to Person, Place, Time and Situation and follows 100% of multi step commands   · Fine Motor Coordination:    · -       Intact  Left hand thumb/finger opposition skills and Right hand thumb/finger opposition skills  · Gross Motor Coordination:  WFL  · Postural Exam:  Patient presented with the following abnormalities:    · -       No postural abnormalities identified  · Sensation:    · -       Intact  light/touch B LEs  · Skin Integrity/Edema:      · -       Skin integrity: Visible skin intact  · -       Edema: Mild B LEs    · RUE ROM: WFL  · RUE Strength: WFL  · LUE ROM: WFL  · LUE Strength: WFL    · RLE ROM: WFL  · RLE Strength: WFL  · LLE ROM: WFL  · LLE Strength: WFL    Functional Mobility:    · Bed Mobility:     · Scooting: independence  · Supine to Sit: independence    · Transfers:     · Sit to Stand:  independence with no AD  · Bed to Chair: independence with  no AD  using  Stand Pivot    · Gait: Pt amb 980', I, without use of AD, mod lateral sway, mod SOB    · Balance: I with dynamic standing balance without AD    · Stairs:  Pt refused to perform sec to fatigue and SOB    AM-PAC 6 CLICK MOBILITY  Total Score:24       Therapeutic Activities and Exercises:   Whiteboard updated  6MWT: Pt amb 912' = 277.98m, 5/10 RPE following  Gait speed: 0.77 m/s  10x sit<>stand: 42.49 sec, 5/10 RPE following    Patient left sitting EOB with  all lines intact, call button in reach, nursing notified and family present.    GOALS:    Physical Therapy Goals     Not on file                History:     Past Medical History:   Diagnosis Date    CHF (congestive heart failure)     Diabetes mellitus, type 2 2010    Hydronephrosis of left kidney     Hypertension     Non-functioning kidney     Followed by Dr. Silver Anderson    Obesity (BMI 30-39.9)     Unspecified disorder of kidney and ureter        Past Surgical History:   Procedure Laterality Date    arm surgery      CARDIOVERSION  11/29/2016    kidney removed Left 0825/2017    knee surgeory N/A 4 to 5 years ago       Clinical Decision Making:     History  Co-morbidities and personal factors that may impact the plan of care Examination  Body Structures and Functions, activity limitations and participation restrictions that may impact the plan of care Clinical Presentation   Decision Making/ Complexity Score   Co-morbidities:   [] Time since onset of injury / illness / exacerbation  [] Status of current condition  []Patient's cognitive status and safety concerns    [] Multiple Medical Problems (see med hx)  Personal Factors:   [] Patient's age  [x] Prior Level of function   [] Patient's home situation (environment and family support)  [] Patient's level of motivation  [] Expected progression of patient      HISTORY:(criteria)    [] 51429 - no personal factors/history    [] 01456 - has 1-2 personal factor/comorbidity     [x] 14911 - has >3 personal factor/comorbidity     Body Regions:  [] Objective examination findings  [] Head     []  Neck  [x] Trunk   [] Upper Extremity  [] Lower Extremity    Body Systems:  [] For communication ability, affect, cognition, language, and learning style: the assessment of the ability to make needs known, consciousness, orientation (person, place, and time), expected emotional /behavioral responses, and learning preferences (eg, learning barriers, education  needs)  [] For  the neuromuscular system: a general assessment of gross coordinated movement (eg, balance, gait, locomotion, transfers, and transitions) and motor function  (motor control and motor learning)  [] For the musculoskeletal system: the assessment of gross symmetry, gross range of motion, gross strength, height, and weight  [] For the integumentary system: the assessment of pliability(texture), presence of scar formation, skin color, and skin integrity  [x] For cardiovascular/pulmonary system: the assessment of heart rate, respiratory rate, blood pressure, and edema     Activity limitations:    [] Patient's cognitive status and saf ety concerns          [] Status of current condition      [] Weight bearing restriction  [] Cardiopulmunary Restriction    Participation Restrictions:   [] Goals and goal agreement with the patient     [] Rehab potential (prognosis) and probable outcome      Examination of Body System: (criteria)    [] 59156 - addressing 1-2 elements    [x] 61798 - addressing a total of 3 or more elements     [] 97012 -  Addressing a total of 4 or more elements         Clinical Presentation: (criteria)  Stable - 04785     On examination of body system using standardized tests and measures patient presents with 3 or more elements from any of the following: body structures and functions, activity limitations, and/or participation restrictions.  Leading to a clinical presentation that is considered stable and/or uncomplicated                              Clinical Decision Making  (Eval Complexity):  Low- 77994     Time Tracking:     PT Received On: 02/02/18  PT Start Time: 1025     PT Stop Time: 1105  PT Total Time (min): 40 min     Billable Minutes: Evaluation 15 and Gait Training 12      Tanvi Almanza, PT  02/02/2018

## 2018-02-02 NOTE — PROGRESS NOTES
[facility] Hospital Medicine  Progress Note    Patient Name: Jose Cruz Lira  MRN: [id number]  Patient Class: IP- Inpatient   Admission Date: 2/1/2018  Length of Stay: 1 days  Attending Physician: Mike Foote MD  Primary Care Provider: Lisa Capone MD    [facility] Team: [facility]  Mike Foote MD    Subjective:     Principal Problem:Acute on chronic diastolic CHF (congestive heart failure)    HPI:  Jose Cruz Lira is a 67 y.o. presenting with CKD (s/p nephrectomy August 2016), HTN, DMII (long term insulin use), atrial fibrillation (s/p DCCV 2016) for SOB/JAMES and fatigue for the past 3-4 months. Symptoms have become progressively worse since onset. Patient reports being unable to walk 10 feet without becoming SOB. He also reports occasional headaches, blurry vision and lower extremity edema. Overnight, the patient experienced chills, nausea and a fever (~102). He denies sick contacts, vomiting, diarrhea, dysuria, cough or chest pain. Patient reports noncompliance with medications since Monday afternoon as he left them at his daughters home and has been staying with his mother temporarily. The patient was seen in the cardiology clinic on 1/29 for similar symptoms; he was advised to take an additional dose of Lasix 6-8 hours after his first dose and follow-up in 3 months. He denies tobacco, alcohol or drug use.     In the ED, troponin elevated 0.033, , K 5.3, EKG with changes in V1-V2 from previous study, CXR without acute process.     Hospital Course:    Interval Hx - improved overnight.    Review of Systems   Respiratory: Positive for shortness of breath. Negative for chest tightness.    Cardiovascular: Positive for leg swelling. Negative for chest pain.   Gastrointestinal: Negative for abdominal pain, constipation and diarrhea.   Genitourinary: Negative for dysuria.   Neurological: Positive for weakness. Negative for dizziness and headaches.    Psychiatric/Behavioral: Negative for confusion. The patient is not nervous/anxious.      Objective:     Vital Signs (Most Recent):  Temp: 98 °F (36.7 °C) (02/02/18 1222)  Pulse: 86 (02/02/18 1222)  Resp: 16 (02/02/18 1222)  BP: (!) 133/55 (02/02/18 1222)  SpO2: 95 % (02/02/18 1222) Vital Signs (24h Range):  Temp:  [96.4 °F (35.8 °C)-99.9 °F (37.7 °C)] 98 °F (36.7 °C)  Pulse:  [80-88] 86  Resp:  [14-20] 16  SpO2:  [92 %-99 %] 95 %  BP: (118-160)/(55-74) 133/55     Weight: 124.3 kg (274 lb 0.5 oz)  Body mass index is 35.18 kg/m².    Intake/Output Summary (Last 24 hours) at 02/02/18 1349  Last data filed at 02/02/18 0600   Gross per 24 hour   Intake              900 ml   Output              775 ml   Net              125 ml      Physical Exam   Constitutional: He is oriented to person, place, and time.   Cardiovascular: Normal rate, regular rhythm and normal heart sounds.    Pulmonary/Chest: Effort normal. He has rales.   Abdominal: Soft. Bowel sounds are normal. He exhibits no distension. There is no tenderness.   Musculoskeletal: He exhibits edema.   Neurological: He is alert and oriented to person, place, and time.   Skin: Skin is warm and dry.   Psychiatric: He has a normal mood and affect.     Neck vv flat    Labs    Recent Labs  Lab 02/01/18  1338 02/02/18 0622   WBC 12.22 12.94*   HGB 11.7* 11.0*   HCT 34.7* 33.3*    312       Recent Labs  Lab 02/01/18  1338 02/02/18 0622   * 132*  132*   K 5.4* 4.1  4.1    102  102   CO2 21* 20*  20*   BUN 23 23  23   CREATININE 1.9* 1.7*  1.7*   * 166*  166*   CALCIUM 8.4* 8.4*  8.4*   MG 2.3 1.9   PHOS 2.1* 2.0*       Recent Labs  Lab 02/01/18  1338 02/02/18  0622   ALKPHOS 251* 255*   ALT 53* 47*   AST 53* 39   ALBUMIN 2.4* 2.3*   PROT 7.3 6.7   BILITOT 1.3* 1.4*   INR 1.2  --    .  Trop 0.033 > 0.021  ECG wnl    Recent Labs  Lab 02/01/18  1338   *     2D ECHO (2/1)      1 - Concentric remodeling.     2 - No wall motion  abnormalities.     3 - Normal left ventricular systolic function (EF 55-60%).     4 - Biatrial enlargement.     5 - Enlarged aortic root.     6 - Normal left ventricular diastolic function.     7 - Right ventricular enlargement with normal systolic function.     8 - Trivial pericardial effusion.     9 - Intermediate central venous pressure.    CXR  Wnl    Assessment/Plan:      * Acute on chronic diastolic CHF (congestive heart failure)    - Acute HF likely due to noncompliance with meds  - Patient on RA on admit with SpO2 96-97%  - Last echo 1/18/2017, EF 55%, (+)DD  - Repeat ECHO (2/2) > wnl   - will restart home medications:  - beta blocker: carvedilol (COREG) 25 MG tablet BID  - home diuretic:  furosemide (LASIX) 40 MG tablet BI  - hospital diuresis: IV furosemide (LASIX) 40 MG BID > 80 mg IV bid  - monitor response with daily weights, strict I/Os, oxygen requirements  - I/O not recorded   - Improved        CKD (chronic kidney disease), stage III    Followed by nephrology as an outpatient, last seen 10/23/2016  - Cr 1.9 and GFR 36.2 on admit, baseline 1.5-1.9 since 2016   - s/p L nephrectomy in 2016   - will arrange follow-up at discharge  - CMP daily, will monitor for acute changes         Sleep apnea, unspecified    Patient with appointment on Tuesday 2/6/18  - no CPAP use yet        Persistent atrial fibrillation    S/p DCCV 2016. Restart home medications.  - EKG with NSR on admit   - apixaban (ELIQUIS) 5 mg Tab BID  - flecainide (TAMBOCOR) 50 MG Tab BID  - TSH ordered         Hypertension associated with diabetes    Mildly elevated on admit, will continue home meds  - amLODIPine (NORVASC) 10 MG tablet daily         Type 2 diabetes mellitus with stage 3 chronic kidney disease, with long-term current use of insulin    Glucose 171 on admit  - does not monitor glucose levels at home  - no oral anti-hyperglycemics  Home regimen:    - insulin detemir, 22 units nightly   Hospital regimen:   - Insulin detemir 9u  nightly   - insulin novolog 12u TIDWM   - low dose sliding scale   - hypo/hyper glycemic protocols in place  - will monitor for titration as needed         Hyperlipidemia associated with type 2 diabetes mellitus    Last lipid panel in October 2017  - simvastatin (ZOCOR) 20 MG tablet          VTE Risk Mitigation         Ordered     apixaban tablet 5 mg  2 times daily     Route:  Oral        02/02/18 0815     Place sequential compression device  Until discontinued      02/01/18 1510     High Risk of VTE  Once      02/01/18 1510        Discharge plan  Likely d/c in am if improvement continues    Mike Foote MD  Department of Hospital Medicine   [facility]

## 2018-02-02 NOTE — PLAN OF CARE
Problem: Patient Care Overview  Goal: Plan of Care Review  Outcome: Ongoing (interventions implemented as appropriate)  Pt. Has a history of type 2 diabetes,last blood sugar 199,no coverage required. Pt. Remains on fall precautions,bed low and locked,side rails up x 2,no falls sustained. Resp. Deep and full unlabored,No sob or dyspnea noted,o2 on RA 92%  continue to monitor. Continue current plan of care.

## 2018-02-02 NOTE — PLAN OF CARE
Problem: Patient Care Overview  Goal: Plan of Care Review  Outcome: Ongoing (interventions implemented as appropriate)  Report received from RONNY Walter in ED. Pt arrived to unit via WC. VSS on RA. Heart monitor in place. Blood sugar checked. Dinner tray ordered. UA collected and sent to lab. Troponin collected per phlebotomist. Oriented pt to room and call bell. Bed in lowest locked position and call light within reach. NAD noted. Will continue to monitor.

## 2018-02-02 NOTE — PLAN OF CARE
02/02/18 0935   Discharge Assessment   Assessment Type Discharge Planning Assessment   Confirmed/corrected address and phone number on facesheet? Yes   Assessment information obtained from? Patient   Expected Length of Stay (days) 2   Communicated expected length of stay with patient/caregiver yes   Prior to hospitilization cognitive status: Alert/Oriented   Prior to hospitalization functional status: Independent   Current cognitive status: Alert/Oriented   Current Functional Status: Independent   Lives With parent(s)  (pt living with his mother temporarily)   Able to Return to Prior Arrangements yes   Is patient able to care for self after discharge? Yes   Patient's perception of discharge disposition home or selfcare   Readmission Within The Last 30 Days no previous admission in last 30 days   Patient currently being followed by outpatient case management? No   Patient currently receives any other outside agency services? No   Equipment Currently Used at Home none   Do you have any problems affording any of your prescribed medications? No   Is the patient taking medications as prescribed? no   If no, which medications is patient not taking? pt left his medications at his daughter's house but his daughter will bring them to the hospital today   Does the patient have transportation home? Yes   Transportation Available car   Does the patient receive services at the Coumadin Clinic? No   Discharge Plan A Home with family   Discharge Plan B Home Health   Patient/Family In Agreement With Plan yes     Patient awake & alert with his son and brother at the bedside when CM rounded. Patient was admitted with CHF. Patient stated that he & his spouse, Jessica Lira ([phone]), recently sold their house in [place] & were temporarily staying with their daughter Winifred Lira ([address] [facility] [place], [address], [phone]) because Burt fell & broke her hip. Patient stated that he felt he was imposing on Winifred & moved  in with his mother ([address] [place] [address], [facility] [address], [place], [address]) but forgot his medications at Winifred's house. Patient stated that Winifred is bringing his medications for the hospital today. CM stressed the importance of taking all of his medications as prescribed. Patient verbalized understanding & agreement. Patient is independent of all ADLs & plans to return to his mother's condo following discharge. Patient denied the need for assistance with transportation at time of discharge. Hospital follow up appointment scheduled for the patient with Dr. Capone (PCP) on 2/16/18 at 1100. Will continue to follow.

## 2018-02-02 NOTE — PLAN OF CARE
Problem: Patient Care Overview  Goal: Plan of Care Review  Outcome: Ongoing (interventions implemented as appropriate)  Pt on fall precautions. Yellow fall risk band and socks on pt. Instructed pt to call for assistance as needed and pt voiced understanding. accu checks ac/hs with scheduled and sliding scale insulin. Denies SOB. SR on tele.

## 2018-02-02 NOTE — PROGRESS NOTES
Patient currently in Obs unit. Program equipment is not available at this time, likely d/c <48h inpatient stay. Not a candidate this admission for DMFP.    Removed from hf list.

## 2018-02-03 VITALS
HEART RATE: 83 BPM | DIASTOLIC BLOOD PRESSURE: 61 MMHG | WEIGHT: 274.06 LBS | TEMPERATURE: 98 F | OXYGEN SATURATION: 93 % | RESPIRATION RATE: 18 BRPM | HEIGHT: 74 IN | BODY MASS INDEX: 35.17 KG/M2 | SYSTOLIC BLOOD PRESSURE: 115 MMHG

## 2018-02-03 PROBLEM — I50.33 ACUTE ON CHRONIC DIASTOLIC HEART FAILURE: Status: RESOLVED | Noted: 2018-02-01 | Resolved: 2018-02-03

## 2018-02-03 LAB
ALBUMIN SERPL BCP-MCNC: 2.1 G/DL
ALP SERPL-CCNC: 246 U/L
ALT SERPL W/O P-5'-P-CCNC: 41 U/L
ANION GAP SERPL CALC-SCNC: 11 MMOL/L
ANION GAP SERPL CALC-SCNC: 11 MMOL/L
AST SERPL-CCNC: 39 U/L
BILIRUB SERPL-MCNC: 1.4 MG/DL
BUN SERPL-MCNC: 23 MG/DL
BUN SERPL-MCNC: 23 MG/DL
CALCIUM SERPL-MCNC: 8.1 MG/DL
CALCIUM SERPL-MCNC: 8.1 MG/DL
CHLORIDE SERPL-SCNC: 101 MMOL/L
CHLORIDE SERPL-SCNC: 101 MMOL/L
CO2 SERPL-SCNC: 22 MMOL/L
CO2 SERPL-SCNC: 22 MMOL/L
CREAT SERPL-MCNC: 1.7 MG/DL
CREAT SERPL-MCNC: 1.7 MG/DL
EST. GFR  (AFRICAN AMERICAN): 47.9 ML/MIN/1.73 M^2
EST. GFR  (AFRICAN AMERICAN): 47.9 ML/MIN/1.73 M^2
EST. GFR  (NON AFRICAN AMERICAN): 41.4 ML/MIN/1.73 M^2
EST. GFR  (NON AFRICAN AMERICAN): 41.4 ML/MIN/1.73 M^2
GLUCOSE SERPL-MCNC: 147 MG/DL
GLUCOSE SERPL-MCNC: 147 MG/DL
MAGNESIUM SERPL-MCNC: 1.8 MG/DL
PHOSPHATE SERPL-MCNC: 2.8 MG/DL
POCT GLUCOSE: 166 MG/DL (ref 70–110)
POTASSIUM SERPL-SCNC: 3.9 MMOL/L
POTASSIUM SERPL-SCNC: 3.9 MMOL/L
PROT SERPL-MCNC: 6.3 G/DL
SODIUM SERPL-SCNC: 134 MMOL/L
SODIUM SERPL-SCNC: 134 MMOL/L

## 2018-02-03 PROCEDURE — 63600175 PHARM REV CODE 636 W HCPCS: Performed by: HOSPITALIST

## 2018-02-03 PROCEDURE — 83735 ASSAY OF MAGNESIUM: CPT

## 2018-02-03 PROCEDURE — 25000242 PHARM REV CODE 250 ALT 637 W/ HCPCS: Performed by: PHYSICIAN ASSISTANT

## 2018-02-03 PROCEDURE — 94761 N-INVAS EAR/PLS OXIMETRY MLT: CPT

## 2018-02-03 PROCEDURE — 99239 HOSP IP/OBS DSCHRG MGMT >30: CPT | Mod: ,,, | Performed by: HOSPITALIST

## 2018-02-03 PROCEDURE — 80053 COMPREHEN METABOLIC PANEL: CPT

## 2018-02-03 PROCEDURE — 82962 GLUCOSE BLOOD TEST: CPT

## 2018-02-03 PROCEDURE — 63600175 PHARM REV CODE 636 W HCPCS: Performed by: PHYSICIAN ASSISTANT

## 2018-02-03 PROCEDURE — 94640 AIRWAY INHALATION TREATMENT: CPT

## 2018-02-03 PROCEDURE — 25000003 PHARM REV CODE 250: Performed by: PHYSICIAN ASSISTANT

## 2018-02-03 PROCEDURE — 84100 ASSAY OF PHOSPHORUS: CPT

## 2018-02-03 PROCEDURE — 36415 COLL VENOUS BLD VENIPUNCTURE: CPT

## 2018-02-03 RX ADMIN — DOXAZOSIN MESYLATE 2 MG: 2 TABLET ORAL at 09:02

## 2018-02-03 RX ADMIN — FUROSEMIDE 80 MG: 10 INJECTION, SOLUTION INTRAMUSCULAR; INTRAVENOUS at 09:02

## 2018-02-03 RX ADMIN — AMLODIPINE BESYLATE 10 MG: 10 TABLET ORAL at 09:02

## 2018-02-03 RX ADMIN — IPRATROPIUM BROMIDE AND ALBUTEROL SULFATE 3 ML: .5; 3 SOLUTION RESPIRATORY (INHALATION) at 04:02

## 2018-02-03 RX ADMIN — IPRATROPIUM BROMIDE AND ALBUTEROL SULFATE 3 ML: .5; 3 SOLUTION RESPIRATORY (INHALATION) at 01:02

## 2018-02-03 RX ADMIN — FLECAINIDE ACETATE 50 MG: 50 TABLET ORAL at 09:02

## 2018-02-03 RX ADMIN — IPRATROPIUM BROMIDE AND ALBUTEROL SULFATE 3 ML: .5; 3 SOLUTION RESPIRATORY (INHALATION) at 07:02

## 2018-02-03 RX ADMIN — STANDARDIZED SENNA CONCENTRATE AND DOCUSATE SODIUM 1 TABLET: 8.6; 5 TABLET, FILM COATED ORAL at 09:02

## 2018-02-03 RX ADMIN — CARVEDILOL 25 MG: 25 TABLET, FILM COATED ORAL at 09:02

## 2018-02-03 RX ADMIN — INSULIN ASPART 9 UNITS: 100 INJECTION, SOLUTION INTRAVENOUS; SUBCUTANEOUS at 09:02

## 2018-02-03 RX ADMIN — APIXABAN 5 MG: 5 TABLET, FILM COATED ORAL at 09:02

## 2018-02-03 NOTE — PLAN OF CARE
Problem: Patient Care Overview  Goal: Plan of Care Review  Outcome: Ongoing (interventions implemented as appropriate)  Pt. Has a history of type 2 diabetes,blood sugar checked ac and hs last blood sugar 223,covered with 1 unit as per sliding scale. Pt. Remains on fall precautions,bed low and locked,aware of need to cll for assistance to get up OOB. Resp. Deep and full unlabored,breath sounds clear felton. RA sat 92-97% continues to receive respiratory treatments. Cardiac monitor in place NSR,pt. Without complaints of chest pain. Continue current plan of care.

## 2018-02-03 NOTE — PLAN OF CARE
Problem: Patient Care Overview  Goal: Plan of Care Review  Outcome: Ongoing (interventions implemented as appropriate)  Pt aaox3, vss, no s/s of distress noted.  Pt denies pain.  Accuchecks ac/hs.  Safety precautions maintained, pt remains free of falls.  Resp treatments every 4 hrs.  No events noted on telemetry.  Call light within reach.

## 2018-02-03 NOTE — PROGRESS NOTES
Pt discharged from unit.  Pt aaox3, vss, no s/s of distress noted.  Pt denies pain at this time.  Discharge instructions given to pt and he verbalized understanding.  Home meds and f/u appts reviewed as well.  IV removed from right wrist with catheter intact, no redness or swelling noted to area.  Pt left unit via w/c.

## 2018-02-03 NOTE — DISCHARGE SUMMARY
Discharge Summary  Hospital Medicine    Attending Provider on Discharge: Mike Foote MD  [facility] Medicine Team: [facility]  Date of Admission:  2/1/2018     Date of Discharge:      Active Hospital Problems    Diagnosis  POA    CKD (chronic kidney disease), stage III [N18.3]  Yes    Sleep apnea, unspecified [G47.30]  Yes    Persistent atrial fibrillation [I48.1]  Yes    Type 2 diabetes mellitus with stage 3 chronic kidney disease, with long-term current use of insulin [E11.22, N18.3, Z79.4]  Not Applicable    Hypertension associated with diabetes [E11.59, I10]  Yes     Chronic    Hyperlipidemia associated with type 2 diabetes mellitus [E11.69, E78.5]  Yes      Resolved Hospital Problems    Diagnosis Date Resolved POA    *Acute on chronic diastolic CHF (congestive heart failure) [I50.33] 02/03/2018 Yes     Priority: 1 - High     Chronic    Acute on chronic diastolic heart failure [I50.33] 02/03/2018 Yes       History of the Present Illness:       67 y.o. presenting with CKD (s/p nephrectomy August 2016), HTN, DMII (long term insulin use), atrial fibrillation (s/p DCCV 2016) for SOB/JAMES and fatigue for the past 3-4 months. Symptoms have become progressively worse since onset. Patient reports being unable to walk 10 feet without becoming SOB. He also reports occasional headaches, blurry vision and lower extremity edema. Overnight, the patient experienced chills, nausea and a fever (~102). He denies sick contacts, vomiting, diarrhea, dysuria, cough or chest pain. Patient reports noncompliance with medications since Monday afternoon as he left them at his daughters home and has been staying with his mother temporarily. The patient was seen in the cardiology clinic on 1/29 for similar symptoms; he was advised to take an additional dose of Lasix 6-8 hours after his first dose and follow-up in 3 months. He denies tobacco, alcohol or drug use    2D ECHO 1/18/17       1 - Eccentric LVH with normal left  ventricular systolic function (EF 55-60%).     2 - Severe left atrial enlargement.     3 - Left ventricular diastolic dysfunction.     4 - Normal right ventricular systolic function    NM stress (1/17/17)    Impression: EQUIVOCAL MYOCARDIAL PERFUSION  1. There is a moderate mostly reversible inferoapical wall defect of uncertain significance. Consider PET-Stress imaging as a preferred modality. Sensitivity is impaired due to beta blocker therapy. Specificity is reduced secondary to body habitus.   2. Resting wall motion is physiologic.   3. Resting LV function is normal.  (normal is >= 51%)  4. The ventricular volumes are normal at rest and stress.   5. The extracardiac distribution of radioactivity is normal.   6. There was no previous study available to compare.    PET stress 2/16    CONCLUSIONS: NO CLINICALLY SIGNIFICANT STRESS INDUCED PERFUSION DEFECTS as assessed with PET perfusion imaging.  1. There is no evidence of a discrete hemodynamically significant coronary stenosis.   2. Although no clinically significant stress defect is seen, there is resting flow heterogeneity that improves after Dipyridamole. These results suggest mild endothelial dysfunction due to elevated cholesterol, high blood pressure and diabetes without   clinically significant, localized perfusion defects.   3. Resting wall motion is physiologic. Stress wall motion is physiologic.   4. LV function is normal at rest and stress.  (normal is >= 51%)  5. The ventricular volumes are normal at rest and stress.   6. The extracardiac distribution of radioactivity is normal.   7. There was no previous study available to compare.    2D ECHO (2/1/18)       1 - Concentric remodeling.     2 - No wall motion abnormalities.     3 - Normal left ventricular systolic function (EF 55-60%).     4 - Biatrial enlargement.     5 - Enlarged aortic root.     6 - Normal left ventricular diastolic function.     7 - Right ventricular enlargement with normal systolic  function.     8 - Trivial pericardial effusion.     9 - Intermediate central venous pressure.     CXR  Wnl    In the ED, troponin elevated 0.033, , K 5.3, EKG with changes in V1-V2 from previous study, CXR without acute process.     Hospital Course of Principle Problem Addressed:       Acute on chronic diastolic CHF (congestive heart failure)     - Admission dx > Acute HF likely due to noncompliance with meds  - Patient on RA on admit with SpO2 96-97%  - Last echo 1/18/2017, EF 55%, (+)DD  - Repeat ECHO (2/2) > wnl   - ECG wnl  - Trop 0.033 > 0.021  - will restart home medications:  - beta blocker: carvedilol (COREG) 25 MG tablet BID  - home diuretic:  furosemide (LASIX) 40 MG tablet BI  - hospital diuresis: IV furosemide (LASIX) 40 MG BID > 80 mg IV bid   - I/O net neg 715  - Improved   - Question diagnosis due to ECHO wnl and unconvincing PEx   -  Resting O2S 95 on RA > 92 with moderate activity  - Consider PFT as outpatient     O2S 95 (resting) > 92 - 96 (3 min walking) on room air      Other Medical Problems Addressed in the Hospital:        CKD (chronic kidney disease), stage III     Followed by nephrology as an outpatient, last seen 10/23/2016  - Cr 1.9 and GFR 36.2 on admit, baseline 1.5-1.9 since 2016   - s/p L nephrectomy in 2016   - will arrange follow-up at discharge  - CMP daily, will monitor for acute changes        Sleep apnea, unspecified     - Patient with appointment on Tuesday 2/6/18  - no CPAP use yet       Atrial fibrillation     S/p DCCV 2016. Restart home medications.  - EKG with NSR on admit   - apixaban (ELIQUIS) 5 mg Tab BID  - flecainide (TAMBOCOR) 50 MG Tab BID  - TSH ordered        Hypertension associated with diabetes     Mildly elevated on admit, will continue home meds  - amLODIPine (NORVASC) 10 MG tablet daily        Type 2 diabetes mellitus with stage 3 chronic kidney disease, with long-term current use of insulin     Glucose 171 on admit  - does not monitor glucose levels at  home  - no oral anti-hyperglycemics  Home regimen:               - insulin detemir, 22 units nightly   Hospital regimen:              - Insulin detemir 9u nightly              - insulin novolog 12u TIDWM              - low dose sliding scale   - hypo/hyper glycemic protocols in place  - will monitor for titration as needed        Hyperlipidemia associated with type 2 diabetes mellitus     Last lipid panel in October 2017  - simvastatin (ZOCOR) 20 MG tablet         Obesity Class 2     BMI 35.18          Diet/ nutritional counseling       Significant diagnostic tests       Recent Labs  Lab 02/01/18 1338 02/02/18 0622   WBC 12.22 12.94*   HGB 11.7* 11.0*   HCT 34.7* 33.3*    312       Recent Labs  Lab 02/01/18 1338 02/02/18 0622 02/03/18  0414   * 132*  132* 134*  134*   K 5.4* 4.1  4.1 3.9  3.9    102  102 101  101   CO2 21* 20*  20* 22*  22*   BUN 23 23  23 23  23   CREATININE 1.9* 1.7*  1.7* 1.7*  1.7*   CALCIUM 8.4* 8.4*  8.4* 8.1*  8.1*   MG 2.3 1.9 1.8   PHOS 2.1* 2.0* 2.8   PROT 7.3 6.7 6.3   BILITOT 1.3* 1.4* 1.4*   ALKPHOS 251* 255* 246*   ALT 53* 47* 41   AST 53* 39 39     2D ECHO (2/1)       1 - Concentric remodeling.     2 - No wall motion abnormalities.     3 - Normal left ventricular systolic function (EF 55-60%).     4 - Biatrial enlargement.     5 - Enlarged aortic root.     6 - Normal left ventricular diastolic function.     7 - Right ventricular enlargement with normal systolic function.     8 - Trivial pericardial effusion.     9 - Intermediate central venous pressure.     CXR  Wnl      Current Discharge Medication List      CONTINUE these medications which have NOT CHANGED    Details   amLODIPine (NORVASC) 10 MG tablet Take 1 tablet (10 mg total) by mouth once daily.  Qty: 90 tablet, Refills: 4      apixaban (ELIQUIS) 5 mg Tab Take 1 tablet (5 mg total) by mouth 2 (two) times daily.  Qty: 60 tablet, Refills: 11    Associated Diagnoses: Persistent atrial  "fibrillation      carvedilol (COREG) 25 MG tablet Take 1 tablet (25 mg total) by mouth 2 (two) times daily with meals.  Qty: 360 tablet, Refills: 3    Associated Diagnoses: Persistent atrial fibrillation; Hypertension associated with diabetes; Chronic systolic congestive heart failure      doxazosin (CARDURA) 4 MG tablet Take 0.5 tablets (2 mg total) by mouth once daily.  Qty: 15 tablet, Refills: 6      ergocalciferol (ERGOCALCIFEROL) 50,000 unit Cap Take 1 capsule (50,000 Units total) by mouth every 7 days.  Qty: 4 capsule, Refills: 3      exenatide microspheres (BYDUREON) 2 mg/0.65 mL PnIj Inject 2 mg into the muscle once a week.  Qty: 4 each, Refills: 11      flecainide (TAMBOCOR) 50 MG Tab Take 1 tablet (50 mg total) by mouth every 12 (twelve) hours.  Qty: 60 tablet, Refills: 11    Associated Diagnoses: Persistent atrial fibrillation      furosemide (LASIX) 40 MG tablet Take 1 tablet (40 mg total) by mouth 2 (two) times daily.  Qty: 180 tablet, Refills: 3      insulin detemir (LEVEMIR FLEXTOUCH) 100 unit/mL (3 mL) SubQ InPn pen INJECT 22 UNITS INTO THE SKIN EVERY EVENING  Qty: 45 mL, Refills: 4      insulin needles, disposable, (BD ULTRA-FINE RODRIGUEZ PEN NEEDLES) 32 x 5/32 " Ndle Patient injects insulin once a day. Please provide 3 month supply.  Qty: 90 each, Refills: 4    Associated Diagnoses: Type 2 diabetes, uncontrolled, with renal manifestation; Type 2 diabetes, uncontrolled, with neuropathy      !! lancets Misc To check BG 3 times daily, to use with insurance preferred meter  Qty: 100 each, Refills: 11      !! lancets Misc To check BG 3 times daily, to use with insurance preferred meter  Qty: 100 each, Refills: 11      simvastatin (ZOCOR) 20 MG tablet Take 1 tablet (20 mg total) by mouth every evening.  Qty: 90 tablet, Refills: 4      !! blood sugar diagnostic Strp To check BG 3 times daily, to use with insurance preferred meter  Qty: 100 each, Refills: 11      !! blood sugar diagnostic Strp To check BG 3 " times daily, to use with insurance preferred meter  Qty: 100 strip, Refills: 11      !! blood-glucose meter kit To check BG 3 times daily, to use with insurance preferred meter  Qty: 1 each, Refills: 1      !! blood-glucose meter kit To check BG 3 times daily, to use with insurance preferred meter  Qty: 1 each, Refills: 0       !! - Potential duplicate medications found. Please discuss with provider.          Discharge Diet:diabetic diet: 1800 calorie with Normal Fluid intake of 1500 - 2000 mL per day    Activity: activity as tolerated    Discharge Condition: Good    Disposition: Home or Self Care    Tests pending at the time of discharge: none      Time spent  on the discharge of the patient including review of hospital course with the patient. reviewing discharge medications and arranging follow-up care 50    Discharge examination Patient was seen and examined on the date of discharge and determined to be suitable for discharge.    Future Appointments  Date Time Provider Department Center   2/6/2018 8:00 AM Xiang Monroe MD [facility]   2/16/2018 7:00 AM LAB, SAME DAY [facility] N[facility] LAB  [facility] [facility]   2/16/2018 7:20 AM LAB, SAME DAY [facility] LAB IM [address] [facility]   2/16/2018 11:00 AM Lisa Capone MD [facility] [address] [facility]   2/20/2018 2:00 PM Tena Phillips MD [facility] ENDOCRN [address]   2/26/2018 8:20 AM Silver Anderson MD [facility] NEPHRO [place]   3/7/2018 8:00 AM Lisa Capone MD [facility] [address]       Mike Foote MD

## 2018-02-03 NOTE — PROGRESS NOTES
Pt's oxygen Sats = 95% on room air at rest.  While pt was ambulating around unit, oxygen Sats fluctuated from 91% - 96% on room air.  No complications noted.

## 2018-02-06 ENCOUNTER — NURSE TRIAGE (OUTPATIENT)
Dept: ADMINISTRATIVE | Facility: CLINIC | Age: 68
End: 2018-02-06

## 2018-02-06 ENCOUNTER — OFFICE VISIT (OUTPATIENT)
Dept: SLEEP MEDICINE | Facility: CLINIC | Age: 68
DRG: 682 | End: 2018-02-06
Payer: COMMERCIAL

## 2018-02-06 ENCOUNTER — HOSPITAL ENCOUNTER (EMERGENCY)
Facility: HOSPITAL | Age: 68
Discharge: HOME | End: 2018-02-07
Attending: EMERGENCY MEDICINE
Payer: COMMERCIAL

## 2018-02-06 ENCOUNTER — TELEPHONE (OUTPATIENT)
Dept: SLEEP MEDICINE | Facility: CLINIC | Age: 68
End: 2018-02-06

## 2018-02-06 VITALS
HEIGHT: 74 IN | BODY MASS INDEX: 34.78 KG/M2 | WEIGHT: 271 LBS | HEART RATE: 82 BPM | DIASTOLIC BLOOD PRESSURE: 58 MMHG | SYSTOLIC BLOOD PRESSURE: 90 MMHG

## 2018-02-06 VITALS
OXYGEN SATURATION: 94 % | HEART RATE: 80 BPM | HEIGHT: 74 IN | WEIGHT: 271 LBS | TEMPERATURE: 100 F | BODY MASS INDEX: 34.78 KG/M2 | SYSTOLIC BLOOD PRESSURE: 132 MMHG | RESPIRATION RATE: 22 BRPM | DIASTOLIC BLOOD PRESSURE: 55 MMHG

## 2018-02-06 DIAGNOSIS — R06.02 SHORTNESS OF BREATH: ICD-10-CM

## 2018-02-06 DIAGNOSIS — G47.30 SLEEP APNEA, UNSPECIFIED TYPE: Primary | ICD-10-CM

## 2018-02-06 LAB
ALBUMIN SERPL BCP-MCNC: 2.1 G/DL
ALLENS TEST: ABNORMAL
ALP SERPL-CCNC: 298 U/L
ALT SERPL W/O P-5'-P-CCNC: 43 U/L
ANION GAP SERPL CALC-SCNC: 8 MMOL/L
AST SERPL-CCNC: 54 U/L
BASOPHILS # BLD AUTO: 0.02 K/UL
BASOPHILS NFR BLD: 0.1 %
BILIRUB SERPL-MCNC: 2 MG/DL
BNP SERPL-MCNC: 302 PG/ML
BUN SERPL-MCNC: 34 MG/DL
CALCIUM SERPL-MCNC: 8.4 MG/DL
CHLORIDE SERPL-SCNC: 99 MMOL/L
CO2 SERPL-SCNC: 26 MMOL/L
CREAT SERPL-MCNC: 2.1 MG/DL
DELSYS: ABNORMAL
DIFFERENTIAL METHOD: ABNORMAL
EOSINOPHIL # BLD AUTO: 0 K/UL
EOSINOPHIL NFR BLD: 0.1 %
ERYTHROCYTE [DISTWIDTH] IN BLOOD BY AUTOMATED COUNT: 13.6 %
EST. GFR  (AFRICAN AMERICAN): 37.1 ML/MIN/1.73 M^2
EST. GFR  (NON AFRICAN AMERICAN): 32.1 ML/MIN/1.73 M^2
GLUCOSE SERPL-MCNC: 289 MG/DL
HCO3 UR-SCNC: 22.7 MMOL/L (ref 24–28)
HCT VFR BLD AUTO: 30.6 %
HGB BLD-MCNC: 10.2 G/DL
IMM GRANULOCYTES # BLD AUTO: 0.11 K/UL
IMM GRANULOCYTES NFR BLD AUTO: 0.7 %
LYMPHOCYTES # BLD AUTO: 0.5 K/UL
LYMPHOCYTES NFR BLD: 3.1 %
MAGNESIUM SERPL-MCNC: 2.1 MG/DL
MCH RBC QN AUTO: 28.4 PG
MCHC RBC AUTO-ENTMCNC: 33.3 G/DL
MCV RBC AUTO: 85 FL
MONOCYTES # BLD AUTO: 1.6 K/UL
MONOCYTES NFR BLD: 10.5 %
NEUTROPHILS # BLD AUTO: 12.8 K/UL
NEUTROPHILS NFR BLD: 85.5 %
NRBC BLD-RTO: 0 /100 WBC
PCO2 BLDA: 32.3 MMHG (ref 35–45)
PH SMN: 7.45 [PH] (ref 7.35–7.45)
PHOSPHATE SERPL-MCNC: 2.4 MG/DL
PLATELET # BLD AUTO: 368 K/UL
PMV BLD AUTO: 10 FL
PO2 BLDA: 72 MMHG (ref 80–100)
POC BE: -1 MMOL/L
POC SATURATED O2: 95 % (ref 95–100)
POC TCO2: 24 MMOL/L (ref 23–27)
POTASSIUM SERPL-SCNC: 4.3 MMOL/L
PROT SERPL-MCNC: 6.7 G/DL
RBC # BLD AUTO: 3.59 M/UL
SAMPLE: ABNORMAL
SITE: ABNORMAL
SODIUM SERPL-SCNC: 133 MMOL/L
TROPONIN I SERPL DL<=0.01 NG/ML-MCNC: 0.03 NG/ML
WBC # BLD AUTO: 14.99 K/UL

## 2018-02-06 PROCEDURE — 99284 EMERGENCY DEPT VISIT MOD MDM: CPT | Mod: 25

## 2018-02-06 PROCEDURE — 82803 BLOOD GASES ANY COMBINATION: CPT

## 2018-02-06 PROCEDURE — 85025 COMPLETE CBC W/AUTO DIFF WBC: CPT

## 2018-02-06 PROCEDURE — 83605 ASSAY OF LACTIC ACID: CPT

## 2018-02-06 PROCEDURE — 83735 ASSAY OF MAGNESIUM: CPT

## 2018-02-06 PROCEDURE — 3008F BODY MASS INDEX DOCD: CPT | Mod: S$GLB,,, | Performed by: PSYCHIATRY & NEUROLOGY

## 2018-02-06 PROCEDURE — 93010 ELECTROCARDIOGRAM REPORT: CPT | Mod: ,,, | Performed by: INTERNAL MEDICINE

## 2018-02-06 PROCEDURE — 84484 ASSAY OF TROPONIN QUANT: CPT

## 2018-02-06 PROCEDURE — 99214 OFFICE O/P EST MOD 30 MIN: CPT | Mod: S$GLB,,, | Performed by: PSYCHIATRY & NEUROLOGY

## 2018-02-06 PROCEDURE — 83880 ASSAY OF NATRIURETIC PEPTIDE: CPT

## 2018-02-06 PROCEDURE — 93005 ELECTROCARDIOGRAM TRACING: CPT

## 2018-02-06 PROCEDURE — 36600 WITHDRAWAL OF ARTERIAL BLOOD: CPT

## 2018-02-06 PROCEDURE — 80053 COMPREHEN METABOLIC PANEL: CPT

## 2018-02-06 PROCEDURE — 84100 ASSAY OF PHOSPHORUS: CPT

## 2018-02-06 PROCEDURE — 87040 BLOOD CULTURE FOR BACTERIA: CPT | Mod: 59

## 2018-02-06 PROCEDURE — 99999 PR PBB SHADOW E&M-EST. PATIENT-LVL III: CPT | Mod: PBBFAC,,, | Performed by: PSYCHIATRY & NEUROLOGY

## 2018-02-06 PROCEDURE — 84145 PROCALCITONIN (PCT): CPT

## 2018-02-06 NOTE — TELEPHONE ENCOUNTER
----- Message from Rafa Vitale sent at 2/6/2018 12:16 PM CST -----  Contact: Jessica, patient's wife  X_  1st Request  _  2nd Request  _  3rd Request        Who: Jessica, patient's wife    Why: Patient returning phone call about his breathing     What Number to Call Back: [phone] or [phone]    When to Expect a call back: (Within 24 hours)    Please return the call at earliest convenience. Thanks!

## 2018-02-06 NOTE — PROGRESS NOTES
This 67 y.o. male is here for obstructive sleep apnea.    Patient states feels exhausted.  Feels that he doesn't sleep  Recent stress from wife's hip injury    Ongoing snoring, gasping for air in sleep, and daytime fatigue    Patient was unable to complete his sleep study as recommended last week.    Feels winded at night, difficulty getting air  Recently admitted for congestive heart failure.  Diuresed. No sig worsening in EF% >50    Lungs sound dry today on exam       PRIOR SLEEP HISTORY:  Patient complaints include: loud snoring, air gasping, and daytime fatigue. Nocturia 4x (on diuretics). Side sleeper. No morning headaches. Reports sleep apnea symptoms started ~ 5 years ago and despite PCP advise, he never got evaluated for sleep apnea. After a fib event and dx heart failure, now highly motivated to treat MONICA.   Medical co-morbidities: HTN, atrial fibrillation, systolic CHF (1/18/17 EF 55-60%), DM2, HLD, obesity  Denies symptoms of restless legs or kicking during sleep.  Occupation: Salesperson, [employer]. Plan to retire in 3 years.     [place] Sleepiness Scale score during initial sleep evaluation was 4.    SLEEP ROUTINE:  Activity the hour prior to sleep: watch tv, occasionally read   Bed partner:  Wife  Time to bed:  9:30  pm  Lights off:  9:30 pm   Sleep onset latency:  <10 minutes        Disruptions or awakenings:    4 to 5 for bathroom    Wakeup time:  4:45 - 5 am   Perceived sleep quality:  2/5       Daytime naps:   none  Weekend sleep routine:   Bedtime at 10 pm, up at 5 am  Caffeine use: decaf coffee only   Exercise habit:  Stationary bike x2 weekly     01/18/2016 2D Echo:  CONCLUSIONS     1 - Eccentric LVH with normal left ventricular systolic function (EF 55-60%).     2 - Severe left atrial enlargement.     3 - Left ventricular diastolic dysfunction.     4 - Normal right ventricular systolic function .     Past Medical History:   Diagnosis Date    CHF (congestive heart failure)     Diabetes  "mellitus, type 2 2010    Hydronephrosis of left kidney     Hypertension     Non-functioning kidney     Followed by Dr. Silver Anderson    Obesity (BMI 30-39.9)     Unspecified disorder of kidney and ureter        Past Surgical History:   Procedure Laterality Date    arm surgery      CARDIOVERSION  11/29/2016    kidney removed Left 0825/2017    knee surgeory N/A 4 to 5 years ago       Family History   Problem Relation Age of Onset    Colon cancer Father     Ovarian cancer Sister     Liver cancer Brother     Heart disease Paternal Grandfather     Amblyopia Neg Hx     Blindness Neg Hx     Cataracts Neg Hx     Diabetes Neg Hx     Glaucoma Neg Hx     Hypertension Neg Hx     Macular degeneration Neg Hx     Retinal detachment Neg Hx     Strabismus Neg Hx     Stroke Neg Hx     Thyroid disease Neg Hx        Social History   Substance Use Topics    Smoking status: Never Smoker    Smokeless tobacco: Never Used    Alcohol use No       ALLERGIES: Reviewed in EPIC    CURRENT MEDICATIONS: Reviewed in EPIC      REVIEW OF SYSTEMS:   Sleep related symptoms as per HPI. CONST: Intentional weight loss ~20 lb (monitoring fluid intake and diet) HEENT: Denies sinus congestion PULM: Denies dyspnea CARD:  Denies palpitations GI:  Denies acid reflux : Denies polyuria  NEURO: Denies headaches PSYCH: Denies mood disturbance HEME: Denies anemia Otherwise, a balance of systems reviewed is negative.          PHYSICAL EXAM:  BP (!) 90/58 (BP Location: Right arm, Patient Position: Sitting, BP Method: Large (Manual))   Pulse 82   Ht 6' 2" (1.88 m)   Wt 122.9 kg (271 lb)   BMI 34.79 kg/m²   GENERAL: Obese body habitus, well groomed  HEENT:  Conjunctivae are non-erythematous; Pupils equal, round, and reactive to light; Nose is symmetrical; Nasal mucosa is pink and moist; Septum is midline; Inferior turbinates are normal; Nasal airflow is normal; Posterior pharynx is pink; Modified Mallampati: IV; Posterior palate is " normal; Tonsils +1; Uvula is normal and pink;Tongue is normal; Dentition is fair; No TMJ tenderness; Jaw opening and protrusion without click and without discomfort.  NECK: Supple. Neck circumference is 19 inches. No thyromegaly. No palpable nodes.  SKIN: On face and neck: No abrasions, no rashes, no lesions.  No subcutaneous nodules are palpable.  RESPIRATORY: Chest is clear to auscultation.  Normal chest expansion and non-labored breathing at rest.  CARDIOVASCULAR: Normal S1, S2.  No murmurs, gallops or rubs. No carotid bruits bilaterally.  EXTREMITIES: No edema. No clubbing. No cyanosis. Station normal. Gait normal.        NEURO/PSYCH: Oriented to time, place and person. Normal attention span and concentration. Affect is full. Mood is normal.                                              ASSESSMENT:    1. Sleep apnea, unspecified. The patient symptomatically has loud snoring, air gasping, and daytime fatigue with findings of crowded oral airway and elevated body mass index. Was unable to complete prior sleep study. Worsening symptoms. Recent admission for heart failure.    2. Sleep disturbances NEC - shortness of breath due to medical issues.    Medical co-morbidities: HTN, atrial fibrillation, systolic CHF, DM2, HLD, obesity This warrants further investigation for possible obstructive sleep apnea.      PLAN:    Avoid sleeping flat; reclined sleep if possible, tolerated.    Home Sleep Study    - Education: During our discussion today, we talked about the etiology of obstructive sleep apnea as well as the potential ramifications of untreated sleep apnea, which could include daytime sleepiness, hypertension, heart disease and/or stroke. We discussed potential treatment options, which could include weight loss, body positioning, continuous positive airway pressure (CPAP)     Follow up: after sleep study; MD/NP

## 2018-02-06 NOTE — PATIENT INSTRUCTIONS
Patient is candidate for home sleep testing.  Set up testing with [facility].  Scheduled by [facility]    1. Silvana/Tony will contact you to schedule your sleep study [phone] (ext [phone])  2. Sleep Lab is located in the [facility], take [address] elevator to 7th floor; You will see sign for [facility]

## 2018-02-07 ENCOUNTER — OFFICE VISIT (OUTPATIENT)
Dept: CARDIOLOGY | Facility: CLINIC | Age: 68
DRG: 682 | End: 2018-02-07
Payer: COMMERCIAL

## 2018-02-07 ENCOUNTER — HOSPITAL ENCOUNTER (OUTPATIENT)
Dept: RADIOLOGY | Facility: HOSPITAL | Age: 68
Discharge: HOME | End: 2018-02-07
Attending: INTERNAL MEDICINE
Payer: COMMERCIAL

## 2018-02-07 ENCOUNTER — TELEPHONE (OUTPATIENT)
Dept: SLEEP MEDICINE | Facility: OTHER | Age: 68
End: 2018-02-07

## 2018-02-07 ENCOUNTER — TELEPHONE (OUTPATIENT)
Dept: CARDIOLOGY | Facility: CLINIC | Age: 68
End: 2018-02-07

## 2018-02-07 ENCOUNTER — TELEPHONE (OUTPATIENT)
Dept: ENDOCRINOLOGY | Facility: CLINIC | Age: 68
End: 2018-02-07

## 2018-02-07 VITALS
BODY MASS INDEX: 38.8 KG/M2 | SYSTOLIC BLOOD PRESSURE: 122 MMHG | OXYGEN SATURATION: 96 % | HEART RATE: 77 BPM | HEIGHT: 70 IN | DIASTOLIC BLOOD PRESSURE: 57 MMHG | WEIGHT: 271 LBS

## 2018-02-07 DIAGNOSIS — R10.11 RUQ PAIN: ICD-10-CM

## 2018-02-07 DIAGNOSIS — I50.30 (HFPEF) HEART FAILURE WITH PRESERVED EJECTION FRACTION: ICD-10-CM

## 2018-02-07 DIAGNOSIS — R06.09 DYSPNEA ON EXERTION: Primary | ICD-10-CM

## 2018-02-07 DIAGNOSIS — R06.09 DYSPNEA ON EXERTION: ICD-10-CM

## 2018-02-07 DIAGNOSIS — D72.829 LEUKOCYTOSIS, UNSPECIFIED TYPE: ICD-10-CM

## 2018-02-07 DIAGNOSIS — N18.30 CKD (CHRONIC KIDNEY DISEASE), STAGE III: ICD-10-CM

## 2018-02-07 DIAGNOSIS — E11.22 TYPE 2 DIABETES MELLITUS WITH STAGE 3 CHRONIC KIDNEY DISEASE, WITH LONG-TERM CURRENT USE OF INSULIN: ICD-10-CM

## 2018-02-07 DIAGNOSIS — I50.22 CHRONIC SYSTOLIC CHF (CONGESTIVE HEART FAILURE): Primary | ICD-10-CM

## 2018-02-07 DIAGNOSIS — N18.30 TYPE 2 DIABETES MELLITUS WITH STAGE 3 CHRONIC KIDNEY DISEASE, WITH LONG-TERM CURRENT USE OF INSULIN: ICD-10-CM

## 2018-02-07 DIAGNOSIS — Z79.01 CURRENT USE OF LONG TERM ANTICOAGULATION: ICD-10-CM

## 2018-02-07 DIAGNOSIS — E80.6 HYPERBILIRUBINEMIA: ICD-10-CM

## 2018-02-07 DIAGNOSIS — E78.5 HYPERLIPIDEMIA ASSOCIATED WITH TYPE 2 DIABETES MELLITUS: Primary | ICD-10-CM

## 2018-02-07 DIAGNOSIS — I48.19 PERSISTENT ATRIAL FIBRILLATION: ICD-10-CM

## 2018-02-07 DIAGNOSIS — E11.69 HYPERLIPIDEMIA ASSOCIATED WITH TYPE 2 DIABETES MELLITUS: Primary | ICD-10-CM

## 2018-02-07 DIAGNOSIS — Z79.4 TYPE 2 DIABETES MELLITUS WITH STAGE 3 CHRONIC KIDNEY DISEASE, WITH LONG-TERM CURRENT USE OF INSULIN: ICD-10-CM

## 2018-02-07 DIAGNOSIS — I50.30 (HFPEF) HEART FAILURE WITH PRESERVED EJECTION FRACTION: Primary | ICD-10-CM

## 2018-02-07 DIAGNOSIS — D64.9 ANEMIA, UNSPECIFIED TYPE: ICD-10-CM

## 2018-02-07 LAB
LACTATE SERPL-SCNC: 1.1 MMOL/L
PROCALCITONIN SERPL IA-MCNC: 4.09 NG/ML

## 2018-02-07 PROCEDURE — A9537 TC99M MEBROFENIN: HCPCS

## 2018-02-07 PROCEDURE — 99999 PR PBB SHADOW E&M-EST. PATIENT-LVL V: CPT | Mod: PBBFAC,,, | Performed by: INTERNAL MEDICINE

## 2018-02-07 PROCEDURE — 3008F BODY MASS INDEX DOCD: CPT | Mod: S$GLB,,, | Performed by: INTERNAL MEDICINE

## 2018-02-07 PROCEDURE — 99215 OFFICE O/P EST HI 40 MIN: CPT | Mod: S$GLB,,, | Performed by: INTERNAL MEDICINE

## 2018-02-07 PROCEDURE — 78226 HEPATOBILIARY SYSTEM IMAGING: CPT | Mod: 26,,, | Performed by: RADIOLOGY

## 2018-02-07 RX ORDER — LANCETS
EACH MISCELLANEOUS
Qty: 100 EACH | Refills: 11 | Status: ON HOLD | OUTPATIENT
Start: 2018-02-07 | End: 2018-02-21 | Stop reason: HOSPADM

## 2018-02-07 RX ORDER — PEN NEEDLE, DIABETIC 30 GX3/16"
NEEDLE, DISPOSABLE MISCELLANEOUS
Qty: 50 EACH | Refills: 11 | Status: ON HOLD | OUTPATIENT
Start: 2018-02-07 | End: 2018-02-21 | Stop reason: HOSPADM

## 2018-02-07 NOTE — TELEPHONE ENCOUNTER
"Jessica called to make sure Dr Castanon had been messaged to let him know that Jose Cruz was recommended to go to the ED now for worsening weakness and SOB.  His brother is at the home with them now and Jessica states he will bring him to the ED.  Assured her will message him personally with this information.     Reason for Disposition   [1] MODERATE difficulty breathing (e.g., speaks in phrases, SOB even at rest, pulse 100-120) AND [2] NEW-onset or WORSE than normal     Recommended ED now for Jose Cruz.  His wife called to say his SOB is worse today, and he is very weak, coughing, and c/o a "heaviness" in his chest.   He was in hospital 02/01/18 -  02/03/18 for CHF, and is not doing well, per wife.  Recommended call 911, as his wife just had hip replacement last week and is not able to get him to the hospital.  She states his brother is a doctor, lives close by, and will bring him.  Strongly encouraged her that if immediate transportation is no available that she should call 911 now.  Unsure if she will do so.  Message to Dr Irvin Castanon, cardiologist, and to Lisa Capone ,pcp.  Please contact caller directly with any additional care advice.    Answer Assessment - Initial Assessment Questions  1. RESPIRATORY STATUS: "Describe your breathing?" (e.g., wheezing, shortness of breath, unable to speak, severe coughing)       Wheezing, cough, SOB, lethargic, weak.    2. ONSET: "When did this breathing problem begin?"       Was discharged from hospital for CHF.    3. PATTERN "Does the difficult breathing come and go, or has it been constant since it started?"       It has been constant, and is worsening.    4. SEVERITY: "How bad is your breathing?" (e.g., mild, moderate, severe)     - MILD: No SOB at rest, mild SOB with walking, speaks normally in sentences, can lay down, no retractions, pulse < 100.     - MODERATE: SOB at rest, SOB with minimal exertion and prefers to sit, cannot lie down flat, speaks in phrases, mild " "retractions, audible wheezing, pulse 100-120.     - SEVERE: Very SOB at rest, speaks in single words, struggling to breathe, sitting hunched forward, retractions, pulse > 120       Severe SOB. Sits at 90 degrees for air.  Cannot lay down and breathe. Very SOB at rest.    5. RECURRENT SYMPTOM: "Have you had difficulty breathing before?" If so, ask: "When was the last time?" and "What happened that time?"       Yes, [facility] 12/2016.    6. CARDIAC HISTORY: "Do you have any history of heart disease?" (e.g., heart attack, angina, bypass surgery, angioplasty)       Yes.    7. LUNG HISTORY: "Do you have any history of lung disease?"  (e.g., pulmonary embolus, asthma, emphysema)      No    8. CAUSE: "What do you think is causing the breathing problem?"       I don't know, but I think it is his heart.  His legs have begun swelling again.    9. OTHER SYMPTOMS: "Do you have any other symptoms? (e.g., dizziness, runny nose, cough, chest pain, fever)      Very weak, cough, heaviness in his chest.    10. PREGNANCY: "Is there any chance you are pregnant?" "When was your last menstrual period?"        N/a     11. TRAVEL: "Have you traveled out of the country in the last month?" (e.g., travel history, exposures)        N/a    Protocols used: ST BREATHING DIFFICULTY-A-AH      "

## 2018-02-07 NOTE — ED TRIAGE NOTES
Pt presents with c/o shortness of breath for the last 2-3 months, worse with exertion. Having to sleep elevated in bed. Difficulty sleeping, in process of having sleep apnea study done.

## 2018-02-07 NOTE — ED NOTES
"Pt reports shortness of breath for several months. Pt reports he is not sleeping, and feels "physically exhausted all the time". Pt reports he was recently discharged from the hospital. Denies chest pain. Pt c/o some nausea at times.   "

## 2018-02-07 NOTE — ED PROVIDER NOTES
Encounter Date: 2/6/2018    SCRIBE #1 NOTE: I, Mariposa Tenorio, am scribing for, and in the presence of,  Dr. Wadsworth. I have scribed the following portions of the note - the Resident attestation.       History     Chief Complaint   Patient presents with    Chest Pain      hx of chf - cp x 1 day -  pt reports feeling run down and sob  told to come by cardiologist      68yoM with PMHx of Dm2, HTN, CKD3, HLD, Afib, and HFpEF with recent admission for CHF exacerbation presents with worsening SOB since discharge on 2/3. Patient claims on his previous admission he was able to walk around the room and andrade a bit but he is currently unable to move from bed with significant JAMES; also claims he has orthopnea that has worsened that he is unable to sleep. Patient endorses intermittent subjective fevers and family at bedside report worsening confusion with mostly agitation today. He has a chronic cough that has been present for weeks that is non-productive. He denies any recent sick contacts, chest pain, dysuria, diarrhea.          Review of patient's allergies indicates:  No Known Allergies  Past Medical History:   Diagnosis Date    CHF (congestive heart failure)     Diabetes mellitus, type 2 2010    Hydronephrosis of left kidney     Hypertension     Non-functioning kidney     Followed by Dr. Silver Anderson    Obesity (BMI 30-39.9)     Unspecified disorder of kidney and ureter      Past Surgical History:   Procedure Laterality Date    arm surgery      CARDIOVERSION  11/29/2016    kidney removed Left 0825/2017    knee surgeory N/A 4 to 5 years ago     Family History   Problem Relation Age of Onset    Colon cancer Father     Ovarian cancer Sister     Liver cancer Brother     Heart disease Paternal Grandfather     Amblyopia Neg Hx     Blindness Neg Hx     Cataracts Neg Hx     Diabetes Neg Hx     Glaucoma Neg Hx     Hypertension Neg Hx     Macular degeneration Neg Hx     Retinal detachment Neg Hx     Strabismus  Neg Hx     Stroke Neg Hx     Thyroid disease Neg Hx      Social History   Substance Use Topics    Smoking status: Never Smoker    Smokeless tobacco: Never Used    Alcohol use No     Review of Systems   Constitutional: Positive for fever. Negative for chills and diaphoresis.   HENT: Negative for trouble swallowing.    Eyes: Negative for visual disturbance.   Respiratory: Positive for cough and shortness of breath. Negative for wheezing.    Cardiovascular: Negative for chest pain.   Gastrointestinal: Negative for abdominal pain, nausea and vomiting.   Genitourinary: Negative for dysuria and hematuria.   Musculoskeletal: Negative for back pain and myalgias.   Skin: Negative for rash and wound.   Neurological: Negative for numbness (in right finger digits, chronic, not radiating down shoulder from chest).   Psychiatric/Behavioral: Positive for agitation.       Physical Exam     Initial Vitals [02/06/18 2047]   BP Pulse Resp Temp SpO2   (!) 117/58 95 20 99.5 °F (37.5 °C) 95 %      MAP       77.67         Physical Exam    Constitutional: He appears distressed.   HENT:   Head: Normocephalic and atraumatic.   Eyes: Conjunctivae and EOM are normal. Pupils are equal, round, and reactive to light.   Neck: No JVD present.   Cardiovascular: Normal rate, regular rhythm and normal heart sounds. Exam reveals no gallop.    No murmur heard.  Pulmonary/Chest:   Some accessory muscle use, Bibasilar insp crackles, some decrease at left base    Abdominal: Soft. Bowel sounds are normal. He exhibits no distension. There is no tenderness.   Musculoskeletal: He exhibits edema.   Trace bilateral lower ext edema, warm   Neurological: He is alert and oriented to person, place, and time.   Skin: Skin is warm and dry.         ED Course   Procedures  Labs Reviewed   CBC W/ AUTO DIFFERENTIAL - Abnormal; Notable for the following:        Result Value    WBC 14.99 (*)     RBC 3.59 (*)     Hemoglobin 10.2 (*)     Hematocrit 30.6 (*)     Platelets  368 (*)     Immature Granulocytes 0.7 (*)     Gran # (ANC) 12.8 (*)     Immature Grans (Abs) 0.11 (*)     Lymph # 0.5 (*)     Mono # 1.6 (*)     Gran% 85.5 (*)     Lymph% 3.1 (*)     All other components within normal limits    Narrative:     [id number]  Add on BNP per Bryson Barragan III, MD @ 22:43  02/06/2018    COMPREHENSIVE METABOLIC PANEL - Abnormal; Notable for the following:     Sodium 133 (*)     Glucose 289 (*)     BUN, Bld 34 (*)     Creatinine 2.1 (*)     Calcium 8.4 (*)     Albumin 2.1 (*)     Total Bilirubin 2.0 (*)     Alkaline Phosphatase 298 (*)     AST 54 (*)     eGFR if  37.1 (*)     eGFR if non  32.1 (*)     All other components within normal limits    Narrative:     [id number]  Add on BNP per Bryson Barragan III, MD @ 22:43  02/06/2018    PHOSPHORUS - Abnormal; Notable for the following:     Phosphorus 2.4 (*)     All other components within normal limits    Narrative:     [id number]  Add on BNP per Bryson Barragan III, MD @ 22:43  02/06/2018    TROPONIN I - Abnormal; Notable for the following:     Troponin I 0.030 (*)     All other components within normal limits    Narrative:     [id number]  Add on BNP per Bryson Barragan III, MD @ 22:43  02/06/2018    B-TYPE NATRIURETIC PEPTIDE - Abnormal; Notable for the following:      (*)     All other components within normal limits    Narrative:     [id number]  Add on BNP per Bryson Barragan III, MD @ 22:43  02/06/2018    PROCALCITONIN - Abnormal; Notable for the following:     Procalcitonin 4.09 (*)     All other components within normal limits    Narrative:     [id number]  Add on BNP per Bryson Barragan III, MD @ 22:43  02/06/2018   add on test procalcitonin per dr bryson barragan order #[id number]   02/06/2018  23:40    ISTAT PROCEDURE - Abnormal; Notable for the following:     POC PH 7.454 (*)     POC PCO2 32.3 (*)     POC PO2 72 (*)     POC HCO3 22.7 (*)     All other components within normal limits   CULTURE, BLOOD   CULTURE, BLOOD    MAGNESIUM    Narrative:     [id number]  Add on BNP per Myles Wadsworth III, MD @ 22:43  02/06/2018    B-TYPE NATRIURETIC PEPTIDE   PROCALCITONIN   LACTIC ACID, PLASMA   POCT GLUCOSE MONITORING CONTINUOUS             Medical Decision Making:   History:   Old Medical Records: I decided to obtain old medical records.  Initial Assessment:   69yo obese male with multiple coronary disease risk factors and diastolic dysfunction presents with worsening SOB and JAMES  Differential Diagnosis:   Intrinsic lung disease vs CHF exacerbation vs CHF exacerbation  Considering pna as patient with subjective fevers and cough (though chronic for months and dry)  ACS less likely as patient without reported chest pain, but risk factors present and has JAMES possible anginal equivalent      Clinical Tests:   Lab Tests: Ordered and Reviewed  Radiological Study: Ordered and Reviewed  Medical Tests: Ordered and Reviewed  ED Management:  CBC  CMP, Mg, Phos  Procalcitonin  EKG  Troponin  ABG  BNP  CXR         APC / Resident Notes:   Patient taken for a walk around the ED noted to not be in distress and did not desaturate below 94%   around baseline from previous admission  Troponin 0.030, no STEMI on EKG  Patient likely with some baseline CAD with possibly Dyspnea as exertional anginal equivalent, to follow up closely with cardiology clinic  WBC mildly elevated, ordered lactate and procal and blood cultures, patient however without meeting of SIRs criteria during ED stay  Procal at 12:35am 4.09, lactate wnl, CXR no acute findings discussed with staff, likely 2/2 to CKD       Scribe Attestation:   Scribe #1: I performed the above scribed service and the documentation accurately describes the services I performed. I attest to the accuracy of the note.    Attending Attestation:   Physician Attestation Statement for Resident:  As the supervising MD   Physician Attestation Statement: I have personally seen and examined this patient.   I agree  with the above history. -: The patient presents with persistent shortness of breath for months.  He was discharged from the hospital several days prior following an admission for similar symptoms.   As the supervising MD I agree with the above PE.   -: The patient was afebrile with normal/stable vital signs.  He was without clinical findings of dehydration.  The patient was in no respiratory distress and had clear breath sounds.  Labs were unremarkable.   As the supervising MD I agree with the above treatment, course, plan, and disposition.  I have reviewed and agree with the residents interpretation of the following: lab data, x-rays and EKG.          Physician Attestation for Scribe:  Physician Attestation Statement for Scribe #1: I, Myles Wadsworth M.D., reviewed documentation, as scribed by Mariposa Tenorio in my presence, and it is both accurate and complete.                  ED Course      Clinical Impression:   The encounter diagnosis was Shortness of breath.    Disposition:   Disposition: Discharged  Condition: Stable                        Roseann Samuels MD  Resident  02/07/18 1606       Myles Wadsworth III, MD  02/08/18 0157

## 2018-02-07 NOTE — TELEPHONE ENCOUNTER
I spoke to Mr. Lira and his son.  His HIDA scan is normal, making cholecystitis less likely.  Given his profound JAMES, will pursue RHC tomorrow with Dr. Colvin.  Clinically he does not look overloaded, however his picture is mixed and symptoms suggest heart failure could be playing a role.  He will hold his Eliquis tonight and tomorrow morning.      Of note, HIDA did show defect in liver, possibly representing cyst/metastatic disease.  He is getting ultrasound of abdomen tonight, will plan on CT abdomen after RHC (maybe chest as well to evaluate lungs depending on results).  Would not do with contrast given renal dysfunction, however.     Patient/son express understanding.     Easton Cole MD  Cardiology Fellow

## 2018-02-07 NOTE — ED NOTES
.  Patient identifiers for Jose Cruz Lira 67 y.o. male checked and correct.  Chief Complaint   Patient presents with    Chest Pain      hx of chf - cp x 1 day -  pt reports feeling run down and sob  told to come by cardiologist      Past Medical History:   Diagnosis Date    CHF (congestive heart failure)     Diabetes mellitus, type 2 2010    Hydronephrosis of left kidney     Hypertension     Non-functioning kidney     Followed by Dr. Silver Anderson    Obesity (BMI 30-39.9)     Unspecified disorder of kidney and ureter      Allergies reported: Review of patient's allergies indicates:  No Known Allergies      LOC: Patient is awake, alert, and aware of environment with an appropriate affect. Patient is oriented x 3 and speaking appropriately.  APPEARANCE: Patient resting comfortably and in no acute distress. Patient is clean and well groomed, patient's clothing is properly fastened.  SKIN: The skin is warm and dry. Patient has normal skin turgor and moist mucus membranes. Skin is intact; no bruising or breakdown noted.  MUSKULOSKELETAL: Patient is moving all extremities well, no obvious deformities noted. Pulses intact.   RESPIRATORY: Airway is open and patent. Respirations are spontaneous and non-labored with normal effort and rate, pt has np cough. Difficulty sleeping at night, reports he feels exhausted and is in process of being scheduled for a sleep apnea test. Pt reports he gets exertional dyspnea. Pt having to elevate head with pillows at night.   CARDIAC: Patient has a normal rate and rhythm. NSR on cardiac monitor,No peripheral edema noted.   ABDOMEN: No distention noted. Bowel sounds active in all 4 quadrants. Soft and non-tender upon palpation.  NEUROLOGICAL: pupils 3 mm, PERRL. Facial expression is symmetrical. Hand grasps are equal bilaterally. Normal sensation in all extremities when touched with finger.

## 2018-02-07 NOTE — TELEPHONE ENCOUNTER
Ms. Lopez states that Mr. Gonzalez has had heart failure twice this week and hes not really eating because he's so week. She wants to know how to adjust his insulin. He was just in the ER last night for low blood sugar. Please advise.    ----- Message from Isabel Ulloa sent at 2/7/2018  9:27 AM CST -----  Contact: pt's  wife  Gabriella  -    Pt needs urgent help  OLD MACHINE NOT WORKING      Needs to get a new machine   -    Insurance ok'ed     Needs to check his blood sugar     AT ER LAST NITE     Sugar way too low      Pt has congestive heart failure      Please call  Wife  [phone]  Thanks

## 2018-02-08 ENCOUNTER — HOSPITAL ENCOUNTER (INPATIENT)
Facility: HOSPITAL | Age: 68
LOS: 13 days | Discharge: HOSPICE | DRG: 682 | End: 2018-02-22
Attending: EMERGENCY MEDICINE | Admitting: EMERGENCY MEDICINE
Payer: COMMERCIAL

## 2018-02-08 ENCOUNTER — TELEPHONE (OUTPATIENT)
Dept: SLEEP MEDICINE | Facility: CLINIC | Age: 68
End: 2018-02-08

## 2018-02-08 ENCOUNTER — TELEPHONE (OUTPATIENT)
Dept: CARDIOLOGY | Facility: CLINIC | Age: 68
End: 2018-02-08

## 2018-02-08 DIAGNOSIS — N18.3 ACUTE RENAL FAILURE SUPERIMPOSED ON STAGE 3 CHRONIC KIDNEY DISEASE, UNSPECIFIED ACUTE RENAL FAILURE TYPE: ICD-10-CM

## 2018-02-08 DIAGNOSIS — R93.2 ABNORMAL LIVER SCAN: ICD-10-CM

## 2018-02-08 DIAGNOSIS — N17.1 ACUTE RENAL FAILURE WITH ACUTE RENAL CORTICAL NECROSIS SUPERIMPOSED ON STAGE 3 CHRONIC KIDNEY DISEASE: ICD-10-CM

## 2018-02-08 DIAGNOSIS — N17.2 ACUTE RENAL FAILURE WITH RENAL MEDULLARY NECROSIS SUPERIMPOSED ON STAGE 3 CHRONIC KIDNEY DISEASE: ICD-10-CM

## 2018-02-08 DIAGNOSIS — E11.22 TYPE 2 DIABETES MELLITUS WITH STAGE 3 CHRONIC KIDNEY DISEASE, WITH LONG-TERM CURRENT USE OF INSULIN: ICD-10-CM

## 2018-02-08 DIAGNOSIS — D72.829 LEUKOCYTOSIS, UNSPECIFIED TYPE: ICD-10-CM

## 2018-02-08 DIAGNOSIS — D72.823 LEUKEMOID REACTION: ICD-10-CM

## 2018-02-08 DIAGNOSIS — Z79.4 TYPE 2 DIABETES MELLITUS WITH STAGE 3 CHRONIC KIDNEY DISEASE, WITH LONG-TERM CURRENT USE OF INSULIN: ICD-10-CM

## 2018-02-08 DIAGNOSIS — N18.30 ACUTE RENAL FAILURE WITH RENAL MEDULLARY NECROSIS SUPERIMPOSED ON STAGE 3 CHRONIC KIDNEY DISEASE: ICD-10-CM

## 2018-02-08 DIAGNOSIS — I95.1 ORTHOSTATIC HYPOTENSION: ICD-10-CM

## 2018-02-08 DIAGNOSIS — N18.30 TYPE 2 DIABETES MELLITUS WITH STAGE 3 CHRONIC KIDNEY DISEASE, WITH LONG-TERM CURRENT USE OF INSULIN: ICD-10-CM

## 2018-02-08 DIAGNOSIS — N17.0 ACUTE RENAL FAILURE WITH ACUTE TUBULAR NECROSIS SUPERIMPOSED ON STAGE 3 CHRONIC KIDNEY DISEASE: ICD-10-CM

## 2018-02-08 DIAGNOSIS — N17.9 ACUTE RENAL FAILURE SUPERIMPOSED ON STAGE 3 CHRONIC KIDNEY DISEASE: ICD-10-CM

## 2018-02-08 DIAGNOSIS — N18.30 ACUTE RENAL FAILURE WITH ACUTE TUBULAR NECROSIS SUPERIMPOSED ON STAGE 3 CHRONIC KIDNEY DISEASE: ICD-10-CM

## 2018-02-08 DIAGNOSIS — N17.9 ACUTE RENAL FAILURE SUPERIMPOSED ON STAGE 3 CHRONIC KIDNEY DISEASE, UNSPECIFIED ACUTE RENAL FAILURE TYPE: ICD-10-CM

## 2018-02-08 DIAGNOSIS — R80.0 ISOLATED PROTEINURIA WITHOUT SPECIFIC MORPHOLOGIC LESION: ICD-10-CM

## 2018-02-08 DIAGNOSIS — E88.09 HYPOALBUMINEMIA: ICD-10-CM

## 2018-02-08 DIAGNOSIS — E87.1 HYPONATREMIA: ICD-10-CM

## 2018-02-08 DIAGNOSIS — N17.9 AKI (ACUTE KIDNEY INJURY): ICD-10-CM

## 2018-02-08 DIAGNOSIS — N18.30 ACUTE RENAL FAILURE WITH ACUTE RENAL CORTICAL NECROSIS SUPERIMPOSED ON STAGE 3 CHRONIC KIDNEY DISEASE: ICD-10-CM

## 2018-02-08 DIAGNOSIS — R06.02 SOB (SHORTNESS OF BREATH): ICD-10-CM

## 2018-02-08 DIAGNOSIS — I48.19 PERSISTENT ATRIAL FIBRILLATION: ICD-10-CM

## 2018-02-08 DIAGNOSIS — Z79.01 CURRENT USE OF LONG TERM ANTICOAGULATION: ICD-10-CM

## 2018-02-08 DIAGNOSIS — R79.89 ABNORMAL LIVER FUNCTION TESTS: ICD-10-CM

## 2018-02-08 DIAGNOSIS — I95.89 OTHER SPECIFIED HYPOTENSION: ICD-10-CM

## 2018-02-08 DIAGNOSIS — N18.30 ACUTE RENAL FAILURE SUPERIMPOSED ON STAGE 3 CHRONIC KIDNEY DISEASE: ICD-10-CM

## 2018-02-08 DIAGNOSIS — I35.9 NONRHEUMATIC AORTIC VALVE DISORDER: ICD-10-CM

## 2018-02-08 DIAGNOSIS — R79.89 D-DIMER, ELEVATED: Primary | ICD-10-CM

## 2018-02-08 DIAGNOSIS — R79.89 ELEVATED D-DIMER: ICD-10-CM

## 2018-02-08 LAB
ALBUMIN SERPL BCP-MCNC: 2 G/DL
ALP SERPL-CCNC: 285 U/L
ALT SERPL W/O P-5'-P-CCNC: 68 U/L
ANION GAP SERPL CALC-SCNC: 11 MMOL/L
AST SERPL-CCNC: 128 U/L
BASOPHILS # BLD AUTO: 0.01 K/UL
BASOPHILS NFR BLD: 0 %
BILIRUB DIRECT SERPL-MCNC: 1.4 MG/DL
BILIRUB SERPL-MCNC: 1.7 MG/DL
BNP SERPL-MCNC: 300 PG/ML
BUN SERPL-MCNC: 54 MG/DL
CALCIUM SERPL-MCNC: 8.4 MG/DL
CHLORIDE SERPL-SCNC: 96 MMOL/L
CO2 SERPL-SCNC: 21 MMOL/L
CREAT SERPL-MCNC: 2.6 MG/DL
D DIMER PPP IA.FEU-MCNC: 8.95 MG/L FEU
DIFFERENTIAL METHOD: ABNORMAL
EOSINOPHIL # BLD AUTO: 0 K/UL
EOSINOPHIL NFR BLD: 0 %
ERYTHROCYTE [DISTWIDTH] IN BLOOD BY AUTOMATED COUNT: 14.3 %
EST. GFR  (AFRICAN AMERICAN): 28.6 ML/MIN/1.73 M^2
EST. GFR  (NON AFRICAN AMERICAN): 24.8 ML/MIN/1.73 M^2
FLUAV AG SPEC QL IA: NEGATIVE
FLUBV AG SPEC QL IA: NEGATIVE
GGT SERPL-CCNC: 282 U/L
GLUCOSE SERPL-MCNC: 199 MG/DL
HCT VFR BLD AUTO: 28.9 %
HGB BLD-MCNC: 9.8 G/DL
IMM GRANULOCYTES # BLD AUTO: 0.34 K/UL
IMM GRANULOCYTES NFR BLD AUTO: 1.6 %
LACTATE SERPL-SCNC: 1.5 MMOL/L
LYMPHOCYTES # BLD AUTO: 0.5 K/UL
LYMPHOCYTES NFR BLD: 2.2 %
MCH RBC QN AUTO: 28.2 PG
MCHC RBC AUTO-ENTMCNC: 33.9 G/DL
MCV RBC AUTO: 83 FL
MONOCYTES # BLD AUTO: 2 K/UL
MONOCYTES NFR BLD: 9.1 %
NEUTROPHILS # BLD AUTO: 19 K/UL
NEUTROPHILS NFR BLD: 87.1 %
NRBC BLD-RTO: 0 /100 WBC
PLATELET # BLD AUTO: 378 K/UL
PMV BLD AUTO: 10.6 FL
POTASSIUM SERPL-SCNC: 4.7 MMOL/L
PROT SERPL-MCNC: 6.7 G/DL
RBC # BLD AUTO: 3.47 M/UL
SODIUM SERPL-SCNC: 128 MMOL/L
SPECIMEN SOURCE: NORMAL
TROPONIN I SERPL DL<=0.01 NG/ML-MCNC: 0.03 NG/ML
WBC # BLD AUTO: 21.78 K/UL

## 2018-02-08 PROCEDURE — G0378 HOSPITAL OBSERVATION PER HR: HCPCS

## 2018-02-08 PROCEDURE — 94640 AIRWAY INHALATION TREATMENT: CPT

## 2018-02-08 PROCEDURE — 87400 INFLUENZA A/B EACH AG IA: CPT | Mod: 59

## 2018-02-08 PROCEDURE — 99220 PR INITIAL OBSERVATION CARE,LEVL III: CPT | Mod: ,,, | Performed by: INTERNAL MEDICINE

## 2018-02-08 PROCEDURE — 25000242 PHARM REV CODE 250 ALT 637 W/ HCPCS: Performed by: EMERGENCY MEDICINE

## 2018-02-08 PROCEDURE — 82977 ASSAY OF GGT: CPT

## 2018-02-08 PROCEDURE — 84484 ASSAY OF TROPONIN QUANT: CPT

## 2018-02-08 PROCEDURE — 25000003 PHARM REV CODE 250: Performed by: EMERGENCY MEDICINE

## 2018-02-08 PROCEDURE — 82248 BILIRUBIN DIRECT: CPT

## 2018-02-08 PROCEDURE — 83880 ASSAY OF NATRIURETIC PEPTIDE: CPT

## 2018-02-08 PROCEDURE — 85025 COMPLETE CBC W/AUTO DIFF WBC: CPT

## 2018-02-08 PROCEDURE — 99285 EMERGENCY DEPT VISIT HI MDM: CPT | Mod: 25

## 2018-02-08 PROCEDURE — 93010 ELECTROCARDIOGRAM REPORT: CPT | Mod: ,,, | Performed by: INTERNAL MEDICINE

## 2018-02-08 PROCEDURE — 85379 FIBRIN DEGRADATION QUANT: CPT

## 2018-02-08 PROCEDURE — 83605 ASSAY OF LACTIC ACID: CPT

## 2018-02-08 PROCEDURE — 80053 COMPREHEN METABOLIC PANEL: CPT

## 2018-02-08 PROCEDURE — 94761 N-INVAS EAR/PLS OXIMETRY MLT: CPT

## 2018-02-08 RX ORDER — AMLODIPINE BESYLATE 10 MG/1
10 TABLET ORAL DAILY
Status: DISCONTINUED | OUTPATIENT
Start: 2018-02-09 | End: 2018-02-11

## 2018-02-08 RX ORDER — SIMVASTATIN 20 MG/1
20 TABLET, FILM COATED ORAL NIGHTLY
Status: DISCONTINUED | OUTPATIENT
Start: 2018-02-08 | End: 2018-02-18

## 2018-02-08 RX ORDER — SODIUM CHLORIDE 9 MG/ML
INJECTION, SOLUTION INTRAVENOUS CONTINUOUS
Status: ACTIVE | OUTPATIENT
Start: 2018-02-08 | End: 2018-02-09

## 2018-02-08 RX ORDER — IPRATROPIUM BROMIDE AND ALBUTEROL SULFATE 2.5; .5 MG/3ML; MG/3ML
3 SOLUTION RESPIRATORY (INHALATION)
Status: COMPLETED | OUTPATIENT
Start: 2018-02-08 | End: 2018-02-08

## 2018-02-08 RX ORDER — GLUCAGON 1 MG
1 KIT INJECTION
Status: DISCONTINUED | OUTPATIENT
Start: 2018-02-08 | End: 2018-02-22

## 2018-02-08 RX ORDER — IBUPROFEN 200 MG
16 TABLET ORAL
Status: DISCONTINUED | OUTPATIENT
Start: 2018-02-08 | End: 2018-02-22

## 2018-02-08 RX ORDER — INSULIN ASPART 100 [IU]/ML
1-10 INJECTION, SOLUTION INTRAVENOUS; SUBCUTANEOUS
Status: DISCONTINUED | OUTPATIENT
Start: 2018-02-08 | End: 2018-02-22

## 2018-02-08 RX ORDER — DOXAZOSIN 1 MG/1
2 TABLET ORAL DAILY
Status: DISCONTINUED | OUTPATIENT
Start: 2018-02-09 | End: 2018-02-11

## 2018-02-08 RX ORDER — CARVEDILOL 25 MG/1
25 TABLET ORAL 2 TIMES DAILY WITH MEALS
Status: DISCONTINUED | OUTPATIENT
Start: 2018-02-08 | End: 2018-02-16

## 2018-02-08 RX ORDER — SODIUM CHLORIDE 0.9 % (FLUSH) 0.9 %
5 SYRINGE (ML) INJECTION
Status: DISCONTINUED | OUTPATIENT
Start: 2018-02-08 | End: 2018-02-22

## 2018-02-08 RX ORDER — IBUPROFEN 200 MG
24 TABLET ORAL
Status: DISCONTINUED | OUTPATIENT
Start: 2018-02-08 | End: 2018-02-22

## 2018-02-08 RX ORDER — FLECAINIDE ACETATE 50 MG/1
50 TABLET ORAL EVERY 12 HOURS
Status: DISCONTINUED | OUTPATIENT
Start: 2018-02-08 | End: 2018-02-22

## 2018-02-08 RX ORDER — NAPROXEN SODIUM 220 MG/1
81 TABLET, FILM COATED ORAL
Status: COMPLETED | OUTPATIENT
Start: 2018-02-08 | End: 2018-02-08

## 2018-02-08 RX ADMIN — ASPIRIN 81 MG 81 MG: 81 TABLET ORAL at 06:02

## 2018-02-08 RX ADMIN — IPRATROPIUM BROMIDE AND ALBUTEROL SULFATE 3 ML: .5; 3 SOLUTION RESPIRATORY (INHALATION) at 04:02

## 2018-02-08 NOTE — TELEPHONE ENCOUNTER
I spoke to Mr. Lira and his son about his labs.  AST/ALT uptrending, WBC now 20k, Cr 2.6.  His liver tests show a possibly obstructive picture (elevated bili, Alk Phos, and now AST/ALT), however HIDA was negative for cholecystitis though there were filling defects in his liver.  Given his worsening renal function, WBC, and now liver enzymes with HIDA scan showing filling defects in the liver, he needs further workup inpatient.  Clinically, I think he appeared dry, and he says he has not been able to eat or drink much lately either.  Of note, blood cultures from 2 days ago remain negative.     Discussed with Dr. Colvin as well, will hold off on [facility] as he appears dry and I think he has more pressing issues at the moment.     I spoke to the patient and ER, recommending admission for workup given the above.  He likely needs IV fluids and CT scan of chest and abdomen.  Could consider ultrasound of abdomen but I think given the liver filling defects and abnormal labs a CT scan would be more definitive (no IV contrast given renal function).  Would also repeat CBC with a differential (earlier CBC does not have a diff that I am able to see). Etiology still more likely infectious but malignancy cannot be ruled out (differential may be helpful).  V/Q scan may also be warranted if no other source identified, as RV slightly enlarged and mildly elevated RA pressure in the setting of normal LA pressure on echocardiogram, though admittedly this is of lower likelihood as I am not sure it would cause his constellation of labs (not without obvious lung necrosis) and he has been on chronic oral anticoagulation.  Of note, procalcitonin from 2 days ago was slightly elevated at 4.      Easton Cole MD  Cardiology Fellow

## 2018-02-08 NOTE — TELEPHONE ENCOUNTER
----- Message from Miriam Daniels sent at 2/8/2018  9:37 AM CST -----  Contact: pt's wife natalya [phone]  _  1st Request  _  2nd Request  _  3rd Request        Who: ptpt's wife natalya Daly [phone]    Why: Requesting a call back in regards to needs to know if the daughter of the pt can  home sleep equipment at 10:30. Second request. Please call the wife    What Number to Call Back:pt's wife natalya Daly [phone]    When to Expect a call back: (Within 24 hours)    Please return the call at earliest convenience. Thanks!

## 2018-02-08 NOTE — ED NOTES
I assume care for this patient.     LOC: The patient is awake, alert, and aware of environment. The patient is oriented x 3 and speaking appropriately.   APPEARANCE: No acute distress noted.   PSYCHOSOCIAL: Patient is calm and cooperative.   SKIN: The skin is warm, dry, and intact. No bruising/discolorations noted.  RESPIRATORY: Airway is open and patent. Bilateral chest rise and fall. Respirations are spontaneous, even and unlabored. Normal effort and rate noted. No accessory muscle use noted.   CARDIAC: Patient has a normal rate and rhythm. Denies any current chest pain.   NEUROLOGIC: Eyes open spontaneously. Speech clear. Tolerating saliva secretions well. Able to follow commands, demonstrating ability to actively and appropriately communicate within context of current conversation. Symmetrical facial muscles. Moving all extremities. Movement is purposeful.   MUSCULOSKELETAL: No obvious deformities noted.     Side rails up x2. Call light within reach. Family at bedside. Updated on plan of care. Will continue to monitor.

## 2018-02-08 NOTE — ED TRIAGE NOTES
"Patient states he is to be admitted to hospital due to SOB and CHF. SOB x 3 weeks, Per family member, patient with elevated liver enzymes and creatnine. Denies weight orion, states he is "losinh weight" Sent from cath lab to ER.   "

## 2018-02-08 NOTE — ED PROVIDER NOTES
Encounter Date: 2/8/2018    SCRIBE #1 NOTE: I, So Callejas, am scribing for, and in the presence of,  Dr. Quijano . I have scribed the following portions of the note - the EKG reading. Other sections scribed: HPI, ROS, PE.       History     Chief Complaint   Patient presents with    Shortness of Breath     persistant SOB x months, sent to the emergency room by Cardiologist    Abdominal Pain     RUQ abd pain, nausea, diarrhea     Time patient was seen by the provider: 3:32 PM    The patient is a 67 y.o. male with co-morbidities including: HTN, DM type II, coronary artery disease, and CHF,  who presents to the ED after being sent from the cardiology clinic with a complaint of SOB that is exacerbated when ambulating. Pt states this is the 5th time he has been to the hospital in the last 3 weeks. He was last seen 4 days ago for similar symptoms where he stayed in observation. Pt also c/o RUQ abdominal pain, nausea, diarrhea, and intermittent fevers. He denies chest pain, being on home oxygen, having an inhaler, and lower extremity swelling. Of note: Pt was seen by Dr. Colvin in the cardiology clinic today and he was going to do a heart catheter for a defenitive dx of SOB when he noted that his liver and WBC count was elevated compared to previous labs and felt he should come to the ED to determine the cause of that.       The history is provided by the patient and a relative.     Review of patient's allergies indicates:  No Known Allergies  Past Medical History:   Diagnosis Date    Auricular fibrillation     Bronchitis     CHF (congestive heart failure)     Coronary artery disease     Diabetes mellitus, type 2 2010    Edema     Elevated troponin     Hematuria     Hydronephrosis of left kidney     Hypertension     Non-functioning kidney     Followed by Dr. Silver Anderson    Obesity (BMI 30-39.9)     Sleep apnea     Unspecified disorder of kidney and ureter      Past Surgical History:   Procedure  Laterality Date    arm surgery      CARDIOVERSION  11/29/2016    kidney removed Left 0825/2017    knee surgeory N/A 4 to 5 years ago     Family History   Problem Relation Age of Onset    Colon cancer Father     Ovarian cancer Sister     Liver cancer Brother     Heart disease Paternal Grandfather     Amblyopia Neg Hx     Blindness Neg Hx     Cataracts Neg Hx     Diabetes Neg Hx     Glaucoma Neg Hx     Hypertension Neg Hx     Macular degeneration Neg Hx     Retinal detachment Neg Hx     Strabismus Neg Hx     Stroke Neg Hx     Thyroid disease Neg Hx      Social History   Substance Use Topics    Smoking status: Never Smoker    Smokeless tobacco: Never Used    Alcohol use No     Review of Systems   Constitutional: Positive for fever (Intermittent ).   HENT: Negative for sore throat.    Respiratory: Positive for shortness of breath.    Cardiovascular: Negative for chest pain and leg swelling.   Gastrointestinal: Positive for abdominal pain (RUQ), diarrhea and nausea.   Genitourinary: Negative for dysuria.   Musculoskeletal: Negative for back pain.   Skin: Negative for rash.   Neurological: Negative for weakness.   Hematological: Does not bruise/bleed easily.       Physical Exam     Initial Vitals [02/08/18 1457]   BP Pulse Resp Temp SpO2   (!) 99/52 90 20 99.8 °F (37.7 °C) (!) 94 %      MAP       67.67         Physical Exam    ED Course   Procedures  Labs Reviewed   CBC W/ AUTO DIFFERENTIAL - Abnormal; Notable for the following:        Result Value    WBC 21.78 (*)     RBC 3.47 (*)     Hemoglobin 9.8 (*)     Hematocrit 28.9 (*)     Platelets 378 (*)     Immature Granulocytes 1.6 (*)     Gran # (ANC) 19.0 (*)     Immature Grans (Abs) 0.34 (*)     Lymph # 0.5 (*)     Mono # 2.0 (*)     Gran% 87.1 (*)     Lymph% 2.2 (*)     All other components within normal limits   COMPREHENSIVE METABOLIC PANEL - Abnormal; Notable for the following:     Sodium 128 (*)     CO2 21 (*)     Glucose 199 (*)     BUN, Bld 54  (*)     Creatinine 2.6 (*)     Calcium 8.4 (*)     Albumin 2.0 (*)     Total Bilirubin 1.7 (*)     Alkaline Phosphatase 285 (*)      (*)     ALT 68 (*)     eGFR if  28.6 (*)     eGFR if non  24.8 (*)     All other components within normal limits   B-TYPE NATRIURETIC PEPTIDE - Abnormal; Notable for the following:      (*)     All other components within normal limits   TROPONIN I - Abnormal; Notable for the following:     Troponin I 0.031 (*)     All other components within normal limits   D DIMER, QUANTITATIVE - Abnormal; Notable for the following:     D-Dimer 8.95 (*)     All other components within normal limits   INFLUENZA A AND B ANTIGEN   LACTIC ACID, PLASMA   POCT INFLUENZA A/B     EKG Readings: (Independently Interpreted)   Normal Sinus rate of 91. No ST elevation. Normal axis. T wave inversion in lead 3.           Medical Decision Making:   History:   Old Medical Records: I decided to obtain old medical records.  Independently Interpreted Test(s):   I have ordered and independently interpreted EKG Reading(s) - see prior notes  Clinical Tests:   Lab Tests: Ordered and Reviewed  Radiological Study: Ordered and Reviewed  Medical Tests: Reviewed and Ordered            Scribe Attestation:   Scribe #1: I performed the above scribed service and the documentation accurately describes the services I performed. I attest to the accuracy of the note.    medical decision making: This patient has had persistent shortness of breath for several weeks exertional non-positional and is essentially had a negative cardiac workup will be admitted with an elevated d-dimer of consult to radiology to try to get a stat nuclear medicine VQ scan, I discussed his case with the hospitalist on-call who is aware at this time were not doing Lovenox patient's not tachycardic is not hypoxic were awaiting the results of the VQ scan I've also consult cardiology to see the patient as they are who  referred him to the emergency department for evaluation I don't have a neck sedation for his leukocytosis his chest x-ray is nondiagnostic he has no abdominal pain on exam urine is pending at this time.  The patient is being admitted for further evaluation        ED Course      Clinical Impression:   The primary encounter diagnosis was D-dimer, elevated. Diagnoses of SOB (shortness of breath), Leukocytosis, unspecified type, and Elevated d-dimer were also pertinent to this visit.                           Colby Quijano MD  02/08/18 4835

## 2018-02-08 NOTE — PROGRESS NOTES
I have reviewed the fellow's history and physical and discussed the case with the fellow. I have personally spoken with and examined the patient in the clinic. I agree with the findings, assessment, and plan.  The HIDA scan did not show any evidence for cholecystitis but does show a couple of filling defects in the liver.  As it is unclear if he has evidence of right heart failure, will proceed with right heart cath per Dr. Cole's note. If normal, will get a CPX to determine if there is an intrinsic pulmonary cause of is exertional dyspnea.

## 2018-02-08 NOTE — ED NOTES
Patient identifiers verified and correct for Mr Lira  C/C: SOB  APPEARANCE: awake and alert in NAD.  SKIN: warm, dry and intact. No breakdown or bruising.  MUSCULOSKELETAL: Patient moving all extremities spontaneously, no obvious swelling or deformities noted. Ambulates independently.  RESPIRATORY: Positive shortness of breath.Respirations slightly labored at rest No cough, unable to determine fever.    CARDIAC: Denies CP, 2+ distal pulses; no peripheral edema  ABDOMEN: ABdomen large, round  : voids spontaneously, denies difficulty except infrequent voiding  Neurologic: AAO x 4; follows commands equal strength in all extremities; denies numbness/tingling. Positive dizziness Positive weakness

## 2018-02-08 NOTE — ED NOTES
Resting quietly, talking on phone, family at bedside. Resp decr labored after resp treatment given.

## 2018-02-08 NOTE — TELEPHONE ENCOUNTER
Forwarded patient message to [facility] sleep lab for further assistance.

## 2018-02-08 NOTE — ED NOTES
Bed: INT 02  Expected date:   Expected time:   Means of arrival:   Comments:  [facility]

## 2018-02-09 ENCOUNTER — TELEPHONE (OUTPATIENT)
Dept: SLEEP MEDICINE | Facility: OTHER | Age: 68
End: 2018-02-09

## 2018-02-09 PROBLEM — R93.2 ABNORMAL LIVER SCAN: Status: ACTIVE | Noted: 2018-02-09

## 2018-02-09 PROBLEM — N17.9 ACUTE RENAL FAILURE SUPERIMPOSED ON STAGE 3 CHRONIC KIDNEY DISEASE: Status: ACTIVE | Noted: 2018-02-09

## 2018-02-09 PROBLEM — N18.30 ACUTE RENAL FAILURE SUPERIMPOSED ON STAGE 3 CHRONIC KIDNEY DISEASE: Status: ACTIVE | Noted: 2018-02-09

## 2018-02-09 PROBLEM — E88.09 HYPOALBUMINEMIA: Status: ACTIVE | Noted: 2018-02-09

## 2018-02-09 PROBLEM — D72.829 LEUCOCYTOSIS: Status: ACTIVE | Noted: 2018-02-09

## 2018-02-09 PROBLEM — R79.89 ABNORMAL LIVER FUNCTION TESTS: Status: ACTIVE | Noted: 2018-02-09

## 2018-02-09 PROBLEM — E87.1 HYPONATREMIA: Status: ACTIVE | Noted: 2018-02-09

## 2018-02-09 LAB
ALBUMIN SERPL BCP-MCNC: 1.9 G/DL
ALBUMIN SERPL BCP-MCNC: 2 G/DL
ALP SERPL-CCNC: 257 U/L
ALP SERPL-CCNC: 288 U/L
ALT SERPL W/O P-5'-P-CCNC: 52 U/L
ALT SERPL W/O P-5'-P-CCNC: 55 U/L
AMPHET+METHAMPHET UR QL: NEGATIVE
ANION GAP SERPL CALC-SCNC: 12 MMOL/L
ANION GAP SERPL CALC-SCNC: 13 MMOL/L
ANISOCYTOSIS BLD QL SMEAR: SLIGHT
ANISOCYTOSIS BLD QL SMEAR: SLIGHT
AST SERPL-CCNC: 80 U/L
AST SERPL-CCNC: 81 U/L
BACTERIA #/AREA URNS AUTO: ABNORMAL /HPF
BARBITURATES UR QL SCN>200 NG/ML: NEGATIVE
BASOPHILS # BLD AUTO: 0.02 K/UL
BASOPHILS # BLD AUTO: 0.03 K/UL
BASOPHILS NFR BLD: 0.1 %
BASOPHILS NFR BLD: 0.1 %
BENZODIAZ UR QL SCN>200 NG/ML: NEGATIVE
BILIRUB SERPL-MCNC: 1.9 MG/DL
BILIRUB SERPL-MCNC: 1.9 MG/DL
BILIRUB UR QL STRIP: NEGATIVE
BUN SERPL-MCNC: 67 MG/DL
BUN SERPL-MCNC: 75 MG/DL
BZE UR QL SCN: NEGATIVE
CALCIUM SERPL-MCNC: 7.9 MG/DL
CALCIUM SERPL-MCNC: 8.2 MG/DL
CANNABINOIDS UR QL SCN: NEGATIVE
CHLORIDE SERPL-SCNC: 96 MMOL/L
CHLORIDE SERPL-SCNC: 96 MMOL/L
CLARITY UR REFRACT.AUTO: ABNORMAL
CO2 SERPL-SCNC: 20 MMOL/L
CO2 SERPL-SCNC: 21 MMOL/L
COLOR UR AUTO: ABNORMAL
CREAT SERPL-MCNC: 2.8 MG/DL
CREAT SERPL-MCNC: 3.2 MG/DL
CREAT UR-MCNC: 223 MG/DL
DIFFERENTIAL METHOD: ABNORMAL
DIFFERENTIAL METHOD: ABNORMAL
EOSINOPHIL # BLD AUTO: 0 K/UL
EOSINOPHIL # BLD AUTO: 0 K/UL
EOSINOPHIL NFR BLD: 0 %
EOSINOPHIL NFR BLD: 0 %
ERYTHROCYTE [DISTWIDTH] IN BLOOD BY AUTOMATED COUNT: 14.4 %
ERYTHROCYTE [DISTWIDTH] IN BLOOD BY AUTOMATED COUNT: 14.6 %
EST. GFR  (AFRICAN AMERICAN): 22.3 ML/MIN/1.73 M^2
EST. GFR  (AFRICAN AMERICAN): 26.2 ML/MIN/1.73 M^2
EST. GFR  (NON AFRICAN AMERICAN): 19.3 ML/MIN/1.73 M^2
EST. GFR  (NON AFRICAN AMERICAN): 22.6 ML/MIN/1.73 M^2
ETHANOL UR-MCNC: <10 MG/DL
GLUCOSE SERPL-MCNC: 189 MG/DL
GLUCOSE SERPL-MCNC: 217 MG/DL
GLUCOSE UR QL STRIP: NEGATIVE
HCT VFR BLD AUTO: 27.2 %
HCT VFR BLD AUTO: 29 %
HGB BLD-MCNC: 8.9 G/DL
HGB BLD-MCNC: 9.8 G/DL
HGB UR QL STRIP: ABNORMAL
HYALINE CASTS UR QL AUTO: 4 /LPF
HYPOCHROMIA BLD QL SMEAR: ABNORMAL
IMM GRANULOCYTES # BLD AUTO: 0.37 K/UL
IMM GRANULOCYTES # BLD AUTO: 0.53 K/UL
IMM GRANULOCYTES NFR BLD AUTO: 1.6 %
IMM GRANULOCYTES NFR BLD AUTO: 2.2 %
KETONES UR QL STRIP: NEGATIVE
LEUKOCYTE ESTERASE UR QL STRIP: NEGATIVE
LYMPHOCYTES # BLD AUTO: 0.5 K/UL
LYMPHOCYTES # BLD AUTO: 0.7 K/UL
LYMPHOCYTES NFR BLD: 2.3 %
LYMPHOCYTES NFR BLD: 2.7 %
MAGNESIUM SERPL-MCNC: 2 MG/DL
MCH RBC QN AUTO: 27.6 PG
MCH RBC QN AUTO: 28.6 PG
MCHC RBC AUTO-ENTMCNC: 32.7 G/DL
MCHC RBC AUTO-ENTMCNC: 33.8 G/DL
MCV RBC AUTO: 85 FL
MCV RBC AUTO: 85 FL
METHADONE UR QL SCN>300 NG/ML: NEGATIVE
MICROSCOPIC COMMENT: ABNORMAL
MONOCYTES # BLD AUTO: 1.8 K/UL
MONOCYTES # BLD AUTO: 2 K/UL
MONOCYTES NFR BLD: 7.6 %
MONOCYTES NFR BLD: 8.2 %
NEUTROPHILS # BLD AUTO: 20.4 K/UL
NEUTROPHILS # BLD AUTO: 20.8 K/UL
NEUTROPHILS NFR BLD: 86.8 %
NEUTROPHILS NFR BLD: 88.4 %
NITRITE UR QL STRIP: NEGATIVE
NRBC BLD-RTO: 0 /100 WBC
NRBC BLD-RTO: 0 /100 WBC
OPIATES UR QL SCN: NEGATIVE
OSMOLALITY UR: 502 MOSM/KG
PCP UR QL SCN>25 NG/ML: NEGATIVE
PH UR STRIP: 5 [PH] (ref 5–8)
PHOSPHATE SERPL-MCNC: 3.4 MG/DL
PLATELET # BLD AUTO: 360 K/UL
PLATELET # BLD AUTO: 400 K/UL
PLATELET BLD QL SMEAR: ABNORMAL
PLATELET BLD QL SMEAR: ABNORMAL
PMV BLD AUTO: 10.5 FL
PMV BLD AUTO: 11.2 FL
POCT GLUCOSE: 201 MG/DL (ref 70–110)
POCT GLUCOSE: 226 MG/DL (ref 70–110)
POCT GLUCOSE: 228 MG/DL (ref 70–110)
POCT GLUCOSE: 249 MG/DL (ref 70–110)
POLYCHROMASIA BLD QL SMEAR: ABNORMAL
POLYCHROMASIA BLD QL SMEAR: ABNORMAL
POTASSIUM SERPL-SCNC: 4.6 MMOL/L
POTASSIUM SERPL-SCNC: 4.6 MMOL/L
POTASSIUM UR-SCNC: 73 MMOL/L
PROT SERPL-MCNC: 6.2 G/DL
PROT SERPL-MCNC: 6.8 G/DL
PROT UR QL STRIP: ABNORMAL
PROT UR-MCNC: 65 MG/DL
PROT/CREAT RATIO, UR: 0.29
RBC # BLD AUTO: 3.22 M/UL
RBC # BLD AUTO: 3.43 M/UL
RBC #/AREA URNS AUTO: 40 /HPF (ref 0–4)
SODIUM SERPL-SCNC: 129 MMOL/L
SODIUM SERPL-SCNC: 129 MMOL/L
SODIUM UR-SCNC: <20 MMOL/L
SP GR UR STRIP: 1.02 (ref 1–1.03)
TOXICOLOGY INFORMATION: NORMAL
URN SPEC COLLECT METH UR: ABNORMAL
UROBILINOGEN UR STRIP-ACNC: NEGATIVE EU/DL
UUN UR-MCNC: 724 MG/DL
WBC # BLD AUTO: 23.07 K/UL
WBC # BLD AUTO: 23.99 K/UL
WBC #/AREA URNS AUTO: 1 /HPF (ref 0–5)

## 2018-02-09 PROCEDURE — 99233 SBSQ HOSP IP/OBS HIGH 50: CPT | Mod: ,,, | Performed by: HOSPITALIST

## 2018-02-09 PROCEDURE — 84540 ASSAY OF URINE/UREA-N: CPT

## 2018-02-09 PROCEDURE — 84100 ASSAY OF PHOSPHORUS: CPT

## 2018-02-09 PROCEDURE — 84133 ASSAY OF URINE POTASSIUM: CPT

## 2018-02-09 PROCEDURE — 80053 COMPREHEN METABOLIC PANEL: CPT

## 2018-02-09 PROCEDURE — 81001 URINALYSIS AUTO W/SCOPE: CPT

## 2018-02-09 PROCEDURE — 63600175 PHARM REV CODE 636 W HCPCS: Performed by: HOSPITALIST

## 2018-02-09 PROCEDURE — 80053 COMPREHEN METABOLIC PANEL: CPT | Mod: 91

## 2018-02-09 PROCEDURE — 83735 ASSAY OF MAGNESIUM: CPT

## 2018-02-09 PROCEDURE — 85025 COMPLETE CBC W/AUTO DIFF WBC: CPT

## 2018-02-09 PROCEDURE — 85060 BLOOD SMEAR INTERPRETATION: CPT | Mod: ,,, | Performed by: PATHOLOGY

## 2018-02-09 PROCEDURE — 25500020 PHARM REV CODE 255: Performed by: STUDENT IN AN ORGANIZED HEALTH CARE EDUCATION/TRAINING PROGRAM

## 2018-02-09 PROCEDURE — 99222 1ST HOSP IP/OBS MODERATE 55: CPT | Mod: ,,, | Performed by: INTERNAL MEDICINE

## 2018-02-09 PROCEDURE — 36415 COLL VENOUS BLD VENIPUNCTURE: CPT

## 2018-02-09 PROCEDURE — 83935 ASSAY OF URINE OSMOLALITY: CPT

## 2018-02-09 PROCEDURE — 84300 ASSAY OF URINE SODIUM: CPT

## 2018-02-09 PROCEDURE — 25000003 PHARM REV CODE 250: Performed by: INTERNAL MEDICINE

## 2018-02-09 PROCEDURE — 63600175 PHARM REV CODE 636 W HCPCS: Performed by: INTERNAL MEDICINE

## 2018-02-09 PROCEDURE — S0030 INJECTION, METRONIDAZOLE: HCPCS | Performed by: HOSPITALIST

## 2018-02-09 PROCEDURE — 25000003 PHARM REV CODE 250: Performed by: HOSPITALIST

## 2018-02-09 PROCEDURE — 11000001 HC ACUTE MED/SURG PRIVATE ROOM

## 2018-02-09 PROCEDURE — 87040 BLOOD CULTURE FOR BACTERIA: CPT

## 2018-02-09 PROCEDURE — 87086 URINE CULTURE/COLONY COUNT: CPT

## 2018-02-09 PROCEDURE — 84156 ASSAY OF PROTEIN URINE: CPT

## 2018-02-09 PROCEDURE — 80307 DRUG TEST PRSMV CHEM ANLYZR: CPT

## 2018-02-09 RX ORDER — CEFTRIAXONE 1 G/1
1 INJECTION, POWDER, FOR SOLUTION INTRAMUSCULAR; INTRAVENOUS
Status: DISCONTINUED | OUTPATIENT
Start: 2018-02-09 | End: 2018-02-10

## 2018-02-09 RX ORDER — CIPROFLOXACIN 250 MG/1
500 TABLET, FILM COATED ORAL EVERY 12 HOURS
Status: DISCONTINUED | OUTPATIENT
Start: 2018-02-09 | End: 2018-02-09

## 2018-02-09 RX ORDER — METRONIDAZOLE 500 MG/100ML
500 INJECTION, SOLUTION INTRAVENOUS
Status: DISCONTINUED | OUTPATIENT
Start: 2018-02-09 | End: 2018-02-10

## 2018-02-09 RX ORDER — SODIUM CHLORIDE 9 MG/ML
INJECTION, SOLUTION INTRAVENOUS CONTINUOUS
Status: ACTIVE | OUTPATIENT
Start: 2018-02-09 | End: 2018-02-10

## 2018-02-09 RX ADMIN — INSULIN ASPART 2 UNITS: 100 INJECTION, SOLUTION INTRAVENOUS; SUBCUTANEOUS at 10:02

## 2018-02-09 RX ADMIN — INSULIN ASPART 2 UNITS: 100 INJECTION, SOLUTION INTRAVENOUS; SUBCUTANEOUS at 01:02

## 2018-02-09 RX ADMIN — AMLODIPINE BESYLATE 10 MG: 10 TABLET ORAL at 08:02

## 2018-02-09 RX ADMIN — CARVEDILOL 25 MG: 25 TABLET, FILM COATED ORAL at 01:02

## 2018-02-09 RX ADMIN — SIMVASTATIN 20 MG: 20 TABLET, FILM COATED ORAL at 01:02

## 2018-02-09 RX ADMIN — APIXABAN 5 MG: 5 TABLET, FILM COATED ORAL at 09:02

## 2018-02-09 RX ADMIN — FLECAINIDE ACETATE 50 MG: 50 TABLET ORAL at 09:02

## 2018-02-09 RX ADMIN — IOHEXOL 15 ML: 350 INJECTION, SOLUTION INTRAVENOUS at 01:02

## 2018-02-09 RX ADMIN — FLECAINIDE ACETATE 50 MG: 50 TABLET ORAL at 08:02

## 2018-02-09 RX ADMIN — METRONIDAZOLE 500 MG: 500 INJECTION, SOLUTION INTRAVENOUS at 09:02

## 2018-02-09 RX ADMIN — APIXABAN 5 MG: 5 TABLET, FILM COATED ORAL at 01:02

## 2018-02-09 RX ADMIN — FLECAINIDE ACETATE 50 MG: 50 TABLET ORAL at 01:02

## 2018-02-09 RX ADMIN — APIXABAN 5 MG: 5 TABLET, FILM COATED ORAL at 08:02

## 2018-02-09 RX ADMIN — CEFTRIAXONE SODIUM 1 G: 1 INJECTION, POWDER, FOR SOLUTION INTRAMUSCULAR; INTRAVENOUS at 10:02

## 2018-02-09 RX ADMIN — INSULIN DETEMIR 12 UNITS: 100 INJECTION, SOLUTION SUBCUTANEOUS at 08:02

## 2018-02-09 RX ADMIN — IOHEXOL 15 ML: 350 INJECTION, SOLUTION INTRAVENOUS at 02:02

## 2018-02-09 RX ADMIN — SIMVASTATIN 20 MG: 20 TABLET, FILM COATED ORAL at 09:02

## 2018-02-09 RX ADMIN — SODIUM CHLORIDE: 0.9 INJECTION, SOLUTION INTRAVENOUS at 09:02

## 2018-02-09 RX ADMIN — DOXAZOSIN 2 MG: 1 TABLET ORAL at 08:02

## 2018-02-09 RX ADMIN — CARVEDILOL 25 MG: 25 TABLET, FILM COATED ORAL at 06:02

## 2018-02-09 RX ADMIN — CARVEDILOL 25 MG: 25 TABLET, FILM COATED ORAL at 09:02

## 2018-02-09 RX ADMIN — SODIUM CHLORIDE: 0.9 INJECTION, SOLUTION INTRAVENOUS at 01:02

## 2018-02-09 NOTE — ASSESSMENT & PLAN NOTE
Likely related to dehydration but cannot exclude potential infectious causes include leptospirosis  Recommend IV fluids  Ordered leptospira antibody

## 2018-02-09 NOTE — PROGRESS NOTES
I spoke to Dr. Pierre with radiology and Dr. Monk regarding Mr. Lira's liver lesions seen on ultrasound and HIDA scan.  He cannot get IV contrast given his renal function, so the question was which test may be the best approach to better characterize.  Radiology recommends CT chest/abdomen/pelvis with ORAL contrast only.  I relayed that information to Dr. Monk.      Easton Cole MD  Cardiology Fellow

## 2018-02-09 NOTE — PLAN OF CARE
02/09/18 0800   Discharge Assessment   Assessment Type Discharge Planning Assessment   Confirmed/corrected address and phone number on facesheet? Yes   Assessment information obtained from? Patient   Expected Length of Stay (days) 2   Communicated expected length of stay with patient/caregiver yes   Prior to hospitilization cognitive status: Alert/Oriented   Prior to hospitalization functional status: Independent   Current cognitive status: Alert/Oriented   Current Functional Status: Independent   Lives With parent(s)  (pt temporarily staying with is mother)   Able to Return to Prior Arrangements yes   Is patient able to care for self after discharge? Yes   Patient's perception of discharge disposition home or selfcare   Readmission Within The Last 30 Days current reason for admission unrelated to previous admission   If yes, most recent facility name: [facility]   Patient currently being followed by outpatient case management? No   Patient currently receives any other outside agency services? No   Equipment Currently Used at Home glucometer   Do you have any problems affording any of your prescribed medications? No   Is the patient taking medications as prescribed? yes   Does the patient have transportation home? Yes   Transportation Available family or friend will provide   Does the patient receive services at the [facility]? No   Discharge Plan A Home with family   Discharge Plan B Home Health   Patient/Family In Agreement With Plan yes   Readmission Questionnaire   At the time of your discharge, did someone talk to you about what your health problems were? Yes   At the time of discharge, did someone talk to you about what to watch out for regarding worsening of your health problem? Yes   At the time of discharge, did someone talk to you about what to do if you experienced worsening of your health problem? Yes   At the time of discharge, did someone talk to you about which medication to take when you left the  hospital and which ones to stop taking? Yes   At the time of discharge, did someone talk to you about when and where to follow up with a doctor after you left the hospital? Yes   What do you believe caused you to be sick enough to be re-admitted? SOB   How often do you need to have someone help you when you read instructions, pamphlets, or other written material from your doctor or pharmacy? Sometimes   Do you have problems taking your medications as prescribed? No   Do you have any problems affording any of  your prescribed medications? No   Do you have problems obtaining/receiving your medications? No   Does the patient have transportation to healthcare appointments? Yes   Living Arrangements condominium   Does the patient have family/friends to help with healtcare needs after discharge? yes   Does your caregiver provide all the help you need? Yes   Are you currently feeling confused? Yes   Are you currently having problems thinking? Yes   Are you currently having memory problems? Yes   In the last 7 days, my sleep quality was: very poor     Patient awake sitting in recliner when CM rounded. No family present. Patient stated that he has been very sleep deprived for the past few months. Patient stated that it is better for him to sleep upright in a recliner. Patient was instructed by his cardiologist, Dr. Cole, to go to the ED due to an elevated d-dimer. D-dimer 8.95 on 2/8/18. Cards & neph consults noted. Patient is temporarily staying with his mother ([address] [place] [address], [phone]) while his wife recovers from her hip surgery at their daughter's house ([address], [place], [address], [phone]). Plan to discharge patient home with support or home with home health when medically stable. Patient denied the need for assistance with transportation at time of discharge. Previously scheduled hospital follow up appointment with Dr. Lisa Capone (PCP) noted on 2/16/18 at 1100. Will continue  to follow.

## 2018-02-09 NOTE — ED NOTES
Pt continues to rest in bed, no distress noted. Respirations even and unlabored. No new complaints voiced. Updated on room assignment and plan of care. Remains on cardiac monitor. Side rails up x2. Call light within reach. Will continue to monitor.

## 2018-02-09 NOTE — HPI
"66 yo M with PMH of CKD3, left hydronephrosis s/p nephrectomy August 2017, HTN, DMII (long term insulin use), persistent atrial fibrillation (s/p DCCV 2016) on flecainide and Eliquis. He presented for RHC appt, was noted to have worsened LFTs, leukocytosis, and was sent to ED for further workup. Patient has been short of breath for several month with symptoms have been getting worse over the past few weeks. He has dyspnea with exertion and at rest currently, chills, cough off and on, fatigue.  He denies chest pain.  He does take his lasix twice daily and reports urine output has decreased lately in last few days, reports wt loss recently, has had difficulty sleeping lately 2/2 orthopnea, reports stable / decrease in LE swelling, unsure of PND. Patient does report recent travel to [facility] 12/17. He also reports that for the past 2 weeks, he has been having diarrhea.     Patient had recent admission for CHF exacerbation 2/1 and discharged 2/3, recent ED visit for same complaints noted to have leukocytosis with neutrophilic prodominance, bili noted to be 2.0, Cr 2.1, blood cultures drawn NGTD, referred for sleep study however did not receive yet though high suspicion for MONICA. Exam with cards noted for + johnson's sign, HIDA scan ordered was negative for cholecystitis however noted filling defects in liver, recommended f/u imaging. RHC was also ordered and was to be done 2/8 however worsened labs (leukocytosis, LFTs) prompted referral to ED for evaluation.    Nephrology was consulted for ARTUR on CKD. He has been followed by nephrologist Dr. Anderson. Baseline Cr is 1.7. He denies NSAID use. He has a history of left total nephrectomy in August 2017 due to left hydronephrosis (etiology is not clear). He notes that his po intake has been poor due to fatigue. Urine output has also decreased. Denies dysuria and hematuria. Endorses nocturia. States that he has been having "loose stools" for the past 2 weeks  "

## 2018-02-09 NOTE — ASSESSMENT & PLAN NOTE
- suspicious for infectious cause given history of night sweats and anemia along with splenomegaly, elev bili and liver enzymes. Procal elevated to 4.   - Given recent travel to [place], ddx include malaria and leptospirosis  - US also suggests liver mets. Leukocytosis could be malignancy   - Have ordered peripheral smear, reticulocyte count, and leptospira antibody

## 2018-02-09 NOTE — ED NOTES
Report called to [facility] for 927B. Pt transported to nuclear medicine with ED tech on tele box, then going to 927 B.

## 2018-02-09 NOTE — SUBJECTIVE & OBJECTIVE
Past Medical History:   Diagnosis Date    Auricular fibrillation     Bronchitis     CHF (congestive heart failure)     Coronary artery disease     Diabetes mellitus, type 2 2010    Edema     Elevated troponin     Hematuria     Hydronephrosis of left kidney     Hypertension     Non-functioning kidney     Followed by Dr. Silver Anderson    Obesity (BMI 30-39.9)     Sleep apnea     Unspecified disorder of kidney and ureter        Past Surgical History:   Procedure Laterality Date    arm surgery      CARDIOVERSION  11/29/2016    kidney removed Left 0825/2017    knee surgeory N/A 4 to 5 years ago       Review of patient's allergies indicates:  No Known Allergies  Current Facility-Administered Medications   Medication Frequency    amLODIPine tablet 10 mg Daily    apixaban tablet 5 mg BID    carvedilol tablet 25 mg BID WM    dextrose 50% injection 12.5 g PRN    dextrose 50% injection 25 g PRN    doxazosin tablet 2 mg Daily    flecainide tablet 50 mg Q12H    glucagon (human recombinant) injection 1 mg PRN    glucose chewable tablet 16 g PRN    glucose chewable tablet 24 g PRN    insulin aspart pen 1-10 Units QID (AC + HS) PRN    insulin detemir pen 12 Units Daily    simvastatin tablet 20 mg QHS    sodium chloride 0.9% flush 5 mL PRN     Family History     Problem Relation (Age of Onset)    Colon cancer Father    Heart disease Paternal Grandfather    Liver cancer Brother    Ovarian cancer Sister        Social History Main Topics    Smoking status: Never Smoker    Smokeless tobacco: Never Used    Alcohol use No    Drug use: No    Sexual activity: No      Comment:       Review of Systems   Constitutional: Positive for chills and fatigue. Negative for fever.   HENT: Negative for trouble swallowing.    Eyes: Negative for visual disturbance.   Respiratory: Positive for cough and shortness of breath. Negative for chest tightness and wheezing.    Cardiovascular: Positive for leg swelling.  Negative for chest pain and palpitations.   Gastrointestinal: Positive for abdominal pain, diarrhea and nausea. Negative for vomiting.   Endocrine: Negative for polyuria.   Genitourinary: Positive for decreased urine volume. Negative for dysuria and hematuria.   Musculoskeletal: Negative for arthralgias, back pain and gait problem.   Neurological: Positive for weakness. Negative for dizziness, light-headedness and headaches.     Objective:     Vital Signs (Most Recent):  Temp: 97.9 °F (36.6 °C) (02/09/18 0824)  Pulse: 83 (02/09/18 0824)  Resp: 18 (02/09/18 0824)  BP: 107/61 (02/09/18 0824)  SpO2: (!) 94 % (02/09/18 0824)  O2 Device (Oxygen Therapy): room air (02/09/18 0824) Vital Signs (24h Range):  Temp:  [97.9 °F (36.6 °C)-100 °F (37.8 °C)] 97.9 °F (36.6 °C)  Pulse:  [83-96] 83  Resp:  [18-26] 18  SpO2:  [90 %-98 %] 94 %  BP: ()/(45-62) 107/61     Weight: 122.5 kg (270 lb) (02/08/18 1457)  Body mass index is 34.67 kg/m².  Body surface area is 2.53 meters squared.    I/O last 3 completed shifts:  In: 360 [P.O.:360]  Out: 200 [Urine:200]    Physical Exam   Constitutional: He is oriented to person, place, and time. He appears well-developed and well-nourished.   HENT:   Head: Normocephalic and atraumatic.   Right Ear: External ear normal.   Eyes: EOM are normal.   Neck: No JVD present.   Cardiovascular: Normal rate, regular rhythm, normal heart sounds and intact distal pulses.    Pulmonary/Chest: Effort normal and breath sounds normal.   Abdominal: Soft. Bowel sounds are normal. There is tenderness (RUQ  ). There is no guarding.   Musculoskeletal: He exhibits edema (1+ LE edema).   Neurological: He is alert and oriented to person, place, and time.   Skin: Skin is warm and dry.   Psychiatric: He has a normal mood and affect. His behavior is normal.       Significant Labs:  CBC:   Recent Labs  Lab 02/09/18  0950   WBC 23.99*   RBC 3.22*   HGB 8.9*   HCT 27.2*   *   MCV 85   MCH 27.6   MCHC 32.7     CMP:    Recent Labs  Lab 02/09/18  0447   *   CALCIUM 7.9*   ALBUMIN 1.9*   PROT 6.2   *   K 4.6   CO2 20*   CL 96   BUN 67*   CREATININE 2.8*   ALKPHOS 257*   ALT 55*   AST 81*   BILITOT 1.9*       Recent Labs  Lab 02/09/18  0835   COLORU Krystyna   SPECGRAV 1.020   PHUR 5.0   PROTEINUA 1+*   BACTERIA Few*   NITRITE Negative   LEUKOCYTESUR Negative   UROBILINOGEN Negative   HYALINECASTS 4*       Significant Imaging:  Labs: Reviewed  X-Ray: Reviewed  US: Reviewed

## 2018-02-09 NOTE — PROGRESS NOTES
Please refer to my notes from yesterday and today.  No acute cardiac issue at this time, can follow up with me in clinic.     Easton Cole MD  Cardiology Fellow

## 2018-02-09 NOTE — ASSESSMENT & PLAN NOTE
This patient presents with ARTUR on CKD3, likely from pre-renal causes which include poor po intake, sepsis resulting in renal hypoperfusion, and 2 week history of diarrhea. This could be ATN from a prolonged pre-renal state    Plan   1. Recommend give additional IV fluids (100cc/hr x 10 hrs) and re-assess fluid status   2. Order urine osms, urine Na/K/Cr/, UPCR  3. Check urine sedimentation   4. Avoid nephrotoxic agents. Renally dose medications

## 2018-02-09 NOTE — H&P
History and Physical  Hospital Medicine       Patient Name: Jose Cruz Lira  MRN:  [id number]  [facility] Team: [facility] E Bean Silva MD  Date of Admission:  2/8/2018     Principal Problem:  <principal problem not specified>   Primary care Physician: Lisa Capone MD      History of Present Illness:     Patient information was obtained from patient, relative(s), past medical records and ER records.     Mr. Lira is a 67-yo patient of Dr. Castanon, with CKD (s/p nephrectomy August 2017), HTN, DMII (long term insulin use), persistent atrial fibrillation (s/p DCCV 2016) on flecainide and Eliquis. Patient presents s/p RHC appt was noted to have worsened LFTs, leukocytosis, admitted for further workup. Patient has been short of breath for several month with symptoms have been getting worse over the past few weeks. He has dyspnea with exertion and at rest currently, chills, cough off and on, fatigue.  He denies chest pain.  He does take his lasix twice daily and reports urine output has decreased lately in last few days, reports wt loss recently, has had difficulty sleeping lately 2/2 orthopnea, reports stable / decrease in LE swelling, unsure of PND. Patient does report recent travel to [facility] 12/17.    Patient had recent admission for CHF exacerbation 2/1 and discharged 2/3, recent ED visit for same complaints noted to have leukocytosis with neutrophilic prodominance, bili noted to be 2.0, Cr 2.1, blood cultures drawn NGTD, referred for sleep study however did not receive yet though high suspicion for MONICA. Exam with cards noted for + johnson's sign, HIDA scan ordered was negative for cholecystitis however noted filling defects in liver, recommended f/u imaging. RHC was also ordered and was to be done 2/8 however worsened labs (leukocytosis, LFTs) prompted referral to ED for evaluation.    In ED D-dimer elevated, V/Q scan ordered (though of note patient currently anticoagulated with eliquis)      Review of Systems   Constitutional: Positive for chills, fatigue and negative for fever.   HENT: Negative for sore throat and trouble swallowing.    Eyes: Negative for photophobia and visual disturbance.   Respiratory: Positive for cough and shortness of breath.    Cardiovascular: Negative for chest pain, palpitations and positive for leg swelling.   Gastrointestinal: Negative for abdominal pain, constipation, diarrhea, positive for nausea and vomiting.   Endocrine: Negative for cold intolerance and heat intolerance.   Genitourinary: Negative for dysuria and frequency, reporting decreased urine output.   Musculoskeletal: Negative for arthralgias and myalgias.   Skin: Negative for rash and wound.   Neurological: Negative for dizziness, syncope, weakness and light-headedness.   Psychiatric/Behavioral: Negative for confusion and hallucinations.   All other systems reviewed and are negative.      Past Medical History: Patient has a past medical history of Auricular fibrillation; Bronchitis; CHF (congestive heart failure); Coronary artery disease; Diabetes mellitus, type 2 (2010); Edema; Elevated troponin; Hematuria; Hydronephrosis of left kidney; Hypertension; Non-functioning kidney; Obesity (BMI 30-39.9); Sleep apnea; and Unspecified disorder of kidney and ureter.    Past Surgical History: Patient has a past surgical history that includes knee surgeory (N/A, 4 to 5 years ago); arm surgery; Cardioversion (11/29/2016); and kidney removed (Left, 0825/2017).    Social History: Patient reports that he has never smoked. He has never used smokeless tobacco. He reports that he does not drink alcohol or use drugs.    Family History: family history includes Colon cancer in his father; Heart disease in his paternal grandfather; Liver cancer in his brother; Ovarian cancer in his sister.    Medications:   No current facility-administered medications on file prior to encounter.      Current Outpatient Prescriptions on File Prior  to Encounter   Medication Sig Dispense Refill    amLODIPine (NORVASC) 10 MG tablet Take 1 tablet (10 mg total) by mouth once daily. 90 tablet 4    apixaban (ELIQUIS) 5 mg Tab Take 1 tablet (5 mg total) by mouth 2 (two) times daily. 60 tablet 11    carvedilol (COREG) 25 MG tablet Take 1 tablet (25 mg total) by mouth 2 (two) times daily with meals. 360 tablet 3    doxazosin (CARDURA) 4 MG tablet Take 0.5 tablets (2 mg total) by mouth once daily. (Patient taking differently: Take 2 mg by mouth once daily. TAkes at night) 15 tablet 6    ergocalciferol (ERGOCALCIFEROL) 50,000 unit Cap Take 1 capsule (50,000 Units total) by mouth every 7 days. 4 capsule 3    exenatide microspheres (BYDUREON) 2 mg/0.65 mL PnIj Inject 2 mg into the muscle once a week. 4 each 11    flecainide (TAMBOCOR) 50 MG Tab Take 1 tablet (50 mg total) by mouth every 12 (twelve) hours. 60 tablet 11    furosemide (LASIX) 40 MG tablet Take 1 tablet (40 mg total) by mouth 2 (two) times daily. 180 tablet 3    insulin detemir (LEVEMIR FLEXTOUCH) 100 unit/mL (3 mL) SubQ InPn pen INJECT 22 UNITS INTO THE SKIN EVERY EVENING (Patient taking differently: 18 Units every evening. INJECT 22 UNITS INTO THE SKIN EVERY EVENING) 45 mL 4    simvastatin (ZOCOR) 20 MG tablet Take 1 tablet (20 mg total) by mouth every evening. 90 tablet 4    blood sugar diagnostic (ONETOUCH VERIO) Strp 1 strip by Misc.(Non-Drug; Combo Route) route 3 (three) times daily. 100 strip 11    blood sugar diagnostic Strp To check BG 3 times daily, to use with insurance preferred meter 100 each 11    blood sugar diagnostic Strp To check BG 3 times daily, to use with insurance preferred meter 100 strip 11    blood-glucose meter kit To check BG 3 times daily, to use with insurance preferred meter 1 each 1    blood-glucose meter kit To check BG 3 times daily, to use with insurance preferred meter 1 each 0    lancets [facility] To check BG 3 times daily, to use with insurance preferred meter  "100 each 11    lancets Misc To check BG 3 times daily, to use with insurance preferred meter 100 each 11    pen needle, diabetic (BD ULTRA-FINE RODRIGUEZ PEN NEEDLES) 32 gauge x 5/32" Ndle Patient injects insulin once a day. Please provide 3 month supply. 50 each 11     Continuous Infusions:   sodium chloride 0.9%       PRN Meds:.dextrose 50%, dextrose 50%, glucagon (human recombinant), glucose, glucose, insulin aspart, sodium chloride 0.9%    Allergies: Patient has No Known Allergies.    Physical Exam:     Vital Signs (Most Recent):  Temp: 99 °F (37.2 °C) (02/08/18 1543)  Pulse: 90 (02/08/18 1831)  Resp: 19 (02/08/18 1831)  BP: 100/62 (02/08/18 1831)  SpO2: 97 % (02/08/18 1831) Vital Signs Range (Last 24H):  Temp:  [99 °F (37.2 °C)-99.8 °F (37.7 °C)]   Pulse:  [86-96]   Resp:  [19-26]   BP: ()/(45-62)   SpO2:  [94 %-97 %]    Body mass index is 34.67 kg/m².     Constitutional: Oriented to person, place, and time. Appears well-developed and well-nourished.   Head: Normocephalic and atraumatic.   Mouth/Throat: Oropharynx is clear and moist.   Eyes: EOM are normal. Pupils are equal, round, and reactive to light. No scleral icterus.   Neck: Normal range of motion. Neck supple. No JVD appreciated.   Cardiovascular: Normal rate and regular rhythm.  No murmur heard.  Pulmonary/Chest: Effort normal and breath sounds normal. No respiratory distress. No wheezes, rales, or rhonchi  Abdominal: Obese, Soft. Bowel sounds are normal. Tenderness to palpation in RUQ and LUQ no masses noted,  Musculoskeletal: Normal range of motion. No edema.   Neurological: Alert and oriented to person, place, and time.   Skin: Skin is warm and dry.   Psychiatric: Normal mood and affect. Behavior is normal.   Vitals reviewed.      Recent Labs  Lab 02/06/18  2225 02/08/18  1307 02/08/18  1544   WBC 14.99* 21.60* 21.78*   HGB 10.2* 10.0* 9.8*   HCT 30.6* 30.4* 28.9*   * 383* 378*         Recent Labs  Lab 02/02/18  0622 02/03/18  0414 " 02/06/18 2225 02/08/18  1307 02/08/18  1544   *  132* 134*  134* 133* 131* 128*   K 4.1  4.1 3.9  3.9 4.3 5.0 4.7     102 101  101 99 97 96   CO2 20*  20* 22*  22* 26 24 21*   BUN 23  23 23  23 34* 51* 54*   CREATININE 1.7*  1.7* 1.7*  1.7* 2.1* 2.6* 2.6*   *  166* 147*  147* 289* 211* 199*   CALCIUM 8.4*  8.4* 8.1*  8.1* 8.4* 8.3* 8.4*   MG 1.9 1.8 2.1  --   --    PHOS 2.0* 2.8 2.4*  --   --        Recent Labs  Lab 02/06/18 2225 02/08/18  1307 02/08/18  1544   ALKPHOS 298* 291* 285*   ALT 43 68* 68*   AST 54* 134* 128*   ALBUMIN 2.1* 2.0* 2.0*   PROT 6.7 6.5 6.7   BILITOT 2.0* 1.8* 1.7*        Recent Labs  Lab 02/02/18  0050 02/02/18  0809 02/02/18  1246 02/02/18  1751 02/02/18  2235 02/03/18  0858   POCTGLUCOSE 199* 158* 156* 144* 223* 166*         Assessment and Plan:     Mr. Jose Cruz Lira is a 67 y.o. male who presented to [facility] on 2/8/2018 with     Active Hospital Problems    Diagnosis  POA    D-dimer, elevated [R79.89]  Yes      Resolved Hospital Problems    Diagnosis Date Resolved POA   No resolved problems to display.     1. Transaminitis  Patient without complaint of abd pain on history however on exam TTP RUQ and LUQ, no masses, noted elevation in AST/ALT (approx 2:1) with alk phos elevated to 285, bili 1.7, and noted HIDA scan normal appearance of gallbladder. No bile duct obstruction seen. No evidence of acute cholecystitis. However noted were liver defects ddx including metastatic disease vs liver cyst. Hepatitis panel negative.   -Abd ultrasound ordered, CT IV contrast contraindicated given ARTUR on CKD  -F/u GGT  -Consider GI consult pending ultrasound results    2. ARTUR on CKD  Patient with baseline creatinine 1.7, h/o nephrectomy, uptrended to 2.6, on exam patient without crackles, JVD, euvolemic on cards exam day prior, BNP not significantly elevated above baseline, ARTUR likely prerenal, will give gentle fluids 75cc/hr x750cc given pt h/o CHF. F/u urine  studies and UA pending  -F/u UA  -Renal ultrasound  -Nephro consult in AM given h/o nephrectomy    3. Leukocytosis  Patient with recent leukocytosis without other evidence of SIRS or focal signs of infection, CXR without consolidation, blood cultures 2/6/18 NGTD, flu negative without signs of viral illness, 1.6% bands on CBC today, lactic acid not elevated  -F/u Bcx  -holding abx at this time, low threshold to start if patient shows signs of infection    4. Shortness of breath  Unclear etiology, being worked up outpatient, possible pulm HTN, pending RHC, V/Q scan as D dimer elevated, though given patient already on anticoagulation and otherwise HDS unlikely to   -F/u V/Q  -RHC as outpatient vs inpatient per cards  -Cards consulted    5. HFpEF  Euvolemic on exam, BNP 300s around baseline, otherwise stable  -Continue coreg 25 BID, holding lasix 40 BID    6. Afib s/p DCCV  Currently in sinus, on long term anticoagulation and on flecanide   -Continue eliquis 5 BID  -Continue flecainide 50mg Q12H    7. DM type 2 on insulin  Stable, glucose 199  -Detimir 12u daily  -Accuchecks ACHS  -SSI    8. HTN  Stable  -Continue amlodipine, coreg     DVT Prophylaxis: Eliquis    Disposition:  Pending V/Q, improvement in renal function, transaminitis, likely discharge with outpatient followup    Bean Silva MD  [facility]  Spectra:  [phone]  Pager:  [phone]

## 2018-02-09 NOTE — PROGRESS NOTES
Progress Note  Hospital Medicine    Admit Date: 2/8/2018  Length of Stay:  LOS: 0 days     SUBJECTIVE:         Follow-up For:  Acute renal failure superimposed on stage 3 chronic kidney disease    HPI/Interval history (See H&P for complete P,F,SHx) :     2/9/18  s/p nephrology evaluation Cr 2.1-->2.8. continue with IV hydration @100ml/hr x 10hr. CT chest abd/Pelvis without contrast as sono liver -Hepatomegaly with multiple ill-defined solid areas of decreased echogenicity, possibly metastatic disease       Review of Systems: List if applicable  Pain scale:0/10  Constitutional- Positive forWeakness  Resp- Positive for Cough, SOB on exertion  GI- Positive for Nausea, Vomiting, Diarrhea, black stools  Extrem- Positive for Swelling    OBJECTIVE:     Vital Signs Range (Last 24H):  Temp:  [97.9 °F (36.6 °C)-100 °F (37.8 °C)]   Pulse:  [83-91]   Resp:  [18-20]   BP: (100-139)/(58-62)   SpO2:  [90 %-98 %]     Physical Exam:  General- Patient alert and oriented x3, obesity   HEENT- PERRLA, EOMI, OP clear  Neck- No JVD, Lymphadenopathy, Thyromegaly  CV- Regular rate and rhythm, No Murmur/talya/rubs  Resp- Lungs CTA Bilaterally, No increased WOB  Abdomen- Non tender/ndistended, BS normoactive x4 quads, no HSM  Extrem- No cyanosis, clubbing, 1+ nonpitting edema.   Skin- No rashes, lesions, ulcers  Neuro- able to move upper and lower extremities without limitation      Medications:  Medication list was reviewed and changes noted under Assessment/Plan.      Current Facility-Administered Medications:     0.9%  NaCl infusion, , Intravenous, Continuous, Danilo Dumont MD    amLODIPine tablet 10 mg, 10 mg, Oral, Daily, Bean Silva MD, 10 mg at 02/09/18 0841    apixaban tablet 5 mg, 5 mg, Oral, BID, Bean Silva MD, 5 mg at 02/09/18 0841    carvedilol tablet 25 mg, 25 mg, Oral, BID WM, Bean Silva MD, 25 mg at 02/09/18 0652    cefTRIAXone injection 1 g, 1 g, Intravenous, Q24H, Daniel Monk MD     dextrose 50% injection 12.5 g, 12.5 g, Intravenous, PRN, Bean Silva MD    dextrose 50% injection 25 g, 25 g, Intravenous, PRN, Bean Silva MD    doxazosin tablet 2 mg, 2 mg, Oral, Daily, Bean Silva MD, 2 mg at 02/09/18 0841    flecainide tablet 50 mg, 50 mg, Oral, Q12H, Bean Silva MD, 50 mg at 02/09/18 0841    glucagon (human recombinant) injection 1 mg, 1 mg, Intramuscular, PRN, Bean Silva MD    glucose chewable tablet 16 g, 16 g, Oral, PRN, Bean Silva MD    glucose chewable tablet 24 g, 24 g, Oral, PRN, Bean Silva MD    insulin aspart pen 1-10 Units, 1-10 Units, Subcutaneous, QID (AC + HS) PRN, Bean Silva MD, 2 Units at 02/09/18 0119    insulin detemir pen 12 Units, 12 Units, Subcutaneous, Daily, Bean Silva MD, 12 Units at 02/09/18 0840    metronidazole IVPB 500 mg, 500 mg, Intravenous, Q8H, Daniel Monk MD    simvastatin tablet 20 mg, 20 mg, Oral, QHS, Bean Silva MD, 20 mg at 02/09/18 0110    sodium chloride 0.9% flush 5 mL, 5 mL, Intravenous, PRN, Bean Silva MD    dextrose 50%, dextrose 50%, glucagon (human recombinant), glucose, glucose, insulin aspart, sodium chloride 0.9%    Laboratory/Diagnostic Data:  Reviewed and noted in plan where applicable- Please see chart for full lab data.      Recent Labs  Lab 02/08/18  1307 02/08/18  1544 02/09/18  0950   WBC 21.60* 21.78* 23.99*   HGB 10.0* 9.8* 8.9*   HCT 30.4* 28.9* 27.2*   * 378* 360*         Recent Labs  Lab 02/03/18  0414 02/06/18  2225 02/08/18  1307 02/08/18  1544 02/09/18  0447   *  134* 133* 131* 128* 129*   K 3.9  3.9 4.3 5.0 4.7 4.6     101 99 97 96 96   CO2 22*  22* 26 24 21* 20*   BUN 23  23 34* 51* 54* 67*   CREATININE 1.7*  1.7* 2.1* 2.6* 2.6* 2.8*   *  147* 289* 211* 199* 217*   CALCIUM 8.1*  8.1* 8.4* 8.3* 8.4* 7.9*   MG 1.8 2.1  --   --  2.0   PHOS 2.8 2.4*  --   --  3.4         Recent Labs  Lab 02/08/18  1307 02/08/18  1543  02/09/18  0447   ALKPHOS 291* 285* 257*   ALT 68* 68* 55*   * 128* 81*   ALBUMIN 2.0* 2.0* 1.9*   PROT 6.5 6.7 6.2   BILITOT 1.8* 1.7* 1.9*        Microbiology labs for the last week  Microbiology Results (last 7 days)     Procedure Component Value Units Date/Time    Blood culture [[id number]] Collected:  02/09/18 1650    Order Status:  Sent Specimen:  Blood Updated:  02/09/18 1700    Blood culture [[id number]] Collected:  02/09/18 1659    Order Status:  Sent Specimen:  Blood Updated:  02/09/18 1700    Urine culture [[id number]] Collected:  02/09/18 0835    Order Status:  Sent Specimen:  Urine from Urine, Clean Catch Updated:  02/09/18 1007           Imaging Results          US Abdomen Complete (Final result)  Result time 02/09/18 02:09:26    Final result by Marc Chao MD (02/09/18 02:09:26)                 Impression:        Hepatomegaly with multiple ill-defined solid areas of decreased echogenicity, possibly metastatic disease. This finding would be best characterized with hepatic protocol contrast-enhanced MRI.    Nonspecific diffuse gallbladder wall thickening, which could be seen with acute/chronic cholecystitis, liver disease, malnutrition, or cardiac disease.    Nondependent subcentimeter adherent sludge ball or stone.    Splenomegaly.    Additional findings as above.  ______________________________________     Electronically signed by resident: WANDY ARELLANO MD  Date:     02/09/18  Time:    01:30            As the supervising and teaching physician, I personally reviewed the images and resident's interpretation and I agree with the findings.          Electronically signed by: Marc Chao  Date:     02/09/18  Time:    02:09              Narrative:    Time of Procedure: 02/09/18 00:00:00  Accession # [id number]    Technique: Complete abdominal ultrasound    Clinical indication: Elevated liver enzymes, hida with liver defects     Comparison: Nuclear medicine hepatobiliary imaging  02/07/2018.    Findings:    Pancreas:  Normal.     Aorta:  No aneurysm.      Inferior vena cava:  Normal in appearance.    Gallbladder:  Nonmobile echogenic focus at the gallbladder wall measuring 0.7 cm consistent with an impacted stone.  The common duct is not dilated, measuring 4 mm.  There is diffuse thickening of the gallbladder wall measuring up to 1.1 cm.No sonographic Andres sign.  No dilated intrahepatic radicles are seen.  No pericholecystic fluid.    Liver:  Enlarged in size measuring 20.5 cm.  The liver demonstrates heterogeneous echotexture with multiple ill-defined areas of decreased echogenicity.  The 2 most prominent areas are the right hepatic lobe measure 3.3 x 2.0 x 3.6 cm, and 1.9 x 1.0 x 1.6 cm.      Right kidney:  Normal in size with no hydronephrosis.  Multiple renal cysts better evaluated on retroperitoneal ultrasound from the same day.      Left kidney:  Left kidney is surgically absent. No abnormal echogenicity in the left renal fossa.      Spleen:  Enlarged in size measuring 15.2 x 4.7 cm with a homogeneous echotexture.    Miscellaneous:  No ascites.                             US Retroperitoneal Complete (Kidney and (Final result)  Result time 02/09/18 04:02:07    Final result by Marc Chao MD (02/09/18 04:02:07)                 Impression:        Findings suggestive of right-sided chronic medical renal disease.    Surgically absent left kidney without abnormal echogenicity in the left renal fossa.    Stable right simple renal cysts.  ______________________________________     Electronically signed by resident: WANDY ARELLANO MD  Date:     02/09/18  Time:    03:40            As the supervising and teaching physician, I personally reviewed the images and resident's interpretation and I agree with the findings.          Electronically signed by: Marc Chao  Date:     02/09/18  Time:    04:02              Narrative:    Time of Procedure: 02/09/18 00:00:00  Accession #  [id number]    Technique: Renal Ultrasound     Clinical indication: ARTUR     Comparison: Ultrasound kidneys only 06/22/2017    FINDINGS:     Right kidney: Dimensions 15.9 cm.  Satisfactory echogenicity with some cortical thinning.  No hydronephrosis.  Multiple simple renal cysts, the largest is at the interpolar region measuring 5.4 cm and is stable when compared to prior examination.    Left kidney: Surgically absent.  No abnormal echogenicity in the left renal fossa    Perfusion: Right renal perfusion is decreased.    Resistive indices: Elevated as follows: Right: 0.85 Splenic RI: 0.69    Bladder: Unremarkable.      Prostate: Measures 4.5 x 2.4 0.4 cm                             US Lower Extremity Veins Bilateral (Final result)  Result time 02/09/18 00:33:33    Final result by Marc Chao MD (02/09/18 00:33:33)                 Impression:        No evidence of deep venous thrombosis bilaterally.  ______________________________________     Electronically signed by resident: WANDY ARELLANO MD  Date:     02/09/18  Time:    00:31            As the supervising and teaching physician, I personally reviewed the images and resident's interpretation and I agree with the findings.          Electronically signed by: Marc Chao  Date:     02/09/18  Time:    00:33              Narrative:    Time of Procedure: 02/09/18 00:00:00  Accession # [id number]    Technique: Duplex and color flow Doppler evaluation of the bilateral lower extremities.    Comparison: None    Clinical indication:  Rule out DVT.    Findings:    Right - There is no evidence of venous thrombosis in the common femoral, superficial femoral, greater saphenous, popliteal, peroneal, anterior tibial and posterior tibial veins.  There is no reflux to suggest valvular incompetence.    Left - There is no evidence of venous thrombosis in the common femoral, superficial femoral, greater saphenous, popliteal, peroneal, anterior tibial and posterior tibial veins.   There is no reflux to suggest valvular incompetence.                             NM Lung Ventilation Perfusion Imaging (Final result)  Result time 02/08/18 20:49:57    Final result by Marc Chao MD (02/08/18 20:49:57)                 Impression:         Low probability VQ scan.    If clinically indicated, consider correlation with lower extremity venous Doppler ultrasound or CT PE protocol if renal function allows.    ______________________________________     Electronically signed by resident: MARIA ESTHER ALMEIDA MD  Date:     02/08/18  Time:    20:28            As the supervising and teaching physician, I personally reviewed the images and resident's interpretation and I agree with the findings.          Electronically signed by: Marc Chao  Date:     02/08/18  Time:    20:49              Narrative:    VENTILATION-PERFUSION PULMONARY SCINTIGRAPHY    History: Shortness of breath.    Comparison:  Chest radiograph, 2/8/18.    Technique:    42.0 mCi of Tc-99m-DTPA were placed in the nebulizer. Following the ventilation portion of the study 5.0 mCi of Tc-99m-MAA was administered and multiple images of the thorax were obtained in various projections.     Findings:      No evidence of significant perfusion defect or mismatched perfusion and ventilation defect.  There is nonspecific clumping of tracer on the ventilation portion of exam within the thierry-hilar airways.                             X-Ray Chest 1 View (Final result)  Result time 02/08/18 17:38:07    Final result by Edgardo Garcia MD (02/08/18 17:38:07)                 Impression:      Hypoventilatory exam, noting mild peribronchial thickening, similar to the previous exam.  Correlation with any bronchial inflammation or infection advise.      Electronically signed by: EDGARDO GARCIA MD  Date:     02/08/18  Time:    17:38              Narrative:    Chest one view    Indication:Shortness of breath    Comparison:2/6/2018    Findings: Habitus limits  "evaluation. The cardiomediastinal silhouette is prominent, magnified by technique, stable, noting calcification of the aortic arch.  There is no pleural effusion.  The trachea is midline.  The lungs are symmetrically expanded bilaterally with coarse interstitial attenuation, accentuated by shallow inspiratory effort.  There is peribronchial thickening bilaterally.. No large focal consolidation seen.  There is no pneumothorax.  The osseous structures are remarkable for degenerative changes.                              Estimated body mass index is 34.67 kg/m² as calculated from the following:    Height as of this encounter: 6' 2" (1.88 m).    Weight as of this encounter: 122.5 kg (270 lb).    I & O (Last 24H):    Intake/Output Summary (Last 24 hours) at 02/09/18 1741  Last data filed at 02/09/18 1600   Gross per 24 hour   Intake                0 ml   Output              420 ml   Net             -420 ml       Estimated Creatinine Clearance: 36.6 mL/min (based on SCr of 2.8 mg/dL (H)).    ASSESSMENT/PLAN:     Active Problems:    Active Hospital Problems    Diagnosis  POA    *Acute renal failure superimposed on stage 3 chronic kidney disease [N17.9, N18.3]poor oral intake. prerenal likely.s/p nephrology evaluation Cr 2.1-->2.8. continue with IV hydration @100ml/hr x 10hr.  Yes    Hypoalbuminemia [E88.09]1.9. likely contributing to peripheral edema   Yes    Leucocytosis [D72.829recent travel to [facility] 12/17. He also reports that for the past 2 weeks, he has been having diarrhea.]21-->23 with elevated procalcitonin 4. started on IV ceftriaxone and Metronidazole .ID consulted  Yes    Abnormal liver function tests [R79.89]ST/ALT (approx 2:1) with alk phos elevated to 285, bili 1.7, and noted HIDA scan normal appearance of gallbladder. No bile duct obstruction   Yes    Abnormal liver scan [R93.2] CT chest abd/Pelvis without contrast as sono liver -Hepatomegaly with multiple ill-defined solid areas of decreased " echogenicity, possibly metastatic disease     Yes    D-dimer, elevated [R79.89]DVT sonogram B/L LE negative.V/Q scan - Low probability VQ scan.  Yes    Current use of long term anticoagulation [Z79.01]Currently in sinus rhythm. Continue eliquis 5 BIDContinue flecainide 50mg Q12H  Not Applicable    Persistent atrial fibrillation [I48.1]s/p DCCV. as above  Yes    Proteinuria [R80.9]nephrololgy consulted as above  Yes    BMI 34.0-34.9,adult [Z68.34]Body mass index is 34.67 kg/m². Morbid obesity complicates all aspects of disease management from diagnostic modalities to treatment. Weight loss encouraged   Not Applicable    Hyperlipidemia associated with type 2 diabetes mellitus [E11.69, E78.5]On simvastatin  Yes    Hypertension associated with diabetes [E11.59, I10]Blood pressure controlled on carvedilol, amlodipine  Yes     Chronic    Type 2 diabetes mellitus with stage 3 chronic kidney disease, with long-term current use of insulin [E11.22, N18.3, Z79.4]Accuchecks ACHS, SSI. Detimir 12u daily  Not Applicable      Resolved Hospital Problems    Diagnosis Date Resolved POA   No resolved problems to display.         Disposition- Home    DVT prophylaxis addressed with: Rocio Monk MD  Attending Staff Physician  [facility]  pager- [phone] [facility] - [id number]

## 2018-02-10 PROBLEM — E87.1 HYPONATREMIA: Status: ACTIVE | Noted: 2018-02-10

## 2018-02-10 LAB
AFP SERPL-MCNC: 0.7 NG/ML
ALBUMIN SERPL BCP-MCNC: 1.7 G/DL
ALBUMIN SERPL BCP-MCNC: 1.9 G/DL
ALP SERPL-CCNC: 253 U/L
ALT SERPL W/O P-5'-P-CCNC: 43 U/L
ANION GAP SERPL CALC-SCNC: 11 MMOL/L
ANION GAP SERPL CALC-SCNC: 12 MMOL/L
ANISOCYTOSIS BLD QL SMEAR: SLIGHT
AST SERPL-CCNC: 67 U/L
BASOPHILS # BLD AUTO: 0.02 K/UL
BASOPHILS NFR BLD: 0.1 %
BILIRUB SERPL-MCNC: 1.6 MG/DL
BUN SERPL-MCNC: 84 MG/DL
BUN SERPL-MCNC: 93 MG/DL
CALCIUM SERPL-MCNC: 7.8 MG/DL
CALCIUM SERPL-MCNC: 8 MG/DL
CHLORIDE SERPL-SCNC: 97 MMOL/L
CHLORIDE SERPL-SCNC: 98 MMOL/L
CO2 SERPL-SCNC: 19 MMOL/L
CO2 SERPL-SCNC: 20 MMOL/L
CREAT SERPL-MCNC: 3.1 MG/DL
CREAT SERPL-MCNC: 3.2 MG/DL
DACRYOCYTES BLD QL SMEAR: ABNORMAL
DIFFERENTIAL METHOD: ABNORMAL
EOSINOPHIL # BLD AUTO: 0 K/UL
EOSINOPHIL NFR BLD: 0 %
ERYTHROCYTE [DISTWIDTH] IN BLOOD BY AUTOMATED COUNT: 14.6 %
EST. GFR  (AFRICAN AMERICAN): 22.3 ML/MIN/1.73 M^2
EST. GFR  (AFRICAN AMERICAN): 23.1 ML/MIN/1.73 M^2
EST. GFR  (NON AFRICAN AMERICAN): 19.3 ML/MIN/1.73 M^2
EST. GFR  (NON AFRICAN AMERICAN): 20 ML/MIN/1.73 M^2
FERRITIN SERPL-MCNC: 1793 NG/ML
GLUCOSE SERPL-MCNC: 200 MG/DL
GLUCOSE SERPL-MCNC: 207 MG/DL
HCT VFR BLD AUTO: 26.1 %
HGB BLD-MCNC: 9 G/DL
HYPOCHROMIA BLD QL SMEAR: ABNORMAL
IMM GRANULOCYTES # BLD AUTO: 0.76 K/UL
IMM GRANULOCYTES NFR BLD AUTO: 3.4 %
IRON SERPL-MCNC: <10 UG/DL
LYMPHOCYTES # BLD AUTO: 0.4 K/UL
LYMPHOCYTES NFR BLD: 2 %
MAGNESIUM SERPL-MCNC: 2.3 MG/DL
MCH RBC QN AUTO: 28.9 PG
MCHC RBC AUTO-ENTMCNC: 34.5 G/DL
MCV RBC AUTO: 84 FL
MONOCYTES # BLD AUTO: 2 K/UL
MONOCYTES NFR BLD: 9 %
NEUTROPHILS # BLD AUTO: 19 K/UL
NEUTROPHILS NFR BLD: 85.5 %
NRBC BLD-RTO: 0 /100 WBC
OB PNL STL: POSITIVE
OSMOLALITY SERPL: 303 MOSM/KG
OVALOCYTES BLD QL SMEAR: ABNORMAL
PHOSPHATE SERPL-MCNC: 3.6 MG/DL
PHOSPHATE SERPL-MCNC: 4 MG/DL
PLATELET # BLD AUTO: 336 K/UL
PMV BLD AUTO: 10.8 FL
POCT GLUCOSE: 187 MG/DL (ref 70–110)
POCT GLUCOSE: 225 MG/DL (ref 70–110)
POCT GLUCOSE: 226 MG/DL (ref 70–110)
POCT GLUCOSE: 233 MG/DL (ref 70–110)
POIKILOCYTOSIS BLD QL SMEAR: SLIGHT
POLYCHROMASIA BLD QL SMEAR: ABNORMAL
POTASSIUM SERPL-SCNC: 4.2 MMOL/L
POTASSIUM SERPL-SCNC: 4.5 MMOL/L
PROT SERPL-MCNC: 6 G/DL
RBC # BLD AUTO: 3.11 M/UL
RETICS/RBC NFR AUTO: 1.5 %
SATURATED IRON: ABNORMAL %
SODIUM SERPL-SCNC: 128 MMOL/L
SODIUM SERPL-SCNC: 129 MMOL/L
TOTAL IRON BINDING CAPACITY: 130 UG/DL
TRANSFERRIN SERPL-MCNC: 88 MG/DL
WBC # BLD AUTO: 22.23 K/UL

## 2018-02-10 PROCEDURE — 80053 COMPREHEN METABOLIC PANEL: CPT

## 2018-02-10 PROCEDURE — 83735 ASSAY OF MAGNESIUM: CPT

## 2018-02-10 PROCEDURE — 86720 LEPTOSPIRA ANTIBODY: CPT

## 2018-02-10 PROCEDURE — 85060 BLOOD SMEAR INTERPRETATION: CPT | Mod: ,,, | Performed by: PATHOLOGY

## 2018-02-10 PROCEDURE — P9047 ALBUMIN (HUMAN), 25%, 50ML: HCPCS | Mod: JG | Performed by: INTERNAL MEDICINE

## 2018-02-10 PROCEDURE — 83540 ASSAY OF IRON: CPT

## 2018-02-10 PROCEDURE — 87045 FECES CULTURE AEROBIC BACT: CPT

## 2018-02-10 PROCEDURE — 63600175 PHARM REV CODE 636 W HCPCS: Mod: JG | Performed by: INTERNAL MEDICINE

## 2018-02-10 PROCEDURE — 85025 COMPLETE CBC W/AUTO DIFF WBC: CPT

## 2018-02-10 PROCEDURE — 82272 OCCULT BLD FECES 1-3 TESTS: CPT

## 2018-02-10 PROCEDURE — 36415 COLL VENOUS BLD VENIPUNCTURE: CPT

## 2018-02-10 PROCEDURE — 99222 1ST HOSP IP/OBS MODERATE 55: CPT | Mod: ,,, | Performed by: INTERNAL MEDICINE

## 2018-02-10 PROCEDURE — 99233 SBSQ HOSP IP/OBS HIGH 50: CPT | Mod: ,,, | Performed by: HOSPITALIST

## 2018-02-10 PROCEDURE — S0030 INJECTION, METRONIDAZOLE: HCPCS | Performed by: HOSPITALIST

## 2018-02-10 PROCEDURE — 25000003 PHARM REV CODE 250: Performed by: INTERNAL MEDICINE

## 2018-02-10 PROCEDURE — 82728 ASSAY OF FERRITIN: CPT

## 2018-02-10 PROCEDURE — 82105 ALPHA-FETOPROTEIN SERUM: CPT

## 2018-02-10 PROCEDURE — 87427 SHIGA-LIKE TOXIN AG IA: CPT

## 2018-02-10 PROCEDURE — 87209 SMEAR COMPLEX STAIN: CPT

## 2018-02-10 PROCEDURE — 89055 LEUKOCYTE ASSESSMENT FECAL: CPT

## 2018-02-10 PROCEDURE — 87046 STOOL CULTR AEROBIC BACT EA: CPT

## 2018-02-10 PROCEDURE — 85045 AUTOMATED RETICULOCYTE COUNT: CPT

## 2018-02-10 PROCEDURE — 80069 RENAL FUNCTION PANEL: CPT

## 2018-02-10 PROCEDURE — 83930 ASSAY OF BLOOD OSMOLALITY: CPT

## 2018-02-10 PROCEDURE — 63600175 PHARM REV CODE 636 W HCPCS: Performed by: INTERNAL MEDICINE

## 2018-02-10 PROCEDURE — 99255 IP/OBS CONSLTJ NEW/EST HI 80: CPT | Mod: ,,, | Performed by: INTERNAL MEDICINE

## 2018-02-10 PROCEDURE — 84100 ASSAY OF PHOSPHORUS: CPT

## 2018-02-10 PROCEDURE — 25000003 PHARM REV CODE 250: Performed by: HOSPITALIST

## 2018-02-10 PROCEDURE — 87449 NOS EACH ORGANISM AG IA: CPT

## 2018-02-10 PROCEDURE — 11000001 HC ACUTE MED/SURG PRIVATE ROOM

## 2018-02-10 RX ORDER — ALBUMIN HUMAN 250 G/1000ML
12.5 SOLUTION INTRAVENOUS ONCE
Status: COMPLETED | OUTPATIENT
Start: 2018-02-10 | End: 2018-02-10

## 2018-02-10 RX ORDER — SODIUM CHLORIDE 9 MG/ML
INJECTION, SOLUTION INTRAVENOUS CONTINUOUS
Status: DISCONTINUED | OUTPATIENT
Start: 2018-02-10 | End: 2018-02-11

## 2018-02-10 RX ADMIN — METRONIDAZOLE 500 MG: 500 INJECTION, SOLUTION INTRAVENOUS at 01:02

## 2018-02-10 RX ADMIN — CARVEDILOL 25 MG: 25 TABLET, FILM COATED ORAL at 04:02

## 2018-02-10 RX ADMIN — INSULIN ASPART 1 UNITS: 100 INJECTION, SOLUTION INTRAVENOUS; SUBCUTANEOUS at 08:02

## 2018-02-10 RX ADMIN — INSULIN DETEMIR 12 UNITS: 100 INJECTION, SOLUTION SUBCUTANEOUS at 09:02

## 2018-02-10 RX ADMIN — ALBUMIN (HUMAN) 12.5 G: 12.5 SOLUTION INTRAVENOUS at 01:02

## 2018-02-10 RX ADMIN — SODIUM CHLORIDE: 0.9 INJECTION, SOLUTION INTRAVENOUS at 01:02

## 2018-02-10 RX ADMIN — APIXABAN 5 MG: 5 TABLET, FILM COATED ORAL at 09:02

## 2018-02-10 RX ADMIN — METRONIDAZOLE 500 MG: 500 INJECTION, SOLUTION INTRAVENOUS at 05:02

## 2018-02-10 RX ADMIN — AMLODIPINE BESYLATE 10 MG: 10 TABLET ORAL at 09:02

## 2018-02-10 RX ADMIN — DOXAZOSIN 2 MG: 1 TABLET ORAL at 09:02

## 2018-02-10 RX ADMIN — INSULIN ASPART 4 UNITS: 100 INJECTION, SOLUTION INTRAVENOUS; SUBCUTANEOUS at 02:02

## 2018-02-10 RX ADMIN — INSULIN ASPART 4 UNITS: 100 INJECTION, SOLUTION INTRAVENOUS; SUBCUTANEOUS at 09:02

## 2018-02-10 RX ADMIN — FLECAINIDE ACETATE 50 MG: 50 TABLET ORAL at 09:02

## 2018-02-10 RX ADMIN — INSULIN ASPART 4 UNITS: 100 INJECTION, SOLUTION INTRAVENOUS; SUBCUTANEOUS at 06:02

## 2018-02-10 RX ADMIN — FLECAINIDE ACETATE 50 MG: 50 TABLET ORAL at 08:02

## 2018-02-10 RX ADMIN — SIMVASTATIN 20 MG: 20 TABLET, FILM COATED ORAL at 08:02

## 2018-02-10 RX ADMIN — CARVEDILOL 25 MG: 25 TABLET, FILM COATED ORAL at 10:02

## 2018-02-10 NOTE — CONSULTS
?Radiology Consult    Jose Cruz Lira is a 67 y.o. male with elevated LFTs and hepatic parenchymal heterogeneity on recent U/S and CT examinations concerning for multiple hepatic lesions vs diffuse infiltrating process. IR consulted for liver biopsy.    Past Medical History:   Diagnosis Date    Auricular fibrillation     Bronchitis     CHF (congestive heart failure)     Coronary artery disease     Diabetes mellitus, type 2 2010    Edema     Elevated troponin     Hematuria     Hydronephrosis of left kidney     Hypertension     Non-functioning kidney     Followed by Dr. Silver Anderson    Obesity (BMI 30-39.9)     Sleep apnea     Unspecified disorder of kidney and ureter      Past Surgical History:   Procedure Laterality Date    arm surgery      CARDIOVERSION  11/29/2016    kidney removed Left 0825/2017    knee surgeory N/A 4 to 5 years ago       Discussed with primary team, Dr. Zhou.    Imaging reviewed with Radiology staff, Dr. Gibbs.     Procedure: Liver biopsy.    Scheduled Meds:    amLODIPine  10 mg Oral Daily    carvedilol  25 mg Oral BID WM    doxazosin  2 mg Oral Daily    flecainide  50 mg Oral Q12H    insulin detemir  12 Units Subcutaneous Daily    simvastatin  20 mg Oral QHS     Continuous Infusions:    sodium chloride 0.9% 75 mL/hr at 02/10/18 1357     PRN Meds:dextrose 50%, dextrose 50%, glucagon (human recombinant), glucose, glucose, insulin aspart, sodium chloride 0.9%    Allergies: Review of patient's allergies indicates:  No Known Allergies    Labs:  No results for input(s): INR in the last 168 hours.    Invalid input(s):  PT,  PTT    Recent Labs  Lab 02/10/18  0437   WBC 22.23*   HGB 9.0*   HCT 26.1*   MCV 84         Recent Labs  Lab 02/08/18  1544  02/10/18  0437   *  < > 200*   *  < > 129*   K 4.7  < > 4.5   CL 96  < > 98   CO2 21*  < > 19*   BUN 54*  < > 84*   CREATININE 2.6*  < > 3.2*   CALCIUM 8.4*  < > 8.0*   MG  --   < > 2.3   ALT 68*  < > 43    *  < > 67*   ALBUMIN 2.0*  < > 1.7*   BILITOT 1.7*  < > 1.6*   BILIDIR 1.4*  --   --    < > = values in this interval not displayed.      Vitals (Most Recent):  Temp: 98.2 °F (36.8 °C) (02/10/18 1625)  Pulse: 85 (02/10/18 1625)  Resp: 17 (02/10/18 1625)  BP: (!) 110/55 (02/10/18 1625)  SpO2: (!) 93 % (02/10/18 1625)    Plan:   1. No discrete hepatic lesion identified on current imaging for directed biopsy. Recommend obtaining MRI for further evaluation of hepatic findings with liver biopsy tentatively scheduled for Monday pending MRI results.  2. NPO after midnight Sunday.  3. Hold anticoagulants.      Bahman Moore MD  PGY-III  Dept of Radiology   Pager: [phone]

## 2018-02-10 NOTE — ASSESSMENT & PLAN NOTE
This patient presents with ARTUR on CKD3, likely from pre-renal causes which include poor po intake, sepsis resulting in renal hypoperfusion, and 2 week history of diarrhea. This could be ATN from a prolonged pre-renal state    Urine sedimentation shows granular casts suggestive of ATN  UPCR neg for significant proteinuria    Plan   1. Recommend continuous IV fluids (75cc/hr). Given that albumin is low (1.8) and patient having LE edema, will give one dose of albumin to assist with intravascular repletion   2. Re-check chem panel in PM to assess Na, Cr  3. Avoid nephrotoxic agents. Renally dose medications

## 2018-02-10 NOTE — PROGRESS NOTES
Progress Note  Hospital Medicine    Admit Date: 2/8/2018  Length of Stay:  LOS: 1 day     SUBJECTIVE:         Follow-up For:  Acute renal failure superimposed on stage 3 chronic kidney disease    HPI/Interval history (See H&P for complete P,F,SHx) :     2/9/18  s/p nephrology evaluation Cr 2.1-->2.8. continue with IV hydration @100ml/hr x 10hr. CT chest abd/Pelvis without contrast as sono liver -Hepatomegaly with multiple ill-defined solid areas of decreased echogenicity, possibly metastatic disease     2/10/18  AFP 0.7. CT chest abd/Pelvis without contrast - Interval development of hepatomegaly with markedly heterogeneity of hepatic parenchyma, noting multiple areas of ill-defined low attenuation.concerning for possible underlying diffuse infiltrating process or multiple hepatic lesions, possibly representing metastatic disease.IR consulted for liver biopsy. Apixaban held for possible liver biopsy on monday      Review of Systems: List if applicable  Pain scale:0/10  Constitutional- Positive forWeakness  Resp- Positive for Cough, SOB on exertion  GI- Positive for Nausea, Vomiting, Diarrhea, black stools  Extrem- Positive for Swelling    OBJECTIVE:     Vital Signs Range (Last 24H):  Temp:  [97.9 °F (36.6 °C)-98.8 °F (37.1 °C)]   Pulse:  [78-87]   Resp:  [18-20]   BP: ()/(53-59)   SpO2:  [91 %-95 %]     Physical Exam:  General- Patient alert and oriented x3, obesity   HEENT- PERRLA, EOMI, OP clear  Neck- No JVD, Lymphadenopathy, Thyromegaly  CV- Regular rate and rhythm, No Murmur/talya/rubs  Resp- Lungs CTA Bilaterally, No increased WOB  Abdomen- Non tender/ndistended, BS normoactive x4 quads, no HSM  Extrem- No cyanosis, clubbing, 1+ nonpitting edema.   Skin- No rashes, lesions, ulcers  Neuro- able to move upper and lower extremities without limitation      Medications:  Medication list was reviewed and changes noted under Assessment/Plan.      Current Facility-Administered Medications:     amLODIPine tablet  10 mg, 10 mg, Oral, Daily, Bean Silva MD, 10 mg at 02/10/18 0955    apixaban tablet 5 mg, 5 mg, Oral, BID, Bean Silva MD, 5 mg at 02/10/18 0955    carvedilol tablet 25 mg, 25 mg, Oral, BID WM, Bean Silva MD, 25 mg at 02/10/18 1000    cefTRIAXone injection 1 g, 1 g, Intravenous, Q24H, Daniel Monk MD, 1 g at 02/09/18 2204    dextrose 50% injection 12.5 g, 12.5 g, Intravenous, PRN, Bean Silva MD    dextrose 50% injection 25 g, 25 g, Intravenous, PRN, Bean Silva MD    doxazosin tablet 2 mg, 2 mg, Oral, Daily, Bean Silva MD, 2 mg at 02/10/18 0955    flecainide tablet 50 mg, 50 mg, Oral, Q12H, Bean Silva MD, 50 mg at 02/10/18 0955    glucagon (human recombinant) injection 1 mg, 1 mg, Intramuscular, PRN, Bean Silva MD    glucose chewable tablet 16 g, 16 g, Oral, PRN, Bean Silva MD    glucose chewable tablet 24 g, 24 g, Oral, PRN, Bean Silva MD    insulin aspart pen 1-10 Units, 1-10 Units, Subcutaneous, QID (AC + HS) PRN, Bean Silva MD, 4 Units at 02/10/18 0956    insulin detemir pen 12 Units, 12 Units, Subcutaneous, Daily, Bean Silva MD, 12 Units at 02/10/18 0956    metronidazole IVPB 500 mg, 500 mg, Intravenous, Q8H, Daniel Monk MD, Last Rate: 100 mL/hr at 02/10/18 0543, 500 mg at 02/10/18 0543    simvastatin tablet 20 mg, 20 mg, Oral, QHS, Bean Silva MD, 20 mg at 02/09/18 2158    sodium chloride 0.9% flush 5 mL, 5 mL, Intravenous, PRN, Bean Silva MD    dextrose 50%, dextrose 50%, glucagon (human recombinant), glucose, glucose, insulin aspart, sodium chloride 0.9%    Laboratory/Diagnostic Data:  Reviewed and noted in plan where applicable- Please see chart for full lab data.      Recent Labs  Lab 02/09/18  0950 02/09/18  1650 02/10/18  0437   WBC 23.99* 23.07* 22.23*   HGB 8.9* 9.8* 9.0*   HCT 27.2* 29.0* 26.1*   * 400* 336         Recent Labs  Lab 02/06/18  2225  02/09/18  0447 02/09/18  1650  02/10/18  0437   *  < > 129* 129* 129*   K 4.3  < > 4.6 4.6 4.5   CL 99  < > 96 96 98   CO2 26  < > 20* 21* 19*   BUN 34*  < > 67* 75* 84*   CREATININE 2.1*  < > 2.8* 3.2* 3.2*   *  < > 217* 189* 200*   CALCIUM 8.4*  < > 7.9* 8.2* 8.0*   MG 2.1  --  2.0  --  2.3   PHOS 2.4*  --  3.4  --  4.0   < > = values in this interval not displayed.      Recent Labs  Lab 02/09/18  0447 02/09/18  1650 02/10/18  0437   ALKPHOS 257* 288* 253*   ALT 55* 52* 43   AST 81* 80* 67*   ALBUMIN 1.9* 2.0* 1.7*   PROT 6.2 6.8 6.0   BILITOT 1.9* 1.9* 1.6*        Microbiology labs for the last week  Microbiology Results (last 7 days)     Procedure Component Value Units Date/Time    Clostridium difficile EIA [[id number]]     Order Status:  No result Specimen:  Stool from Stool     Stool culture [[id number]]     Order Status:  No result Specimen:  Stool from Stool     Blood culture [[id number]] Collected:  02/09/18 1650    Order Status:  Completed Specimen:  Blood Updated:  02/10/18 0145     Blood Culture, Routine No Growth to date    Blood culture [[id number]] Collected:  02/09/18 1659    Order Status:  Completed Specimen:  Blood Updated:  02/10/18 0145     Blood Culture, Routine No Growth to date    Urine culture [[id number]] Collected:  02/09/18 0835    Order Status:  Sent Specimen:  Urine from Urine, Clean Catch Updated:  02/09/18 1007           Imaging Results          CT Abdomen Pelvis  Without Contrast (Final result)  Result time 02/09/18 22:50:24    Final result by Norm Boswell MD (02/09/18 22:50:24)                 Impression:        1. Interval development of hepatomegaly with markedly heterogeneity of hepatic parenchyma, noting multiple areas of ill-defined low attenuation. This is incompletely characterized on today's noncontrast examination. Findings are concerning for possible underlying diffuse infiltrating process or multiple hepatic lesions, possibly representing metastatic disease. Clinical correlation with  patient history is advised.    2. Small volume of perihepatic ascites.    3. Status post left nephrectomy.    4. Multiple low-attenuation right renal lesions previously characterized as cysts. Nonobstructing right renal calculus.    5. Diverticulosis without evidence of acute diverticulitis.      Electronically signed by: SWATHI VINCENT  Date:     02/09/18  Time:    22:50              Narrative:    Procedure comments: The patient was surveyed from the thoracic inlet through the pelvis after the administration of oral contrast and without IV contrast and data was reconstructed for coronal, sagittal, and axial images.    Comparison: CT abdomen and pelvis 6/16/2015.    Findings:    Please note evaluation of the solid organs is limited without IV contrast.    Examination of the vascular and soft tissue structures at the base of the neck is unremarkable.    The thoracic aorta maintains normal caliber, contour, and course without significant atherosclerotic calcification within its course.  The heart is not enlarged and there is no evidence of pericardial effusion.    The esophagus maintains a normal course and caliber. There is no axillary, mediastinal, or hilar lymphadenopathy.      The trachea is midline, the proximal airways are patent and the lungs are symmetrically expanded.   Examination of the lung fields demonstrates mild dependent atelectasis at the lung bases. There is no focal airspace consolidation or pulmonary mass. There is no significant pleural effusion.      The liver is enlarged.  Evaluation of the hepatic parenchyma is significantly limited by lack of IV contrast, however there is marked heterogeneity with multifocal areas of ill-defined low attenuation throughout the hepatic parenchyma. There is a trace volume of perihepatic ascites.  Gallbladder is somewhat contracted with mild wall thickening as seen on prior examinations. No radiopaque stones are identified. There is no intra-extra hepatic biliary  ductal dilatation appreciated.    The stomach, spleen, pancreas, and adrenal glands are unremarkable.    The left kidney is surgically absent.  The right kidney demonstrates no evidence of hydronephrosis. Evaluation of the right renal parenchyma is limited secondary to streak artifact from the patient's body habitus. There are multiple partially exophytic low attenuation lesions previously characterized as cysts on prior ultrasound examination. There is a stable 1.0 cm cortically-based calcification within the right kidney. There is a nonobstructing right renal calculus. No stones are identified within the right ureter. The urinary bladder appears within normal limits. The prostate demonstrates central dystrophic calcification. There is a fat containing left inguinal hernia.    The abdominal aorta is normal in course and caliber without significant atherosclerotic calcifications.    The visualized loops of small and large bowel show no evidence of obstruction or inflammation.  There is diverticulosis without evidence of acute diverticulitis. There is no free intraperitoneal air or portal venous gas. No bulky lymphadenopathy is identified within the abdomen or pelvis.    The osseous structures demonstrate mild degenerative changes of the spine.  The extraperitoneal soft tissues are unremarkable.                             CT Chest Without Contrast (Final result)  Result time 02/09/18 22:50:23    Final result by Norm Boswell MD (02/09/18 22:50:23)                 Impression:        1. Interval development of hepatomegaly with markedly heterogeneity of hepatic parenchyma, noting multiple areas of ill-defined low attenuation. This is incompletely characterized on today's noncontrast examination. Findings are concerning for possible underlying diffuse infiltrating process or multiple hepatic lesions, possibly representing metastatic disease. Clinical correlation with patient history is advised.    2. Small volume  of perihepatic ascites.    3. Status post left nephrectomy.    4. Multiple low-attenuation right renal lesions previously characterized as cysts. Nonobstructing right renal calculus.    5. Diverticulosis without evidence of acute diverticulitis.      Electronically signed by: SWATHI VINCENT  Date:     02/09/18  Time:    22:50              Narrative:    Procedure comments: The patient was surveyed from the thoracic inlet through the pelvis after the administration of oral contrast and without IV contrast and data was reconstructed for coronal, sagittal, and axial images.    Comparison: CT abdomen and pelvis 6/16/2015.    Findings:    Please note evaluation of the solid organs is limited without IV contrast.    Examination of the vascular and soft tissue structures at the base of the neck is unremarkable.    The thoracic aorta maintains normal caliber, contour, and course without significant atherosclerotic calcification within its course.  The heart is not enlarged and there is no evidence of pericardial effusion.    The esophagus maintains a normal course and caliber. There is no axillary, mediastinal, or hilar lymphadenopathy.      The trachea is midline, the proximal airways are patent and the lungs are symmetrically expanded.   Examination of the lung fields demonstrates mild dependent atelectasis at the lung bases. There is no focal airspace consolidation or pulmonary mass. There is no significant pleural effusion.      The liver is enlarged.  Evaluation of the hepatic parenchyma is significantly limited by lack of IV contrast, however there is marked heterogeneity with multifocal areas of ill-defined low attenuation throughout the hepatic parenchyma. There is a trace volume of perihepatic ascites.  Gallbladder is somewhat contracted with mild wall thickening as seen on prior examinations. No radiopaque stones are identified. There is no intra-extra hepatic biliary ductal dilatation appreciated.    The stomach,  spleen, pancreas, and adrenal glands are unremarkable.    The left kidney is surgically absent.  The right kidney demonstrates no evidence of hydronephrosis. Evaluation of the right renal parenchyma is limited secondary to streak artifact from the patient's body habitus. There are multiple partially exophytic low attenuation lesions previously characterized as cysts on prior ultrasound examination. There is a stable 1.0 cm cortically-based calcification within the right kidney. There is a nonobstructing right renal calculus. No stones are identified within the right ureter. The urinary bladder appears within normal limits. The prostate demonstrates central dystrophic calcification. There is a fat containing left inguinal hernia.    The abdominal aorta is normal in course and caliber without significant atherosclerotic calcifications.    The visualized loops of small and large bowel show no evidence of obstruction or inflammation.  There is diverticulosis without evidence of acute diverticulitis. There is no free intraperitoneal air or portal venous gas. No bulky lymphadenopathy is identified within the abdomen or pelvis.    The osseous structures demonstrate mild degenerative changes of the spine.  The extraperitoneal soft tissues are unremarkable.                             US Abdomen Complete (Final result)  Result time 02/09/18 02:09:26    Final result by Marc Chao MD (02/09/18 02:09:26)                 Impression:        Hepatomegaly with multiple ill-defined solid areas of decreased echogenicity, possibly metastatic disease. This finding would be best characterized with hepatic protocol contrast-enhanced MRI.    Nonspecific diffuse gallbladder wall thickening, which could be seen with acute/chronic cholecystitis, liver disease, malnutrition, or cardiac disease.    Nondependent subcentimeter adherent sludge ball or stone.    Splenomegaly.    Additional findings as  above.  ______________________________________     Electronically signed by resident: WANDY ARELLANO MD  Date:     02/09/18  Time:    01:30            As the supervising and teaching physician, I personally reviewed the images and resident's interpretation and I agree with the findings.          Electronically signed by: Marc Chao  Date:     02/09/18  Time:    02:09              Narrative:    Time of Procedure: 02/09/18 00:00:00  Accession # [id number]    Technique: Complete abdominal ultrasound    Clinical indication: Elevated liver enzymes, hida with liver defects     Comparison: Nuclear medicine hepatobiliary imaging 02/07/2018.    Findings:    Pancreas:  Normal.     Aorta:  No aneurysm.      Inferior vena cava:  Normal in appearance.    Gallbladder:  Nonmobile echogenic focus at the gallbladder wall measuring 0.7 cm consistent with an impacted stone.  The common duct is not dilated, measuring 4 mm.  There is diffuse thickening of the gallbladder wall measuring up to 1.1 cm.No sonographic Andres sign.  No dilated intrahepatic radicles are seen.  No pericholecystic fluid.    Liver:  Enlarged in size measuring 20.5 cm.  The liver demonstrates heterogeneous echotexture with multiple ill-defined areas of decreased echogenicity.  The 2 most prominent areas are the right hepatic lobe measure 3.3 x 2.0 x 3.6 cm, and 1.9 x 1.0 x 1.6 cm.      Right kidney:  Normal in size with no hydronephrosis.  Multiple renal cysts better evaluated on retroperitoneal ultrasound from the same day.      Left kidney:  Left kidney is surgically absent. No abnormal echogenicity in the left renal fossa.      Spleen:  Enlarged in size measuring 15.2 x 4.7 cm with a homogeneous echotexture.    Miscellaneous:  No ascites.                             US Retroperitoneal Complete (Kidney and (Final result)  Result time 02/09/18 04:02:07    Final result by Marc Chao MD (02/09/18 04:02:07)                 Impression:        Findings  suggestive of right-sided chronic medical renal disease.    Surgically absent left kidney without abnormal echogenicity in the left renal fossa.    Stable right simple renal cysts.  ______________________________________     Electronically signed by resident: WANDY ARELLANO MD  Date:     02/09/18  Time:    03:40            As the supervising and teaching physician, I personally reviewed the images and resident's interpretation and I agree with the findings.          Electronically signed by: Marc Chao  Date:     02/09/18  Time:    04:02              Narrative:    Time of Procedure: 02/09/18 00:00:00  Accession # [id number]    Technique: Renal Ultrasound     Clinical indication: ARTUR     Comparison: Ultrasound kidneys only 06/22/2017    FINDINGS:     Right kidney: Dimensions 15.9 cm.  Satisfactory echogenicity with some cortical thinning.  No hydronephrosis.  Multiple simple renal cysts, the largest is at the interpolar region measuring 5.4 cm and is stable when compared to prior examination.    Left kidney: Surgically absent.  No abnormal echogenicity in the left renal fossa    Perfusion: Right renal perfusion is decreased.    Resistive indices: Elevated as follows: Right: 0.85 Splenic RI: 0.69    Bladder: Unremarkable.      Prostate: Measures 4.5 x 2.4 0.4 cm                             US Lower Extremity Veins Bilateral (Final result)  Result time 02/09/18 00:33:33    Final result by Marc Chao MD (02/09/18 00:33:33)                 Impression:        No evidence of deep venous thrombosis bilaterally.  ______________________________________     Electronically signed by resident: WANDY ARELLANO MD  Date:     02/09/18  Time:    00:31            As the supervising and teaching physician, I personally reviewed the images and resident's interpretation and I agree with the findings.          Electronically signed by: Marc Chao  Date:     02/09/18  Time:    00:33              Narrative:    Time of  Procedure: 02/09/18 00:00:00  Accession # [id number]    Technique: Duplex and color flow Doppler evaluation of the bilateral lower extremities.    Comparison: None    Clinical indication:  Rule out DVT.    Findings:    Right - There is no evidence of venous thrombosis in the common femoral, superficial femoral, greater saphenous, popliteal, peroneal, anterior tibial and posterior tibial veins.  There is no reflux to suggest valvular incompetence.    Left - There is no evidence of venous thrombosis in the common femoral, superficial femoral, greater saphenous, popliteal, peroneal, anterior tibial and posterior tibial veins.  There is no reflux to suggest valvular incompetence.                             NM Lung Ventilation Perfusion Imaging (Final result)  Result time 02/08/18 20:49:57    Final result by Marc Chao MD (02/08/18 20:49:57)                 Impression:         Low probability VQ scan.    If clinically indicated, consider correlation with lower extremity venous Doppler ultrasound or CT PE protocol if renal function allows.    ______________________________________     Electronically signed by resident: MARIA ESTHER ALMEIDA MD  Date:     02/08/18  Time:    20:28            As the supervising and teaching physician, I personally reviewed the images and resident's interpretation and I agree with the findings.          Electronically signed by: Marc Chao  Date:     02/08/18  Time:    20:49              Narrative:    VENTILATION-PERFUSION PULMONARY SCINTIGRAPHY    History: Shortness of breath.    Comparison:  Chest radiograph, 2/8/18.    Technique:    42.0 mCi of Tc-99m-DTPA were placed in the nebulizer. Following the ventilation portion of the study 5.0 mCi of Tc-99m-MAA was administered and multiple images of the thorax were obtained in various projections.     Findings:      No evidence of significant perfusion defect or mismatched perfusion and ventilation defect.  There is nonspecific clumping of  "tracer on the ventilation portion of exam within the thierry-hilar airways.                             X-Ray Chest 1 View (Final result)  Result time 02/08/18 17:38:07    Final result by Edgardo Garcia MD (02/08/18 17:38:07)                 Impression:      Hypoventilatory exam, noting mild peribronchial thickening, similar to the previous exam.  Correlation with any bronchial inflammation or infection advise.      Electronically signed by: EDGARDO GARCIA MD  Date:     02/08/18  Time:    17:38              Narrative:    Chest one view    Indication:Shortness of breath    Comparison:2/6/2018    Findings: Habitus limits evaluation. The cardiomediastinal silhouette is prominent, magnified by technique, stable, noting calcification of the aortic arch.  There is no pleural effusion.  The trachea is midline.  The lungs are symmetrically expanded bilaterally with coarse interstitial attenuation, accentuated by shallow inspiratory effort.  There is peribronchial thickening bilaterally.. No large focal consolidation seen.  There is no pneumothorax.  The osseous structures are remarkable for degenerative changes.                              Estimated body mass index is 34.67 kg/m² as calculated from the following:    Height as of this encounter: 6' 2" (1.88 m).    Weight as of this encounter: 122.5 kg (270 lb).    I & O (Last 24H):    Intake/Output Summary (Last 24 hours) at 02/10/18 1140  Last data filed at 02/10/18 0330   Gross per 24 hour   Intake                0 ml   Output              420 ml   Net             -420 ml       Estimated Creatinine Clearance: 32 mL/min (based on SCr of 3.2 mg/dL (H)).    ASSESSMENT/PLAN:     Active Problems:    Active Hospital Problems    Diagnosis  POA    *Acute renal failure superimposed on stage 3 chronic kidney disease [N17.9, N18.3]poor oral intake. prerenal likely.s/p nephrology evaluation Cr 2.1-->2.8-->3.2 continue with IV hydration   Yes    Hypoalbuminemia [E88.09]1.9. likely " contributing to peripheral edema   Yes    Leucocytosis [D72.829recent travel to [facility] 12/17. He also reports that for the past 2 weeks, he has been having diarrhea.]21-->23 with elevated procalcitonin 4.ID consulted. Antibiotics discontinued  Yes    Abnormal liver function tests [R79.89]ST/ALT (approx 2:1) with alk phos elevated to 285, bili 1.7, and noted HIDA scan normal appearance of gallbladder. No bile duct obstruction .AFP 0.7. CT chest abd/Pelvis without contrast - Interval development of hepatomegaly with markedly heterogeneity of hepatic parenchyma, noting multiple areas of ill-defined low attenuation.concerning for possible underlying diffuse infiltrating process or multiple hepatic lesions, possibly representing metastatic disease. Hepatology consulted   Yes    Abnormal liver scan [R93.2] CT chest abd/Pelvis without contrast as sono liver -Hepatomegaly with multiple ill-defined solid areas of decreased echogenicity, possibly metastatic disease .IR consulted for liver biopsy. Apixaban held for possible liver biopsy on monday    Yes    D-dimer, elevated [R79.89]DVT sonogram B/L LE negative.V/Q scan - Low probability VQ scan.  Yes    Current use of long term anticoagulation [Z79.01]Currently in sinus rhythm. Continue eliquis 5 BIDContinue flecainide 50mg Q12H  Not Applicable    Persistent atrial fibrillation [I48.1]s/p DCCV. as above  Yes    Proteinuria [R80.9]nephrololgy consulted as above  Yes    BMI 34.0-34.9,adult [Z68.34]Body mass index is 34.67 kg/m². Morbid obesity complicates all aspects of disease management from diagnostic modalities to treatment. Weight loss encouraged   Not Applicable    Hyperlipidemia associated with type 2 diabetes mellitus [E11.69, E78.5]On simvastatin  Yes    Hypertension associated with diabetes [E11.59, I10]Blood pressure controlled on carvedilol, amlodipine  Yes     Chronic    Type 2 diabetes mellitus with stage 3 chronic kidney disease, with long-term  current use of insulin [E11.22, N18.3, Z79.4]Accuchecks ACHS, SSI. Detimir 12u daily  Not Applicable      Resolved Hospital Problems    Diagnosis Date Resolved POA   No resolved problems to display.         Disposition- Home    DVT prophylaxis addressed with: Rocio Monk MD  Attending Staff Physician  [facility] Medicine  pager- [phone] [id number] - [phone]

## 2018-02-10 NOTE — PLAN OF CARE
Problem: Patient Care Overview  Goal: Plan of Care Review  Outcome: Ongoing (interventions implemented as appropriate)  POC: remain NPO for CT abd/pelvis, telemetry on, accu check AC/HS, monitor pain, start 24 hour urine. Voices understanding.

## 2018-02-10 NOTE — PROGRESS NOTES
[facility]  Nephrology  Progress Note    Patient Name: Jose Cruz Lira  MRN: [id number]  Admission Date: 2/8/2018  Hospital Length of Stay: 1 days  Attending Provider: Daniel Monk MD   Primary Care Physician: Lisa Capone MD  Principal Problem:Acute renal failure superimposed on stage 3 chronic kidney disease    Subjective:     HPI: 68 yo M with PMH of CKD3, left hydronephrosis s/p nephrectomy August 2017, HTN, DMII (long term insulin use), persistent atrial fibrillation (s/p DCCV 2016) on flecainide and Eliquis. He presented for RHC appt, was noted to have worsened LFTs, leukocytosis, and was sent to ED for further workup. Patient has been short of breath for several month with symptoms have been getting worse over the past few weeks. He has dyspnea with exertion and at rest currently, chills, cough off and on, fatigue.  He denies chest pain.  He does take his lasix twice daily and reports urine output has decreased lately in last few days, reports wt loss recently, has had difficulty sleeping lately 2/2 orthopnea, reports stable / decrease in LE swelling, unsure of PND. Patient does report recent travel to [facility] 12/17. He also reports that for the past 2 weeks, he has been having diarrhea.     Patient had recent admission for CHF exacerbation 2/1 and discharged 2/3, recent ED visit for same complaints noted to have leukocytosis with neutrophilic prodominance, bili noted to be 2.0, Cr 2.1, blood cultures drawn NGTD, referred for sleep study however did not receive yet though high suspicion for MONICA. Exam with cards noted for + johnson's sign, HIDA scan ordered was negative for cholecystitis however noted filling defects in liver, recommended f/u imaging. RHC was also ordered and was to be done 2/8 however worsened labs (leukocytosis, LFTs) prompted referral to ED for evaluation.    Nephrology was consulted for ARTUR on CKD. He has been followed by nephrologist Dr. Anderson.  "Baseline Cr is 1.7. He denies NSAID use. He has a history of left total nephrectomy in August 2017 due to left hydronephrosis (etiology is not clear). He notes that his po intake has been poor due to fatigue. Urine output has also decreased. Denies dysuria and hematuria. Endorses nocturia. States that he has been having "loose stools" for the past 2 weeks    Interval History: No acute events. Patient continues to have fatigue. Having multiple watery bowel movements. Afebrile. Cr increased to 3.2 from 2.8. CT scan shows multiple hepatic lesions and renal cysts.    Review of patient's allergies indicates:  No Known Allergies  Current Facility-Administered Medications   Medication Frequency    0.9%  NaCl infusion Continuous    amLODIPine tablet 10 mg Daily    apixaban tablet 5 mg BID    carvedilol tablet 25 mg BID WM    dextrose 50% injection 12.5 g PRN    dextrose 50% injection 25 g PRN    doxazosin tablet 2 mg Daily    flecainide tablet 50 mg Q12H    glucagon (human recombinant) injection 1 mg PRN    glucose chewable tablet 16 g PRN    glucose chewable tablet 24 g PRN    insulin aspart pen 1-10 Units QID (AC + HS) PRN    insulin detemir pen 12 Units Daily    simvastatin tablet 20 mg QHS    sodium chloride 0.9% flush 5 mL PRN       Objective:     Vital Signs (Most Recent):  Temp: 99.1 °F (37.3 °C) (02/10/18 1321)  Pulse: 81 (02/10/18 1321)  Resp: 20 (02/10/18 1321)  BP: (!) 125/53 (02/10/18 1321)  SpO2: (!) 94 % (02/10/18 1321)  O2 Device (Oxygen Therapy): room air (02/10/18 1321) Vital Signs (24h Range):  Temp:  [97.9 °F (36.6 °C)-99.1 °F (37.3 °C)] 99.1 °F (37.3 °C)  Pulse:  [78-87] 81  Resp:  [18-20] 20  SpO2:  [91 %-95 %] 94 %  BP: ()/(53-59) 125/53     Weight: 122.5 kg (270 lb) (02/08/18 1457)  Body mass index is 34.67 kg/m².  Body surface area is 2.53 meters squared.    I/O last 3 completed shifts:  In: -   Out: 620 [Urine:620]    Physical Exam   Constitutional: He is oriented to person, " place, and time. He appears well-developed and well-nourished.   HENT:   Head: Normocephalic and atraumatic.   Right Ear: External ear normal.   Mucus membranes appear dry    Eyes: EOM are normal.   Neck: No JVD present.   Cardiovascular: Normal rate, regular rhythm, normal heart sounds and intact distal pulses.    Pulmonary/Chest: Effort normal and breath sounds normal.   Abdominal: Soft. Bowel sounds are normal. There is tenderness (RUQ  ). There is no guarding.   Musculoskeletal: He exhibits edema (1+ LE edema).   Neurological: He is alert and oriented to person, place, and time.   Skin: Skin is warm and dry.   Psychiatric: He has a normal mood and affect. His behavior is normal.       Significant Labs:  CBC:   Recent Labs  Lab 02/10/18  0437   WBC 22.23*   RBC 3.11*   HGB 9.0*   HCT 26.1*      MCV 84   MCH 28.9   MCHC 34.5     CMP:   Recent Labs  Lab 02/10/18  0437   *   CALCIUM 8.0*   ALBUMIN 1.7*   PROT 6.0   *   K 4.5   CO2 19*   CL 98   BUN 84*   CREATININE 3.2*   ALKPHOS 253*   ALT 43   AST 67*   BILITOT 1.6*        Significant Imaging:  Labs: Reviewed  CT: Reviewed    Assessment/Plan:     * Acute renal failure superimposed on stage 3 chronic kidney disease    This patient presents with ARTUR on CKD3, likely from pre-renal causes which include poor po intake, sepsis resulting in renal hypoperfusion, and 2 week history of diarrhea. This could be ATN from a prolonged pre-renal state    Urine sedimentation shows granular casts suggestive of ATN  UPCR neg for significant proteinuria    Plan   1. Recommend continuous IV fluids (75cc/hr). Given that albumin is low (1.8) and patient having LE edema, will give one dose of albumin to assist with intravascular repletion   2. Re-check chem panel in PM to assess Na, Cr  3. Avoid nephrotoxic agents. Renally dose medications           Hyponatremia    Likely related to dehydration  Recommend IV fluids        Leukocytosis    - suspicious for infectious  cause given history of night sweats and anemia along with splenomegaly, elev bili and liver enzymes. Procal elevated to 4. CT scan shows multiple hepatic nodule suspicious for mets  - ID consulted and hem/onc consulted. Appreciate recs            Thank you for your consult. I will follow-up with patient. Please contact us if you have any additional questions.    Danilo Dumnot MD  Nephrology  [facility]

## 2018-02-10 NOTE — CONSULTS
Consult received. Full note to follow.    Please call for questions.    Thanks,  Teresa Busch MD  Infectious Disease Fellow, PGY-4  Pager: [phone]  [facility]

## 2018-02-10 NOTE — HPI
Case of  66 y/o male PMHx CKD s/P nephrectomy, AHTN, DM type 2, Afib on flecainide and eliquis. Presented with elevated LFTs, leukocytosis, worsened SOB, dyspnea on exertion, chills, cough and fatigue. Patient was recently admitted on 2/1/18- 2/3/18 for decompensated CHF. Due to abdominal pain and positive murphys sign had HIDA scan with no evidence of cholecystitis but filling defect in liver area. Had V/Q scan and lower extremity doppler that were negative for PE or DVT respectively. CT abdomen with hepatomegaly, heterogenicity of hepatic parenchyma, multiple hepatic lesions that could be suggestive of metastasis. Patient has a significant family history for liver cirrhosis, colon cancer, ovarian cancer. Has a past recent history of travels on cruise ships in November and december 2017. Does not recall if symptoms started after retourn from trip. Reports he did not consume food off the ship. Patient endorses up to date colonoscopy. ID consulted for additional recommendations.

## 2018-02-10 NOTE — PLAN OF CARE
Problem: Patient Care Overview  Goal: Plan of Care Review  Outcome: Ongoing (interventions implemented as appropriate)  Pt remains free from falls and injury. 24 hour urine started at 03:30, pt aware to call when he urinates. Pt continues to have diarrhea. Pt makes statement of no pain. Bed low and locked, Call light within reach. .

## 2018-02-10 NOTE — CONSULTS
[facility]  Nephrology  Consult Note    Patient Name: Jose Cruz Lira  MRN: [id number]  Admission Date: 2/8/2018  Hospital Length of Stay: 0 days  Attending Provider: Daniel Monk MD   Primary Care Physician: Lisa Capone MD  Principal Problem:Acute renal failure superimposed on stage 3 chronic kidney disease    Consults  Subjective:     HPI: 66 yo M with PMH of CKD3, left hydronephrosis s/p nephrectomy August 2017, HTN, DMII (long term insulin use), persistent atrial fibrillation (s/p DCCV 2016) on flecainide and Eliquis. He presented for RHC appt, was noted to have worsened LFTs, leukocytosis, and was sent to ED for further workup. Patient has been short of breath for several month with symptoms have been getting worse over the past few weeks. He has dyspnea with exertion and at rest currently, chills, cough off and on, fatigue.  He denies chest pain.  He does take his lasix twice daily and reports urine output has decreased lately in last few days, reports wt loss recently, has had difficulty sleeping lately 2/2 orthopnea, reports stable / decrease in LE swelling, unsure of PND. Patient does report recent travel to [facility] 12/17. He also reports that for the past 2 weeks, he has been having diarrhea.     Patient had recent admission for CHF exacerbation 2/1 and discharged 2/3, recent ED visit for same complaints noted to have leukocytosis with neutrophilic prodominance, bili noted to be 2.0, Cr 2.1, blood cultures drawn NGTD, referred for sleep study however did not receive yet though high suspicion for MONICA. Exam with cards noted for + johnson's sign, HIDA scan ordered was negative for cholecystitis however noted filling defects in liver, recommended f/u imaging. RHC was also ordered and was to be done 2/8 however worsened labs (leukocytosis, LFTs) prompted referral to ED for evaluation.    Nephrology was consulted for ARTUR on CKD. He has been followed by nephrologist   "Justin. Baseline Cr is 1.7. He denies NSAID use. He has a history of left total nephrectomy in August 2017 due to left hydronephrosis (etiology is not clear). He notes that his po intake has been poor due to fatigue. Urine output has also decreased. Denies dysuria and hematuria. Endorses nocturia. States that he has been having "loose stools" for the past 2 weeks    Past Medical History:   Diagnosis Date    Auricular fibrillation     Bronchitis     CHF (congestive heart failure)     Coronary artery disease     Diabetes mellitus, type 2 2010    Edema     Elevated troponin     Hematuria     Hydronephrosis of left kidney     Hypertension     Non-functioning kidney     Followed by Dr. Silver Anderson    Obesity (BMI 30-39.9)     Sleep apnea     Unspecified disorder of kidney and ureter        Past Surgical History:   Procedure Laterality Date    arm surgery      CARDIOVERSION  11/29/2016    kidney removed Left 0825/2017    knee surgeory N/A 4 to 5 years ago       Review of patient's allergies indicates:  No Known Allergies  Current Facility-Administered Medications   Medication Frequency    amLODIPine tablet 10 mg Daily    apixaban tablet 5 mg BID    carvedilol tablet 25 mg BID WM    dextrose 50% injection 12.5 g PRN    dextrose 50% injection 25 g PRN    doxazosin tablet 2 mg Daily    flecainide tablet 50 mg Q12H    glucagon (human recombinant) injection 1 mg PRN    glucose chewable tablet 16 g PRN    glucose chewable tablet 24 g PRN    insulin aspart pen 1-10 Units QID (AC + HS) PRN    insulin detemir pen 12 Units Daily    simvastatin tablet 20 mg QHS    sodium chloride 0.9% flush 5 mL PRN     Family History     Problem Relation (Age of Onset)    Colon cancer Father    Heart disease Paternal Grandfather    Liver cancer Brother    Ovarian cancer Sister        Social History Main Topics    Smoking status: Never Smoker    Smokeless tobacco: Never Used    Alcohol use No    Drug use: No    " Sexual activity: No      Comment:       Review of Systems   Constitutional: Positive for chills and fatigue. Negative for fever.   HENT: Negative for trouble swallowing.    Eyes: Negative for visual disturbance.   Respiratory: Positive for cough and shortness of breath. Negative for chest tightness and wheezing.    Cardiovascular: Positive for leg swelling. Negative for chest pain and palpitations.   Gastrointestinal: Positive for abdominal pain, diarrhea and nausea. Negative for vomiting.   Endocrine: Negative for polyuria.   Genitourinary: Positive for decreased urine volume. Negative for dysuria and hematuria.   Musculoskeletal: Negative for arthralgias, back pain and gait problem.   Neurological: Positive for weakness. Negative for dizziness, light-headedness and headaches.     Objective:     Vital Signs (Most Recent):  Temp: 97.9 °F (36.6 °C) (02/09/18 0824)  Pulse: 83 (02/09/18 0824)  Resp: 18 (02/09/18 0824)  BP: 107/61 (02/09/18 0824)  SpO2: (!) 94 % (02/09/18 0824)  O2 Device (Oxygen Therapy): room air (02/09/18 0824) Vital Signs (24h Range):  Temp:  [97.9 °F (36.6 °C)-100 °F (37.8 °C)] 97.9 °F (36.6 °C)  Pulse:  [83-96] 83  Resp:  [18-26] 18  SpO2:  [90 %-98 %] 94 %  BP: ()/(45-62) 107/61     Weight: 122.5 kg (270 lb) (02/08/18 1457)  Body mass index is 34.67 kg/m².  Body surface area is 2.53 meters squared.    I/O last 3 completed shifts:  In: 360 [P.O.:360]  Out: 200 [Urine:200]    Physical Exam   Constitutional: He is oriented to person, place, and time. He appears well-developed and well-nourished.   HENT:   Head: Normocephalic and atraumatic.   Right Ear: External ear normal.   Eyes: EOM are normal.   Neck: No JVD present.   Cardiovascular: Normal rate, regular rhythm, normal heart sounds and intact distal pulses.    Pulmonary/Chest: Effort normal and breath sounds normal.   Abdominal: Soft. Bowel sounds are normal. There is tenderness (RUQ  ). There is no guarding.   Musculoskeletal: He  exhibits edema (1+ LE edema).   Neurological: He is alert and oriented to person, place, and time.   Skin: Skin is warm and dry.   Psychiatric: He has a normal mood and affect. His behavior is normal.       Significant Labs:  CBC:   Recent Labs  Lab 02/09/18  0950   WBC 23.99*   RBC 3.22*   HGB 8.9*   HCT 27.2*   *   MCV 85   MCH 27.6   MCHC 32.7     CMP:   Recent Labs  Lab 02/09/18  0447   *   CALCIUM 7.9*   ALBUMIN 1.9*   PROT 6.2   *   K 4.6   CO2 20*   CL 96   BUN 67*   CREATININE 2.8*   ALKPHOS 257*   ALT 55*   AST 81*   BILITOT 1.9*       Recent Labs  Lab 02/09/18  0835   COLORU Krystyna   SPECGRAV 1.020   PHUR 5.0   PROTEINUA 1+*   BACTERIA Few*   NITRITE Negative   LEUKOCYTESUR Negative   UROBILINOGEN Negative   HYALINECASTS 4*       Significant Imaging:  Labs: Reviewed  X-Ray: Reviewed  US: Reviewed    Assessment/Plan:     * Acute renal failure superimposed on stage 3 chronic kidney disease    This patient presents with ARTUR on CKD3, likely from pre-renal causes which include poor po intake, sepsis resulting in renal hypoperfusion, and 2 week history of diarrhea. This could be ATN from a prolonged pre-renal state    Plan   1. Recommend give additional IV fluids (100cc/hr x 10 hrs) and re-assess fluid status   2. Order urine osms, urine Na/K/Cr/, UPCR  3. Check urine sedimentation   4. Avoid nephrotoxic agents. Renally dose medications           Hyponatremia    Likely related to dehydration but cannot exclude potential infectious causes include leptospirosis  Recommend IV fluids  Ordered leptospira antibody         Leukocytosis    - suspicious for infectious cause given history of night sweats and anemia along with splenomegaly, elev bili and liver enzymes. Procal elevated to 4.   - Given recent travel to [place], ddx include malaria and leptospirosis  - US also suggests liver mets. Leukocytosis could be malignancy   - Have ordered peripheral smear, reticulocyte count, and leptospira  antibody             Thank you for your consult. I will follow-up with patient. Please contact us if you have any additional questions.    Danilo Dumont MD  Nephrology  [facility]

## 2018-02-10 NOTE — SUBJECTIVE & OBJECTIVE
Interval History: No acute events. Patient continues to have fatigue. Having multiple watery bowel movements. Afebrile. Cr increased to 3.2 from 2.8. CT scan shows multiple hepatic lesions and renal cysts.    Review of patient's allergies indicates:  No Known Allergies  Current Facility-Administered Medications   Medication Frequency    0.9%  NaCl infusion Continuous    amLODIPine tablet 10 mg Daily    apixaban tablet 5 mg BID    carvedilol tablet 25 mg BID WM    dextrose 50% injection 12.5 g PRN    dextrose 50% injection 25 g PRN    doxazosin tablet 2 mg Daily    flecainide tablet 50 mg Q12H    glucagon (human recombinant) injection 1 mg PRN    glucose chewable tablet 16 g PRN    glucose chewable tablet 24 g PRN    insulin aspart pen 1-10 Units QID (AC + HS) PRN    insulin detemir pen 12 Units Daily    simvastatin tablet 20 mg QHS    sodium chloride 0.9% flush 5 mL PRN       Objective:     Vital Signs (Most Recent):  Temp: 99.1 °F (37.3 °C) (02/10/18 1321)  Pulse: 81 (02/10/18 1321)  Resp: 20 (02/10/18 1321)  BP: (!) 125/53 (02/10/18 1321)  SpO2: (!) 94 % (02/10/18 1321)  O2 Device (Oxygen Therapy): room air (02/10/18 1321) Vital Signs (24h Range):  Temp:  [97.9 °F (36.6 °C)-99.1 °F (37.3 °C)] 99.1 °F (37.3 °C)  Pulse:  [78-87] 81  Resp:  [18-20] 20  SpO2:  [91 %-95 %] 94 %  BP: ()/(53-59) 125/53     Weight: 122.5 kg (270 lb) (02/08/18 1457)  Body mass index is 34.67 kg/m².  Body surface area is 2.53 meters squared.    I/O last 3 completed shifts:  In: -   Out: 620 [Urine:620]    Physical Exam   Constitutional: He is oriented to person, place, and time. He appears well-developed and well-nourished.   HENT:   Head: Normocephalic and atraumatic.   Right Ear: External ear normal.   Mucus membranes appear dry    Eyes: EOM are normal.   Neck: No JVD present.   Cardiovascular: Normal rate, regular rhythm, normal heart sounds and intact distal pulses.    Pulmonary/Chest: Effort normal and breath  sounds normal.   Abdominal: Soft. Bowel sounds are normal. There is tenderness (RUQ  ). There is no guarding.   Musculoskeletal: He exhibits edema (1+ LE edema).   Neurological: He is alert and oriented to person, place, and time.   Skin: Skin is warm and dry.   Psychiatric: He has a normal mood and affect. His behavior is normal.       Significant Labs:  CBC:   Recent Labs  Lab 02/10/18  0437   WBC 22.23*   RBC 3.11*   HGB 9.0*   HCT 26.1*      MCV 84   MCH 28.9   MCHC 34.5     CMP:   Recent Labs  Lab 02/10/18  0437   *   CALCIUM 8.0*   ALBUMIN 1.7*   PROT 6.0   *   K 4.5   CO2 19*   CL 98   BUN 84*   CREATININE 3.2*   ALKPHOS 253*   ALT 43   AST 67*   BILITOT 1.6*        Significant Imaging:  Labs: Reviewed  CT: Reviewed

## 2018-02-10 NOTE — ASSESSMENT & PLAN NOTE
Patient evaluated for leukocytosis and increasing LFTs. Has abd/pelvic CT with findings suggestive of liver masses vs metastasis. LFTs most likely secondary to changes observed on CT imaging. Has also impaired emptying in liver area observed on HIDA scan no evidence of cholecystitis. That in addition to family history of cancer and hemochromatosis patient would benefit from evaluation from hepatology for possible MRCP or ERCP to be done or liver biopsy in order to establish etiology of liver parenchyma changes. Patient with no focal symptoms of infection. History of travel [place]ises does not necessarily suggest infection. Patient denied consuming local food or water in addition to denying adventure tours, swimming in rivers or lakes, visiting caves makes less likely infection related to travel. Agree with stool culture for ova and parasites. Would recommend to discontinue antibiotics at this time as not currently providing any benefit and increasing risk for iatrogenic cdiff infection. Patient has negaitive viral hepatitis panel and normal lactic acid. Procalcitonin of 4.09 not helpful in setting of patient with CKD and nephrectomy unable to correlate finding with infectious process. Likely elevated due to reduced renal clearance. Will sign off please reconsult if additional questions.     Recommendations   - Evaluation by hepatology  - Discontinue antibiotics at this time  - Will benefit from MRCP   - Will sign off please call with questions

## 2018-02-10 NOTE — CONSULTS
[facility]  Infectious Disease  Consult Note    Patient Name: Jose Cruz Lira  MRN: [id number]  Admission Date: 2/8/2018  Hospital Length of Stay: 1 days  Attending Physician: Daniel Monk MD  Primary Care Provider: Lisa Capone MD     Isolation Status: Special Contact    Patient information was obtained from patient and ER records.      Consults  Assessment/Plan:     Leukocytosis    Patient evaluated for leukocytosis and increasing LFTs. Has abd/pelvic CT with findings suggestive of liver masses vs metastasis. LFTs most likely secondary to changes observed on CT imaging. Has also impaired emptying in liver area observed on HIDA scan no evidence of cholecystitis. That in addition to family history of cancer and hemochromatosis patient would benefit from evaluation from hepatology for possible MRCP or ERCP to be done or liver biopsy in order to establish etiology of liver parenchyma changes. Patient with no focal symptoms of infection. History of travel [place] on cruises does not necessarily suggest infection. Patient denied consuming local food or water in addition to denying adventure tours, swimming in rivers or lakes, visiting caves makes less likely infection related to travel. Agree with stool culture for ova and parasites. Would recommend to discontinue antibiotics at this time as not currently providing any benefit and increasing risk for iatrogenic cdiff infection. Patient has negaitive viral hepatitis panel and normal lactic acid. Procalcitonin of 4.09 not helpful in setting of patient with CKD and nephrectomy unable to correlate finding with infectious process. Likely elevated due to reduced renal clearance. Will sign off please reconsult if additional questions.     Recommendations   - Evaluation by hepatology  - Discontinue antibiotics at this time  - Will benefit from MRCP   - Will sign off please call with questions                Thank you for  your consult. I will sign off. Please contact us if you have any additional questions.    Teresa Sexton MD  Infectious Disease  [facility]    Subjective:     Principal Problem: Acute renal failure superimposed on stage 3 chronic kidney disease    HPI: Case of  66 y/o male PMHx CKD s/P nephrectomy, AHTN, DM type 2, Afib on flecainide and eliquis. Presented with elevated LFTs, leukocytosis, worsened SOB, dyspnea on exertion, chills, cough and fatigue. Patient was recently admitted on 2/1/18- 2/3/18 for decompensated CHF. Due to abdominal pain and positive murphys sign had HIDA scan with no evidence of cholecystitis but filling defect in liver area. Had V/Q scan and lower extremity doppler that were negative for PE or DVT respectively. CT abdomen with hepatomegaly, heterogenicity of hepatic parenchyma, multiple hepatic lesions that could be suggestive of metastasis. Patient has a significant family history for liver cirrhosis, colon cancer, ovarian cancer. Has a past recent history of travels on cruise ships in November and december 2017. Does not recall if symptoms started after retourn from trip. Reports he did not consume food off the ship. Patient endorses up to date colonoscopy. ID consulted for additional recommendations.            No new subjective & objective note has been filed under this hospital service since the last note was generated.

## 2018-02-10 NOTE — CONSULTS
"[facility]  Hematology/Oncology  Consult Note    Patient Name: Jose Cruz Lira  MRN: [id number]  Admission Date: 2/8/2018  Hospital Length of Stay: 1 days  Code Status: Full Code   Attending Provider: Daniel Oliveros MD  Primary Care Physician: Lisa Capone MD  Principal Problem:Acute renal failure superimposed on stage 3 chronic kidney disease    Inpatient consult to Hematology/Oncology  Consult performed by: ARNAUD WONG  Consult ordered by: DANIEL OLIVEROS  Reason for consult: "Elevated LFTs, liver mets?"         Subjective:     HPI: Mr. Lira is a 66 yo male with history of CKD (s/p nephrectomy August 2017), HTN, DMII (long term insulin use), persistent atrial fibrillation (s/p DCCV 2016) on flecainide and Eliquis. At his [facility] appointment was noted to have worsened LFTs and leukocytosis, admitted for further workup prior to having this done. Patient has been short of breath for several months with symptoms that have been getting worse over the past few weeks. He has dyspnea with exertion and at rest currently, chills, non-productive cough off and on, night sweats, and fatigue. He denies chest pain. Has had vague abdominal pain (points to RUQ), as well as several weeks of diarrhea. No melena or BRBPR. Says he has lost approximately 10 pounds in last few months. Exam with cardiology noted positive Andres's sign, but HIDA scan ordered was negative for cholecystitis. However, noted filling defects in liver, so patient underwent CT on 2/9. This exam showed "hepatomegaly with markedly heterogeneity of hepatic parenchyma, noting multiple areas of ill-defined low attenuation. This is incompletely characterized on today's noncontrast examination. Findings are concerning for possible underlying diffuse infiltrating process or multiple hepatic lesions, possibly representing metastatic disease." Heme/onc is consulted for further input on liver masses and elevated LFTs. 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 No colonoscopy reports in EMR or in legacy documents, but patient says his next c-scope should occur this year. Sister  of ovarian cancer. Also has a brother who had hemachromatosis. Pt denies history of liver disease, hepatitis, or alcohol abuse. Lifetime non-smoker. 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 AFP checked and .7.     Oncology Treatment Plan:   [No treatment plan]    Medications:  Continuous Infusions:   sodium chloride 0.9% 75 mL/hr at 02/10/18 1357     Scheduled Meds:   amLODIPine  10 mg Oral Daily    apixaban  5 mg Oral BID    carvedilol  25 mg Oral BID WM    doxazosin  2 mg Oral Daily    flecainide  50 mg Oral Q12H    insulin detemir  12 Units Subcutaneous Daily    simvastatin  20 mg Oral QHS     PRN Meds:dextrose 50%, dextrose 50%, glucagon (human recombinant), glucose, glucose, insulin aspart, sodium chloride 0.9%     Review of patient's allergies indicates:  No Known Allergies     Past Medical History:   Diagnosis Date    Auricular fibrillation     Bronchitis     CHF (congestive heart failure)     Coronary artery disease     Diabetes mellitus, type 2     Edema     Elevated troponin     Hematuria     Hydronephrosis of left kidney     Hypertension     Non-functioning kidney     Followed by Dr. Silver Anderson    Obesity (BMI 30-39.9)     Sleep apnea     Unspecified disorder of kidney and ureter      Past Surgical History:   Procedure Laterality Date    arm surgery      CARDIOVERSION  2016    kidney removed Left     knee surgeory N/A 4 to 5 years ago     Family History     Problem Relation (Age of Onset)    Colon cancer Father    Heart disease Paternal Grandfather    Liver cancer Brother    Ovarian cancer Sister        Social History Main Topics    Smoking status: Never Smoker     Smokeless tobacco: Never Used    Alcohol use No    Drug use: No    Sexual activity: No      Comment:         Review of Systems   Constitutional: Positive for chills and fatigue.        Night sweats x several months   HENT: Negative for congestion and rhinorrhea.    Eyes: Negative for photophobia and visual disturbance.   Respiratory: Positive for cough and shortness of breath.    Cardiovascular: Positive for leg swelling. Negative for chest pain and palpitations.   Gastrointestinal: Positive for abdominal pain and diarrhea. Negative for anal bleeding, blood in stool, constipation, nausea and vomiting.   Endocrine: Negative for cold intolerance and heat intolerance.   Genitourinary: Positive for decreased urine volume. Negative for difficulty urinating.   Musculoskeletal: Negative for arthralgias and myalgias.   Skin: Negative for color change and rash.   Allergic/Immunologic: Negative for immunocompromised state.   Neurological: Negative for dizziness and light-headedness.   Hematological: Does not bruise/bleed easily.   Psychiatric/Behavioral: Negative for agitation and confusion.     Objective:     Vital Signs (Most Recent):  Temp: 99.1 °F (37.3 °C) (02/10/18 1321)  Pulse: 81 (02/10/18 1321)  Resp: 20 (02/10/18 1321)  BP: (!) 125/53 (02/10/18 1321)  SpO2: (!) 94 % (02/10/18 1321) Vital Signs (24h Range):  Temp:  [97.9 °F (36.6 °C)-99.1 °F (37.3 °C)] 99.1 °F (37.3 °C)  Pulse:  [78-87] 81  Resp:  [18-20] 20  SpO2:  [91 %-95 %] 94 %  BP: ()/(53-59) 125/53     Weight: 122.5 kg (270 lb)  Body mass index is 34.67 kg/m².  Body surface area is 2.53 meters squared.      Intake/Output Summary (Last 24 hours) at 02/10/18 1436  Last data filed at 02/10/18 0330   Gross per 24 hour   Intake                0 ml   Output              300 ml   Net             -300 ml       Physical Exam   Constitutional: He is oriented to person, place, and time. He appears well-developed and well-nourished.   Sitting up in  chair, pleasant, interactive    HENT:   Head: Normocephalic and atraumatic.   Eyes: EOM are normal. Pupils are equal, round, and reactive to light.   Neck: Normal range of motion.   Cardiovascular: Normal rate, regular rhythm and intact distal pulses.    Pedal edema bilaterally    Pulmonary/Chest: Effort normal.   Abdominal: He exhibits distension. There is tenderness.   RUQ tenderness    Musculoskeletal: Normal range of motion.   Neurological: He is alert and oriented to person, place, and time.   Skin: Skin is warm and dry.   Psychiatric: He has a normal mood and affect. His behavior is normal. Judgment and thought content normal.       Significant Labs:   CBC:   Recent Labs  Lab 02/09/18  0950 02/09/18  1650 02/10/18  0437   WBC 23.99* 23.07* 22.23*   HGB 8.9* 9.8* 9.0*   HCT 27.2* 29.0* 26.1*   * 400* 336   , CMP:   Recent Labs  Lab 02/09/18  0447 02/09/18  1650 02/10/18  0437   * 129* 129*   K 4.6 4.6 4.5   CL 96 96 98   CO2 20* 21* 19*   * 189* 200*   BUN 67* 75* 84*   CREATININE 2.8* 3.2* 3.2*   CALCIUM 7.9* 8.2* 8.0*   PROT 6.2 6.8 6.0   ALBUMIN 1.9* 2.0* 1.7*   BILITOT 1.9* 1.9* 1.6*   ALKPHOS 257* 288* 253*   AST 81* 80* 67*   ALT 55* 52* 43   ANIONGAP 13 12 12   EGFRNONAA 22.6* 19.3* 19.3*   , Coagulation: No results for input(s): PT, INR, APTT in the last 48 hours., Haptoglobin: No results for input(s): HAPTOGLOBIN in the last 48 hours., Immunology: No results for input(s): SPEP, MADDY, CARY, FREELAMBDALI in the last 48 hours., LDH: No results for input(s): LDHCSF, BFSOURCE in the last 48 hours., LFTs:   Recent Labs  Lab 02/09/18  0447 02/09/18  1650 02/10/18  0437   ALT 55* 52* 43   AST 81* 80* 67*   ALKPHOS 257* 288* 253*   BILITOT 1.9* 1.9* 1.6*   PROT 6.2 6.8 6.0   ALBUMIN 1.9* 2.0* 1.7*   , Reticulocytes:   Recent Labs  Lab 02/10/18  0437   RETIC 1.5   , Tumor Markers: No results for input(s): PSA, CEA, , AFPTM, [id number],  in the last 48 hours.    Invalid input(s): ALGTM and  Urine Studies:   Recent Labs  Lab 02/09/18  0835   COLORU Krystyna   APPEARANCEUA Hazy*   PHUR 5.0   SPECGRAV 1.020   PROTEINUA 1+*   GLUCUA Negative   KETONESU Negative   BILIRUBINUA Negative   OCCULTUA 2+*   NITRITE Negative   UROBILINOGEN Negative   LEUKOCYTESUR Negative   RBCUA 40*   WBCUA 1   BACTERIA Few*   HYALINECASTS 4*       Diagnostic Results:  I have reviewed and interpreted all pertinent imaging results/findings within the past 24 hours.    Assessment/Plan:     Active Diagnoses:    Diagnosis Date Noted POA    PRINCIPAL PROBLEM:  Acute renal failure superimposed on stage 3 chronic kidney disease [N17.9, N18.3] 02/09/2018 Yes    Hyponatremia [E87.1] 02/10/2018 Yes    Hypoalbuminemia [E88.09] 02/09/2018 Yes    Leukocytosis [D72.829] 02/09/2018 Yes    Abnormal liver function tests [R79.89] 02/09/2018 Yes    Abnormal liver scan [R93.2] 02/09/2018 Yes    D-dimer, elevated [R79.89] 02/08/2018 Yes    Current use of long term anticoagulation [Z79.01] 01/29/2018 Not Applicable    Persistent atrial fibrillation [I48.1] 11/28/2016 Yes    Proteinuria [R80.9] 11/03/2014 Yes    BMI 34.0-34.9,adult [Z68.34] 05/28/2014 Not Applicable    Hyperlipidemia associated with type 2 diabetes mellitus [E11.69, E78.5] 05/28/2014 Yes    Hypertension associated with diabetes [E11.59, I10] 05/28/2014 Yes     Chronic    Type 2 diabetes mellitus with stage 3 chronic kidney disease, with long-term current use of insulin [E11.22, N18.3, Z79.4] 05/28/2014 Not Applicable      Problems Resolved During this Admission:    Diagnosis Date Noted Date Resolved POA     Assessment: Patient is a 66 yo male who presents with elevated LFTs, and has been found to have multiple hepatic lesions on non-contrasted CT.     Plan:   - AFP resulted and is normal but does not excluded HCC  - Mildly elevated LFTs may suggest pancreatic or biliary source   - Ultimately, believe this patient will require liver biopsy     Thank you for your consult. Will  discuss with staff. Formal staff attestation to follow. Please notify consult service when pathology has resulted.     SHANTHI Zeng  Hematology/Oncology  [facility]    Attending Addendum:  The patient was seen, examined, and discussed on rounds with the team.  I agree with the assessment and plan as outlined for Jose Cruz Lira.  Have reviewed findings of CT and MRI which suggest metastatic disease to the liver.  He will need a liver biopsy.  He does have a family history of colon cancer and is Hemoccult-positive.  Recommend colonoscopy and check CEA.    Mike Mcneill DO, FACP  Hematology & Oncology  [address] [place] [address]  ph. [phone]  Fax. [phone]

## 2018-02-10 NOTE — ASSESSMENT & PLAN NOTE
- suspicious for infectious cause given history of night sweats and anemia along with splenomegaly, elev bili and liver enzymes. Procal elevated to 4. CT scan shows multiple hepatic nodule suspicious for mets  - ID consulted and hem/onc consulted. Appreciate recs

## 2018-02-11 PROBLEM — D72.823 LEUKEMOID REACTION: Status: ACTIVE | Noted: 2018-02-09

## 2018-02-11 LAB
ALBUMIN SERPL BCP-MCNC: 1.6 G/DL
ALBUMIN SERPL BCP-MCNC: 1.7 G/DL
ALBUMIN SERPL BCP-MCNC: 1.7 G/DL
ALP SERPL-CCNC: 249 U/L
ALP SERPL-CCNC: 250 U/L
ALP SERPL-CCNC: 260 U/L
ALT SERPL W/O P-5'-P-CCNC: 30 U/L
ALT SERPL W/O P-5'-P-CCNC: 30 U/L
ALT SERPL W/O P-5'-P-CCNC: 35 U/L
ANION GAP SERPL CALC-SCNC: 13 MMOL/L
AST SERPL-CCNC: 51 U/L
AST SERPL-CCNC: 53 U/L
AST SERPL-CCNC: 56 U/L
BACTERIA UR CULT: NORMAL
BASOPHILS # BLD AUTO: 0.03 K/UL
BASOPHILS NFR BLD: 0.1 %
BILIRUB SERPL-MCNC: 1.5 MG/DL
BILIRUB SERPL-MCNC: 1.5 MG/DL
BILIRUB SERPL-MCNC: 1.7 MG/DL
BUN SERPL-MCNC: 101 MG/DL
BUN SERPL-MCNC: 96 MG/DL
BUN SERPL-MCNC: 98 MG/DL
C DIFF GDH STL QL: NEGATIVE
C DIFF TOX A+B STL QL IA: NEGATIVE
CALCIUM SERPL-MCNC: 7.4 MG/DL
CALCIUM SERPL-MCNC: 7.5 MG/DL
CALCIUM SERPL-MCNC: 7.6 MG/DL
CHLORIDE SERPL-SCNC: 97 MMOL/L
CHLORIDE SERPL-SCNC: 98 MMOL/L
CHLORIDE SERPL-SCNC: 98 MMOL/L
CO2 SERPL-SCNC: 18 MMOL/L
CREAT SERPL-MCNC: 3.1 MG/DL
CREAT SERPL-MCNC: 3.2 MG/DL
CREAT SERPL-MCNC: 3.2 MG/DL
DIFFERENTIAL METHOD: ABNORMAL
EOSINOPHIL # BLD AUTO: 0 K/UL
EOSINOPHIL NFR BLD: 0 %
ERYTHROCYTE [DISTWIDTH] IN BLOOD BY AUTOMATED COUNT: 14.8 %
EST. GFR  (AFRICAN AMERICAN): 22.3 ML/MIN/1.73 M^2
EST. GFR  (AFRICAN AMERICAN): 22.3 ML/MIN/1.73 M^2
EST. GFR  (AFRICAN AMERICAN): 23.1 ML/MIN/1.73 M^2
EST. GFR  (NON AFRICAN AMERICAN): 19.3 ML/MIN/1.73 M^2
EST. GFR  (NON AFRICAN AMERICAN): 19.3 ML/MIN/1.73 M^2
EST. GFR  (NON AFRICAN AMERICAN): 20 ML/MIN/1.73 M^2
GLUCOSE SERPL-MCNC: 165 MG/DL
GLUCOSE SERPL-MCNC: 194 MG/DL
GLUCOSE SERPL-MCNC: 202 MG/DL
HCT VFR BLD AUTO: 27 %
HGB BLD-MCNC: 8.8 G/DL
IMM GRANULOCYTES # BLD AUTO: 0.48 K/UL
IMM GRANULOCYTES NFR BLD AUTO: 2.2 %
LYMPHOCYTES # BLD AUTO: 0.6 K/UL
LYMPHOCYTES NFR BLD: 2.9 %
MAGNESIUM SERPL-MCNC: 2.4 MG/DL
MCH RBC QN AUTO: 27.8 PG
MCHC RBC AUTO-ENTMCNC: 32.6 G/DL
MCV RBC AUTO: 85 FL
MONOCYTES # BLD AUTO: 1.8 K/UL
MONOCYTES NFR BLD: 8.2 %
NEUTROPHILS # BLD AUTO: 19 K/UL
NEUTROPHILS NFR BLD: 86.6 %
NRBC BLD-RTO: 0 /100 WBC
PHOSPHATE SERPL-MCNC: 4.1 MG/DL
PLATELET # BLD AUTO: 394 K/UL
PMV BLD AUTO: 10.9 FL
POCT GLUCOSE: 194 MG/DL (ref 70–110)
POCT GLUCOSE: 222 MG/DL (ref 70–110)
POCT GLUCOSE: 229 MG/DL (ref 70–110)
POCT GLUCOSE: 233 MG/DL (ref 70–110)
POTASSIUM SERPL-SCNC: 4.1 MMOL/L
POTASSIUM SERPL-SCNC: 4.2 MMOL/L
POTASSIUM SERPL-SCNC: 4.4 MMOL/L
PROT SERPL-MCNC: 5.9 G/DL
RBC # BLD AUTO: 3.16 M/UL
SODIUM SERPL-SCNC: 128 MMOL/L
SODIUM SERPL-SCNC: 129 MMOL/L
SODIUM SERPL-SCNC: 129 MMOL/L
WBC # BLD AUTO: 21.92 K/UL

## 2018-02-11 PROCEDURE — 83735 ASSAY OF MAGNESIUM: CPT

## 2018-02-11 PROCEDURE — 11000001 HC ACUTE MED/SURG PRIVATE ROOM

## 2018-02-11 PROCEDURE — 36415 COLL VENOUS BLD VENIPUNCTURE: CPT

## 2018-02-11 PROCEDURE — 25000003 PHARM REV CODE 250: Performed by: INTERNAL MEDICINE

## 2018-02-11 PROCEDURE — 99232 SBSQ HOSP IP/OBS MODERATE 35: CPT | Mod: ,,, | Performed by: INTERNAL MEDICINE

## 2018-02-11 PROCEDURE — 84100 ASSAY OF PHOSPHORUS: CPT

## 2018-02-11 PROCEDURE — 80053 COMPREHEN METABOLIC PANEL: CPT | Mod: 91

## 2018-02-11 PROCEDURE — 63600175 PHARM REV CODE 636 W HCPCS: Performed by: HOSPITALIST

## 2018-02-11 PROCEDURE — 25000003 PHARM REV CODE 250: Performed by: HOSPITALIST

## 2018-02-11 PROCEDURE — 99223 1ST HOSP IP/OBS HIGH 75: CPT | Mod: ,,, | Performed by: INTERNAL MEDICINE

## 2018-02-11 PROCEDURE — 80053 COMPREHEN METABOLIC PANEL: CPT

## 2018-02-11 PROCEDURE — 99233 SBSQ HOSP IP/OBS HIGH 50: CPT | Mod: ,,, | Performed by: HOSPITALIST

## 2018-02-11 PROCEDURE — 85025 COMPLETE CBC W/AUTO DIFF WBC: CPT

## 2018-02-11 RX ORDER — DOXAZOSIN 1 MG/1
2 TABLET ORAL NIGHTLY
Status: DISCONTINUED | OUTPATIENT
Start: 2018-02-11 | End: 2018-02-16

## 2018-02-11 RX ORDER — LORAZEPAM 2 MG/ML
0.5 INJECTION INTRAMUSCULAR ONCE
Status: COMPLETED | OUTPATIENT
Start: 2018-02-11 | End: 2018-02-11

## 2018-02-11 RX ADMIN — CARVEDILOL 25 MG: 25 TABLET, FILM COATED ORAL at 05:02

## 2018-02-11 RX ADMIN — INSULIN ASPART 4 UNITS: 100 INJECTION, SOLUTION INTRAVENOUS; SUBCUTANEOUS at 12:02

## 2018-02-11 RX ADMIN — INSULIN ASPART 4 UNITS: 100 INJECTION, SOLUTION INTRAVENOUS; SUBCUTANEOUS at 05:02

## 2018-02-11 RX ADMIN — INSULIN ASPART 1 UNITS: 100 INJECTION, SOLUTION INTRAVENOUS; SUBCUTANEOUS at 08:02

## 2018-02-11 RX ADMIN — CARVEDILOL 25 MG: 25 TABLET, FILM COATED ORAL at 09:02

## 2018-02-11 RX ADMIN — FLECAINIDE ACETATE 50 MG: 50 TABLET ORAL at 08:02

## 2018-02-11 RX ADMIN — SIMVASTATIN 20 MG: 20 TABLET, FILM COATED ORAL at 08:02

## 2018-02-11 RX ADMIN — DOXAZOSIN 2 MG: 1 TABLET ORAL at 08:02

## 2018-02-11 RX ADMIN — DEXTROSE MONOHYDRATE: 5 INJECTION, SOLUTION INTRAVENOUS at 03:02

## 2018-02-11 RX ADMIN — INSULIN DETEMIR 12 UNITS: 100 INJECTION, SOLUTION SUBCUTANEOUS at 08:02

## 2018-02-11 RX ADMIN — INSULIN ASPART 4 UNITS: 100 INJECTION, SOLUTION INTRAVENOUS; SUBCUTANEOUS at 08:02

## 2018-02-11 RX ADMIN — LORAZEPAM 0.5 MG: 2 INJECTION, SOLUTION INTRAMUSCULAR; INTRAVENOUS at 11:02

## 2018-02-11 NOTE — SUBJECTIVE & OBJECTIVE
Review of Systems   Constitutional: Positive for activity change, appetite change and chills.   HENT: Negative for sore throat and trouble swallowing.    Respiratory: Positive for shortness of breath. Negative for cough.    Cardiovascular: Positive for leg swelling. Negative for chest pain.   Gastrointestinal: Positive for abdominal distention, abdominal pain and diarrhea. Negative for blood in stool, nausea and vomiting.   Genitourinary: Negative for decreased urine volume and difficulty urinating.   Musculoskeletal: Negative for arthralgias and back pain.   Skin: Negative for color change and pallor.   Neurological: Positive for weakness. Negative for dizziness and light-headedness.   Psychiatric/Behavioral: Negative for agitation and confusion.       Past Medical History:   Diagnosis Date    Auricular fibrillation     Bronchitis     CHF (congestive heart failure)     Coronary artery disease     Diabetes mellitus, type 2 2010    Edema     Elevated troponin     Hematuria     Hydronephrosis of left kidney     Hypertension     Non-functioning kidney     Followed by Dr. Silver Anderson    Obesity (BMI 30-39.9)     Sleep apnea     Unspecified disorder of kidney and ureter        Past Surgical History:   Procedure Laterality Date    arm surgery      CARDIOVERSION  11/29/2016    kidney removed Left 0825/2017    knee surgeory N/A 4 to 5 years ago       Family history of liver disease: Yes    Review of patient's allergies indicates:  No Known Allergies    Social History Main Topics    Smoking status: Never Smoker    Smokeless tobacco: Never Used    Alcohol use No    Drug use: No    Sexual activity: No      Comment:         Prescriptions Prior to Admission   Medication Sig Dispense Refill Last Dose    amLODIPine (NORVASC) 10 MG tablet Take 1 tablet (10 mg total) by mouth once daily. 90 tablet 4 2/8/2018    apixaban (ELIQUIS) 5 mg Tab Take 1 tablet (5 mg total) by mouth 2 (two) times daily. 60  tablet 11 Past Week    carvedilol (COREG) 25 MG tablet Take 1 tablet (25 mg total) by mouth 2 (two) times daily with meals. 360 tablet 3 2/8/2018    doxazosin (CARDURA) 4 MG tablet Take 0.5 tablets (2 mg total) by mouth once daily. (Patient taking differently: Take 2 mg by mouth once daily. TAkes at night) 15 tablet 6 2/7/2018    ergocalciferol (ERGOCALCIFEROL) 50,000 unit Cap Take 1 capsule (50,000 Units total) by mouth every 7 days. 4 capsule 3 Past Week    exenatide microspheres (BYDUREON) 2 mg/0.65 mL PnIj Inject 2 mg into the muscle once a week. 4 each 11 2/4/2018    flecainide (TAMBOCOR) 50 MG Tab Take 1 tablet (50 mg total) by mouth every 12 (twelve) hours. 60 tablet 11 2/8/2018    furosemide (LASIX) 40 MG tablet Take 1 tablet (40 mg total) by mouth 2 (two) times daily. 180 tablet 3 2/8/2018    insulin detemir (LEVEMIR FLEXTOUCH) 100 unit/mL (3 mL) SubQ InPn pen INJECT 22 UNITS INTO THE SKIN EVERY EVENING (Patient taking differently: 18 Units every evening. INJECT 22 UNITS INTO THE SKIN EVERY EVENING) 45 mL 4 2/7/2018    simvastatin (ZOCOR) 20 MG tablet Take 1 tablet (20 mg total) by mouth every evening. 90 tablet 4 2/7/2018    blood sugar diagnostic (ONETOUCH VERIO) Strp 1 strip by Misc.(Non-Drug; Combo Route) route 3 (three) times daily. 100 strip 11     blood sugar diagnostic Strp To check BG 3 times daily, to use with insurance preferred meter 100 each 11 Taking    blood sugar diagnostic Strp To check BG 3 times daily, to use with insurance preferred meter 100 strip 11 Taking    blood-glucose meter kit To check BG 3 times daily, to use with insurance preferred meter 1 each 1 Taking    blood-glucose meter kit To check BG 3 times daily, to use with insurance preferred meter 1 each 0 Taking    lancets Misc To check BG 3 times daily, to use with insurance preferred meter 100 each 11 Taking    lancets Misc To check BG 3 times daily, to use with insurance preferred meter 100 each 11     pen  "needle, diabetic (BD ULTRA-FINE RODRIGUEZ PEN NEEDLES) 32 gauge x 5/32" Ndle Patient injects insulin once a day. Please provide 3 month supply. 50 each 11        Objective:     Vital Signs (Most Recent):  Temp: 97.9 °F (36.6 °C) (02/11/18 1248)  Pulse: 81 (02/11/18 1248)  Resp: (!) 22 (02/11/18 1248)  BP: (!) 114/53 (02/11/18 1248)  SpO2: 95 % (02/11/18 1248) Vital Signs (24h Range):  Temp:  [97.9 °F (36.6 °C)-100.3 °F (37.9 °C)] 97.9 °F (36.6 °C)  Pulse:  [78-87] 81  Resp:  [16-22] 22  SpO2:  [91 %-95 %] 95 %  BP: ()/(50-57) 114/53     Weight: 122.5 kg (270 lb) (02/08/18 1457)  Body mass index is 34.67 kg/m².    Physical Exam   Constitutional: He is oriented to person, place, and time.   HENT:   Mouth/Throat: Oropharynx is clear and moist.   Eyes: No scleral icterus.   Cardiovascular: Normal rate and regular rhythm.    Pulmonary/Chest: Effort normal and breath sounds normal.   Abdominal: He exhibits distension. He exhibits no mass. There is no tenderness. There is no guarding.   Musculoskeletal: He exhibits no edema or deformity.   Neurological: He is alert and oriented to person, place, and time.   Skin: Skin is warm and dry. He is not diaphoretic.   Psychiatric: He has a normal mood and affect.   Vitals reviewed.      MELD-Na score: 17 at 2/3/2018  4:14 AM  MELD score: 15 at 2/3/2018  4:14 AM  Calculated from:  Serum Creatinine: 1.7 mg/dL at 2/3/2018  4:14 AM  Serum Sodium: 134 mmol/L at 2/3/2018  4:14 AM  Total Bilirubin: 1.4 mg/dL at 2/3/2018  4:14 AM  INR(ratio): 1.2 at 2/1/2018  1:38 PM  Age: 67 years    Significant Labs:  CBC:   Recent Labs  Lab 02/11/18  0452   WBC 21.92*   RBC 3.16*   HGB 8.8*   HCT 27.0*   *     CMP:   Recent Labs  Lab 02/11/18  0452   *   CALCIUM 7.5*   ALBUMIN 1.7*   PROT 5.9*   *   K 4.2   CO2 18*   CL 98   BUN 96*   CREATININE 3.2*   ALKPHOS 249*   ALT 35   AST 56*   BILITOT 1.7*     Coagulation: No results for input(s): PT, INR, APTT in the last 168 hours.      "

## 2018-02-11 NOTE — PROGRESS NOTES
Pt sitting up in chair incontinent of urine,found with condom cath off,replaced with great difficulty,penis is hidden  and very small. 24hr urine restart time.

## 2018-02-11 NOTE — CONSULTS
Consult received, chart reviewed, and case discussed with staff. Full note to follow. Please call with any questions.    Ramirez Hubbard MD PGY-IV  Hepatology Fellow  [facility]  [phone]

## 2018-02-11 NOTE — PLAN OF CARE
Problem: Patient Care Overview  Goal: Plan of Care Review  Outcome: Ongoing (interventions implemented as appropriate)  Patient had 4 BM'S this shift-brown liquid, moderate amt each time. Specimens sent off and patient placed in Contact Isolation. Urine output low, pt was incontinent x2 and urinated with BM'S x2, Nanette Monk and Drew notified, condom catheter place on him and 24 hr urine restarted at 1725. No c/o CP or SOB, O2 sat 93% R/A. See flowsheet for full assessment. IVF initiated, family educated on special isolation. In NAD; will continue to monitor.   Patient did have a bladder scan done and volume was = 12cc and no distention palpated over bladder.

## 2018-02-11 NOTE — HPI
"This is a 68 yo M with HFpEF, CKD, who has been admitted since 2/8 for elevated liver enzymes and WBC. Patient was undergoing outpatient evaluation for RHC and was noted to have positive Andres's sign. He then underwent HIDA scan which showed filling defects in the liver. CT abdomen then obtained which showed "interval development of hepatomegaly with markedly heterogeneity of hepatic parenchyma, noting multiple areas of ill-defined low attenuation. This is incompletely characterized on today's noncontrast examination. Findings are concerning for possible underlying diffuse infiltrating process or multiple hepatic lesions, possibly representing metastatic disease." He then underwent attempted liver biopsy yesterday however no targeted lesions could be identified and MRI was recommended. Hepatology was consulted to comment on liver lesions.   "

## 2018-02-11 NOTE — SUBJECTIVE & OBJECTIVE
Interval History:  Scrt rising to 3.2 despite albumin and IV NS, he has had continuing diarrhea,  UO does not appear well documented, bp low  Normal not on antihypertensives  Temp 100.3 last night  MRI reviewed, single R kidneyy with multiple cysts and one hemorrhagic    Review of patient's allergies indicates:  No Known Allergies  Current Facility-Administered Medications   Medication Frequency    carvedilol tablet 25 mg BID WM    dextrose 50% injection 12.5 g PRN    dextrose 50% injection 25 g PRN    doxazosin tablet 2 mg Nightly    flecainide tablet 50 mg Q12H    glucagon (human recombinant) injection 1 mg PRN    glucose chewable tablet 16 g PRN    glucose chewable tablet 24 g PRN    insulin aspart pen 1-10 Units QID (AC + HS) PRN    insulin detemir pen 12 Units Daily    simvastatin tablet 20 mg QHS    sodium chloride 0.9% flush 5 mL PRN       Objective:     Vital Signs (Most Recent):  Temp: 97.9 °F (36.6 °C) (02/11/18 1248)  Pulse: 81 (02/11/18 1248)  Resp: (!) 22 (02/11/18 1248)  BP: (!) 114/53 (02/11/18 1248)  SpO2: 95 % (02/11/18 1248)  O2 Device (Oxygen Therapy): room air (02/11/18 0842) Vital Signs (24h Range):  Temp:  [97.9 °F (36.6 °C)-100.3 °F (37.9 °C)] 97.9 °F (36.6 °C)  Pulse:  [78-87] 81  Resp:  [16-22] 22  SpO2:  [91 %-95 %] 95 %  BP: ()/(50-57) 114/53     Weight: 122.5 kg (270 lb) (02/08/18 1457)  Body mass index is 34.67 kg/m².  Body surface area is 2.53 meters squared.    I/O last 3 completed shifts:  In: 2882.5 [P.O.:1710; I.V.:1172.5]  Out: 500 [Urine:500]    Physical Exam   Constitutional: He is oriented to person, place, and time. He appears well-developed and well-nourished. No distress.   HENT:   Head: Normocephalic and atraumatic.   Mouth/Throat: Oropharynx is clear and moist. No oropharyngeal exudate.   Eyes: Conjunctivae and EOM are normal. Pupils are equal, round, and reactive to light. Right eye exhibits no discharge. Left eye exhibits no discharge. No scleral  icterus.   Neck: Normal range of motion. Neck supple. No JVD present. No tracheal deviation present. No thyromegaly present.   Cardiovascular: Normal rate, regular rhythm and normal heart sounds.  Exam reveals no gallop and no friction rub.    No murmur heard.  +2 LE edema, appears the same to slightly more than yesterday   Pulmonary/Chest: No stridor. No respiratory distress. He has no wheezes. He has no rales. He exhibits no tenderness.   Decreased bs at bases   Abdominal: Soft. Bowel sounds are normal. He exhibits no distension and no mass. There is no tenderness. There is no rebound and no guarding. No hernia.   Musculoskeletal: Normal range of motion. He exhibits no edema or tenderness.   Lymphadenopathy:     He has no cervical adenopathy.   Neurological: He is alert and oriented to person, place, and time. No cranial nerve deficit. He exhibits normal muscle tone. Coordination normal.   Skin: Skin is warm and dry. No rash noted. He is not diaphoretic. No erythema. No pallor.   Psychiatric: He has a normal mood and affect. His behavior is normal. Judgment and thought content normal.   Nursing note and vitals reviewed.      Significant Labs:  All labs within the past 24 hours have been reviewed.     Significant Imaging:  MRI: Reviewed

## 2018-02-11 NOTE — PROGRESS NOTES
Progress Note  Hospital Medicine    Admit Date: 2/8/2018  Length of Stay:  LOS: 2 days     SUBJECTIVE:         Follow-up For:  Acute renal failure superimposed on stage 3 chronic kidney disease    HPI/Interval history (See H&P for complete P,F,SHx) :     2/9/18  s/p nephrology evaluation Cr 2.1-->2.8. continue with IV hydration @100ml/hr x 10hr. CT chest abd/Pelvis without contrast as sono liver -Hepatomegaly with multiple ill-defined solid areas of decreased echogenicity, possibly metastatic disease     2/10/18  AFP 0.7. CT chest abd/Pelvis without contrast - Interval development of hepatomegaly with markedly heterogeneity of hepatic parenchyma, noting multiple areas of ill-defined low attenuation.concerning for possible underlying diffuse infiltrating process or multiple hepatic lesions, possibly representing metastatic disease.IR consulted for liver biopsy. Apixaban held for possible liver biopsy on monday 2/11/18  MRI abd without contast today - Liver lesions worrisome for metastatic disease. before biopsy. FOBT+       Review of Systems: List if applicable  Pain scale:0/10  Constitutional- Positive forWeakness  Resp- Positive for Cough, SOB on exertion  GI- Positive for Nausea, Vomiting, Diarrhea, black stools  Extrem- Positive for Swelling    OBJECTIVE:     Vital Signs Range (Last 24H):  Temp:  [97.9 °F (36.6 °C)-100.3 °F (37.9 °C)]   Pulse:  [78-87]   Resp:  [16-20]   BP: ()/(50-57)   SpO2:  [91 %-94 %]     Physical Exam:  General- Patient alert and oriented x3, obesity   HEENT- PERRLA, EOMI, OP clear  Neck- No JVD, Lymphadenopathy, Thyromegaly  CV- Regular rate and rhythm, No Murmur/talya/rubs  Resp- Lungs CTA Bilaterally, No increased WOB  Abdomen- Non tender/ndistended, BS normoactive x4 quads, no HSM  Extrem- No cyanosis, clubbing, 1+ nonpitting edema.   Skin- No rashes, lesions, ulcers  Neuro- able to move upper and lower extremities without limitation      Medications:  Medication list was  reviewed and changes noted under Assessment/Plan.      Current Facility-Administered Medications:     carvedilol tablet 25 mg, 25 mg, Oral, BID WM, Daniel Monk MD, 25 mg at 02/11/18 0901    dextrose 50% injection 12.5 g, 12.5 g, Intravenous, PRN, Bean iSlva MD    dextrose 50% injection 25 g, 25 g, Intravenous, PRN, Bean Silva MD    doxazosin tablet 2 mg, 2 mg, Oral, Nightly, Daniel Monk MD    flecainide tablet 50 mg, 50 mg, Oral, Q12H, Bean Silva MD, 50 mg at 02/11/18 0845    glucagon (human recombinant) injection 1 mg, 1 mg, Intramuscular, PRN, Bean Silva MD    glucose chewable tablet 16 g, 16 g, Oral, PRN, Bean Silva MD    glucose chewable tablet 24 g, 24 g, Oral, PRN, Bean Silva MD    insulin aspart pen 1-10 Units, 1-10 Units, Subcutaneous, QID (AC + HS) PRN, Bean Silva MD, 4 Units at 02/11/18 0847    insulin detemir pen 12 Units, 12 Units, Subcutaneous, Daily, Bean Silva MD, 12 Units at 02/11/18 0846    simvastatin tablet 20 mg, 20 mg, Oral, QHS, Bean Silva MD, 20 mg at 02/10/18 2029    sodium chloride 0.9% flush 5 mL, 5 mL, Intravenous, PRN, Bean Silva MD    dextrose 50%, dextrose 50%, glucagon (human recombinant), glucose, glucose, insulin aspart, sodium chloride 0.9%    Laboratory/Diagnostic Data:  Reviewed and noted in plan where applicable- Please see chart for full lab data.      Recent Labs  Lab 02/09/18  1650 02/10/18  0437 02/11/18  0452   WBC 23.07* 22.23* 21.92*   HGB 9.8* 9.0* 8.8*   HCT 29.0* 26.1* 27.0*   * 336 394*         Recent Labs  Lab 02/09/18  0447  02/10/18  0437 02/10/18  2011 02/11/18  0452   *  < > 129* 128* 129*   K 4.6  < > 4.5 4.2 4.2   CL 96  < > 98 97 98   CO2 20*  < > 19* 20* 18*   BUN 67*  < > 84* 93* 96*   CREATININE 2.8*  < > 3.2* 3.1* 3.2*   *  < > 200* 207* 165*   CALCIUM 7.9*  < > 8.0* 7.8* 7.5*   MG 2.0  --  2.3  --  2.4   PHOS 3.4  --  4.0 3.6 4.1   < > = values in this  interval not displayed.      Recent Labs  Lab 02/09/18  1650 02/10/18  0437 02/10/18  2011 02/11/18  0452   ALKPHOS 288* 253*  --  249*   ALT 52* 43  --  35   AST 80* 67*  --  56*   ALBUMIN 2.0* 1.7* 1.9* 1.7*   PROT 6.8 6.0  --  5.9*   BILITOT 1.9* 1.6*  --  1.7*        Microbiology labs for the last week  Microbiology Results (last 7 days)     Procedure Component Value Units Date/Time    Stool culture [[id number]] Collected:  02/10/18 1330    Order Status:  Completed Specimen:  Stool from Stool Updated:  02/11/18 1122     Stool Culture Culture in progress    Urine culture [[id number]] Collected:  02/09/18 0835    Order Status:  Completed Specimen:  Urine from Urine, Clean Catch Updated:  02/11/18 0544     Urine Culture, Routine No significant growth    Narrative:       1 cup of urine    Clostridium difficile EIA [[id number]] Collected:  02/10/18 1330    Order Status:  Completed Specimen:  Stool from Stool Updated:  02/11/18 0009     C. diff Antigen Negative     C difficile Toxins A+B, EIA Negative     Comment: Testing not recommended for children <24 months old.       Blood culture [[id number]] Collected:  02/09/18 1659    Order Status:  Completed Specimen:  Blood Updated:  02/10/18 2012     Blood Culture, Routine No Growth to date     Blood Culture, Routine No Growth to date    Blood culture [[id number]] Collected:  02/09/18 1650    Order Status:  Completed Specimen:  Blood Updated:  02/10/18 2012     Blood Culture, Routine No Growth to date     Blood Culture, Routine No Growth to date    E. coli 0157 antigen [[id number]] Collected:  02/10/18 1330    Order Status:  No result Specimen:  Stool from Stool Updated:  02/10/18 1447           Imaging Results          MRI Abdomen Without Contrast (Final result)  Result time 02/11/18 12:11:50    Final result by Beck Arrieta III, MD (02/11/18 12:11:50)                 Impression:     Fluid around a nondistended gallbladder. There is a small amount of ascites with no definite  wall thickening of the gallbladder. Nuclear medicine HIDA scan could be helpful.    Liver lesions worrisome for metastatic disease. MRI with contrast or CT with contrast is recommended.      Electronically signed by: MARYELLEN SOUSA MD  Date:     02/11/18  Time:    12:11              Narrative:    There are diffuse liver lesions seen infiltrating throughout the liver. Spleen, stomach, pancreas, pancreatic duct, and bile ducts are unremarkable. There is fluid around the gallbladder which appears somewhat small. There is a solitary right kidney. There is a small amount of ascites. There are multiple right renal cysts one of which is hemorrhagic. No adenopathy is seen.                             CT Abdomen Pelvis  Without Contrast (Final result)  Result time 02/09/18 22:50:24    Final result by Swathi Boswell MD (02/09/18 22:50:24)                 Impression:        1. Interval development of hepatomegaly with markedly heterogeneity of hepatic parenchyma, noting multiple areas of ill-defined low attenuation. This is incompletely characterized on today's noncontrast examination. Findings are concerning for possible underlying diffuse infiltrating process or multiple hepatic lesions, possibly representing metastatic disease. Clinical correlation with patient history is advised.    2. Small volume of perihepatic ascites.    3. Status post left nephrectomy.    4. Multiple low-attenuation right renal lesions previously characterized as cysts. Nonobstructing right renal calculus.    5. Diverticulosis without evidence of acute diverticulitis.      Electronically signed by: SWATHI BOSWELL  Date:     02/09/18  Time:    22:50              Narrative:    Procedure comments: The patient was surveyed from the thoracic inlet through the pelvis after the administration of oral contrast and without IV contrast and data was reconstructed for coronal, sagittal, and axial images.    Comparison: CT abdomen and pelvis  6/16/2015.    Findings:    Please note evaluation of the solid organs is limited without IV contrast.    Examination of the vascular and soft tissue structures at the base of the neck is unremarkable.    The thoracic aorta maintains normal caliber, contour, and course without significant atherosclerotic calcification within its course.  The heart is not enlarged and there is no evidence of pericardial effusion.    The esophagus maintains a normal course and caliber. There is no axillary, mediastinal, or hilar lymphadenopathy.      The trachea is midline, the proximal airways are patent and the lungs are symmetrically expanded.   Examination of the lung fields demonstrates mild dependent atelectasis at the lung bases. There is no focal airspace consolidation or pulmonary mass. There is no significant pleural effusion.      The liver is enlarged.  Evaluation of the hepatic parenchyma is significantly limited by lack of IV contrast, however there is marked heterogeneity with multifocal areas of ill-defined low attenuation throughout the hepatic parenchyma. There is a trace volume of perihepatic ascites.  Gallbladder is somewhat contracted with mild wall thickening as seen on prior examinations. No radiopaque stones are identified. There is no intra-extra hepatic biliary ductal dilatation appreciated.    The stomach, spleen, pancreas, and adrenal glands are unremarkable.    The left kidney is surgically absent.  The right kidney demonstrates no evidence of hydronephrosis. Evaluation of the right renal parenchyma is limited secondary to streak artifact from the patient's body habitus. There are multiple partially exophytic low attenuation lesions previously characterized as cysts on prior ultrasound examination. There is a stable 1.0 cm cortically-based calcification within the right kidney. There is a nonobstructing right renal calculus. No stones are identified within the right ureter. The urinary bladder appears  within normal limits. The prostate demonstrates central dystrophic calcification. There is a fat containing left inguinal hernia.    The abdominal aorta is normal in course and caliber without significant atherosclerotic calcifications.    The visualized loops of small and large bowel show no evidence of obstruction or inflammation.  There is diverticulosis without evidence of acute diverticulitis. There is no free intraperitoneal air or portal venous gas. No bulky lymphadenopathy is identified within the abdomen or pelvis.    The osseous structures demonstrate mild degenerative changes of the spine.  The extraperitoneal soft tissues are unremarkable.                             CT Chest Without Contrast (Final result)  Result time 02/09/18 22:50:23    Final result by Swathi Boswell MD (02/09/18 22:50:23)                 Impression:        1. Interval development of hepatomegaly with markedly heterogeneity of hepatic parenchyma, noting multiple areas of ill-defined low attenuation. This is incompletely characterized on today's noncontrast examination. Findings are concerning for possible underlying diffuse infiltrating process or multiple hepatic lesions, possibly representing metastatic disease. Clinical correlation with patient history is advised.    2. Small volume of perihepatic ascites.    3. Status post left nephrectomy.    4. Multiple low-attenuation right renal lesions previously characterized as cysts. Nonobstructing right renal calculus.    5. Diverticulosis without evidence of acute diverticulitis.      Electronically signed by: SWATHI BOSWELL  Date:     02/09/18  Time:    22:50              Narrative:    Procedure comments: The patient was surveyed from the thoracic inlet through the pelvis after the administration of oral contrast and without IV contrast and data was reconstructed for coronal, sagittal, and axial images.    Comparison: CT abdomen and pelvis 6/16/2015.    Findings:    Please note  evaluation of the solid organs is limited without IV contrast.    Examination of the vascular and soft tissue structures at the base of the neck is unremarkable.    The thoracic aorta maintains normal caliber, contour, and course without significant atherosclerotic calcification within its course.  The heart is not enlarged and there is no evidence of pericardial effusion.    The esophagus maintains a normal course and caliber. There is no axillary, mediastinal, or hilar lymphadenopathy.      The trachea is midline, the proximal airways are patent and the lungs are symmetrically expanded.   Examination of the lung fields demonstrates mild dependent atelectasis at the lung bases. There is no focal airspace consolidation or pulmonary mass. There is no significant pleural effusion.      The liver is enlarged.  Evaluation of the hepatic parenchyma is significantly limited by lack of IV contrast, however there is marked heterogeneity with multifocal areas of ill-defined low attenuation throughout the hepatic parenchyma. There is a trace volume of perihepatic ascites.  Gallbladder is somewhat contracted with mild wall thickening as seen on prior examinations. No radiopaque stones are identified. There is no intra-extra hepatic biliary ductal dilatation appreciated.    The stomach, spleen, pancreas, and adrenal glands are unremarkable.    The left kidney is surgically absent.  The right kidney demonstrates no evidence of hydronephrosis. Evaluation of the right renal parenchyma is limited secondary to streak artifact from the patient's body habitus. There are multiple partially exophytic low attenuation lesions previously characterized as cysts on prior ultrasound examination. There is a stable 1.0 cm cortically-based calcification within the right kidney. There is a nonobstructing right renal calculus. No stones are identified within the right ureter. The urinary bladder appears within normal limits. The prostate  demonstrates central dystrophic calcification. There is a fat containing left inguinal hernia.    The abdominal aorta is normal in course and caliber without significant atherosclerotic calcifications.    The visualized loops of small and large bowel show no evidence of obstruction or inflammation.  There is diverticulosis without evidence of acute diverticulitis. There is no free intraperitoneal air or portal venous gas. No bulky lymphadenopathy is identified within the abdomen or pelvis.    The osseous structures demonstrate mild degenerative changes of the spine.  The extraperitoneal soft tissues are unremarkable.                             US Abdomen Complete (Final result)  Result time 02/09/18 02:09:26    Final result by Marc Chao MD (02/09/18 02:09:26)                 Impression:        Hepatomegaly with multiple ill-defined solid areas of decreased echogenicity, possibly metastatic disease. This finding would be best characterized with hepatic protocol contrast-enhanced MRI.    Nonspecific diffuse gallbladder wall thickening, which could be seen with acute/chronic cholecystitis, liver disease, malnutrition, or cardiac disease.    Nondependent subcentimeter adherent sludge ball or stone.    Splenomegaly.    Additional findings as above.  ______________________________________     Electronically signed by resident: WANDY ARELLANO MD  Date:     02/09/18  Time:    01:30            As the supervising and teaching physician, I personally reviewed the images and resident's interpretation and I agree with the findings.          Electronically signed by: Marc Chao  Date:     02/09/18  Time:    02:09              Narrative:    Time of Procedure: 02/09/18 00:00:00  Accession # [id number]    Technique: Complete abdominal ultrasound    Clinical indication: Elevated liver enzymes, hida with liver defects     Comparison: Nuclear medicine hepatobiliary imaging 02/07/2018.    Findings:    Pancreas:  Normal.      Aorta:  No aneurysm.      Inferior vena cava:  Normal in appearance.    Gallbladder:  Nonmobile echogenic focus at the gallbladder wall measuring 0.7 cm consistent with an impacted stone.  The common duct is not dilated, measuring 4 mm.  There is diffuse thickening of the gallbladder wall measuring up to 1.1 cm.No sonographic Andres sign.  No dilated intrahepatic radicles are seen.  No pericholecystic fluid.    Liver:  Enlarged in size measuring 20.5 cm.  The liver demonstrates heterogeneous echotexture with multiple ill-defined areas of decreased echogenicity.  The 2 most prominent areas are the right hepatic lobe measure 3.3 x 2.0 x 3.6 cm, and 1.9 x 1.0 x 1.6 cm.      Right kidney:  Normal in size with no hydronephrosis.  Multiple renal cysts better evaluated on retroperitoneal ultrasound from the same day.      Left kidney:  Left kidney is surgically absent. No abnormal echogenicity in the left renal fossa.      Spleen:  Enlarged in size measuring 15.2 x 4.7 cm with a homogeneous echotexture.    Miscellaneous:  No ascites.                             US Retroperitoneal Complete (Kidney and (Final result)  Result time 02/09/18 04:02:07    Final result by Marc Chao MD (02/09/18 04:02:07)                 Impression:        Findings suggestive of right-sided chronic medical renal disease.    Surgically absent left kidney without abnormal echogenicity in the left renal fossa.    Stable right simple renal cysts.  ______________________________________     Electronically signed by resident: WANDY ARELLANO MD  Date:     02/09/18  Time:    03:40            As the supervising and teaching physician, I personally reviewed the images and resident's interpretation and I agree with the findings.          Electronically signed by: Marc Chao  Date:     02/09/18  Time:    04:02              Narrative:    Time of Procedure: 02/09/18 00:00:00  Accession # [id number]    Technique: Renal Ultrasound     Clinical  indication: ARTUR     Comparison: Ultrasound kidneys only 06/22/2017    FINDINGS:     Right kidney: Dimensions 15.9 cm.  Satisfactory echogenicity with some cortical thinning.  No hydronephrosis.  Multiple simple renal cysts, the largest is at the interpolar region measuring 5.4 cm and is stable when compared to prior examination.    Left kidney: Surgically absent.  No abnormal echogenicity in the left renal fossa    Perfusion: Right renal perfusion is decreased.    Resistive indices: Elevated as follows: Right: 0.85 Splenic RI: 0.69    Bladder: Unremarkable.      Prostate: Measures 4.5 x 2.4 0.4 cm                             US Lower Extremity Veins Bilateral (Final result)  Result time 02/09/18 00:33:33    Final result by Marc Chao MD (02/09/18 00:33:33)                 Impression:        No evidence of deep venous thrombosis bilaterally.  ______________________________________     Electronically signed by resident: WANDY ARELLANO MD  Date:     02/09/18  Time:    00:31            As the supervising and teaching physician, I personally reviewed the images and resident's interpretation and I agree with the findings.          Electronically signed by: Marc Chao  Date:     02/09/18  Time:    00:33              Narrative:    Time of Procedure: 02/09/18 00:00:00  Accession # [id number]    Technique: Duplex and color flow Doppler evaluation of the bilateral lower extremities.    Comparison: None    Clinical indication:  Rule out DVT.    Findings:    Right - There is no evidence of venous thrombosis in the common femoral, superficial femoral, greater saphenous, popliteal, peroneal, anterior tibial and posterior tibial veins.  There is no reflux to suggest valvular incompetence.    Left - There is no evidence of venous thrombosis in the common femoral, superficial femoral, greater saphenous, popliteal, peroneal, anterior tibial and posterior tibial veins.  There is no reflux to suggest valvular incompetence.                              NM Lung Ventilation Perfusion Imaging (Final result)  Result time 02/08/18 20:49:57    Final result by Marc Chao MD (02/08/18 20:49:57)                 Impression:         Low probability VQ scan.    If clinically indicated, consider correlation with lower extremity venous Doppler ultrasound or CT PE protocol if renal function allows.    ______________________________________     Electronically signed by resident: MARIA ESTHER ALMEIDA MD  Date:     02/08/18  Time:    20:28            As the supervising and teaching physician, I personally reviewed the images and resident's interpretation and I agree with the findings.          Electronically signed by: Marc Chao  Date:     02/08/18  Time:    20:49              Narrative:    VENTILATION-PERFUSION PULMONARY SCINTIGRAPHY    History: Shortness of breath.    Comparison:  Chest radiograph, 2/8/18.    Technique:    42.0 mCi of Tc-99m-DTPA were placed in the nebulizer. Following the ventilation portion of the study 5.0 mCi of Tc-99m-MAA was administered and multiple images of the thorax were obtained in various projections.     Findings:      No evidence of significant perfusion defect or mismatched perfusion and ventilation defect.  There is nonspecific clumping of tracer on the ventilation portion of exam within the thierry-hilar airways.                             X-Ray Chest 1 View (Final result)  Result time 02/08/18 17:38:07    Final result by Edgardo Garcia MD (02/08/18 17:38:07)                 Impression:      Hypoventilatory exam, noting mild peribronchial thickening, similar to the previous exam.  Correlation with any bronchial inflammation or infection advise.      Electronically signed by: EDGARDO GARCIA MD  Date:     02/08/18  Time:    17:38              Narrative:    Chest one view    Indication:Shortness of breath    Comparison:2/6/2018    Findings: Habitus limits evaluation. The cardiomediastinal silhouette is prominent,  "magnified by technique, stable, noting calcification of the aortic arch.  There is no pleural effusion.  The trachea is midline.  The lungs are symmetrically expanded bilaterally with coarse interstitial attenuation, accentuated by shallow inspiratory effort.  There is peribronchial thickening bilaterally.. No large focal consolidation seen.  There is no pneumothorax.  The osseous structures are remarkable for degenerative changes.                              Estimated body mass index is 34.67 kg/m² as calculated from the following:    Height as of this encounter: 6' 2" (1.88 m).    Weight as of this encounter: 122.5 kg (270 lb).    I & O (Last 24H):    Intake/Output Summary (Last 24 hours) at 02/11/18 1229  Last data filed at 02/11/18 0535   Gross per 24 hour   Intake           2882.5 ml   Output              300 ml   Net           2582.5 ml       Estimated Creatinine Clearance: 32 mL/min (based on SCr of 3.2 mg/dL (H)).    ASSESSMENT/PLAN:     Active Problems:    Active Hospital Problems    Diagnosis  POA    *Acute renal failure superimposed on stage 3 chronic kidney disease [N17.9, N18.3]poor oral intake. ATN likely. s/p nephrology evaluation Cr 2.1-->2.8-->3.2 continue with IV hydration   Anemia Hb 10.2--> 8.8 FOBT + . monitor with hydration  Yes    Hypoalbuminemia [E88.09]1.9. likely contributing to peripheral edema . reveived IV albuminx 1  Hyponatremia /Metabolic acidosis 129 -stable started on bicarbonated drip for metabolic acidosis. CMP q 12hrly  Yes    Leucocytosis [D72.829recent travel to [facility] 12/17. He also reports that for the past 2 weeks, he has been having diarrhea.]21-->23 with elevated procalcitonin 4.ID consulted. Antibiotics discontinued  Yes    Abnormal liver function tests [R79.89]ST/ALT (approx 2:1) with alk phos elevated to 285, bili 1.7, and noted HIDA scan normal appearance of gallbladder. No bile duct obstruction .AFP 0.7. CT chest abd/Pelvis without contrast - Interval " development of hepatomegaly with markedly heterogeneity of hepatic parenchyma, noting multiple areas of ill-defined low attenuation.concerning for possible underlying diffuse infiltrating process or multiple hepatic lesions, possibly representing metastatic disease. MRI abd without contast today for futher characterization of liver lesions before biopsy. FOBT+ Hepatology consulted   Hemorrhagic renal cyst -urology consulted   Yes    Abnormal liver scan [R93.2] CT chest abd/Pelvis without contrast as sono liver -Hepatomegaly with multiple ill-defined solid areas of decreased echogenicity, possibly metastatic disease .IR consulted for liver biopsy. Apixaban held for possible liver biopsy on monday    Yes    D-dimer, elevated [R79.89]DVT sonogram B/L LE negative.V/Q scan - Low probability VQ scan.  Yes    Current use of long term anticoagulation [Z79.01]Currently in sinus rhythm.Continue flecainide 50mg Q12H. Eliquis held for liver biopsy in AM  Not Applicable    Persistent atrial fibrillation [I48.1]s/p DCCV. as above  Yes    Proteinuria [R80.9]nephrololgy consulted as above  Yes    BMI 34.0-34.9,adult [Z68.34]Body mass index is 34.67 kg/m². Morbid obesity complicates all aspects of disease management from diagnostic modalities to treatment. Weight loss encouraged   Not Applicable    Hyperlipidemia associated with type 2 diabetes mellitus [E11.69, E78.5]On simvastatin  Yes    Hypertension associated with diabetes [E11.59, I10]Blood pressure controlled on carvedilol and doxazosin with holding parameters  amlodipine discontinued   Yes     Chronic    Type 2 diabetes mellitus with stage 3 chronic kidney disease, with long-term current use of insulin [E11.22, N18.3, Z79.4]Accuchecks ACHS, SSI. Detimir 12u daily  Not Applicable      Resolved Hospital Problems    Diagnosis Date Resolved POA   No resolved problems to display.         Disposition- Home    DVT prophylaxis addressed with: Rocio Sanchez  Aleshia DAVIS  Attending Staff Physician  [facility]  pager- [phone]  [employer] - [phone]

## 2018-02-11 NOTE — PROGRESS NOTES
[facility]  Nephrology  Progress Note    Patient Name: Jose Cruz Lira  MRN: [id number]  Admission Date: 2/8/2018  Hospital Length of Stay: 2 days  Attending Provider: Daniel Monk MD   Primary Care Physician: Lisa Capone MD  Principal Problem:Acute renal failure superimposed on stage 3 chronic kidney disease    Subjective:     HPI: 66 yo M with PMH of CKD3, left hydronephrosis s/p nephrectomy August 2017, HTN, DMII (long term insulin use), persistent atrial fibrillation (s/p DCCV 2016) on flecainide and Eliquis. He presented for RHC appt, was noted to have worsened LFTs, leukocytosis, and was sent to ED for further workup. Patient has been short of breath for several month with symptoms have been getting worse over the past few weeks. He has dyspnea with exertion and at rest currently, chills, cough off and on, fatigue.  He denies chest pain.  He does take his lasix twice daily and reports urine output has decreased lately in last few days, reports wt loss recently, has had difficulty sleeping lately 2/2 orthopnea, reports stable / decrease in LE swelling, unsure of PND. Patient does report recent travel to [facility] 12/17. He also reports that for the past 2 weeks, he has been having diarrhea.     Patient had recent admission for CHF exacerbation 2/1 and discharged 2/3, recent ED visit for same complaints noted to have leukocytosis with neutrophilic prodominance, bili noted to be 2.0, Cr 2.1, blood cultures drawn NGTD, referred for sleep study however did not receive yet though high suspicion for MONICA. Exam with cards noted for + johnson's sign, HIDA scan ordered was negative for cholecystitis however noted filling defects in liver, recommended f/u imaging. RHC was also ordered and was to be done 2/8 however worsened labs (leukocytosis, LFTs) prompted referral to ED for evaluation.    Nephrology was consulted for ARTUR on CKD. He has been followed by nephrologist Dr. Anderson.  "Baseline Cr is 1.7. He denies NSAID use. He has a history of left total nephrectomy in August 2017 due to left hydronephrosis (etiology is not clear). He notes that his po intake has been poor due to fatigue. Urine output has also decreased. Denies dysuria and hematuria. Endorses nocturia. States that he has been having "loose stools" for the past 2 weeks    Interval History:  Scrt rising to 3.2 despite albumin and IV NS, he has had continuing diarrhea,  UO does not appear well documented, bp low  Normal not on antihypertensives  Temp 100.3 last night  MRI reviewed, single R kidneyy with multiple cysts and one hemorrhagic    Review of patient's allergies indicates:  No Known Allergies  Current Facility-Administered Medications   Medication Frequency    carvedilol tablet 25 mg BID WM    dextrose 50% injection 12.5 g PRN    dextrose 50% injection 25 g PRN    doxazosin tablet 2 mg Nightly    flecainide tablet 50 mg Q12H    glucagon (human recombinant) injection 1 mg PRN    glucose chewable tablet 16 g PRN    glucose chewable tablet 24 g PRN    insulin aspart pen 1-10 Units QID (AC + HS) PRN    insulin detemir pen 12 Units Daily    simvastatin tablet 20 mg QHS    sodium chloride 0.9% flush 5 mL PRN       Objective:     Vital Signs (Most Recent):  Temp: 97.9 °F (36.6 °C) (02/11/18 1248)  Pulse: 81 (02/11/18 1248)  Resp: (!) 22 (02/11/18 1248)  BP: (!) 114/53 (02/11/18 1248)  SpO2: 95 % (02/11/18 1248)  O2 Device (Oxygen Therapy): room air (02/11/18 0842) Vital Signs (24h Range):  Temp:  [97.9 °F (36.6 °C)-100.3 °F (37.9 °C)] 97.9 °F (36.6 °C)  Pulse:  [78-87] 81  Resp:  [16-22] 22  SpO2:  [91 %-95 %] 95 %  BP: ()/(50-57) 114/53     Weight: 122.5 kg (270 lb) (02/08/18 1457)  Body mass index is 34.67 kg/m².  Body surface area is 2.53 meters squared.    I/O last 3 completed shifts:  In: 2882.5 [P.O.:1710; I.V.:1172.5]  Out: 500 [Urine:500]    Physical Exam   Constitutional: He is oriented to person, place, " and time. He appears well-developed and well-nourished. No distress.   HENT:   Head: Normocephalic and atraumatic.   Mouth/Throat: Oropharynx is clear and moist. No oropharyngeal exudate.   Eyes: Conjunctivae and EOM are normal. Pupils are equal, round, and reactive to light. Right eye exhibits no discharge. Left eye exhibits no discharge. No scleral icterus.   Neck: Normal range of motion. Neck supple. No JVD present. No tracheal deviation present. No thyromegaly present.   Cardiovascular: Normal rate, regular rhythm and normal heart sounds.  Exam reveals no gallop and no friction rub.    No murmur heard.  +2 LE edema, appears the same to slightly more than yesterday   Pulmonary/Chest: No stridor. No respiratory distress. He has no wheezes. He has no rales. He exhibits no tenderness.   Decreased bs at bases   Abdominal: Soft. Bowel sounds are normal. He exhibits no distension and no mass. There is no tenderness. There is no rebound and no guarding. No hernia.   Musculoskeletal: Normal range of motion. He exhibits no edema or tenderness.   Lymphadenopathy:     He has no cervical adenopathy.   Neurological: He is alert and oriented to person, place, and time. No cranial nerve deficit. He exhibits normal muscle tone. Coordination normal.   Skin: Skin is warm and dry. No rash noted. He is not diaphoretic. No erythema. No pallor.   Psychiatric: He has a normal mood and affect. His behavior is normal. Judgment and thought content normal.   Nursing note and vitals reviewed.      Significant Labs:  All labs within the past 24 hours have been reviewed.     Significant Imaging:  MRI: Reviewed    Assessment/Plan:     * Acute renal failure superimposed on stage 3 chronic kidney disease    This patient presents with ARTUR on CKD3, likely from pre-renal causes which include poor po intake, sepsis resulting in renal hypoperfusion, and 2 week history of diarrhea. This could be ATN from a prolonged pre-renal state    Urine  sedimentation shows granular casts suggestive of ATN  UPCR neg for significant proteinuria    -2/11/18: solitary r kidney w worsening renal function, with diarrhea, metabolic acidosis and undocumented urine output with some worsening peripheral edema despite IV NS and albumin    Hemorrhagic renal cyst on MRI: suggest urology evaluation     Plan   1. Recommend continuous IV fluid but will switch to bicarb drip for another 24 hrs, if renal function continues to worsen, would hold further aggressive hydration and match I/o's. Given that albumin is low (1.8) and peripheral edema  He was  given one dose of albumin 2/10, to assist with intravascular repletion, may consider further dosing, but currently bp is stable    -Metabolic acidosis: elsa and diarrhea,   begin bicarb drip as above , on PO bicarb    Hyponatremia:  May need to consider PO fluid restriction,   Would obtain Uosm, s osm, U electrolytes    2. Follow rfp twice daily on bicarb drip    3.Hemorrhagic renal cyst suggest urology eval     4. Avoid nephrotoxic agents. Renally dose medications               Thank you for your consult. I will follow-up with patient. Please contact us if you have any additional questions.    Emily Snyder MD  Nephrology  [facility]

## 2018-02-11 NOTE — ASSESSMENT & PLAN NOTE
This patient presents with ARTUR on CKD3, likely from pre-renal causes which include poor po intake, sepsis resulting in renal hypoperfusion, and 2 week history of diarrhea. This could be ATN from a prolonged pre-renal state    Urine sedimentation shows granular casts suggestive of ATN  UPCR neg for significant proteinuria    -2/11/18: solitary r kidney w worsening renal function, with diarrhea, metabolic acidosis and undocumented urine output with some worsening peripheral edema despite IV NS and albumin    Hemorrhagic renal cyst on MRI: suggest urology evaluation     Plan   1. Recommend continuous IV fluid but will switch to bicarb drip for another 24 hrs, if renal function continues to worsen, would hold further aggressive hydration and match I/o's. Given that albumin is low (1.8) and peripheral edema  He was  given one dose of albumin 2/10, to assist with intravascular repletion, may consider further dosing, but currently bp is stable    -Metabolic acidosis: artur and diarrhea,   begin bicarb drip as above , on PO bicarb    Hyponatremia:  May need to consider PO fluid restriction,   Would obtain Uosm, s osm, U electrolytes    2. Follow rfp twice daily on bicarb drip    3.Hemorrhagic renal cyst suggest urology eval     4. Avoid nephrotoxic agents. Renally dose medications

## 2018-02-11 NOTE — ASSESSMENT & PLAN NOTE
Unclear etiology of liver lesions. Would need tissue to make definitive diagnosis. Most recent imaging concerning for metastatic disease.    Recommendations:  - Consider US guided liver biopsy given lesions seen on abd US  - Consider US with contrast (non nephrotxic) to better characterize lesions

## 2018-02-11 NOTE — PLAN OF CARE
Problem: Patient Care Overview  Goal: Plan of Care Review  Outcome: Ongoing (interventions implemented as appropriate)  POC reviewed,stated understanding,no fall or injury,refuses to get in bed,prefers to sit in chair,pt has had 3 episodes of incontinence after condom cath found off of pt,Neftaly Severino NP notified of inability to collect 24hr urine due to hidden penis and inability of condom cath in place,no new orders,ctm.

## 2018-02-11 NOTE — CONSULTS
"[facility]  Hepatology  Consult Note    Patient Name: Jose Cruz Lira  MRN: [id number]  Admission Date: 2/8/2018  Hospital Length of Stay: 2 days  Attending Provider: Daniel Monk MD   Primary Care Physician: Lisa Capone MD  Principal Problem:Acute renal failure superimposed on stage 3 chronic kidney disease    Consults  Subjective:     Transplant status: No    HPI:  This is a 66 yo M with HFpEF, CKD, who has been admitted since 2/8 for elevated liver enzymes and WBC. Patient was undergoing outpatient evaluation for RHC and was noted to have positive Andres's sign. He then underwent HIDA scan which showed filling defects in the liver. CT abdomen then obtained which showed "interval development of hepatomegaly with markedly heterogeneity of hepatic parenchyma, noting multiple areas of ill-defined low attenuation. This is incompletely characterized on today's noncontrast examination. Findings are concerning for possible underlying diffuse infiltrating process or multiple hepatic lesions, possibly representing metastatic disease." He then underwent attempted liver biopsy yesterday however no targeted lesions could be identified and MRI was recommended. Hepatology was consulted to comment on liver lesions.     Review of Systems   Constitutional: Positive for activity change, appetite change and chills.   HENT: Negative for sore throat and trouble swallowing.    Respiratory: Positive for shortness of breath. Negative for cough.    Cardiovascular: Positive for leg swelling. Negative for chest pain.   Gastrointestinal: Positive for abdominal distention, abdominal pain and diarrhea. Negative for blood in stool, nausea and vomiting.   Genitourinary: Negative for decreased urine volume and difficulty urinating.   Musculoskeletal: Negative for arthralgias and back pain.   Skin: Negative for color change and pallor.   Neurological: Positive for weakness. Negative for dizziness and " light-headedness.   Psychiatric/Behavioral: Negative for agitation and confusion.       Past Medical History:   Diagnosis Date    Auricular fibrillation     Bronchitis     CHF (congestive heart failure)     Coronary artery disease     Diabetes mellitus, type 2 2010    Edema     Elevated troponin     Hematuria     Hydronephrosis of left kidney     Hypertension     Non-functioning kidney     Followed by Dr. Silver Anderson    Obesity (BMI 30-39.9)     Sleep apnea     Unspecified disorder of kidney and ureter        Past Surgical History:   Procedure Laterality Date    arm surgery      CARDIOVERSION  11/29/2016    kidney removed Left 0825/2017    knee surgeory N/A 4 to 5 years ago       Family history of liver disease: Yes    Review of patient's allergies indicates:  No Known Allergies    Social History Main Topics    Smoking status: Never Smoker    Smokeless tobacco: Never Used    Alcohol use No    Drug use: No    Sexual activity: No      Comment:         Prescriptions Prior to Admission   Medication Sig Dispense Refill Last Dose    amLODIPine (NORVASC) 10 MG tablet Take 1 tablet (10 mg total) by mouth once daily. 90 tablet 4 2/8/2018    apixaban (ELIQUIS) 5 mg Tab Take 1 tablet (5 mg total) by mouth 2 (two) times daily. 60 tablet 11 Past Week    carvedilol (COREG) 25 MG tablet Take 1 tablet (25 mg total) by mouth 2 (two) times daily with meals. 360 tablet 3 2/8/2018    doxazosin (CARDURA) 4 MG tablet Take 0.5 tablets (2 mg total) by mouth once daily. (Patient taking differently: Take 2 mg by mouth once daily. TAkes at night) 15 tablet 6 2/7/2018    ergocalciferol (ERGOCALCIFEROL) 50,000 unit Cap Take 1 capsule (50,000 Units total) by mouth every 7 days. 4 capsule 3 Past Week    exenatide microspheres (BYDUREON) 2 mg/0.65 mL PnIj Inject 2 mg into the muscle once a week. 4 each 11 2/4/2018    flecainide (TAMBOCOR) 50 MG Tab Take 1 tablet (50 mg total) by mouth every 12 (twelve) hours.  "60 tablet 11 2/8/2018    furosemide (LASIX) 40 MG tablet Take 1 tablet (40 mg total) by mouth 2 (two) times daily. 180 tablet 3 2/8/2018    insulin detemir (LEVEMIR FLEXTOUCH) 100 unit/mL (3 mL) SubQ InPn pen INJECT 22 UNITS INTO THE SKIN EVERY EVENING (Patient taking differently: 18 Units every evening. INJECT 22 UNITS INTO THE SKIN EVERY EVENING) 45 mL 4 2/7/2018    simvastatin (ZOCOR) 20 MG tablet Take 1 tablet (20 mg total) by mouth every evening. 90 tablet 4 2/7/2018    blood sugar diagnostic (ONETOUCH VERIO) Strp 1 strip by Misc.(Non-Drug; Combo Route) route 3 (three) times daily. 100 strip 11     blood sugar diagnostic Strp To check BG 3 times daily, to use with insurance preferred meter 100 each 11 Taking    blood sugar diagnostic Strp To check BG 3 times daily, to use with insurance preferred meter 100 strip 11 Taking    blood-glucose meter kit To check BG 3 times daily, to use with insurance preferred meter 1 each 1 Taking    blood-glucose meter kit To check BG 3 times daily, to use with insurance preferred meter 1 each 0 Taking    lancets Misc To check BG 3 times daily, to use with insurance preferred meter 100 each 11 Taking    lancets Misc To check BG 3 times daily, to use with insurance preferred meter 100 each 11     pen needle, diabetic (BD ULTRA-FINE RODRIGUEZ PEN NEEDLES) 32 gauge x 5/32" Ndle Patient injects insulin once a day. Please provide 3 month supply. 50 each 11        Objective:     Vital Signs (Most Recent):  Temp: 97.9 °F (36.6 °C) (02/11/18 1248)  Pulse: 81 (02/11/18 1248)  Resp: (!) 22 (02/11/18 1248)  BP: (!) 114/53 (02/11/18 1248)  SpO2: 95 % (02/11/18 1248) Vital Signs (24h Range):  Temp:  [97.9 °F (36.6 °C)-100.3 °F (37.9 °C)] 97.9 °F (36.6 °C)  Pulse:  [78-87] 81  Resp:  [16-22] 22  SpO2:  [91 %-95 %] 95 %  BP: ()/(50-57) 114/53     Weight: 122.5 kg (270 lb) (02/08/18 1457)  Body mass index is 34.67 kg/m².    Physical Exam   Constitutional: He is oriented to person, " place, and time.   HENT:   Mouth/Throat: Oropharynx is clear and moist.   Eyes: No scleral icterus.   Cardiovascular: Normal rate and regular rhythm.    Pulmonary/Chest: Effort normal and breath sounds normal.   Abdominal: He exhibits distension. He exhibits no mass. There is no tenderness. There is no guarding.   Musculoskeletal: He exhibits no edema or deformity.   Neurological: He is alert and oriented to person, place, and time.   Skin: Skin is warm and dry. He is not diaphoretic.   Psychiatric: He has a normal mood and affect.   Vitals reviewed.      MELD-Na score: 17 at 2/3/2018  4:14 AM  MELD score: 15 at 2/3/2018  4:14 AM  Calculated from:  Serum Creatinine: 1.7 mg/dL at 2/3/2018  4:14 AM  Serum Sodium: 134 mmol/L at 2/3/2018  4:14 AM  Total Bilirubin: 1.4 mg/dL at 2/3/2018  4:14 AM  INR(ratio): 1.2 at 2/1/2018  1:38 PM  Age: 67 years    Significant Labs:  CBC:   Recent Labs  Lab 02/11/18  0452   WBC 21.92*   RBC 3.16*   HGB 8.8*   HCT 27.0*   *     CMP:   Recent Labs  Lab 02/11/18 0452   *   CALCIUM 7.5*   ALBUMIN 1.7*   PROT 5.9*   *   K 4.2   CO2 18*   CL 98   BUN 96*   CREATININE 3.2*   ALKPHOS 249*   ALT 35   AST 56*   BILITOT 1.7*     Coagulation: No results for input(s): PT, INR, APTT in the last 168 hours.        Assessment/Plan:     Abnormal liver scan    Unclear etiology of liver lesions. Would need tissue to make definitive diagnosis. Most recent imaging concerning for metastatic disease.    Recommendations:  - Consider US guided liver biopsy given lesions seen on abd US  - Consider US with contrast (non nephrotxic) to better characterize lesions            Thank you for your consult. I will follow-up with patient. Please contact us if you have any additional questions.    Ramirez Hubbard MD  Hepatology  [facility]

## 2018-02-12 LAB
ALBUMIN SERPL BCP-MCNC: 1.6 G/DL
ALBUMIN SERPL BCP-MCNC: 1.8 G/DL
ALP SERPL-CCNC: 252 U/L
ALP SERPL-CCNC: 262 U/L
ALT SERPL W/O P-5'-P-CCNC: 26 U/L
ALT SERPL W/O P-5'-P-CCNC: 29 U/L
ANION GAP SERPL CALC-SCNC: 11 MMOL/L
ANION GAP SERPL CALC-SCNC: 13 MMOL/L
AST SERPL-CCNC: 50 U/L
AST SERPL-CCNC: 51 U/L
BACTERIA BLD CULT: NORMAL
BACTERIA BLD CULT: NORMAL
BASOPHILS # BLD AUTO: 0.03 K/UL
BASOPHILS NFR BLD: 0.1 %
BILIRUB SERPL-MCNC: 1.7 MG/DL
BILIRUB SERPL-MCNC: 1.7 MG/DL
BUN SERPL-MCNC: 104 MG/DL
BUN SERPL-MCNC: 105 MG/DL
CALCIUM SERPL-MCNC: 7.4 MG/DL
CALCIUM SERPL-MCNC: 7.4 MG/DL
CHLORIDE SERPL-SCNC: 93 MMOL/L
CHLORIDE SERPL-SCNC: 94 MMOL/L
CO2 SERPL-SCNC: 21 MMOL/L
CO2 SERPL-SCNC: 24 MMOL/L
CREAT SERPL-MCNC: 3.2 MG/DL
CREAT SERPL-MCNC: 3.3 MG/DL
CREAT UR-MCNC: 171 MG/DL
DIFFERENTIAL METHOD: ABNORMAL
E COLI SXT1 STL QL IA: NEGATIVE
E COLI SXT2 STL QL IA: NEGATIVE
EOSINOPHIL # BLD AUTO: 0 K/UL
EOSINOPHIL NFR BLD: 0.1 %
ERYTHROCYTE [DISTWIDTH] IN BLOOD BY AUTOMATED COUNT: 14.9 %
EST. GFR  (AFRICAN AMERICAN): 21.5 ML/MIN/1.73 M^2
EST. GFR  (AFRICAN AMERICAN): 22.3 ML/MIN/1.73 M^2
EST. GFR  (NON AFRICAN AMERICAN): 18.6 ML/MIN/1.73 M^2
EST. GFR  (NON AFRICAN AMERICAN): 19.3 ML/MIN/1.73 M^2
GLUCOSE SERPL-MCNC: 168 MG/DL
GLUCOSE SERPL-MCNC: 221 MG/DL
HCT VFR BLD AUTO: 26.4 %
HGB BLD-MCNC: 9 G/DL
IMM GRANULOCYTES # BLD AUTO: 0.59 K/UL
IMM GRANULOCYTES NFR BLD AUTO: 2.8 %
LYMPHOCYTES # BLD AUTO: 0.5 K/UL
LYMPHOCYTES NFR BLD: 2.5 %
MAGNESIUM SERPL-MCNC: 2.6 MG/DL
MCH RBC QN AUTO: 28 PG
MCHC RBC AUTO-ENTMCNC: 34.1 G/DL
MCV RBC AUTO: 82 FL
MONOCYTES # BLD AUTO: 1.9 K/UL
MONOCYTES NFR BLD: 9.1 %
NEUTROPHILS # BLD AUTO: 17.8 K/UL
NEUTROPHILS NFR BLD: 85.4 %
NRBC BLD-RTO: 0 /100 WBC
O+P STL TRI STN: NORMAL
OSMOLALITY SERPL: 306 MOSM/KG
OSMOLALITY UR: 456 MOSM/KG
PATH REV BLD -IMP: NORMAL
PHOSPHATE SERPL-MCNC: 3.6 MG/DL
PLATELET # BLD AUTO: 401 K/UL
PMV BLD AUTO: 10.6 FL
POCT GLUCOSE: 153 MG/DL (ref 70–110)
POCT GLUCOSE: 165 MG/DL (ref 70–110)
POCT GLUCOSE: 197 MG/DL (ref 70–110)
POCT GLUCOSE: 225 MG/DL (ref 70–110)
POTASSIUM SERPL-SCNC: 3.9 MMOL/L
POTASSIUM SERPL-SCNC: 4 MMOL/L
POTASSIUM UR-SCNC: 32 MMOL/L
PROT SERPL-MCNC: 5.7 G/DL
PROT SERPL-MCNC: 6.1 G/DL
RBC # BLD AUTO: 3.21 M/UL
SODIUM SERPL-SCNC: 127 MMOL/L
SODIUM SERPL-SCNC: 129 MMOL/L
SODIUM UR-SCNC: <20 MMOL/L
UUN UR-MCNC: 842 MG/DL
WBC # BLD AUTO: 20.83 K/UL
WBC #/AREA STL HPF: NORMAL /[HPF]

## 2018-02-12 PROCEDURE — 36415 COLL VENOUS BLD VENIPUNCTURE: CPT

## 2018-02-12 PROCEDURE — 84540 ASSAY OF URINE/UREA-N: CPT

## 2018-02-12 PROCEDURE — 83935 ASSAY OF URINE OSMOLALITY: CPT

## 2018-02-12 PROCEDURE — 88307 TISSUE EXAM BY PATHOLOGIST: CPT | Mod: 26,,, | Performed by: PATHOLOGY

## 2018-02-12 PROCEDURE — 80053 COMPREHEN METABOLIC PANEL: CPT

## 2018-02-12 PROCEDURE — 85025 COMPLETE CBC W/AUTO DIFF WBC: CPT

## 2018-02-12 PROCEDURE — 0FB23ZX EXCISION OF LEFT LOBE LIVER, PERCUTANEOUS APPROACH, DIAGNOSTIC: ICD-10-PCS | Performed by: RADIOLOGY

## 2018-02-12 PROCEDURE — 84133 ASSAY OF URINE POTASSIUM: CPT

## 2018-02-12 PROCEDURE — 11000001 HC ACUTE MED/SURG PRIVATE ROOM

## 2018-02-12 PROCEDURE — 25000003 PHARM REV CODE 250: Performed by: HOSPITALIST

## 2018-02-12 PROCEDURE — 84100 ASSAY OF PHOSPHORUS: CPT

## 2018-02-12 PROCEDURE — 82570 ASSAY OF URINE CREATININE: CPT

## 2018-02-12 PROCEDURE — 99233 SBSQ HOSP IP/OBS HIGH 50: CPT | Mod: ,,, | Performed by: INTERNAL MEDICINE

## 2018-02-12 PROCEDURE — 99233 SBSQ HOSP IP/OBS HIGH 50: CPT | Mod: ,,, | Performed by: HOSPITALIST

## 2018-02-12 PROCEDURE — 25000003 PHARM REV CODE 250: Performed by: INTERNAL MEDICINE

## 2018-02-12 PROCEDURE — 84300 ASSAY OF URINE SODIUM: CPT

## 2018-02-12 PROCEDURE — 88342 IMHCHEM/IMCYTCHM 1ST ANTB: CPT | Mod: 26,,, | Performed by: PATHOLOGY

## 2018-02-12 PROCEDURE — 88341 IMHCHEM/IMCYTCHM EA ADD ANTB: CPT | Performed by: PATHOLOGY

## 2018-02-12 PROCEDURE — 63600175 PHARM REV CODE 636 W HCPCS: Performed by: RADIOLOGY

## 2018-02-12 PROCEDURE — 88333 PATH CONSLTJ SURG CYTO XM 1: CPT | Mod: 26,,, | Performed by: PATHOLOGY

## 2018-02-12 PROCEDURE — 83735 ASSAY OF MAGNESIUM: CPT

## 2018-02-12 PROCEDURE — 63600175 PHARM REV CODE 636 W HCPCS

## 2018-02-12 PROCEDURE — 83930 ASSAY OF BLOOD OSMOLALITY: CPT

## 2018-02-12 RX ORDER — FENTANYL CITRATE 50 UG/ML
INJECTION, SOLUTION INTRAMUSCULAR; INTRAVENOUS CODE/TRAUMA/SEDATION MEDICATION
Status: COMPLETED | OUTPATIENT
Start: 2018-02-12 | End: 2018-02-12

## 2018-02-12 RX ORDER — MIDAZOLAM HYDROCHLORIDE 1 MG/ML
INJECTION INTRAMUSCULAR; INTRAVENOUS CODE/TRAUMA/SEDATION MEDICATION
Status: COMPLETED | OUTPATIENT
Start: 2018-02-12 | End: 2018-02-12

## 2018-02-12 RX ORDER — FUROSEMIDE 10 MG/ML
40 INJECTION INTRAMUSCULAR; INTRAVENOUS ONCE
Status: COMPLETED | OUTPATIENT
Start: 2018-02-12 | End: 2018-02-12

## 2018-02-12 RX ORDER — RAMELTEON 8 MG/1
8 TABLET ORAL NIGHTLY PRN
Status: DISCONTINUED | OUTPATIENT
Start: 2018-02-12 | End: 2018-02-14

## 2018-02-12 RX ADMIN — MIDAZOLAM HYDROCHLORIDE 0.5 MG: 1 INJECTION, SOLUTION INTRAMUSCULAR; INTRAVENOUS at 01:02

## 2018-02-12 RX ADMIN — CARVEDILOL 25 MG: 25 TABLET, FILM COATED ORAL at 08:02

## 2018-02-12 RX ADMIN — FLECAINIDE ACETATE 50 MG: 50 TABLET ORAL at 08:02

## 2018-02-12 RX ADMIN — RAMELTEON 8 MG: 8 TABLET, FILM COATED ORAL at 09:02

## 2018-02-12 RX ADMIN — INSULIN ASPART 2 UNITS: 100 INJECTION, SOLUTION INTRAVENOUS; SUBCUTANEOUS at 12:02

## 2018-02-12 RX ADMIN — INSULIN DETEMIR 12 UNITS: 100 INJECTION, SOLUTION SUBCUTANEOUS at 08:02

## 2018-02-12 RX ADMIN — FLECAINIDE ACETATE 50 MG: 50 TABLET ORAL at 09:02

## 2018-02-12 RX ADMIN — INSULIN ASPART 2 UNITS: 100 INJECTION, SOLUTION INTRAVENOUS; SUBCUTANEOUS at 05:02

## 2018-02-12 RX ADMIN — FUROSEMIDE 40 MG: 10 INJECTION, SOLUTION INTRAMUSCULAR; INTRAVENOUS at 04:02

## 2018-02-12 RX ADMIN — CARVEDILOL 25 MG: 25 TABLET, FILM COATED ORAL at 05:02

## 2018-02-12 RX ADMIN — FENTANYL CITRATE 50 MCG: 50 INJECTION, SOLUTION INTRAMUSCULAR; INTRAVENOUS at 01:02

## 2018-02-12 RX ADMIN — MIDAZOLAM HYDROCHLORIDE 1 MG: 1 INJECTION, SOLUTION INTRAMUSCULAR; INTRAVENOUS at 01:02

## 2018-02-12 RX ADMIN — DOXAZOSIN 2 MG: 1 TABLET ORAL at 09:02

## 2018-02-12 RX ADMIN — INSULIN ASPART 4 UNITS: 100 INJECTION, SOLUTION INTRAVENOUS; SUBCUTANEOUS at 08:02

## 2018-02-12 RX ADMIN — DEXTROSE MONOHYDRATE: 5 INJECTION, SOLUTION INTRAVENOUS at 03:02

## 2018-02-12 RX ADMIN — FENTANYL CITRATE 25 MCG: 50 INJECTION, SOLUTION INTRAMUSCULAR; INTRAVENOUS at 01:02

## 2018-02-12 RX ADMIN — SIMVASTATIN 20 MG: 20 TABLET, FILM COATED ORAL at 09:02

## 2018-02-12 NOTE — PROGRESS NOTES
[facility]  Nephrology  Progress Note    Patient Name: Jose Cruz Lira  MRN: [id number]  Admission Date: 2/8/2018  Hospital Length of Stay: 3 days  Attending Provider: Daniel Monk MD   Primary Care Physician: Lisa Capone MD  Principal Problem:Acute renal failure superimposed on stage 3 chronic kidney disease    Subjective:     HPI: 66 yo M with PMH of CKD3, left hydronephrosis s/p nephrectomy August 2017, HTN, DMII (long term insulin use), persistent atrial fibrillation (s/p DCCV 2016) on flecainide and Eliquis. He presented for RHC appt, was noted to have worsened LFTs, leukocytosis, and was sent to ED for further workup. Patient has been short of breath for several month with symptoms have been getting worse over the past few weeks. He has dyspnea with exertion and at rest currently, chills, cough off and on, fatigue.  He denies chest pain.  He does take his lasix twice daily and reports urine output has decreased lately in last few days, reports wt loss recently, has had difficulty sleeping lately 2/2 orthopnea, reports stable / decrease in LE swelling, unsure of PND. Patient does report recent travel to [facility] 12/17. He also reports that for the past 2 weeks, he has been having diarrhea.     Patient had recent admission for CHF exacerbation 2/1 and discharged 2/3, recent ED visit for same complaints noted to have leukocytosis with neutrophilic prodominance, bili noted to be 2.0, Cr 2.1, blood cultures drawn NGTD, referred for sleep study however did not receive yet though high suspicion for MONICA. Exam with cards noted for + johnson's sign, HIDA scan ordered was negative for cholecystitis however noted filling defects in liver, recommended f/u imaging. RHC was also ordered and was to be done 2/8 however worsened labs (leukocytosis, LFTs) prompted referral to ED for evaluation.    Nephrology was consulted for ARTUR on CKD. He has been followed by nephrologist Dr. Anderson.  "Baseline Cr is 1.7. He denies NSAID use. He has a history of left total nephrectomy in August 2017 due to left hydronephrosis (etiology is not clear). He notes that his po intake has been poor due to fatigue. Urine output has also decreased. Denies dysuria and hematuria. Endorses nocturia. States that he has been having "loose stools" for the past 2 weeks    Interval History:  Patient notes not sleeping well, chronic issue.  States he wakes up feeling short of breath.  Otherwise, denies any fevers, chills, urinary symptoms, or flank pain.  Notes urinating 4-5 times within the last 24 hours, but not being able to fill up the entire urinal.  725 cc / last 24 hours of urine noted, no unmeasured.      Review of patient's allergies indicates:  No Known Allergies  Current Facility-Administered Medications   Medication Frequency    carvedilol tablet 25 mg BID WM    dextrose 50% injection 12.5 g PRN    dextrose 50% injection 25 g PRN    doxazosin tablet 2 mg Nightly    flecainide tablet 50 mg Q12H    glucagon (human recombinant) injection 1 mg PRN    glucose chewable tablet 16 g PRN    glucose chewable tablet 24 g PRN    insulin aspart pen 1-10 Units QID (AC + HS) PRN    insulin detemir pen 12 Units Daily    ramelteon tablet 8 mg Nightly PRN    simvastatin tablet 20 mg QHS    sodium bicarbonate 1 mEq/mL (8.4 %) 150 mEq in dextrose 5 % 1,000 mL infusion Continuous    sodium chloride 0.9% flush 5 mL PRN       Objective:     Vital Signs (Most Recent):  Temp: 98.6 °F (37 °C) (02/12/18 0724)  Pulse: 77 (02/12/18 0724)  Resp: 16 (02/12/18 0724)  BP: 112/61 (02/12/18 0724)  SpO2: (!) 93 % (02/12/18 0724)  O2 Device (Oxygen Therapy): room air (02/12/18 0314) Vital Signs (24h Range):  Temp:  [97.4 °F (36.3 °C)-98.9 °F (37.2 °C)] 98.6 °F (37 °C)  Pulse:  [76-86] 77  Resp:  [16-22] 16  SpO2:  [89 %-95 %] 93 %  BP: ()/(52-61) 112/61     Weight: 122.5 kg (270 lb) (02/08/18 1457)  Body mass index is 34.67 kg/m².  Body " surface area is 2.53 meters squared.    I/O last 3 completed shifts:  In: 3763.8 [P.O.:1880; I.V.:1883.8]  Out: 726 [Urine:725; Stool:1]    Physical Exam   Constitutional: He is oriented to person, place, and time. He appears well-developed and well-nourished. No distress.   HENT:   Head: Normocephalic and atraumatic.   Mouth/Throat: Oropharynx is clear and moist. No oropharyngeal exudate.   Eyes: Conjunctivae and EOM are normal. Pupils are equal, round, and reactive to light. Right eye exhibits no discharge. Left eye exhibits no discharge. No scleral icterus.   Neck: Normal range of motion. Neck supple. No JVD present. No tracheal deviation present. No thyromegaly present.   Cardiovascular: Normal rate, regular rhythm and normal heart sounds.  Exam reveals no gallop and no friction rub.    No murmur heard.  +2 LE edema, appears the same to slightly more than yesterday   Pulmonary/Chest: No stridor. No respiratory distress. He has no wheezes. He has no rales. He exhibits no tenderness.   Decreased bs at bases   Abdominal: Soft. Bowel sounds are normal. He exhibits no distension and no mass. There is no tenderness. There is no rebound and no guarding. No hernia.   Musculoskeletal: Normal range of motion. He exhibits no edema or tenderness.   Lymphadenopathy:     He has no cervical adenopathy.   Neurological: He is alert and oriented to person, place, and time. No cranial nerve deficit. He exhibits normal muscle tone. Coordination normal.   Skin: Skin is warm and dry. No rash noted. He is not diaphoretic. No erythema. No pallor.   Psychiatric: He has a normal mood and affect. His behavior is normal. Judgment and thought content normal.   Nursing note and vitals reviewed.      Significant Labs:    Recent Results (from the past 24 hour(s))   POCT glucose    Collection Time: 02/11/18 12:51 PM   Result Value Ref Range    POCT Glucose 229 (H) 70 - 110 mg/dL   Comprehensive Metabolic Panel (CMP)    Collection Time: 02/11/18   3:51 PM   Result Value Ref Range    Sodium 129 (L) 136 - 145 mmol/L    Potassium 4.4 3.5 - 5.1 mmol/L    Chloride 98 95 - 110 mmol/L    CO2 18 (L) 23 - 29 mmol/L    Glucose 194 (H) 70 - 110 mg/dL    BUN, Bld 101 (H) 8 - 23 mg/dL    Creatinine 3.1 (H) 0.5 - 1.4 mg/dL    Calcium 7.6 (L) 8.7 - 10.5 mg/dL    Total Protein 5.9 (L) 6.0 - 8.4 g/dL    Albumin 1.6 (L) 3.5 - 5.2 g/dL    Total Bilirubin 1.5 (H) 0.1 - 1.0 mg/dL    Alkaline Phosphatase 250 (H) 55 - 135 U/L    AST 51 (H) 10 - 40 U/L    ALT 30 10 - 44 U/L    Anion Gap 13 8 - 16 mmol/L    eGFR if African American 23.1 (A) >60 mL/min/1.73 m^2    eGFR if non African American 20.0 (A) >60 mL/min/1.73 m^2   POCT glucose    Collection Time: 02/11/18  5:32 PM   Result Value Ref Range    POCT Glucose 233 (H) 70 - 110 mg/dL   Comprehensive Metabolic Panel (CMP)    Collection Time: 02/11/18  8:16 PM   Result Value Ref Range    Sodium 128 (L) 136 - 145 mmol/L    Potassium 4.1 3.5 - 5.1 mmol/L    Chloride 97 95 - 110 mmol/L    CO2 18 (L) 23 - 29 mmol/L    Glucose 202 (H) 70 - 110 mg/dL    BUN, Bld 98 (H) 8 - 23 mg/dL    Creatinine 3.2 (H) 0.5 - 1.4 mg/dL    Calcium 7.4 (L) 8.7 - 10.5 mg/dL    Total Protein 5.9 (L) 6.0 - 8.4 g/dL    Albumin 1.7 (L) 3.5 - 5.2 g/dL    Total Bilirubin 1.5 (H) 0.1 - 1.0 mg/dL    Alkaline Phosphatase 260 (H) 55 - 135 U/L    AST 53 (H) 10 - 40 U/L    ALT 30 10 - 44 U/L    Anion Gap 13 8 - 16 mmol/L    eGFR if African American 22.3 (A) >60 mL/min/1.73 m^2    eGFR if non  19.3 (A) >60 mL/min/1.73 m^2   POCT glucose    Collection Time: 02/11/18  8:20 PM   Result Value Ref Range    POCT Glucose 194 (H) 70 - 110 mg/dL   Magnesium    Collection Time: 02/12/18  5:19 AM   Result Value Ref Range    Magnesium 2.6 1.6 - 2.6 mg/dL   Phosphorus    Collection Time: 02/12/18  5:19 AM   Result Value Ref Range    Phosphorus 3.6 2.7 - 4.5 mg/dL   CBC auto differential    Collection Time: 02/12/18  5:19 AM   Result Value Ref Range    WBC 20.83 (H)  3.90 - 12.70 K/uL    RBC 3.21 (L) 4.60 - 6.20 M/uL    Hemoglobin 9.0 (L) 14.0 - 18.0 g/dL    Hematocrit 26.4 (L) 40.0 - 54.0 %    MCV 82 82 - 98 fL    MCH 28.0 27.0 - 31.0 pg    MCHC 34.1 32.0 - 36.0 g/dL    RDW 14.9 (H) 11.5 - 14.5 %    Platelets 401 (H) 150 - 350 K/uL    MPV 10.6 9.2 - 12.9 fL    Immature Granulocytes 2.8 (H) 0.0 - 0.5 %    Gran # (ANC) 17.8 (H) 1.8 - 7.7 K/uL    Immature Grans (Abs) 0.59 (H) 0.00 - 0.04 K/uL    Lymph # 0.5 (L) 1.0 - 4.8 K/uL    Mono # 1.9 (H) 0.3 - 1.0 K/uL    Eos # 0.0 0.0 - 0.5 K/uL    Baso # 0.03 0.00 - 0.20 K/uL    nRBC 0 0 /100 WBC    Gran% 85.4 (H) 38.0 - 73.0 %    Lymph% 2.5 (L) 18.0 - 48.0 %    Mono% 9.1 4.0 - 15.0 %    Eosinophil% 0.1 0.0 - 8.0 %    Basophil% 0.1 0.0 - 1.9 %    Differential Method Automated    Osmolality    Collection Time: 02/12/18  5:19 AM   Result Value Ref Range    Osmolality 306 (H) 280 - 300 mOsm/kg   POCT glucose    Collection Time: 02/12/18  7:25 AM   Result Value Ref Range    POCT Glucose 225 (H) 70 - 110 mg/dL   Comprehensive Metabolic Panel (CMP)    Collection Time: 02/12/18  8:27 AM   Result Value Ref Range    Sodium 127 (L) 136 - 145 mmol/L    Potassium 4.0 3.5 - 5.1 mmol/L    Chloride 93 (L) 95 - 110 mmol/L    CO2 21 (L) 23 - 29 mmol/L    Glucose 221 (H) 70 - 110 mg/dL    BUN, Bld 105 (H) 8 - 23 mg/dL    Creatinine 3.3 (H) 0.5 - 1.4 mg/dL    Calcium 7.4 (L) 8.7 - 10.5 mg/dL    Total Protein 6.1 6.0 - 8.4 g/dL    Albumin 1.8 (L) 3.5 - 5.2 g/dL    Total Bilirubin 1.7 (H) 0.1 - 1.0 mg/dL    Alkaline Phosphatase 262 (H) 55 - 135 U/L    AST 50 (H) 10 - 40 U/L    ALT 29 10 - 44 U/L    Anion Gap 13 8 - 16 mmol/L    eGFR if African American 21.5 (A) >60 mL/min/1.73 m^2    eGFR if non  18.6 (A) >60 mL/min/1.73 m^2         Significant Imaging:  No new imaging in last 24 hours.      Assessment/Plan:     * Acute renal failure superimposed on stage 3 chronic kidney disease    This patient presents with ARTUR on CKD3, likely from pre-renal  causes which include poor po intake, sepsis resulting in renal hypoperfusion, and 2 week history of diarrhea. This could be ATN from a prolonged pre-renal state.  However, history of heart failure (previous 2D echos revealing decreased EF, noted diastolic dysfunction previously) and recent echo on 2/7 with noted increased intravascular volume, the addition of 3.8L of fluid may be correlating with ARTUR as CRS.      Plan  -- Recommend cessation of IV fluid administration.  Do recommend trial of diuresis with lasix 40 mg IV once.  We will re-assess with labs tomorrow.  Given elevation of BUN to Cr, possible component of GI bleed.  Recommend hemeoccult testing   -- Avoid nephrotoxic agents. Renally dose medications               Jerardo Bueno MD  Nephrology  [facility]

## 2018-02-12 NOTE — SUBJECTIVE & OBJECTIVE
Past Medical History:   Diagnosis Date    Auricular fibrillation     Bronchitis     CHF (congestive heart failure)     Coronary artery disease     Diabetes mellitus, type 2 2010    Edema     Elevated troponin     Hematuria     Hydronephrosis of left kidney     Hypertension     Non-functioning kidney     Followed by Dr. Silver Anderson    Obesity (BMI 30-39.9)     Sleep apnea     Unspecified disorder of kidney and ureter        Past Surgical History:   Procedure Laterality Date    arm surgery      CARDIOVERSION  11/29/2016    kidney removed Left 0825/2017    knee surgeory N/A 4 to 5 years ago       Review of patient's allergies indicates:  No Known Allergies    Family History     Problem Relation (Age of Onset)    Colon cancer Father    Heart disease Paternal Grandfather    Liver cancer Brother    Ovarian cancer Sister          Social History Main Topics    Smoking status: Never Smoker    Smokeless tobacco: Never Used    Alcohol use No    Drug use: No    Sexual activity: No      Comment:         Review of Systems    Objective:     Temp:  [97.4 °F (36.3 °C)-98.9 °F (37.2 °C)] 98.6 °F (37 °C)  Pulse:  [76-86] 77  Resp:  [16-22] 16  SpO2:  [89 %-95 %] 93 %  BP: ()/(52-61) 112/61     Body mass index is 34.67 kg/m².            Drains     Drain                 Ureteral Drain/Stent 05/14/15 Left ureter 6 Fr. 1005 days                Physical Exam    Significant Labs:    BMP:    Recent Labs  Lab 02/11/18  1551 02/11/18 2016 02/12/18  0827   * 128* 127*   K 4.4 4.1 4.0   CL 98 97 93*   CO2 18* 18* 21*   * 98* 105*   CREATININE 3.1* 3.2* 3.3*   CALCIUM 7.6* 7.4* 7.4*       CBC:    Recent Labs  Lab 02/10/18  0437 02/11/18  0452 02/12/18  0519   WBC 22.23* 21.92* 20.83*   HGB 9.0* 8.8* 9.0*   HCT 26.1* 27.0* 26.4*    394* 401*       {Results:31493}    Significant Imaging:  {Imaging Review:18607}

## 2018-02-12 NOTE — H&P
Inpatient Radiology Pre-procedure Note    History of Present Illness:  Jose Cruz Lira is a 67 y.o. male who presents for liver biopsy with possible preprocedural paracentesis.  Admission H&P reviewed.  Past Medical History:   Diagnosis Date    Auricular fibrillation     Bronchitis     CHF (congestive heart failure)     Coronary artery disease     Diabetes mellitus, type 2 2010    Edema     Elevated troponin     Hematuria     Hydronephrosis of left kidney     Hypertension     Non-functioning kidney     Followed by Dr. Silver Anderson    Obesity (BMI 30-39.9)     Sleep apnea     Unspecified disorder of kidney and ureter      Past Surgical History:   Procedure Laterality Date    arm surgery      CARDIOVERSION  11/29/2016    kidney removed Left 0825/2017    knee surgeory N/A 4 to 5 years ago       Review of Systems:   As documented in primary team H&P    Home Meds:   Prior to Admission medications    Medication Sig Start Date End Date Taking? Authorizing Provider   amLODIPine (NORVASC) 10 MG tablet Take 1 tablet (10 mg total) by mouth once daily. 12/15/17  Yes Tena Phillips MD   apixaban (ELIQUIS) 5 mg Tab Take 1 tablet (5 mg total) by mouth 2 (two) times daily. 12/14/17  Yes Irvin Castanon MD   carvedilol (COREG) 25 MG tablet Take 1 tablet (25 mg total) by mouth 2 (two) times daily with meals. 12/14/17 12/14/18 Yes Irvin Castanon MD   doxazosin (CARDURA) 4 MG tablet Take 0.5 tablets (2 mg total) by mouth once daily.  Patient taking differently: Take 2 mg by mouth once daily. TAkes at night 12/26/17  Yes Álvaro Mina MD   ergocalciferol (ERGOCALCIFEROL) 50,000 unit Cap Take 1 capsule (50,000 Units total) by mouth every 7 days. 12/15/17  Yes Tena Phillips MD   exenatide microspheres (BYDUREON) 2 mg/0.65 mL PnIj Inject 2 mg into the muscle once a week. 12/15/17  Yes Tena Phillips MD   flecainide (TAMBOCOR) 50 MG Tab Take 1 tablet (50 mg total) by mouth every 12 (twelve) hours. 12/14/17 12/14/18  "Yes Irvin Castanon MD   furosemide (LASIX) 40 MG tablet Take 1 tablet (40 mg total) by mouth 2 (two) times daily. 12/14/17 12/14/18 Yes Irvin Castanon MD   insulin detemir (LEVEMIR FLEXTOUCH) 100 unit/mL (3 mL) SubQ InPn pen INJECT 22 UNITS INTO THE SKIN EVERY EVENING  Patient taking differently: 18 Units every evening. INJECT 22 UNITS INTO THE SKIN EVERY EVENING 12/15/17  Yes Tena Phillips MD   simvastatin (ZOCOR) 20 MG tablet Take 1 tablet (20 mg total) by mouth every evening. 12/15/17  Yes Tena Phillips MD   blood sugar diagnostic (ONETOUCH VERIO) Strp 1 strip by Misc.(Non-Drug; Combo Route) route 3 (three) times daily. 2/7/18   Tena Phillips MD   blood sugar diagnostic Strp To check BG 3 times daily, to use with insurance preferred meter 10/11/17   Tena Phillips MD   blood sugar diagnostic Strp To check BG 3 times daily, to use with insurance preferred meter 11/21/17   Tena Phillips MD   blood-glucose meter kit To check BG 3 times daily, to use with insurance preferred meter 10/11/17   Tena Phillips MD   blood-glucose meter kit To check BG 3 times daily, to use with insurance preferred meter 11/21/17 11/21/18  Tena Phillips MD   lancets [facility] To check BG 3 times daily, to use with insurance preferred meter 11/21/17   Tena Phillips MD   lancets [facility] To check BG 3 times daily, to use with insurance preferred meter 2/7/18   Tena Phillips MD   pen needle, diabetic (BD ULTRA-FINE RODRIGUEZ PEN NEEDLES) 32 gauge x 5/32" Ndle Patient injects insulin once a day. Please provide 3 month supply. 2/7/18   Tena Phillips MD     Scheduled Meds:    carvedilol  25 mg Oral BID WM    doxazosin  2 mg Oral Nightly    flecainide  50 mg Oral Q12H    insulin detemir  12 Units Subcutaneous Daily    simvastatin  20 mg Oral QHS     Continuous Infusions:    sodium bicarbonate drip 75 mL/hr at 02/12/18 0313     PRN Meds:dextrose 50%, dextrose 50%, glucagon (human recombinant), glucose, glucose, insulin aspart, " ramelteon, sodium chloride 0.9%  Anticoagulants/Antiplatelets: no anticoagulation eloquis held since 2/10 am    Allergies: Review of patient's allergies indicates:  No Known Allergies  Sedation Hx: have not been any systemic reactions    Labs:  No results for input(s): INR in the last 168 hours.    Invalid input(s):  PT,  PTT    Recent Labs  Lab 02/12/18  0519   WBC 20.83*   HGB 9.0*   HCT 26.4*   MCV 82   *      Recent Labs  Lab 02/08/18  1544  02/12/18  0519 02/12/18  0827   *  < >  --  221*   *  < >  --  127*   K 4.7  < >  --  4.0   CL 96  < >  --  93*   CO2 21*  < >  --  21*   BUN 54*  < >  --  105*   CREATININE 2.6*  < >  --  3.3*   CALCIUM 8.4*  < >  --  7.4*   MG  --   < > 2.6  --    ALT 68*  < >  --  29   *  < >  --  50*   ALBUMIN 2.0*  < >  --  1.8*   BILITOT 1.7*  < >  --  1.7*   BILIDIR 1.4*  --   --   --    < > = values in this interval not displayed.      Vitals:  Temp: 98.6 °F (37 °C) (02/12/18 1229)  Pulse: 78 (02/12/18 1229)  Resp: 20 (02/12/18 1229)  BP: (!) 111/56 (02/12/18 1229)  SpO2: (!) 94 % (02/12/18 1229)     Physical Exam:  ASA: 3  Mallampati: 3    General: no acute distress  Mental Status: alert and oriented to person, place and time  HEENT: normocephalic, atraumatic  Chest: unlabored breathing  Heart: regular heart rate  Abdomen: nondistended  Extremity: moves all extremities    Plan: US guided liver biopsy + paracentesis  Sedation Plan: moderate    Jose Cruz Connolly MD  Radiology

## 2018-02-12 NOTE — PLAN OF CARE
Problem: Patient Care Overview  Goal: Plan of Care Review  Outcome: Ongoing (interventions implemented as appropriate)  VS stable, CBG < 250 all day, family at bedside, went for MRI and had anxiety attack so Ativan was given with good relief. 1 loose BM today, no c/o pain, see flowsheet for full assessment. C.Diff isolation precautions reviewed with family again-needs reinforcement. Bicarb drip initiated, in no acute distress; will continue to monitor.

## 2018-02-12 NOTE — PLAN OF CARE
Problem: Patient Care Overview  Goal: Plan of Care Review  Outcome: Ongoing (interventions implemented as appropriate)  Multiple reviews of POC,pt noncompliant with fall risk and ambulate with assistance order,at one point snapped IV tubing in order to go to BR despite BSC at bedside ,again reinforced safety,pt sits up either on sofa or chair or edge of bed  entire shift with eyes closed ,no injury at this point,IV bicarb in progress,awaiting urine spec,BM x1 diarrhea stool this shift,special contact precautions maintained.Denies pain,ctm.NPO for procedure today reinforced,states understanding.

## 2018-02-12 NOTE — SUBJECTIVE & OBJECTIVE
Interval History:  Patient notes not sleeping well, chronic issue.  States he wakes up feeling short of breath.  Otherwise, denies any fevers, chills, urinary symptoms, or flank pain.  Notes urinating 4-5 times within the last 24 hours, but not being able to fill up the entire urinal.  725 cc / last 24 hours of urine noted, no unmeasured.      Review of patient's allergies indicates:  No Known Allergies  Current Facility-Administered Medications   Medication Frequency    carvedilol tablet 25 mg BID WM    dextrose 50% injection 12.5 g PRN    dextrose 50% injection 25 g PRN    doxazosin tablet 2 mg Nightly    flecainide tablet 50 mg Q12H    glucagon (human recombinant) injection 1 mg PRN    glucose chewable tablet 16 g PRN    glucose chewable tablet 24 g PRN    insulin aspart pen 1-10 Units QID (AC + HS) PRN    insulin detemir pen 12 Units Daily    ramelteon tablet 8 mg Nightly PRN    simvastatin tablet 20 mg QHS    sodium bicarbonate 1 mEq/mL (8.4 %) 150 mEq in dextrose 5 % 1,000 mL infusion Continuous    sodium chloride 0.9% flush 5 mL PRN       Objective:     Vital Signs (Most Recent):  Temp: 98.6 °F (37 °C) (02/12/18 0724)  Pulse: 77 (02/12/18 0724)  Resp: 16 (02/12/18 0724)  BP: 112/61 (02/12/18 0724)  SpO2: (!) 93 % (02/12/18 0724)  O2 Device (Oxygen Therapy): room air (02/12/18 0314) Vital Signs (24h Range):  Temp:  [97.4 °F (36.3 °C)-98.9 °F (37.2 °C)] 98.6 °F (37 °C)  Pulse:  [76-86] 77  Resp:  [16-22] 16  SpO2:  [89 %-95 %] 93 %  BP: ()/(52-61) 112/61     Weight: 122.5 kg (270 lb) (02/08/18 1457)  Body mass index is 34.67 kg/m².  Body surface area is 2.53 meters squared.    I/O last 3 completed shifts:  In: 3763.8 [P.O.:1880; I.V.:1883.8]  Out: 726 [Urine:725; Stool:1]    Physical Exam   Constitutional: He is oriented to person, place, and time. He appears well-developed and well-nourished. No distress.   HENT:   Head: Normocephalic and atraumatic.   Mouth/Throat: Oropharynx is clear and  moist. No oropharyngeal exudate.   Eyes: Conjunctivae and EOM are normal. Pupils are equal, round, and reactive to light. Right eye exhibits no discharge. Left eye exhibits no discharge. No scleral icterus.   Neck: Normal range of motion. Neck supple. No JVD present. No tracheal deviation present. No thyromegaly present.   Cardiovascular: Normal rate, regular rhythm and normal heart sounds.  Exam reveals no gallop and no friction rub.    No murmur heard.  +2 LE edema, appears the same to slightly more than yesterday   Pulmonary/Chest: No stridor. No respiratory distress. He has no wheezes. He has no rales. He exhibits no tenderness.   Decreased bs at bases   Abdominal: Soft. Bowel sounds are normal. He exhibits no distension and no mass. There is no tenderness. There is no rebound and no guarding. No hernia.   Musculoskeletal: Normal range of motion. He exhibits no edema or tenderness.   Lymphadenopathy:     He has no cervical adenopathy.   Neurological: He is alert and oriented to person, place, and time. No cranial nerve deficit. He exhibits normal muscle tone. Coordination normal.   Skin: Skin is warm and dry. No rash noted. He is not diaphoretic. No erythema. No pallor.   Psychiatric: He has a normal mood and affect. His behavior is normal. Judgment and thought content normal.   Nursing note and vitals reviewed.      Significant Labs:    Recent Results (from the past 24 hour(s))   POCT glucose    Collection Time: 02/11/18 12:51 PM   Result Value Ref Range    POCT Glucose 229 (H) 70 - 110 mg/dL   Comprehensive Metabolic Panel (CMP)    Collection Time: 02/11/18  3:51 PM   Result Value Ref Range    Sodium 129 (L) 136 - 145 mmol/L    Potassium 4.4 3.5 - 5.1 mmol/L    Chloride 98 95 - 110 mmol/L    CO2 18 (L) 23 - 29 mmol/L    Glucose 194 (H) 70 - 110 mg/dL    BUN, Bld 101 (H) 8 - 23 mg/dL    Creatinine 3.1 (H) 0.5 - 1.4 mg/dL    Calcium 7.6 (L) 8.7 - 10.5 mg/dL    Total Protein 5.9 (L) 6.0 - 8.4 g/dL    Albumin 1.6  (L) 3.5 - 5.2 g/dL    Total Bilirubin 1.5 (H) 0.1 - 1.0 mg/dL    Alkaline Phosphatase 250 (H) 55 - 135 U/L    AST 51 (H) 10 - 40 U/L    ALT 30 10 - 44 U/L    Anion Gap 13 8 - 16 mmol/L    eGFR if African American 23.1 (A) >60 mL/min/1.73 m^2    eGFR if non African American 20.0 (A) >60 mL/min/1.73 m^2   POCT glucose    Collection Time: 02/11/18  5:32 PM   Result Value Ref Range    POCT Glucose 233 (H) 70 - 110 mg/dL   Comprehensive Metabolic Panel (CMP)    Collection Time: 02/11/18  8:16 PM   Result Value Ref Range    Sodium 128 (L) 136 - 145 mmol/L    Potassium 4.1 3.5 - 5.1 mmol/L    Chloride 97 95 - 110 mmol/L    CO2 18 (L) 23 - 29 mmol/L    Glucose 202 (H) 70 - 110 mg/dL    BUN, Bld 98 (H) 8 - 23 mg/dL    Creatinine 3.2 (H) 0.5 - 1.4 mg/dL    Calcium 7.4 (L) 8.7 - 10.5 mg/dL    Total Protein 5.9 (L) 6.0 - 8.4 g/dL    Albumin 1.7 (L) 3.5 - 5.2 g/dL    Total Bilirubin 1.5 (H) 0.1 - 1.0 mg/dL    Alkaline Phosphatase 260 (H) 55 - 135 U/L    AST 53 (H) 10 - 40 U/L    ALT 30 10 - 44 U/L    Anion Gap 13 8 - 16 mmol/L    eGFR if African American 22.3 (A) >60 mL/min/1.73 m^2    eGFR if non  19.3 (A) >60 mL/min/1.73 m^2   POCT glucose    Collection Time: 02/11/18  8:20 PM   Result Value Ref Range    POCT Glucose 194 (H) 70 - 110 mg/dL   Magnesium    Collection Time: 02/12/18  5:19 AM   Result Value Ref Range    Magnesium 2.6 1.6 - 2.6 mg/dL   Phosphorus    Collection Time: 02/12/18  5:19 AM   Result Value Ref Range    Phosphorus 3.6 2.7 - 4.5 mg/dL   CBC auto differential    Collection Time: 02/12/18  5:19 AM   Result Value Ref Range    WBC 20.83 (H) 3.90 - 12.70 K/uL    RBC 3.21 (L) 4.60 - 6.20 M/uL    Hemoglobin 9.0 (L) 14.0 - 18.0 g/dL    Hematocrit 26.4 (L) 40.0 - 54.0 %    MCV 82 82 - 98 fL    MCH 28.0 27.0 - 31.0 pg    MCHC 34.1 32.0 - 36.0 g/dL    RDW 14.9 (H) 11.5 - 14.5 %    Platelets 401 (H) 150 - 350 K/uL    MPV 10.6 9.2 - 12.9 fL    Immature Granulocytes 2.8 (H) 0.0 - 0.5 %    Gran # (ANC) 17.8  (H) 1.8 - 7.7 K/uL    Immature Grans (Abs) 0.59 (H) 0.00 - 0.04 K/uL    Lymph # 0.5 (L) 1.0 - 4.8 K/uL    Mono # 1.9 (H) 0.3 - 1.0 K/uL    Eos # 0.0 0.0 - 0.5 K/uL    Baso # 0.03 0.00 - 0.20 K/uL    nRBC 0 0 /100 WBC    Gran% 85.4 (H) 38.0 - 73.0 %    Lymph% 2.5 (L) 18.0 - 48.0 %    Mono% 9.1 4.0 - 15.0 %    Eosinophil% 0.1 0.0 - 8.0 %    Basophil% 0.1 0.0 - 1.9 %    Differential Method Automated    Osmolality    Collection Time: 02/12/18  5:19 AM   Result Value Ref Range    Osmolality 306 (H) 280 - 300 mOsm/kg   POCT glucose    Collection Time: 02/12/18  7:25 AM   Result Value Ref Range    POCT Glucose 225 (H) 70 - 110 mg/dL   Comprehensive Metabolic Panel (CMP)    Collection Time: 02/12/18  8:27 AM   Result Value Ref Range    Sodium 127 (L) 136 - 145 mmol/L    Potassium 4.0 3.5 - 5.1 mmol/L    Chloride 93 (L) 95 - 110 mmol/L    CO2 21 (L) 23 - 29 mmol/L    Glucose 221 (H) 70 - 110 mg/dL    BUN, Bld 105 (H) 8 - 23 mg/dL    Creatinine 3.3 (H) 0.5 - 1.4 mg/dL    Calcium 7.4 (L) 8.7 - 10.5 mg/dL    Total Protein 6.1 6.0 - 8.4 g/dL    Albumin 1.8 (L) 3.5 - 5.2 g/dL    Total Bilirubin 1.7 (H) 0.1 - 1.0 mg/dL    Alkaline Phosphatase 262 (H) 55 - 135 U/L    AST 50 (H) 10 - 40 U/L    ALT 29 10 - 44 U/L    Anion Gap 13 8 - 16 mmol/L    eGFR if African American 21.5 (A) >60 mL/min/1.73 m^2    eGFR if non  18.6 (A) >60 mL/min/1.73 m^2         Significant Imaging:  No new imaging in last 24 hours.

## 2018-02-12 NOTE — ASSESSMENT & PLAN NOTE
This patient presents with ARTUR on CKD3, likely from pre-renal causes which include poor po intake, sepsis resulting in renal hypoperfusion, and 2 week history of diarrhea. This could be ATN from a prolonged pre-renal state.  However, history of heart failure (previous 2D echos revealing decreased EF, noted diastolic dysfunction previously) and recent echo on 2/7 with noted increased intravascular volume, the addition of 3.8L of fluid may be correlating with ARTUR as CRS.      Plan  -- Recommend cessation of IV fluid administration.  Do recommend trial of diuresis with lasix 40 mg IV once.  We will re-assess with labs tomorrow.  Given elevation of BUN to Cr, possible component of GI bleed.  Recommend hemeoccult testing   -- Avoid nephrotoxic agents. Renally dose medications

## 2018-02-12 NOTE — PLAN OF CARE
Problem: Patient Care Overview  Goal: Plan of Care Review  Outcome: Ongoing (interventions implemented as appropriate)  POC reviewed with pt and family.  Pt verbalized understanding.  Questions and concerns addressed.  Pt had liver biopsy done today, no bleeding at incision site.  Pt progressing toward goals, will continue to monitor.  See flowsheets for full assessment and VS info.

## 2018-02-12 NOTE — PROCEDURES
Radiology Post-Procedure Note    Pre Op Diagnosis: left and right liver masses    Post Op Diagnosis: left and right liver masses    Procedure: left liver biopsy    Procedure performed by:NICK Rosa    Written Informed Consent Obtained: Yes    Specimen Removed: YES 2 cores    Estimated Blood Loss: less than 50     Findings:   Using US guidance a coaxial biopsy system was placed into a left liver mass. The biopsy system was used to take 2 core biopsy samples. Specimen sent to pathology.     Patient tolerated procedure well.    Jose Cruz Connolly MD  Radiology

## 2018-02-12 NOTE — PROGRESS NOTES
Progress Note  Hospital Medicine    Admit Date: 2/8/2018  Length of Stay:  LOS: 3 days     SUBJECTIVE:         Follow-up For:  Acute renal failure superimposed on stage 3 chronic kidney disease    HPI/Interval history (See H&P for complete P,F,SHx) :     2/9/18  s/p nephrology evaluation Cr 2.1-->2.8. continue with IV hydration @100ml/hr x 10hr. CT chest abd/Pelvis without contrast as sono liver -Hepatomegaly with multiple ill-defined solid areas of decreased echogenicity, possibly metastatic disease     2/10/18  AFP 0.7. CT chest abd/Pelvis without contrast - Interval development of hepatomegaly with markedly heterogeneity of hepatic parenchyma, noting multiple areas of ill-defined low attenuation.concerning for possible underlying diffuse infiltrating process or multiple hepatic lesions, possibly representing metastatic disease.IR consulted for liver biopsy. Apixaban held for possible liver biopsy on monday 2/11/18  MRI abd without contast today - Liver lesions worrisome for metastatic disease. before biopsy. FOBT+     2/12/18  stool for C diff , E coli negative . for liver biopsy today . IV fluids discontinued. Lasix 40mg IV x once today      Review of Systems: List if applicable  Pain scale:0/10  Constitutional- Positive forWeakness  Resp- Positive for Cough, SOB on exertion  GI- Positive for Nausea, Vomiting, Diarrhea, black stools  Extrem- Positive for Swelling    OBJECTIVE:     Vital Signs Range (Last 24H):  Temp:  [97.4 °F (36.3 °C)-98.9 °F (37.2 °C)]   Pulse:  [76-86]   Resp:  [16-22]   BP: ()/(52-61)   SpO2:  [89 %-95 %]     Physical Exam:  General- Patient alert and oriented x3, obesity   HEENT- PERRLA, EOMI, OP clear  Neck- No JVD, Lymphadenopathy, Thyromegaly  CV- Regular rate and rhythm, No Murmur/talya/rubs  Resp- Lungs CTA Bilaterally, No increased WOB  Abdomen- Non tender/ndistended, BS normoactive x4 quads, no HSM  Extrem- No cyanosis, clubbing, 1+ nonpitting edema.   Skin- No rashes,  lesions, ulcers  Neuro- able to move upper and lower extremities without limitation      Medications:  Medication list was reviewed and changes noted under Assessment/Plan.      Current Facility-Administered Medications:     carvedilol tablet 25 mg, 25 mg, Oral, BID WM, Daniel Monk MD, 25 mg at 02/12/18 0830    dextrose 50% injection 12.5 g, 12.5 g, Intravenous, PRN, Bean Silva MD    dextrose 50% injection 25 g, 25 g, Intravenous, PRN, Bean Silva MD    doxazosin tablet 2 mg, 2 mg, Oral, Nightly, Daniel Monk MD, 2 mg at 02/11/18 2021    flecainide tablet 50 mg, 50 mg, Oral, Q12H, Bean Sivla MD, 50 mg at 02/12/18 0830    glucagon (human recombinant) injection 1 mg, 1 mg, Intramuscular, PRN, Bean Silva MD    glucose chewable tablet 16 g, 16 g, Oral, PRN, Bean Silva MD    glucose chewable tablet 24 g, 24 g, Oral, PRN, Bean Silva MD    insulin aspart pen 1-10 Units, 1-10 Units, Subcutaneous, QID (AC + HS) PRN, Bean Silva MD, 2 Units at 02/12/18 1209    insulin detemir pen 12 Units, 12 Units, Subcutaneous, Daily, Bean Silva MD, 12 Units at 02/12/18 0830    ramelteon tablet 8 mg, 8 mg, Oral, Nightly PRN, Daniel Monk MD    simvastatin tablet 20 mg, 20 mg, Oral, QHS, Bean Silva MD, 20 mg at 02/11/18 2021    sodium bicarbonate 1 mEq/mL (8.4 %) 150 mEq in dextrose 5 % 1,000 mL infusion, , Intravenous, Continuous, Emily Snyder MD, Last Rate: 75 mL/hr at 02/12/18 0313    sodium chloride 0.9% flush 5 mL, 5 mL, Intravenous, PRN, Bean Silva MD    dextrose 50%, dextrose 50%, glucagon (human recombinant), glucose, glucose, insulin aspart, ramelteon, sodium chloride 0.9%    Laboratory/Diagnostic Data:  Reviewed and noted in plan where applicable- Please see chart for full lab data.      Recent Labs  Lab 02/10/18  0437 02/11/18  0452 02/12/18  0519   WBC 22.23* 21.92* 20.83*   HGB 9.0* 8.8* 9.0*   HCT 26.1* 27.0* 26.4*    394*  401*         Recent Labs  Lab 02/10/18  0437 02/10/18  2011 02/11/18  0452 02/11/18  1551 02/11/18 2016 02/12/18  0519 02/12/18  0827   * 128* 129* 129* 128*  --  127*   K 4.5 4.2 4.2 4.4 4.1  --  4.0   CL 98 97 98 98 97  --  93*   CO2 19* 20* 18* 18* 18*  --  21*   BUN 84* 93* 96* 101* 98*  --  105*   CREATININE 3.2* 3.1* 3.2* 3.1* 3.2*  --  3.3*   * 207* 165* 194* 202*  --  221*   CALCIUM 8.0* 7.8* 7.5* 7.6* 7.4*  --  7.4*   MG 2.3  --  2.4  --   --  2.6  --    PHOS 4.0 3.6 4.1  --   --  3.6  --          Recent Labs  Lab 02/11/18  1551 02/11/18 2016 02/12/18  0827   ALKPHOS 250* 260* 262*   ALT 30 30 29   AST 51* 53* 50*   ALBUMIN 1.6* 1.7* 1.8*   PROT 5.9* 5.9* 6.1   BILITOT 1.5* 1.5* 1.7*        Microbiology labs for the last week  Microbiology Results (last 7 days)     Procedure Component Value Units Date/Time    Stool culture [[id number]] Collected:  02/10/18 1330    Order Status:  Completed Specimen:  Stool from Stool Updated:  02/12/18 1133     Stool Culture Pseudomonas - only isolate    E. coli 0157 antigen [[id number]] Collected:  02/10/18 1330    Order Status:  Completed Specimen:  Stool from Stool Updated:  02/12/18 0419     Shiga Toxin 1 E.coli Negative     Shiga Toxin 2 E.coli Negative    Blood culture [[id number]] Collected:  02/09/18 1650    Order Status:  Completed Specimen:  Blood Updated:  02/11/18 2012     Blood Culture, Routine No Growth to date     Blood Culture, Routine No Growth to date     Blood Culture, Routine No Growth to date    Blood culture [[id number]] Collected:  02/09/18 1659    Order Status:  Completed Specimen:  Blood Updated:  02/11/18 2012     Blood Culture, Routine No Growth to date     Blood Culture, Routine No Growth to date     Blood Culture, Routine No Growth to date    Urine culture [[id number]] Collected:  02/09/18 0835    Order Status:  Completed Specimen:  Urine from Urine, Clean Catch Updated:  02/11/18 0544     Urine Culture, Routine No significant growth     Narrative:       1 cup of urine    Clostridium difficile EIA [[id number]] Collected:  02/10/18 1330    Order Status:  Completed Specimen:  Stool from Stool Updated:  02/11/18 0009     C. diff Antigen Negative     C difficile Toxins A+B, EIA Negative     Comment: Testing not recommended for children <24 months old.              Imaging Results          MRI Abdomen Without Contrast (Final result)  Result time 02/11/18 12:11:50    Final result by Beck Sousa III, MD (02/11/18 12:11:50)                 Impression:     Fluid around a nondistended gallbladder. There is a small amount of ascites with no definite wall thickening of the gallbladder. Nuclear medicine HIDA scan could be helpful.    Liver lesions worrisome for metastatic disease. MRI with contrast or CT with contrast is recommended.      Electronically signed by: BECK SOUSA MD  Date:     02/11/18  Time:    12:11              Narrative:    There are diffuse liver lesions seen infiltrating throughout the liver. Spleen, stomach, pancreas, pancreatic duct, and bile ducts are unremarkable. There is fluid around the gallbladder which appears somewhat small. There is a solitary right kidney. There is a small amount of ascites. There are multiple right renal cysts one of which is hemorrhagic. No adenopathy is seen.                             CT Abdomen Pelvis  Without Contrast (Final result)  Result time 02/09/18 22:50:24    Final result by Norm Boswell MD (02/09/18 22:50:24)                 Impression:        1. Interval development of hepatomegaly with markedly heterogeneity of hepatic parenchyma, noting multiple areas of ill-defined low attenuation. This is incompletely characterized on today's noncontrast examination. Findings are concerning for possible underlying diffuse infiltrating process or multiple hepatic lesions, possibly representing metastatic disease. Clinical correlation with patient history is advised.    2. Small volume of perihepatic  ascites.    3. Status post left nephrectomy.    4. Multiple low-attenuation right renal lesions previously characterized as cysts. Nonobstructing right renal calculus.    5. Diverticulosis without evidence of acute diverticulitis.      Electronically signed by: SWATHI VINCENT  Date:     02/09/18  Time:    22:50              Narrative:    Procedure comments: The patient was surveyed from the thoracic inlet through the pelvis after the administration of oral contrast and without IV contrast and data was reconstructed for coronal, sagittal, and axial images.    Comparison: CT abdomen and pelvis 6/16/2015.    Findings:    Please note evaluation of the solid organs is limited without IV contrast.    Examination of the vascular and soft tissue structures at the base of the neck is unremarkable.    The thoracic aorta maintains normal caliber, contour, and course without significant atherosclerotic calcification within its course.  The heart is not enlarged and there is no evidence of pericardial effusion.    The esophagus maintains a normal course and caliber. There is no axillary, mediastinal, or hilar lymphadenopathy.      The trachea is midline, the proximal airways are patent and the lungs are symmetrically expanded.   Examination of the lung fields demonstrates mild dependent atelectasis at the lung bases. There is no focal airspace consolidation or pulmonary mass. There is no significant pleural effusion.      The liver is enlarged.  Evaluation of the hepatic parenchyma is significantly limited by lack of IV contrast, however there is marked heterogeneity with multifocal areas of ill-defined low attenuation throughout the hepatic parenchyma. There is a trace volume of perihepatic ascites.  Gallbladder is somewhat contracted with mild wall thickening as seen on prior examinations. No radiopaque stones are identified. There is no intra-extra hepatic biliary ductal dilatation appreciated.    The stomach, spleen,  pancreas, and adrenal glands are unremarkable.    The left kidney is surgically absent.  The right kidney demonstrates no evidence of hydronephrosis. Evaluation of the right renal parenchyma is limited secondary to streak artifact from the patient's body habitus. There are multiple partially exophytic low attenuation lesions previously characterized as cysts on prior ultrasound examination. There is a stable 1.0 cm cortically-based calcification within the right kidney. There is a nonobstructing right renal calculus. No stones are identified within the right ureter. The urinary bladder appears within normal limits. The prostate demonstrates central dystrophic calcification. There is a fat containing left inguinal hernia.    The abdominal aorta is normal in course and caliber without significant atherosclerotic calcifications.    The visualized loops of small and large bowel show no evidence of obstruction or inflammation.  There is diverticulosis without evidence of acute diverticulitis. There is no free intraperitoneal air or portal venous gas. No bulky lymphadenopathy is identified within the abdomen or pelvis.    The osseous structures demonstrate mild degenerative changes of the spine.  The extraperitoneal soft tissues are unremarkable.                             CT Chest Without Contrast (Final result)  Result time 02/09/18 22:50:23    Final result by Norm Boswell MD (02/09/18 22:50:23)                 Impression:        1. Interval development of hepatomegaly with markedly heterogeneity of hepatic parenchyma, noting multiple areas of ill-defined low attenuation. This is incompletely characterized on today's noncontrast examination. Findings are concerning for possible underlying diffuse infiltrating process or multiple hepatic lesions, possibly representing metastatic disease. Clinical correlation with patient history is advised.    2. Small volume of perihepatic ascites.    3. Status post left  nephrectomy.    4. Multiple low-attenuation right renal lesions previously characterized as cysts. Nonobstructing right renal calculus.    5. Diverticulosis without evidence of acute diverticulitis.      Electronically signed by: SWATHI VINCENT  Date:     02/09/18  Time:    22:50              Narrative:    Procedure comments: The patient was surveyed from the thoracic inlet through the pelvis after the administration of oral contrast and without IV contrast and data was reconstructed for coronal, sagittal, and axial images.    Comparison: CT abdomen and pelvis 6/16/2015.    Findings:    Please note evaluation of the solid organs is limited without IV contrast.    Examination of the vascular and soft tissue structures at the base of the neck is unremarkable.    The thoracic aorta maintains normal caliber, contour, and course without significant atherosclerotic calcification within its course.  The heart is not enlarged and there is no evidence of pericardial effusion.    The esophagus maintains a normal course and caliber. There is no axillary, mediastinal, or hilar lymphadenopathy.      The trachea is midline, the proximal airways are patent and the lungs are symmetrically expanded.   Examination of the lung fields demonstrates mild dependent atelectasis at the lung bases. There is no focal airspace consolidation or pulmonary mass. There is no significant pleural effusion.      The liver is enlarged.  Evaluation of the hepatic parenchyma is significantly limited by lack of IV contrast, however there is marked heterogeneity with multifocal areas of ill-defined low attenuation throughout the hepatic parenchyma. There is a trace volume of perihepatic ascites.  Gallbladder is somewhat contracted with mild wall thickening as seen on prior examinations. No radiopaque stones are identified. There is no intra-extra hepatic biliary ductal dilatation appreciated.    The stomach, spleen, pancreas, and adrenal glands are  unremarkable.    The left kidney is surgically absent.  The right kidney demonstrates no evidence of hydronephrosis. Evaluation of the right renal parenchyma is limited secondary to streak artifact from the patient's body habitus. There are multiple partially exophytic low attenuation lesions previously characterized as cysts on prior ultrasound examination. There is a stable 1.0 cm cortically-based calcification within the right kidney. There is a nonobstructing right renal calculus. No stones are identified within the right ureter. The urinary bladder appears within normal limits. The prostate demonstrates central dystrophic calcification. There is a fat containing left inguinal hernia.    The abdominal aorta is normal in course and caliber without significant atherosclerotic calcifications.    The visualized loops of small and large bowel show no evidence of obstruction or inflammation.  There is diverticulosis without evidence of acute diverticulitis. There is no free intraperitoneal air or portal venous gas. No bulky lymphadenopathy is identified within the abdomen or pelvis.    The osseous structures demonstrate mild degenerative changes of the spine.  The extraperitoneal soft tissues are unremarkable.                             US Abdomen Complete (Final result)  Result time 02/09/18 02:09:26    Final result by Marc Chao MD (02/09/18 02:09:26)                 Impression:        Hepatomegaly with multiple ill-defined solid areas of decreased echogenicity, possibly metastatic disease. This finding would be best characterized with hepatic protocol contrast-enhanced MRI.    Nonspecific diffuse gallbladder wall thickening, which could be seen with acute/chronic cholecystitis, liver disease, malnutrition, or cardiac disease.    Nondependent subcentimeter adherent sludge ball or stone.    Splenomegaly.    Additional findings as above.  ______________________________________     Electronically signed by  resident: WANDY ARELLANO MD  Date:     02/09/18  Time:    01:30            As the supervising and teaching physician, I personally reviewed the images and resident's interpretation and I agree with the findings.          Electronically signed by: Marc Chao  Date:     02/09/18  Time:    02:09              Narrative:    Time of Procedure: 02/09/18 00:00:00  Accession # [id number]    Technique: Complete abdominal ultrasound    Clinical indication: Elevated liver enzymes, hida with liver defects     Comparison: Nuclear medicine hepatobiliary imaging 02/07/2018.    Findings:    Pancreas:  Normal.     Aorta:  No aneurysm.      Inferior vena cava:  Normal in appearance.    Gallbladder:  Nonmobile echogenic focus at the gallbladder wall measuring 0.7 cm consistent with an impacted stone.  The common duct is not dilated, measuring 4 mm.  There is diffuse thickening of the gallbladder wall measuring up to 1.1 cm.No sonographic Andres sign.  No dilated intrahepatic radicles are seen.  No pericholecystic fluid.    Liver:  Enlarged in size measuring 20.5 cm.  The liver demonstrates heterogeneous echotexture with multiple ill-defined areas of decreased echogenicity.  The 2 most prominent areas are the right hepatic lobe measure 3.3 x 2.0 x 3.6 cm, and 1.9 x 1.0 x 1.6 cm.      Right kidney:  Normal in size with no hydronephrosis.  Multiple renal cysts better evaluated on retroperitoneal ultrasound from the same day.      Left kidney:  Left kidney is surgically absent. No abnormal echogenicity in the left renal fossa.      Spleen:  Enlarged in size measuring 15.2 x 4.7 cm with a homogeneous echotexture.    Miscellaneous:  No ascites.                             US Retroperitoneal Complete (Kidney and (Final result)  Result time 02/09/18 04:02:07    Final result by Marc Chao MD (02/09/18 04:02:07)                 Impression:        Findings suggestive of right-sided chronic medical renal disease.    Surgically absent  left kidney without abnormal echogenicity in the left renal fossa.    Stable right simple renal cysts.  ______________________________________     Electronically signed by resident: WANDY ARELLANO MD  Date:     02/09/18  Time:    03:40            As the supervising and teaching physician, I personally reviewed the images and resident's interpretation and I agree with the findings.          Electronically signed by: Marc Chao  Date:     02/09/18  Time:    04:02              Narrative:    Time of Procedure: 02/09/18 00:00:00  Accession # [id number]    Technique: Renal Ultrasound     Clinical indication: ARTUR     Comparison: Ultrasound kidneys only 06/22/2017    FINDINGS:     Right kidney: Dimensions 15.9 cm.  Satisfactory echogenicity with some cortical thinning.  No hydronephrosis.  Multiple simple renal cysts, the largest is at the interpolar region measuring 5.4 cm and is stable when compared to prior examination.    Left kidney: Surgically absent.  No abnormal echogenicity in the left renal fossa    Perfusion: Right renal perfusion is decreased.    Resistive indices: Elevated as follows: Right: 0.85 Splenic RI: 0.69    Bladder: Unremarkable.      Prostate: Measures 4.5 x 2.4 0.4 cm                             US Lower Extremity Veins Bilateral (Final result)  Result time 02/09/18 00:33:33    Final result by Marc Chao MD (02/09/18 00:33:33)                 Impression:        No evidence of deep venous thrombosis bilaterally.  ______________________________________     Electronically signed by resident: WANDY ARELLANO MD  Date:     02/09/18  Time:    00:31            As the supervising and teaching physician, I personally reviewed the images and resident's interpretation and I agree with the findings.          Electronically signed by: Marc Chao  Date:     02/09/18  Time:    00:33              Narrative:    Time of Procedure: 02/09/18 00:00:00  Accession # [id number]    Technique: Duplex and color flow  Doppler evaluation of the bilateral lower extremities.    Comparison: None    Clinical indication:  Rule out DVT.    Findings:    Right - There is no evidence of venous thrombosis in the common femoral, superficial femoral, greater saphenous, popliteal, peroneal, anterior tibial and posterior tibial veins.  There is no reflux to suggest valvular incompetence.    Left - There is no evidence of venous thrombosis in the common femoral, superficial femoral, greater saphenous, popliteal, peroneal, anterior tibial and posterior tibial veins.  There is no reflux to suggest valvular incompetence.                             NM Lung Ventilation Perfusion Imaging (Final result)  Result time 02/08/18 20:49:57    Final result by Marc Chao MD (02/08/18 20:49:57)                 Impression:         Low probability VQ scan.    If clinically indicated, consider correlation with lower extremity venous Doppler ultrasound or CT PE protocol if renal function allows.    ______________________________________     Electronically signed by resident: MARIA ESTHER ALMEIDA MD  Date:     02/08/18  Time:    20:28            As the supervising and teaching physician, I personally reviewed the images and resident's interpretation and I agree with the findings.          Electronically signed by: Marc Chao  Date:     02/08/18  Time:    20:49              Narrative:    VENTILATION-PERFUSION PULMONARY SCINTIGRAPHY    History: Shortness of breath.    Comparison:  Chest radiograph, 2/8/18.    Technique:    42.0 mCi of Tc-99m-DTPA were placed in the nebulizer. Following the ventilation portion of the study 5.0 mCi of Tc-99m-MAA was administered and multiple images of the thorax were obtained in various projections.     Findings:      No evidence of significant perfusion defect or mismatched perfusion and ventilation defect.  There is nonspecific clumping of tracer on the ventilation portion of exam within the thierry-hilar airways.                   "           X-Ray Chest 1 View (Final result)  Result time 02/08/18 17:38:07    Final result by Robby Garcia MD (02/08/18 17:38:07)                 Impression:      Hypoventilatory exam, noting mild peribronchial thickening, similar to the previous exam.  Correlation with any bronchial inflammation or infection advise.      Electronically signed by: ROBBY GARCIA MD  Date:     02/08/18  Time:    17:38              Narrative:    Chest one view    Indication:Shortness of breath    Comparison:2/6/2018    Findings: Habitus limits evaluation. The cardiomediastinal silhouette is prominent, magnified by technique, stable, noting calcification of the aortic arch.  There is no pleural effusion.  The trachea is midline.  The lungs are symmetrically expanded bilaterally with coarse interstitial attenuation, accentuated by shallow inspiratory effort.  There is peribronchial thickening bilaterally.. No large focal consolidation seen.  There is no pneumothorax.  The osseous structures are remarkable for degenerative changes.                              Estimated body mass index is 34.67 kg/m² as calculated from the following:    Height as of this encounter: 6' 2" (1.88 m).    Weight as of this encounter: 122.5 kg (270 lb).    I & O (Last 24H):    Intake/Output Summary (Last 24 hours) at 02/12/18 1242  Last data filed at 02/12/18 0516   Gross per 24 hour   Intake             2260 ml   Output              726 ml   Net             1534 ml       Estimated Creatinine Clearance: 31 mL/min (based on SCr of 3.3 mg/dL (H)).    ASSESSMENT/PLAN:     Active Problems:    Active Hospital Problems    Diagnosis  POA    *Acute renal failure superimposed on stage 3 chronic kidney disease [N17.9, N18.3]poor oral intake. ATN likely. s/p nephrology evaluation Cr 2.1-->2.8-->3.2 continue with IV hydration   Anemia Hb 10.2--> 8.8-->9 FOBT + . monitor with hydration  Yes    Hypoalbuminemia [E88.09]1.9. likely contributing to peripheral edema . " reveived IV albuminx 1  Hyponatremia /Metabolic acidosis 127 -serum, urine osmolality, urine lytes pending. started on bicarbonated drip for metabolic acidosis. CMP q 12hrly  Yes    Leucocytosis [D72.829recent travel to [facility] 12/17. He also reports that for the past 2 weeks, he has been having diarrhea.]21-->23 with elevated procalcitonin 4.ID consulted. Antibiotics discontinued  Yes    Abnormal liver function tests [R79.89]ST/ALT (approx 2:1) with alk phos elevated to 285, bili 1.7, and noted HIDA scan normal appearance of gallbladder. No bile duct obstruction .AFP 0.7. CT chest abd/Pelvis without contrast - Interval development of hepatomegaly with markedly heterogeneity of hepatic parenchyma, noting multiple areas of ill-defined low attenuation.concerning for possible underlying diffuse infiltrating process or multiple hepatic lesions, possibly representing metastatic disease. MRI abd without contast today for futher characterization of liver lesions before biopsy. FOBT+ Hepatology consulted   Hemorrhagic renal cyst -urology consulted   Yes    Abnormal liver scan [R93.2] CT chest abd/Pelvis without contrast as sono liver -Hepatomegaly with multiple ill-defined solid areas of decreased echogenicity, possibly metastatic disease .IR consulted for liver biopsy. Apixaban held for possible liver biopsy on monday    Yes    D-dimer, elevated [R79.89]DVT sonogram B/L LE negative.V/Q scan - Low probability VQ scan.  Yes    Current use of long term anticoagulation [Z79.01]Currently in sinus rhythm.Continue flecainide 50mg Q12H. Eliquis held for liver biopsy in AM  Not Applicable    Persistent atrial fibrillation [I48.1]s/p DCCV. as above  Yes    Proteinuria [R80.9]nephrololgy consulted as above  Yes    BMI 34.0-34.9,adult [Z68.34]Body mass index is 34.67 kg/m². Morbid obesity complicates all aspects of disease management from diagnostic modalities to treatment. Weight loss encouraged   Not Applicable     Hyperlipidemia associated with type 2 diabetes mellitus [E11.69, E78.5]On simvastatin  Yes    Hypertension associated with diabetes [E11.59, I10]Blood pressure controlled on carvedilol and doxazosin with holding parameters  amlodipine discontinued   Yes     Chronic    Type 2 diabetes mellitus with stage 3 chronic kidney disease, with long-term current use of insulin [E11.22, N18.3, Z79.4]Accuchecks ACHS, SSI. Detimir 12u daily  Not Applicable      Resolved Hospital Problems    Diagnosis Date Resolved POA   No resolved problems to display.         Disposition- Home    DVT prophylaxis addressed with: Rocio Monk MD  Attending Staff Physician  [facility] Medicine  pager- [phone] [place]se[place] - [phone]

## 2018-02-12 NOTE — HPI
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 He has had progressively worsening shortness of breath, JAMES and fatigue. He does endorse recent weight loss. He was recently admitted for CHF exacerbation on 2/1 and discharged 2/3. Examination revealed concern for cholecystitis and abd US was ordered which revealed concerning liver lesions. He has now had retroperitoneal US, CT abd/pelvis w/o contrast and MRI abdomen w/o contrast to work up his liver lesions. These studies reveal interval development of hepatomegaly and hypoattenuating liver lesions with concern for metastatic disease. There is no bulky adenopathy. He is scheduled for IR biopsy on 2/12.     Urology was consulted for incidental finding of hemorrhagic renal cyst. His creatinine is slowly risen over the course of his hospital stay. It is now 3.3 from a baseline of 1.7. Nephrology is following. He indicates he has had limited PO intake and diarrhea recently. He is being resuscitate with IVF. His Hgb has remained stable throughout his hospital stay. He has had no hematuria or dysuria. He says he has LUTS but is comfortable with the way he voids and is not interested in medication. However, he does not feel he empties his bladder well. There was no hydronephrosis not bladder distention seen on his CT A/P.

## 2018-02-13 LAB
ALBUMIN SERPL BCP-MCNC: 1.5 G/DL
ALBUMIN SERPL BCP-MCNC: 1.7 G/DL
ALP SERPL-CCNC: 260 U/L
ALP SERPL-CCNC: 301 U/L
ALT SERPL W/O P-5'-P-CCNC: 25 U/L
ALT SERPL W/O P-5'-P-CCNC: 25 U/L
ANION GAP SERPL CALC-SCNC: 12 MMOL/L
ANION GAP SERPL CALC-SCNC: 14 MMOL/L
AST SERPL-CCNC: 58 U/L
AST SERPL-CCNC: 60 U/L
BASOPHILS # BLD AUTO: 0.04 K/UL
BASOPHILS NFR BLD: 0.2 %
BILIRUB SERPL-MCNC: 1.8 MG/DL
BILIRUB SERPL-MCNC: 1.9 MG/DL
BUN SERPL-MCNC: 103 MG/DL
BUN SERPL-MCNC: 116 MG/DL
CALCIUM SERPL-MCNC: 7.3 MG/DL
CALCIUM SERPL-MCNC: 7.6 MG/DL
CHLORIDE SERPL-SCNC: 94 MMOL/L
CHLORIDE SERPL-SCNC: 94 MMOL/L
CO2 SERPL-SCNC: 20 MMOL/L
CO2 SERPL-SCNC: 24 MMOL/L
CREAT SERPL-MCNC: 3.1 MG/DL
CREAT SERPL-MCNC: 3.3 MG/DL
DIFFERENTIAL METHOD: ABNORMAL
EOSINOPHIL # BLD AUTO: 0 K/UL
EOSINOPHIL NFR BLD: 0.1 %
ERYTHROCYTE [DISTWIDTH] IN BLOOD BY AUTOMATED COUNT: 15.2 %
EST. GFR  (AFRICAN AMERICAN): 21.5 ML/MIN/1.73 M^2
EST. GFR  (AFRICAN AMERICAN): 23.1 ML/MIN/1.73 M^2
EST. GFR  (NON AFRICAN AMERICAN): 18.6 ML/MIN/1.73 M^2
EST. GFR  (NON AFRICAN AMERICAN): 20 ML/MIN/1.73 M^2
GLUCOSE SERPL-MCNC: 141 MG/DL
GLUCOSE SERPL-MCNC: 161 MG/DL
HCT VFR BLD AUTO: 27.9 %
HGB BLD-MCNC: 9.2 G/DL
IMM GRANULOCYTES # BLD AUTO: 0.67 K/UL
IMM GRANULOCYTES NFR BLD AUTO: 3.4 %
LYMPHOCYTES # BLD AUTO: 0.5 K/UL
LYMPHOCYTES NFR BLD: 2.5 %
MAGNESIUM SERPL-MCNC: 2.6 MG/DL
MCH RBC QN AUTO: 28.1 PG
MCHC RBC AUTO-ENTMCNC: 33 G/DL
MCV RBC AUTO: 85 FL
MONOCYTES # BLD AUTO: 1.9 K/UL
MONOCYTES NFR BLD: 9.7 %
NEUTROPHILS # BLD AUTO: 16.3 K/UL
NEUTROPHILS NFR BLD: 84.1 %
NRBC BLD-RTO: 0 /100 WBC
PHOSPHATE SERPL-MCNC: 3.9 MG/DL
PLATELET # BLD AUTO: 373 K/UL
PMV BLD AUTO: 10.7 FL
POCT GLUCOSE: 143 MG/DL (ref 70–110)
POCT GLUCOSE: 149 MG/DL (ref 70–110)
POCT GLUCOSE: 172 MG/DL (ref 70–110)
POCT GLUCOSE: 207 MG/DL (ref 70–110)
POTASSIUM SERPL-SCNC: 3.9 MMOL/L
POTASSIUM SERPL-SCNC: 4 MMOL/L
PROT SERPL-MCNC: 5.5 G/DL
PROT SERPL-MCNC: 5.8 G/DL
RBC # BLD AUTO: 3.27 M/UL
SODIUM SERPL-SCNC: 128 MMOL/L
SODIUM SERPL-SCNC: 130 MMOL/L
WBC # BLD AUTO: 19.45 K/UL

## 2018-02-13 PROCEDURE — 85025 COMPLETE CBC W/AUTO DIFF WBC: CPT

## 2018-02-13 PROCEDURE — 25000003 PHARM REV CODE 250: Performed by: INTERNAL MEDICINE

## 2018-02-13 PROCEDURE — 99232 SBSQ HOSP IP/OBS MODERATE 35: CPT | Mod: ,,, | Performed by: INTERNAL MEDICINE

## 2018-02-13 PROCEDURE — 84100 ASSAY OF PHOSPHORUS: CPT

## 2018-02-13 PROCEDURE — 99233 SBSQ HOSP IP/OBS HIGH 50: CPT | Mod: ,,, | Performed by: INTERNAL MEDICINE

## 2018-02-13 PROCEDURE — 25000003 PHARM REV CODE 250: Performed by: HOSPITALIST

## 2018-02-13 PROCEDURE — 83735 ASSAY OF MAGNESIUM: CPT

## 2018-02-13 PROCEDURE — 63600175 PHARM REV CODE 636 W HCPCS: Performed by: INTERNAL MEDICINE

## 2018-02-13 PROCEDURE — 36415 COLL VENOUS BLD VENIPUNCTURE: CPT

## 2018-02-13 PROCEDURE — 11000001 HC ACUTE MED/SURG PRIVATE ROOM

## 2018-02-13 PROCEDURE — 80053 COMPREHEN METABOLIC PANEL: CPT

## 2018-02-13 RX ORDER — FUROSEMIDE 10 MG/ML
40 INJECTION INTRAMUSCULAR; INTRAVENOUS ONCE
Status: COMPLETED | OUTPATIENT
Start: 2018-02-13 | End: 2018-02-13

## 2018-02-13 RX ADMIN — INSULIN ASPART 4 UNITS: 100 INJECTION, SOLUTION INTRAVENOUS; SUBCUTANEOUS at 01:02

## 2018-02-13 RX ADMIN — SIMVASTATIN 20 MG: 20 TABLET, FILM COATED ORAL at 09:02

## 2018-02-13 RX ADMIN — FUROSEMIDE 40 MG: 10 INJECTION, SOLUTION INTRAMUSCULAR; INTRAVENOUS at 01:02

## 2018-02-13 RX ADMIN — CARVEDILOL 25 MG: 25 TABLET, FILM COATED ORAL at 05:02

## 2018-02-13 RX ADMIN — RAMELTEON 8 MG: 8 TABLET, FILM COATED ORAL at 09:02

## 2018-02-13 RX ADMIN — FLECAINIDE ACETATE 50 MG: 50 TABLET ORAL at 08:02

## 2018-02-13 RX ADMIN — INSULIN DETEMIR 12 UNITS: 100 INJECTION, SOLUTION SUBCUTANEOUS at 08:02

## 2018-02-13 RX ADMIN — DOXAZOSIN 2 MG: 1 TABLET ORAL at 09:02

## 2018-02-13 RX ADMIN — FLECAINIDE ACETATE 50 MG: 50 TABLET ORAL at 09:02

## 2018-02-13 RX ADMIN — INSULIN ASPART 2 UNITS: 100 INJECTION, SOLUTION INTRAVENOUS; SUBCUTANEOUS at 06:02

## 2018-02-13 NOTE — PROGRESS NOTES
Progress Note   Hospital Medicine       Team: [facility]OSP MED B aEston Andino MD  Admit Date: 2/8/2018  Length of Stay:  LOS: 4 days   DAVIE 2/15/2018  Principal Problem:  Acute renal failure superimposed on stage 3 chronic kidney disease    Interval hx / overnight events: S/P bioipsy    Areas of concern/ handoff:    ROS    Temp:  [98 °F (36.7 °C)-99.4 °F (37.4 °C)]   Pulse:  [75-84]   Resp:  [17-20]   BP: ()/(50-56)   SpO2:  [92 %-95 %]       Physical Exam   Constitutional: He is oriented to person, place, and time and well-developed, well-nourished, and in no distress.   HENT:   Head: Normocephalic and atraumatic.   Neck: Normal range of motion. Neck supple.   Cardiovascular: Normal rate.    Pulmonary/Chest: Effort normal and breath sounds normal.   Abdominal: He exhibits distension. There is tenderness.   Neurological: He is alert and oriented to person, place, and time.   Skin: Skin is warm and dry.       Recent Labs  Lab 02/09/18  0950 02/09/18  1650 02/10/18  0437 02/11/18  0452 02/12/18  0519 02/13/18  0403   WBC 23.99* 23.07* 22.23* 21.92* 20.83* 19.45*   HGB 8.9* 9.8* 9.0* 8.8* 9.0* 9.2*   HCT 27.2* 29.0* 26.1* 27.0* 26.4* 27.9*   * 400* 336 394* 401* 373*       Recent Labs  Lab 02/11/18  0452  02/12/18  0519 02/12/18  0827 02/12/18 2010 02/13/18  0403 02/13/18  0846   *  < >  --  127* 129*  --  128*   K 4.2  < >  --  4.0 3.9  --  4.0   CL 98  < >  --  93* 94*  --  94*   CO2 18*  < >  --  21* 24  --  20*   BUN 96*  < >  --  105* 104*  --  103*   CREATININE 3.2*  < >  --  3.3* 3.2*  --  3.1*   *  < >  --  221* 168*  --  141*   CALCIUM 7.5*  < >  --  7.4* 7.4*  --  7.3*   MG 2.4  --  2.6  --   --  2.6  --    PHOS 4.1  --  3.6  --   --  3.9  --    < > = values in this interval not displayed.    Recent Labs  Lab 02/12/18  0827 02/12/18 2010 02/13/18  0846   ALKPHOS 262* 252* 260*   ALT 29 26 25   AST 50* 51* 58*   ALBUMIN 1.8* 1.6* 1.5*   PROT 6.1 5.7* 5.5*   BILITOT 1.7* 1.7* 1.9*        Recent Labs  Lab 02/12/18  0725 02/12/18  1201 02/12/18  1743 02/12/18  2154 02/13/18  0715 02/13/18  1201   POCTGLUCOSE 225* 197* 165* 153* 149* 207*            carvedilol  25 mg Oral BID WM    doxazosin  2 mg Oral Nightly    flecainide  50 mg Oral Q12H    insulin detemir  12 Units Subcutaneous Daily    simvastatin  20 mg Oral QHS       Assessment and Plan     Active Hospital Problems    Diagnosis  POA    *Acute renal failure superimposed on stage 3 chronic kidney disease [N17.9, N18.3]  Yes    Hyponatremia [E87.1]  Yes    Hypoalbuminemia [E88.09]  Yes    Leukemoid reaction [D72.823]  Yes    Abnormal liver function tests [R79.89]  Yes    Abnormal liver scan [R93.2]  Yes    D-dimer, elevated [R79.89]  Yes    Current use of long term anticoagulation [Z79.01]  Not Applicable    Persistent atrial fibrillation [I48.1]  Yes    Isolated proteinuria without specific morphologic lesion [R80.0]  Yes    BMI 34.0-34.9,adult [Z68.34]  Not Applicable    Hyperlipidemia associated with type 2 diabetes mellitus [E11.69, E78.5]  Yes    Hypertension associated with diabetes [E11.59, I10]  Yes     Chronic    Type 2 diabetes mellitus with stage 3 chronic kidney disease, with long-term current use of insulin [E11.22, N18.3, Z79.4]  Not Applicable      Resolved Hospital Problems    Diagnosis Date Resolved POA   No resolved problems to display.       Liver Mass  Biopsy pending  Hepatology following    Acute Renal Failure  Improving    Leukocytosis  Leukemoid Reaction              Time spent in care of the patient (Greater than 1/2 spent in direct face-to-face contact) 30minutes    Easton Andino MD

## 2018-02-14 LAB
ALBUMIN SERPL BCP-MCNC: 1.6 G/DL
ALBUMIN SERPL BCP-MCNC: 1.6 G/DL
ALP SERPL-CCNC: 289 U/L
ALP SERPL-CCNC: 301 U/L
ALT SERPL W/O P-5'-P-CCNC: 23 U/L
ALT SERPL W/O P-5'-P-CCNC: 24 U/L
ANION GAP SERPL CALC-SCNC: 15 MMOL/L
ANION GAP SERPL CALC-SCNC: 15 MMOL/L
AST SERPL-CCNC: 52 U/L
AST SERPL-CCNC: 57 U/L
BACTERIA BLD CULT: NORMAL
BACTERIA BLD CULT: NORMAL
BACTERIA STL CULT: NORMAL
BACTERIA STL CULT: NORMAL
BASOPHILS # BLD AUTO: 0.03 K/UL
BASOPHILS NFR BLD: 0.1 %
BILIRUB SERPL-MCNC: 2 MG/DL
BILIRUB SERPL-MCNC: 2 MG/DL
BILIRUB UR QL STRIP: NEGATIVE
BUN SERPL-MCNC: 114 MG/DL
BUN SERPL-MCNC: 118 MG/DL
CALCIUM SERPL-MCNC: 7.3 MG/DL
CALCIUM SERPL-MCNC: 7.6 MG/DL
CHLORIDE SERPL-SCNC: 93 MMOL/L
CHLORIDE SERPL-SCNC: 94 MMOL/L
CHLORIDE UR-SCNC: <20 MMOL/L
CLARITY UR REFRACT.AUTO: CLEAR
CO2 SERPL-SCNC: 20 MMOL/L
CO2 SERPL-SCNC: 21 MMOL/L
COLOR UR AUTO: NORMAL
CREAT SERPL-MCNC: 3.5 MG/DL
CREAT SERPL-MCNC: 3.9 MG/DL
CREAT UR-MCNC: 240 MG/DL
DIFFERENTIAL METHOD: ABNORMAL
EOSINOPHIL # BLD AUTO: 0 K/UL
EOSINOPHIL NFR BLD: 0.1 %
ERYTHROCYTE [DISTWIDTH] IN BLOOD BY AUTOMATED COUNT: 15.4 %
EST. GFR  (AFRICAN AMERICAN): 17.5 ML/MIN/1.73 M^2
EST. GFR  (AFRICAN AMERICAN): 20 ML/MIN/1.73 M^2
EST. GFR  (NON AFRICAN AMERICAN): 15.2 ML/MIN/1.73 M^2
EST. GFR  (NON AFRICAN AMERICAN): 17.3 ML/MIN/1.73 M^2
GLUCOSE SERPL-MCNC: 136 MG/DL
GLUCOSE SERPL-MCNC: 211 MG/DL
GLUCOSE UR QL STRIP: NEGATIVE
HCT VFR BLD AUTO: 27.3 %
HGB BLD-MCNC: 9 G/DL
HGB UR QL STRIP: NEGATIVE
IMM GRANULOCYTES # BLD AUTO: 0.91 K/UL
IMM GRANULOCYTES NFR BLD AUTO: 4.4 %
KETONES UR QL STRIP: NEGATIVE
LEPTOSPIRA AB SER QL: NEGATIVE
LEUKOCYTE ESTERASE UR QL STRIP: NEGATIVE
LYMPHOCYTES # BLD AUTO: 0.6 K/UL
LYMPHOCYTES NFR BLD: 2.8 %
MAGNESIUM SERPL-MCNC: 2.9 MG/DL
MCH RBC QN AUTO: 27 PG
MCHC RBC AUTO-ENTMCNC: 33 G/DL
MCV RBC AUTO: 82 FL
MONOCYTES # BLD AUTO: 1.6 K/UL
MONOCYTES NFR BLD: 7.4 %
NEUTROPHILS # BLD AUTO: 17.7 K/UL
NEUTROPHILS NFR BLD: 85.2 %
NITRITE UR QL STRIP: NEGATIVE
NRBC BLD-RTO: 0 /100 WBC
PH UR STRIP: 5 [PH] (ref 5–8)
PHOSPHATE SERPL-MCNC: 4.4 MG/DL
PLATELET # BLD AUTO: 383 K/UL
PMV BLD AUTO: 10.3 FL
POCT GLUCOSE: 147 MG/DL (ref 70–110)
POCT GLUCOSE: 158 MG/DL (ref 70–110)
POCT GLUCOSE: 160 MG/DL (ref 70–110)
POTASSIUM SERPL-SCNC: 4.1 MMOL/L
POTASSIUM SERPL-SCNC: 4.3 MMOL/L
POTASSIUM UR-SCNC: 42 MMOL/L
PROT SERPL-MCNC: 5.7 G/DL
PROT SERPL-MCNC: 5.7 G/DL
PROT UR QL STRIP: NEGATIVE
RBC # BLD AUTO: 3.33 M/UL
SODIUM SERPL-SCNC: 128 MMOL/L
SODIUM SERPL-SCNC: 130 MMOL/L
SODIUM UR-SCNC: <20 MMOL/L
SP GR UR STRIP: 1.01 (ref 1–1.03)
URN SPEC COLLECT METH UR: NORMAL
UROBILINOGEN UR STRIP-ACNC: NEGATIVE EU/DL
UUN UR-MCNC: 613 MG/DL
WBC # BLD AUTO: 20.82 K/UL

## 2018-02-14 PROCEDURE — 80053 COMPREHEN METABOLIC PANEL: CPT | Mod: 91

## 2018-02-14 PROCEDURE — 99233 SBSQ HOSP IP/OBS HIGH 50: CPT | Mod: ,,, | Performed by: INTERNAL MEDICINE

## 2018-02-14 PROCEDURE — 83735 ASSAY OF MAGNESIUM: CPT

## 2018-02-14 PROCEDURE — 11000001 HC ACUTE MED/SURG PRIVATE ROOM

## 2018-02-14 PROCEDURE — 84540 ASSAY OF URINE/UREA-N: CPT

## 2018-02-14 PROCEDURE — 36415 COLL VENOUS BLD VENIPUNCTURE: CPT

## 2018-02-14 PROCEDURE — 84100 ASSAY OF PHOSPHORUS: CPT

## 2018-02-14 PROCEDURE — 82570 ASSAY OF URINE CREATININE: CPT

## 2018-02-14 PROCEDURE — 99232 SBSQ HOSP IP/OBS MODERATE 35: CPT | Mod: ,,, | Performed by: INTERNAL MEDICINE

## 2018-02-14 PROCEDURE — 85025 COMPLETE CBC W/AUTO DIFF WBC: CPT

## 2018-02-14 PROCEDURE — 84300 ASSAY OF URINE SODIUM: CPT

## 2018-02-14 PROCEDURE — 81003 URINALYSIS AUTO W/O SCOPE: CPT

## 2018-02-14 PROCEDURE — 84133 ASSAY OF URINE POTASSIUM: CPT

## 2018-02-14 PROCEDURE — 25000003 PHARM REV CODE 250: Performed by: INTERNAL MEDICINE

## 2018-02-14 PROCEDURE — 82436 ASSAY OF URINE CHLORIDE: CPT

## 2018-02-14 RX ORDER — ZOLPIDEM TARTRATE 5 MG/1
5 TABLET ORAL NIGHTLY PRN
Status: DISCONTINUED | OUTPATIENT
Start: 2018-02-14 | End: 2018-02-18

## 2018-02-14 RX ORDER — CALCIUM CARBONATE 200(500)MG
500 TABLET,CHEWABLE ORAL DAILY PRN
Status: DISCONTINUED | OUTPATIENT
Start: 2018-02-14 | End: 2018-02-22

## 2018-02-14 RX ADMIN — INSULIN ASPART 2 UNITS: 100 INJECTION, SOLUTION INTRAVENOUS; SUBCUTANEOUS at 12:02

## 2018-02-14 RX ADMIN — INSULIN ASPART 2 UNITS: 100 INJECTION, SOLUTION INTRAVENOUS; SUBCUTANEOUS at 06:02

## 2018-02-14 RX ADMIN — INSULIN DETEMIR 12 UNITS: 100 INJECTION, SOLUTION SUBCUTANEOUS at 08:02

## 2018-02-14 RX ADMIN — SIMVASTATIN 20 MG: 20 TABLET, FILM COATED ORAL at 08:02

## 2018-02-14 RX ADMIN — CALCIUM CARBONATE (ANTACID) CHEW TAB 500 MG 500 MG: 500 CHEW TAB at 12:02

## 2018-02-14 RX ADMIN — FLECAINIDE ACETATE 50 MG: 50 TABLET ORAL at 08:02

## 2018-02-14 RX ADMIN — INSULIN ASPART 1 UNITS: 100 INJECTION, SOLUTION INTRAVENOUS; SUBCUTANEOUS at 08:02

## 2018-02-14 NOTE — ASSESSMENT & PLAN NOTE
This patient presents with ARTUR on CKD3, likely from pre-renal causes which include poor po intake, sepsis resulting in renal hypoperfusion, and 2 week history of diarrhea. This could be ATN from a prolonged pre-renal state.  However, history of heart failure (previous 2D echos revealing decreased EF, noted diastolic dysfunction previously) and recent echo on 2/7 with noted increased intravascular volume, the addition of 3.8L of fluid may be correlating with ARTUR as CRS.  However, patient with no significant improvement of labs with either method at this time.  Unknown if underlying liver disease possibility for dysfunction.  Additionally, BUN/Cr ratio with increased BUN, consideration if colorectal malignancy with GI bleed (positive stool occult blood) as cause of elevated BUN.  Will need additional work-up.      Plan  -- Will re-order urine electrolytes and urinalysis to assess current status.  As patient given volume, would like to assess if patient currently is in HF with worsening renal function.  BNP added to AM labs.  -- Additionally, hypoalbuminemic with Albumin of 1.6.  Given liver mets, unknown if patient with underlying liver disease.  Possibly may need albumin administration given hypotension this AM.  Will check CXR now to see if patient volume overloaded, will give albumin if patient CXR clear.  Will repeat 2D echo  -- Will assess complement given kidney disease, added to AM labs

## 2018-02-14 NOTE — PROGRESS NOTES
[facility]  Nephrology  Progress Note    Patient Name: Jose Cruz Lira  MRN: [id number]  Admission Date: 2/8/2018  Hospital Length of Stay: 4 days  Attending Provider: Easton Andino MD   Primary Care Physician: Lisa Capone MD  Principal Problem:Acute renal failure superimposed on stage 3 chronic kidney disease    Consults  Subjective:     Interval History:    No acute events overnight  Made 1 liter of urine overnight with lasix dose  Still with lot of leg swelling    Total urine output 1.4 L    ROS  He still feel very weak and tired  Waiting on liver biopsy report  No chest pain  Has limited activity  No nausea/ vomiting    Objective:     Vital Signs (Most Recent):  Temp: 98.8 °F (37.1 °C) (02/13/18 1522)  Pulse: 75 (02/13/18 1522)  Resp: 20 (02/13/18 1522)  BP: (!) 112/52 (02/13/18 1522)  SpO2: (!) 93 % (02/13/18 1522)  O2 Device (Oxygen Therapy): room air (02/13/18 1522) Vital Signs (24h Range):  Temp:  [98 °F (36.7 °C)-99.4 °F (37.4 °C)] 98.8 °F (37.1 °C)  Pulse:  [75-84] 75  Resp:  [17-20] 20  SpO2:  [92 %-95 %] 93 %  BP: ()/(50-56) 112/52     Weight: 130 kg (286 lb 9.6 oz) (02/13/18 0424)  Body mass index is 36.8 kg/m².  Body surface area is 2.61 meters squared.    I/O last 3 completed shifts:  In: 1830 [P.O.:480; I.V.:1350]  Out: 1425 [Urine:1425]    Physical Exam     Unchanged from prior  Still with edema ++  Alert and oriented x 3    Significant Labs:sure  CBC:   Recent Labs  Lab 02/13/18  0403   WBC 19.45*   RBC 3.27*   HGB 9.2*   HCT 27.9*   *   MCV 85   MCH 28.1   MCHC 33.0     CMP:   Recent Labs  Lab 02/13/18  0846   *   CALCIUM 7.3*   ALBUMIN 1.5*   PROT 5.5*   *   K 4.0   CO2 20*   CL 94*   *   CREATININE 3.1*   ALKPHOS 260*   ALT 25   AST 58*   BILITOT 1.9*     All labs within the past 24 hours have been reviewed.    Significant Imaging:  Labs: Reviewed    Assessment/Plan:     Active Diagnoses:    Diagnosis Date Noted POA    PRINCIPAL  PROBLEM:  Acute renal failure superimposed on stage 3 chronic kidney disease [N17.9, N18.3] 02/09/2018 Yes    Hyponatremia [E87.1] 02/10/2018 Yes    Hypoalbuminemia [E88.09] 02/09/2018 Yes    Leukemoid reaction [D72.823] 02/09/2018 Yes    Abnormal liver function tests [R79.89] 02/09/2018 Yes    Abnormal liver scan [R93.2] 02/09/2018 Yes    D-dimer, elevated [R79.89] 02/08/2018 Yes    Current use of long term anticoagulation [Z79.01] 01/29/2018 Not Applicable    Persistent atrial fibrillation [I48.1] 11/28/2016 Yes    Isolated proteinuria without specific morphologic lesion [R80.0] 11/03/2014 Yes    BMI 34.0-34.9,adult [Z68.34] 05/28/2014 Not Applicable    Hyperlipidemia associated with type 2 diabetes mellitus [E11.69, E78.5] 05/28/2014 Yes    Hypertension associated with diabetes [E11.59, I10] 05/28/2014 Yes     Chronic    Type 2 diabetes mellitus with stage 3 chronic kidney disease, with long-term current use of insulin [E11.22, N18.3, Z79.4] 05/28/2014 Not Applicable      Problems Resolved During this Admission:    Diagnosis Date Noted Date Resolved POA     ARTUR on CKD III  ARTUR due to hypoperfusion/ ATN, no response to volume expansion  Solitary kidney  Edema  Hyponatremia  Liver lesions    - another dose of lasix today  - monitor urine output closely   - daily renal panel to monitor electrolytes, acid base status, creatinine closely  - avoid NSAID/ bactrim/ IV contrast/ gadolinium/ aminoglycoside/ Fleet enema/ PPI where possible  - oral sodium bicarbonate if metabolic acidosis worsens       Nessa Angel MD  Nephrology  [facility]

## 2018-02-14 NOTE — PROGRESS NOTES
Progress Note   Hospital Medicine       Team: [facility] [place] [facility] MED B Easton Andino MD  Admit Date: 2/8/2018  Length of Stay:  LOS: 5 days   DAVIE 2/15/2018  Principal Problem:  Acute renal failure superimposed on stage 3 chronic kidney disease    Interval hx / overnight events: Very tired, not sleeping well    Areas of concern/ handoff:    ROS    Temp:  [97.4 °F (36.3 °C)-98.8 °F (37.1 °C)]   Pulse:  [72-79]   Resp:  [16-20]   BP: ()/(46-53)   SpO2:  [93 %-96 %]       Physical Exam   Constitutional: He is oriented to person, place, and time and well-developed, well-nourished, and in no distress.   HENT:   Head: Normocephalic and atraumatic.   Eyes: Pupils are equal, round, and reactive to light.   Neck: Normal range of motion.   Cardiovascular: Regular rhythm.    Pulmonary/Chest: Effort normal and breath sounds normal.   Abdominal: Soft.   Neurological: He is alert and oriented to person, place, and time.   Skin: Skin is warm and dry.       Recent Labs  Lab 02/09/18  1650 02/10/18  0437 02/11/18  0452 02/12/18  0519 02/13/18  0403 02/14/18  0522   WBC 23.07* 22.23* 21.92* 20.83* 19.45* 20.82*   HGB 9.8* 9.0* 8.8* 9.0* 9.2* 9.0*   HCT 29.0* 26.1* 27.0* 26.4* 27.9* 27.3*   * 336 394* 401* 373* 383*       Recent Labs  Lab 02/12/18  0519  02/13/18  0403 02/13/18  0846 02/13/18  1947 02/14/18  0522 02/14/18  0811   NA  --   < >  --  128* 130*  --  130*   K  --   < >  --  4.0 3.9  --  4.1   CL  --   < >  --  94* 94*  --  94*   CO2  --   < >  --  20* 24  --  21*   BUN  --   < >  --  103* 116*  --  114*   CREATININE  --   < >  --  3.1* 3.3*  --  3.5*   GLU  --   < >  --  141* 161*  --  136*   CALCIUM  --   < >  --  7.3* 7.6*  --  7.3*   MG 2.6  --  2.6  --   --  2.9*  --    PHOS 3.6  --  3.9  --   --  4.4  --    < > = values in this interval not displayed.    Recent Labs  Lab 02/13/18  0846 02/13/18  1947 02/14/18  0811   ALKPHOS 260* 301* 289*   ALT 25 25 24   AST 58* 60* 57*   ALBUMIN 1.5* 1.7* 1.6*   PROT 5.5* 5.8*  5.7*   BILITOT 1.9* 1.8* 2.0*       Recent Labs  Lab 02/12/18  2154 02/13/18  0715 02/13/18  1201 02/13/18  1727 02/13/18  2235 02/14/18  0843   POCTGLUCOSE 153* 149* 207* 172* 143* 147*            carvedilol  25 mg Oral BID WM    doxazosin  2 mg Oral Nightly    flecainide  50 mg Oral Q12H    insulin detemir  12 Units Subcutaneous Daily    simvastatin  20 mg Oral QHS       Assessment and Plan     Active Hospital Problems    Diagnosis  POA    *Acute renal failure superimposed on stage 3 chronic kidney disease [N17.9, N18.3]  Yes    Hyponatremia [E87.1]  Yes    Hypoalbuminemia [E88.09]  Yes    Leukemoid reaction [D72.823]  Yes    Abnormal liver function tests [R79.89]  Yes    Abnormal liver scan [R93.2]  Yes    D-dimer, elevated [R79.89]  Yes    Current use of long term anticoagulation [Z79.01]  Not Applicable    Persistent atrial fibrillation [I48.1]  Yes    Isolated proteinuria without specific morphologic lesion [R80.0]  Yes    BMI 34.0-34.9,adult [Z68.34]  Not Applicable    Hyperlipidemia associated with type 2 diabetes mellitus [E11.69, E78.5]  Yes    Hypertension associated with diabetes [E11.59, I10]  Yes     Chronic    Type 2 diabetes mellitus with stage 3 chronic kidney disease, with long-term current use of insulin [E11.22, N18.3, Z79.4]  Not Applicable      Resolved Hospital Problems    Diagnosis Date Resolved POA   No resolved problems to display.       LIver Mass  Biopsy Pending    ARF  ATN and pre renal  Remains unchanged    Leukamoid Recation  Malignacy ?        Disposition: To Home    Time spent in care of the patient (Greater than 1/2 spent in direct face-to-face contact) 30minutes    Easton Andino MD

## 2018-02-14 NOTE — PROGRESS NOTES
[facility]  Nephrology  Progress Note    Patient Name: Jose Cruz Lira  MRN: [id number]  Admission Date: 2/8/2018  Hospital Length of Stay: 5 days  Attending Provider: Easton Andino MD   Primary Care Physician: Lisa Capone MD  Principal Problem:Acute renal failure superimposed on stage 3 chronic kidney disease    Subjective:     HPI: 66 yo M with PMH of CKD3, left hydronephrosis s/p nephrectomy August 2017, HTN, DMII (long term insulin use), persistent atrial fibrillation (s/p DCCV 2016) on flecainide and Eliquis. He presented for RHC appt, was noted to have worsened LFTs, leukocytosis, and was sent to ED for further workup. Patient has been short of breath for several month with symptoms have been getting worse over the past few weeks. He has dyspnea with exertion and at rest currently, chills, cough off and on, fatigue.  He denies chest pain.  He does take his lasix twice daily and reports urine output has decreased lately in last few days, reports wt loss recently, has had difficulty sleeping lately 2/2 orthopnea, reports stable / decrease in LE swelling, unsure of PND. Patient does report recent travel to [facility] 12/17. He also reports that for the past 2 weeks, he has been having diarrhea.     Patient had recent admission for CHF exacerbation 2/1 and discharged 2/3, recent ED visit for same complaints noted to have leukocytosis with neutrophilic prodominance, bili noted to be 2.0, Cr 2.1, blood cultures drawn NGTD, referred for sleep study however did not receive yet though high suspicion for MONICA. Exam with cards noted for + johnson's sign, HIDA scan ordered was negative for cholecystitis however noted filling defects in liver, recommended f/u imaging. RHC was also ordered and was to be done 2/8 however worsened labs (leukocytosis, LFTs) prompted referral to ED for evaluation.    Nephrology was consulted for ARTUR on CKD. He has been followed by nephrologist Dr. Anderson. Baseline  "Cr is 1.7. He denies NSAID use. He has a history of left total nephrectomy in August 2017 due to left hydronephrosis (etiology is not clear). He notes that his po intake has been poor due to fatigue. Urine output has also decreased. Denies dysuria and hematuria. Endorses nocturia. States that he has been having "loose stools" for the past 2 weeks    Interval History:  Patient notes continually feeling tired in AM.  Noted lethargy and worsening memory.  Had 1 episode of watery BM this AM.  Denies fevers, chills, N/V, shortness of breath.  Unable to obtain accurate urine measurements given diarrhea and urinating while on toilet.      Review of patient's allergies indicates:  No Known Allergies  Current Facility-Administered Medications   Medication Frequency    calcium carbonate 200 mg calcium (500 mg) chewable tablet 500 mg Daily PRN    carvedilol tablet 25 mg BID WM    dextrose 50% injection 12.5 g PRN    dextrose 50% injection 25 g PRN    doxazosin tablet 2 mg Nightly    flecainide tablet 50 mg Q12H    glucagon (human recombinant) injection 1 mg PRN    glucose chewable tablet 16 g PRN    glucose chewable tablet 24 g PRN    insulin aspart pen 1-10 Units QID (AC + HS) PRN    insulin detemir pen 12 Units Daily    simvastatin tablet 20 mg QHS    sodium chloride 0.9% flush 5 mL PRN    zolpidem tablet 5 mg Nightly PRN       Objective:     Vital Signs (Most Recent):  Temp: 98.8 °F (37.1 °C) (02/14/18 1217)  Pulse: 73 (02/14/18 1217)  Resp: 16 (02/14/18 1217)  BP: (!) 89/50 (02/14/18 1217)  SpO2: 95 % (02/14/18 1217)  O2 Device (Oxygen Therapy): room air (02/14/18 0815) Vital Signs (24h Range):  Temp:  [97.4 °F (36.3 °C)-98.8 °F (37.1 °C)] 98.8 °F (37.1 °C)  Pulse:  [72-79] 73  Resp:  [16-20] 16  SpO2:  [94 %-96 %] 95 %  BP: ()/(46-53) 89/50     Weight: 130 kg (286 lb 9.6 oz) (02/13/18 0424)  Body mass index is 36.8 kg/m².  Body surface area is 2.61 meters squared.    I/O last 3 completed shifts:  In: " 600 [P.O.:600]  Out: 400 [Urine:400]    Physical Exam   Constitutional: He is oriented to person, place, and time. He appears well-developed and well-nourished. No distress.   HENT:   Head: Normocephalic and atraumatic.   Mouth/Throat: Oropharynx is clear and moist. No oropharyngeal exudate.   Eyes: Conjunctivae and EOM are normal. Pupils are equal, round, and reactive to light. Right eye exhibits no discharge. Left eye exhibits no discharge. No scleral icterus.   Neck: Normal range of motion. Neck supple. No JVD present. No tracheal deviation present. No thyromegaly present.   Cardiovascular: Normal rate, regular rhythm and normal heart sounds.  Exam reveals no gallop and no friction rub.    No murmur heard.  +2 LE edema, appears the same to slightly more than yesterday   Pulmonary/Chest: No stridor. No respiratory distress. He has no wheezes. He has no rales. He exhibits no tenderness.   Decreased bs at bases   Abdominal: Soft. Bowel sounds are normal. He exhibits no distension and no mass. There is no tenderness. There is no rebound and no guarding. No hernia.   Musculoskeletal: Normal range of motion. He exhibits no edema or tenderness.   Lymphadenopathy:     He has no cervical adenopathy.   Neurological: He is alert and oriented to person, place, and time. No cranial nerve deficit. He exhibits normal muscle tone. Coordination normal.   Skin: Skin is warm and dry. No rash noted. He is not diaphoretic. No erythema. No pallor.   Psychiatric: He has a normal mood and affect. His behavior is normal. Judgment and thought content normal.   Nursing note and vitals reviewed.      Significant Labs:    Recent Results (from the past 24 hour(s))   POCT glucose    Collection Time: 02/13/18  5:27 PM   Result Value Ref Range    POCT Glucose 172 (H) 70 - 110 mg/dL   Comprehensive Metabolic Panel (CMP)    Collection Time: 02/13/18  7:47 PM   Result Value Ref Range    Sodium 130 (L) 136 - 145 mmol/L    Potassium 3.9 3.5 - 5.1  mmol/L    Chloride 94 (L) 95 - 110 mmol/L    CO2 24 23 - 29 mmol/L    Glucose 161 (H) 70 - 110 mg/dL    BUN, Bld 116 (H) 8 - 23 mg/dL    Creatinine 3.3 (H) 0.5 - 1.4 mg/dL    Calcium 7.6 (L) 8.7 - 10.5 mg/dL    Total Protein 5.8 (L) 6.0 - 8.4 g/dL    Albumin 1.7 (L) 3.5 - 5.2 g/dL    Total Bilirubin 1.8 (H) 0.1 - 1.0 mg/dL    Alkaline Phosphatase 301 (H) 55 - 135 U/L    AST 60 (H) 10 - 40 U/L    ALT 25 10 - 44 U/L    Anion Gap 12 8 - 16 mmol/L    eGFR if African American 21.5 (A) >60 mL/min/1.73 m^2    eGFR if non  18.6 (A) >60 mL/min/1.73 m^2   POCT glucose    Collection Time: 02/13/18 10:35 PM   Result Value Ref Range    POCT Glucose 143 (H) 70 - 110 mg/dL   Magnesium    Collection Time: 02/14/18  5:22 AM   Result Value Ref Range    Magnesium 2.9 (H) 1.6 - 2.6 mg/dL   Phosphorus    Collection Time: 02/14/18  5:22 AM   Result Value Ref Range    Phosphorus 4.4 2.7 - 4.5 mg/dL   CBC auto differential    Collection Time: 02/14/18  5:22 AM   Result Value Ref Range    WBC 20.82 (H) 3.90 - 12.70 K/uL    RBC 3.33 (L) 4.60 - 6.20 M/uL    Hemoglobin 9.0 (L) 14.0 - 18.0 g/dL    Hematocrit 27.3 (L) 40.0 - 54.0 %    MCV 82 82 - 98 fL    MCH 27.0 27.0 - 31.0 pg    MCHC 33.0 32.0 - 36.0 g/dL    RDW 15.4 (H) 11.5 - 14.5 %    Platelets 383 (H) 150 - 350 K/uL    MPV 10.3 9.2 - 12.9 fL    Immature Granulocytes 4.4 (H) 0.0 - 0.5 %    Gran # (ANC) 17.7 (H) 1.8 - 7.7 K/uL    Immature Grans (Abs) 0.91 (H) 0.00 - 0.04 K/uL    Lymph # 0.6 (L) 1.0 - 4.8 K/uL    Mono # 1.6 (H) 0.3 - 1.0 K/uL    Eos # 0.0 0.0 - 0.5 K/uL    Baso # 0.03 0.00 - 0.20 K/uL    nRBC 0 0 /100 WBC    Gran% 85.2 (H) 38.0 - 73.0 %    Lymph% 2.8 (L) 18.0 - 48.0 %    Mono% 7.4 4.0 - 15.0 %    Eosinophil% 0.1 0.0 - 8.0 %    Basophil% 0.1 0.0 - 1.9 %    Differential Method Automated    Comprehensive Metabolic Panel (CMP)    Collection Time: 02/14/18  8:11 AM   Result Value Ref Range    Sodium 130 (L) 136 - 145 mmol/L    Potassium 4.1 3.5 - 5.1 mmol/L     Chloride 94 (L) 95 - 110 mmol/L    CO2 21 (L) 23 - 29 mmol/L    Glucose 136 (H) 70 - 110 mg/dL    BUN, Bld 114 (H) 8 - 23 mg/dL    Creatinine 3.5 (H) 0.5 - 1.4 mg/dL    Calcium 7.3 (L) 8.7 - 10.5 mg/dL    Total Protein 5.7 (L) 6.0 - 8.4 g/dL    Albumin 1.6 (L) 3.5 - 5.2 g/dL    Total Bilirubin 2.0 (H) 0.1 - 1.0 mg/dL    Alkaline Phosphatase 289 (H) 55 - 135 U/L    AST 57 (H) 10 - 40 U/L    ALT 24 10 - 44 U/L    Anion Gap 15 8 - 16 mmol/L    eGFR if African American 20.0 (A) >60 mL/min/1.73 m^2    eGFR if non  17.3 (A) >60 mL/min/1.73 m^2   POCT glucose    Collection Time: 02/14/18  8:43 AM   Result Value Ref Range    POCT Glucose 147 (H) 70 - 110 mg/dL   POCT glucose    Collection Time: 02/14/18 11:24 AM   Result Value Ref Range    POCT Glucose 160 (H) 70 - 110 mg/dL         Significant Imaging:  No new imaging in last 24 hours.      Assessment/Plan:     * Acute renal failure superimposed on stage 3 chronic kidney disease    This patient presents with ARTUR on CKD3, likely from pre-renal causes which include poor po intake, sepsis resulting in renal hypoperfusion, and 2 week history of diarrhea. This could be ATN from a prolonged pre-renal state.  However, history of heart failure (previous 2D echos revealing decreased EF, noted diastolic dysfunction previously) and recent echo on 2/7 with noted increased intravascular volume, the addition of 3.8L of fluid may be correlating with ARTUR as CRS.  However, patient with no significant improvement of labs with either method at this time.  Unknown if underlying liver disease possibility for dysfunction.  Additionally, BUN/Cr ratio with increased BUN, consideration if colorectal malignancy with GI bleed (positive stool occult blood) as cause of elevated BUN.  Will need additional work-up.      Plan  -- Will re-order urine electrolytes and urinalysis to assess current status.  As patient given volume, would like to assess if patient currently is in HF with  worsening renal function.  BNP added to AM labs.  -- Additionally, hypoalbuminemic with Albumin of 1.6.  Given liver mets, unknown if patient with underlying liver disease.  Possibly may need albumin administration given hypotension this AM.  Will check CXR now to see if patient volume overloaded, will give albumin if patient CXR clear.  Will repeat 2D echo  -- Will assess complement given kidney disease, added to AM labs             Jerardo Bueno MD  Nephrology  [facility]

## 2018-02-14 NOTE — PLAN OF CARE
Problem: Diabetes, Type 2 (Adult)  Goal: Signs and Symptoms of Listed Potential Problems Will be Absent, Minimized or Managed (Diabetes, Type 2)  Signs and symptoms of listed potential problems will be absent, minimized or managed by discharge/transition of care (reference Diabetes, Type 2 (Adult) CPG).   Outcome: Ongoing (interventions implemented as appropriate)   02/14/18 7259   Diabetes, Type 2   Problems Assessed (Type 2 Diabetes) all   Problems Present (Type 2 Diabetes) hyperglycemia       Problem: Patient Care Overview  Goal: Plan of Care Review  Outcome: Ongoing (interventions implemented as appropriate)  Pt BP low and asymptomatic notified Dr. Andino and stated to hold morning and evening dose of coreg, family at bedside

## 2018-02-14 NOTE — SUBJECTIVE & OBJECTIVE
Interval History:  Patient notes continually feeling tired in AM.  Noted lethargy and worsening memory.  Had 1 episode of watery BM this AM.  Denies fevers, chills, N/V, shortness of breath.  Unable to obtain accurate urine measurements given diarrhea and urinating while on toilet.      Review of patient's allergies indicates:  No Known Allergies  Current Facility-Administered Medications   Medication Frequency    calcium carbonate 200 mg calcium (500 mg) chewable tablet 500 mg Daily PRN    carvedilol tablet 25 mg BID WM    dextrose 50% injection 12.5 g PRN    dextrose 50% injection 25 g PRN    doxazosin tablet 2 mg Nightly    flecainide tablet 50 mg Q12H    glucagon (human recombinant) injection 1 mg PRN    glucose chewable tablet 16 g PRN    glucose chewable tablet 24 g PRN    insulin aspart pen 1-10 Units QID (AC + HS) PRN    insulin detemir pen 12 Units Daily    simvastatin tablet 20 mg QHS    sodium chloride 0.9% flush 5 mL PRN    zolpidem tablet 5 mg Nightly PRN       Objective:     Vital Signs (Most Recent):  Temp: 98.8 °F (37.1 °C) (02/14/18 1217)  Pulse: 73 (02/14/18 1217)  Resp: 16 (02/14/18 1217)  BP: (!) 89/50 (02/14/18 1217)  SpO2: 95 % (02/14/18 1217)  O2 Device (Oxygen Therapy): room air (02/14/18 0815) Vital Signs (24h Range):  Temp:  [97.4 °F (36.3 °C)-98.8 °F (37.1 °C)] 98.8 °F (37.1 °C)  Pulse:  [72-79] 73  Resp:  [16-20] 16  SpO2:  [94 %-96 %] 95 %  BP: ()/(46-53) 89/50     Weight: 130 kg (286 lb 9.6 oz) (02/13/18 0424)  Body mass index is 36.8 kg/m².  Body surface area is 2.61 meters squared.    I/O last 3 completed shifts:  In: 600 [P.O.:600]  Out: 400 [Urine:400]    Physical Exam   Constitutional: He is oriented to person, place, and time. He appears well-developed and well-nourished. No distress.   HENT:   Head: Normocephalic and atraumatic.   Mouth/Throat: Oropharynx is clear and moist. No oropharyngeal exudate.   Eyes: Conjunctivae and EOM are normal. Pupils are equal,  round, and reactive to light. Right eye exhibits no discharge. Left eye exhibits no discharge. No scleral icterus.   Neck: Normal range of motion. Neck supple. No JVD present. No tracheal deviation present. No thyromegaly present.   Cardiovascular: Normal rate, regular rhythm and normal heart sounds.  Exam reveals no gallop and no friction rub.    No murmur heard.  +2 LE edema, appears the same to slightly more than yesterday   Pulmonary/Chest: No stridor. No respiratory distress. He has no wheezes. He has no rales. He exhibits no tenderness.   Decreased bs at bases   Abdominal: Soft. Bowel sounds are normal. He exhibits no distension and no mass. There is no tenderness. There is no rebound and no guarding. No hernia.   Musculoskeletal: Normal range of motion. He exhibits no edema or tenderness.   Lymphadenopathy:     He has no cervical adenopathy.   Neurological: He is alert and oriented to person, place, and time. No cranial nerve deficit. He exhibits normal muscle tone. Coordination normal.   Skin: Skin is warm and dry. No rash noted. He is not diaphoretic. No erythema. No pallor.   Psychiatric: He has a normal mood and affect. His behavior is normal. Judgment and thought content normal.   Nursing note and vitals reviewed.      Significant Labs:    Recent Results (from the past 24 hour(s))   POCT glucose    Collection Time: 02/13/18  5:27 PM   Result Value Ref Range    POCT Glucose 172 (H) 70 - 110 mg/dL   Comprehensive Metabolic Panel (CMP)    Collection Time: 02/13/18  7:47 PM   Result Value Ref Range    Sodium 130 (L) 136 - 145 mmol/L    Potassium 3.9 3.5 - 5.1 mmol/L    Chloride 94 (L) 95 - 110 mmol/L    CO2 24 23 - 29 mmol/L    Glucose 161 (H) 70 - 110 mg/dL    BUN, Bld 116 (H) 8 - 23 mg/dL    Creatinine 3.3 (H) 0.5 - 1.4 mg/dL    Calcium 7.6 (L) 8.7 - 10.5 mg/dL    Total Protein 5.8 (L) 6.0 - 8.4 g/dL    Albumin 1.7 (L) 3.5 - 5.2 g/dL    Total Bilirubin 1.8 (H) 0.1 - 1.0 mg/dL    Alkaline Phosphatase 301  (H) 55 - 135 U/L    AST 60 (H) 10 - 40 U/L    ALT 25 10 - 44 U/L    Anion Gap 12 8 - 16 mmol/L    eGFR if African American 21.5 (A) >60 mL/min/1.73 m^2    eGFR if non  18.6 (A) >60 mL/min/1.73 m^2   POCT glucose    Collection Time: 02/13/18 10:35 PM   Result Value Ref Range    POCT Glucose 143 (H) 70 - 110 mg/dL   Magnesium    Collection Time: 02/14/18  5:22 AM   Result Value Ref Range    Magnesium 2.9 (H) 1.6 - 2.6 mg/dL   Phosphorus    Collection Time: 02/14/18  5:22 AM   Result Value Ref Range    Phosphorus 4.4 2.7 - 4.5 mg/dL   CBC auto differential    Collection Time: 02/14/18  5:22 AM   Result Value Ref Range    WBC 20.82 (H) 3.90 - 12.70 K/uL    RBC 3.33 (L) 4.60 - 6.20 M/uL    Hemoglobin 9.0 (L) 14.0 - 18.0 g/dL    Hematocrit 27.3 (L) 40.0 - 54.0 %    MCV 82 82 - 98 fL    MCH 27.0 27.0 - 31.0 pg    MCHC 33.0 32.0 - 36.0 g/dL    RDW 15.4 (H) 11.5 - 14.5 %    Platelets 383 (H) 150 - 350 K/uL    MPV 10.3 9.2 - 12.9 fL    Immature Granulocytes 4.4 (H) 0.0 - 0.5 %    Gran # (ANC) 17.7 (H) 1.8 - 7.7 K/uL    Immature Grans (Abs) 0.91 (H) 0.00 - 0.04 K/uL    Lymph # 0.6 (L) 1.0 - 4.8 K/uL    Mono # 1.6 (H) 0.3 - 1.0 K/uL    Eos # 0.0 0.0 - 0.5 K/uL    Baso # 0.03 0.00 - 0.20 K/uL    nRBC 0 0 /100 WBC    Gran% 85.2 (H) 38.0 - 73.0 %    Lymph% 2.8 (L) 18.0 - 48.0 %    Mono% 7.4 4.0 - 15.0 %    Eosinophil% 0.1 0.0 - 8.0 %    Basophil% 0.1 0.0 - 1.9 %    Differential Method Automated    Comprehensive Metabolic Panel (CMP)    Collection Time: 02/14/18  8:11 AM   Result Value Ref Range    Sodium 130 (L) 136 - 145 mmol/L    Potassium 4.1 3.5 - 5.1 mmol/L    Chloride 94 (L) 95 - 110 mmol/L    CO2 21 (L) 23 - 29 mmol/L    Glucose 136 (H) 70 - 110 mg/dL    BUN, Bld 114 (H) 8 - 23 mg/dL    Creatinine 3.5 (H) 0.5 - 1.4 mg/dL    Calcium 7.3 (L) 8.7 - 10.5 mg/dL    Total Protein 5.7 (L) 6.0 - 8.4 g/dL    Albumin 1.6 (L) 3.5 - 5.2 g/dL    Total Bilirubin 2.0 (H) 0.1 - 1.0 mg/dL    Alkaline Phosphatase 289 (H) 55 -  135 U/L    AST 57 (H) 10 - 40 U/L    ALT 24 10 - 44 U/L    Anion Gap 15 8 - 16 mmol/L    eGFR if African American 20.0 (A) >60 mL/min/1.73 m^2    eGFR if non  17.3 (A) >60 mL/min/1.73 m^2   POCT glucose    Collection Time: 02/14/18  8:43 AM   Result Value Ref Range    POCT Glucose 147 (H) 70 - 110 mg/dL   POCT glucose    Collection Time: 02/14/18 11:24 AM   Result Value Ref Range    POCT Glucose 160 (H) 70 - 110 mg/dL         Significant Imaging:  No new imaging in last 24 hours.

## 2018-02-14 NOTE — PROGRESS NOTES
"Paged [facility] patient BP with dinamap = 88/50  A Worrel ordered manual BP.  BP = 88/46 with manual cuff.  Breath sounds are clear.  Paged back with the results @ 2340  0200  BP =109/53.  Paged on call for [facility], JUDIT Saravia with the result.  Patient up in chair. Patient stated that he feels more relaxed, "but I just want to sleep."  Call bell in patient's right hand and instructed to call if he needs any help.  "

## 2018-02-15 PROBLEM — I95.9 ARTERIAL HYPOTENSION: Status: ACTIVE | Noted: 2018-02-15

## 2018-02-15 LAB
ALBUMIN SERPL BCP-MCNC: 1.6 G/DL
ALBUMIN SERPL BCP-MCNC: 1.7 G/DL
ALP SERPL-CCNC: 291 U/L
ALP SERPL-CCNC: 296 U/L
ALT SERPL W/O P-5'-P-CCNC: 22 U/L
ALT SERPL W/O P-5'-P-CCNC: 23 U/L
ANION GAP SERPL CALC-SCNC: 14 MMOL/L
ANION GAP SERPL CALC-SCNC: 15 MMOL/L
ANISOCYTOSIS BLD QL SMEAR: SLIGHT
AST SERPL-CCNC: 51 U/L
AST SERPL-CCNC: 54 U/L
BASOPHILS NFR BLD: 0 %
BILIRUB SERPL-MCNC: 1.8 MG/DL
BILIRUB SERPL-MCNC: 2 MG/DL
BNP SERPL-MCNC: 157 PG/ML
BUN SERPL-MCNC: 128 MG/DL
BUN SERPL-MCNC: 129 MG/DL
C3 SERPL-MCNC: 96 MG/DL
C4 SERPL-MCNC: 30 MG/DL
CALCIUM SERPL-MCNC: 7.5 MG/DL
CALCIUM SERPL-MCNC: 7.6 MG/DL
CHLORIDE SERPL-SCNC: 92 MMOL/L
CHLORIDE SERPL-SCNC: 93 MMOL/L
CK SERPL-CCNC: 76 U/L
CO2 SERPL-SCNC: 21 MMOL/L
CO2 SERPL-SCNC: 23 MMOL/L
CREAT SERPL-MCNC: 4.3 MG/DL
CREAT SERPL-MCNC: 4.5 MG/DL
DIASTOLIC DYSFUNCTION: NO
DIFFERENTIAL METHOD: ABNORMAL
EOSINOPHIL NFR BLD: 0 %
ERYTHROCYTE [DISTWIDTH] IN BLOOD BY AUTOMATED COUNT: 15.7 %
EST. GFR  (AFRICAN AMERICAN): 14.8 ML/MIN/1.73 M^2
EST. GFR  (AFRICAN AMERICAN): 15.6 ML/MIN/1.73 M^2
EST. GFR  (NON AFRICAN AMERICAN): 12.8 ML/MIN/1.73 M^2
EST. GFR  (NON AFRICAN AMERICAN): 13.5 ML/MIN/1.73 M^2
GLOBAL PERICARDIAL EFFUSION: NORMAL
GLUCOSE SERPL-MCNC: 150 MG/DL
GLUCOSE SERPL-MCNC: 184 MG/DL
HCT VFR BLD AUTO: 28 %
HGB BLD-MCNC: 9.3 G/DL
IMM GRANULOCYTES # BLD AUTO: ABNORMAL K/UL
IMM GRANULOCYTES NFR BLD AUTO: ABNORMAL %
LYMPHOCYTES NFR BLD: 0 %
MAGNESIUM SERPL-MCNC: 3 MG/DL
MCH RBC QN AUTO: 27 PG
MCHC RBC AUTO-ENTMCNC: 33.2 G/DL
MCV RBC AUTO: 81 FL
METAMYELOCYTES NFR BLD MANUAL: 1 %
MONOCYTES NFR BLD: 3 %
NEUTROPHILS NFR BLD: 96 %
NRBC BLD-RTO: 0 /100 WBC
PHOSPHATE SERPL-MCNC: 5.3 MG/DL
PLATELET # BLD AUTO: 394 K/UL
PLATELET BLD QL SMEAR: ABNORMAL
PMV BLD AUTO: 10.4 FL
POCT GLUCOSE: 124 MG/DL (ref 70–110)
POCT GLUCOSE: 169 MG/DL (ref 70–110)
POCT GLUCOSE: 185 MG/DL (ref 70–110)
POCT GLUCOSE: 186 MG/DL (ref 70–110)
POCT GLUCOSE: 201 MG/DL (ref 70–110)
POTASSIUM SERPL-SCNC: 4.3 MMOL/L
POTASSIUM SERPL-SCNC: 4.4 MMOL/L
PROT SERPL-MCNC: 5.7 G/DL
PROT SERPL-MCNC: 6 G/DL
RBC # BLD AUTO: 3.44 M/UL
RETIRED EF AND QEF - SEE NOTES: 58 (ref 55–65)
SODIUM SERPL-SCNC: 129 MMOL/L
SODIUM SERPL-SCNC: 129 MMOL/L
WBC # BLD AUTO: 22.95 K/UL

## 2018-02-15 PROCEDURE — 25000003 PHARM REV CODE 250: Performed by: INTERNAL MEDICINE

## 2018-02-15 PROCEDURE — 82550 ASSAY OF CK (CPK): CPT

## 2018-02-15 PROCEDURE — 36415 COLL VENOUS BLD VENIPUNCTURE: CPT

## 2018-02-15 PROCEDURE — 99232 SBSQ HOSP IP/OBS MODERATE 35: CPT | Mod: ,,, | Performed by: INTERNAL MEDICINE

## 2018-02-15 PROCEDURE — 83880 ASSAY OF NATRIURETIC PEPTIDE: CPT

## 2018-02-15 PROCEDURE — 63600175 PHARM REV CODE 636 W HCPCS: Mod: JG | Performed by: INTERNAL MEDICINE

## 2018-02-15 PROCEDURE — 93306 TTE W/DOPPLER COMPLETE: CPT | Mod: 26,,, | Performed by: INTERNAL MEDICINE

## 2018-02-15 PROCEDURE — 93306 TTE W/DOPPLER COMPLETE: CPT

## 2018-02-15 PROCEDURE — 86160 COMPLEMENT ANTIGEN: CPT | Mod: 59

## 2018-02-15 PROCEDURE — 86160 COMPLEMENT ANTIGEN: CPT

## 2018-02-15 PROCEDURE — 99233 SBSQ HOSP IP/OBS HIGH 50: CPT | Mod: ,,, | Performed by: INTERNAL MEDICINE

## 2018-02-15 PROCEDURE — 80053 COMPREHEN METABOLIC PANEL: CPT

## 2018-02-15 PROCEDURE — P9047 ALBUMIN (HUMAN), 25%, 50ML: HCPCS | Mod: JG | Performed by: INTERNAL MEDICINE

## 2018-02-15 PROCEDURE — 84100 ASSAY OF PHOSPHORUS: CPT

## 2018-02-15 PROCEDURE — 83735 ASSAY OF MAGNESIUM: CPT

## 2018-02-15 PROCEDURE — 11000001 HC ACUTE MED/SURG PRIVATE ROOM

## 2018-02-15 RX ORDER — MIDODRINE HYDROCHLORIDE 5 MG/1
10 TABLET ORAL 3 TIMES DAILY
Status: DISCONTINUED | OUTPATIENT
Start: 2018-02-15 | End: 2018-02-22

## 2018-02-15 RX ORDER — ALBUMIN HUMAN 250 G/1000ML
12.5 SOLUTION INTRAVENOUS 2 TIMES DAILY
Status: DISCONTINUED | OUTPATIENT
Start: 2018-02-15 | End: 2018-02-18

## 2018-02-15 RX ORDER — OCTREOTIDE ACETATE 100 UG/ML
25 INJECTION, SOLUTION INTRAVENOUS; SUBCUTANEOUS EVERY 12 HOURS
Status: DISCONTINUED | OUTPATIENT
Start: 2018-02-15 | End: 2018-02-15

## 2018-02-15 RX ADMIN — INSULIN DETEMIR 12 UNITS: 100 INJECTION, SOLUTION SUBCUTANEOUS at 08:02

## 2018-02-15 RX ADMIN — INSULIN ASPART 2 UNITS: 100 INJECTION, SOLUTION INTRAVENOUS; SUBCUTANEOUS at 08:02

## 2018-02-15 RX ADMIN — OCTREOTIDE ACETATE 50 MCG/HR: 1000 INJECTION, SOLUTION INTRAVENOUS; SUBCUTANEOUS at 12:02

## 2018-02-15 RX ADMIN — ALBUMIN (HUMAN) 12.5 G: 12.5 SOLUTION INTRAVENOUS at 12:02

## 2018-02-15 RX ADMIN — SIMVASTATIN 20 MG: 20 TABLET, FILM COATED ORAL at 09:02

## 2018-02-15 RX ADMIN — INSULIN ASPART 2 UNITS: 100 INJECTION, SOLUTION INTRAVENOUS; SUBCUTANEOUS at 12:02

## 2018-02-15 RX ADMIN — FLECAINIDE ACETATE 50 MG: 50 TABLET ORAL at 09:02

## 2018-02-15 RX ADMIN — ALBUMIN (HUMAN) 12.5 G: 12.5 SOLUTION INTRAVENOUS at 09:02

## 2018-02-15 RX ADMIN — MIDODRINE HYDROCHLORIDE 10 MG: 5 TABLET ORAL at 01:02

## 2018-02-15 RX ADMIN — MIDODRINE HYDROCHLORIDE 10 MG: 5 TABLET ORAL at 09:02

## 2018-02-15 RX ADMIN — FLECAINIDE ACETATE 50 MG: 50 TABLET ORAL at 08:02

## 2018-02-15 NOTE — ASSESSMENT & PLAN NOTE
This patient presents with ARTUR on CKD3, likely from pre-renal causes which include poor po intake, sepsis resulting in renal hypoperfusion, and 2 week history of diarrhea.  Re-evaluation with urine lytes with pre-renal etiology noted.  2D echo with CVP < 3.  BNP improved from admission.  Overall, current working hypothesis for ARTUR would be due to poor renal perfusion from hypoalbuminemia vs HRS.      Plan  -- Continue albumin, midodrine, and octreotide   -- discontinue antihypertensives  -- sodium bicarb 650 PO TID for acidosis  -- continue labs.  8AM cortisol ordered for tomorrow

## 2018-02-15 NOTE — PROGRESS NOTES
Mr. Lira is a 67-yo man with HFpEF and paroxysmal AF whom I sent to the ER a week ago for evaluation for his ARTUR and liver lesions.  He still feels lethargic, poor po appetite, says that he only had a piece of toast this morning and not really drinking very much.  Nephrology has been consulted and is following along.  His sister says that he has not gotten out of the chair all day, he overall just feels very weak.      Vitals reviewed  Gen - Sitting in chair, appears fatigued  Neck - No JVD  Heart - Regular rate and rhythm, no gallop or murmurs  Pulm - CTA bilaterally  Abdomen - Soft, no clear fluid wave  Extremities - Warm, 2+ pitting edema up to the knee    Imaging reviewed: No pulmonary edema on CXR.  Liver lesions noted on CT/MRI/ultrasound/HIDA as noted previously.     Infectious workup has been negative.  His liver biopsy is currently pending.  He was dry in clinic, and was given small amounts of IV fluids and then IV lasix.  He does not have accurate I/O or weights.  His BNP is the lowest it has been in [facility], he still clinically looks dry intravascularly, echocardiogram suggests intravascular depletion (RA pressure 3 mmHg, E/e' corresponds to a round PCWP or LA pressure 8 mmHg).  Given his HFpEF, he does not need carvedilol or doxazosin while BP hypotensive.  Furthermore, giving alpha blockade with midodrine is counterproductive.  Would recommend holding anti-HTN.      His albumin is low, suggesting poor synthetic function of the liver or malnutrition, his INR has not been checked since he has been off of Eliquis, so that part of liver synthetic function remains unknown.  Again, do not think that his preserved ejection fraction and diastolic dysfunction is contributing to his clinical picture at this time (not cardiorenal with low/normal filling pressures on echo. His edema his likely secondary to poor nutrition status and low oncotic pressure.      Regarding his fluid status, he is 130 kg of which at least  "(conservatively) 60kg is likely total body water; leaving 40kg for intracellular fluid and 20 kg for extracellular fluid.  He was given a "fluid challenge" of 3.25L over 3 days, as far as I can ascertain from the chart.  Given a total body water of 60 kg, this would be roughly 5% of his total body water, in a 3-day period, or roughly 1.7% per day.  Given his poor po intake and illness, this likely does not keep up with minimal caloric expenditure along with transepidermal diffusion or respiratory tract evaporation (insensible losses).     Patient's liver biopsy was sent to [facility] 2/14/18, no result yet.    Recommendations:   - Adequately hydrate with po/IV fluids  - Stop anti-HTN medications, allow permissive hypertension to SBP <160 (though he has not received these medications in 2 days and BP only 90-100s/50s)  - Defer to nephrology on treatment for hepatorenal syndrome, given his relative hypotension with liver dysfunction it is not unreasonable  - f/u liver biopsy results  - Loosen dietary restrictions (currently on 1800 kcal diabetic diet) to encourage him to take po.  He may need protein supplementation.   - Will check CK (though does not really appear to be rhabdo)  - Check ESR/CRP with prealbumin  - Consider nutrition and PT/OT given worsening functional status in the hospital    Easton Cole MD  Cardiology Fellow  "

## 2018-02-15 NOTE — SUBJECTIVE & OBJECTIVE
Interval History:  Patient in AM without significant complaints.  Still notes having diarrhea.  Otherwise, no acute issues.      Review of patient's allergies indicates:  No Known Allergies  Current Facility-Administered Medications   Medication Frequency    albumin human 25% bottle 12.5 g BID    calcium carbonate 200 mg calcium (500 mg) chewable tablet 500 mg Daily PRN    carvedilol tablet 25 mg BID WM    dextrose 50% injection 12.5 g PRN    dextrose 50% injection 25 g PRN    doxazosin tablet 2 mg Nightly    flecainide tablet 50 mg Q12H    glucagon (human recombinant) injection 1 mg PRN    glucose chewable tablet 16 g PRN    glucose chewable tablet 24 g PRN    insulin aspart pen 1-10 Units QID (AC + HS) PRN    insulin detemir pen 12 Units Daily    midodrine tablet 10 mg TID    octreotide (SANDOSTATIN) 500 mcg in sodium chloride 0.9% 100 mL infusion Continuous    simvastatin tablet 20 mg QHS    sodium chloride 0.9% flush 5 mL PRN    zolpidem tablet 5 mg Nightly PRN       Objective:     Vital Signs (Most Recent):  Temp: 98.4 °F (36.9 °C) (02/15/18 1522)  Pulse: 72 (02/15/18 1522)  Resp: 20 (02/15/18 1522)  BP: (!) 101/52 (02/15/18 1522)  SpO2: (!) 92 % (02/15/18 1522)  O2 Device (Oxygen Therapy): room air (02/15/18 1522) Vital Signs (24h Range):  Temp:  [96.5 °F (35.8 °C)-98.4 °F (36.9 °C)] 98.4 °F (36.9 °C)  Pulse:  [70-77] 72  Resp:  [16-20] 20  SpO2:  [92 %-97 %] 92 %  BP: ()/(47-55) 101/52     Weight: 130 kg (286 lb 9.6 oz) (02/13/18 0424)  Body mass index is 36.8 kg/m².  Body surface area is 2.61 meters squared.    I/O last 3 completed shifts:  In: 500 [P.O.:500]  Out: -     Physical Exam   Constitutional: He is oriented to person, place, and time. He appears well-developed and well-nourished. No distress.   HENT:   Head: Normocephalic and atraumatic.   Mouth/Throat: Oropharynx is clear and moist. No oropharyngeal exudate.   Eyes: Conjunctivae and EOM are normal. Pupils are equal, round,  and reactive to light. Right eye exhibits no discharge. Left eye exhibits no discharge. No scleral icterus.   Neck: Normal range of motion. Neck supple. No JVD present. No tracheal deviation present. No thyromegaly present.   Cardiovascular: Normal rate, regular rhythm and normal heart sounds.  Exam reveals no gallop and no friction rub.    No murmur heard.  +2 LE edema, appears the same to slightly more than yesterday   Pulmonary/Chest: No stridor. No respiratory distress. He has no wheezes. He has no rales. He exhibits no tenderness.   Decreased bs at bases   Abdominal: Soft. Bowel sounds are normal. He exhibits no distension and no mass. There is no tenderness. There is no rebound and no guarding. No hernia.   Musculoskeletal: Normal range of motion. He exhibits no edema or tenderness.   Lymphadenopathy:     He has no cervical adenopathy.   Neurological: He is alert and oriented to person, place, and time. No cranial nerve deficit. He exhibits normal muscle tone. Coordination normal.   Skin: Skin is warm and dry. No rash noted. He is not diaphoretic. No erythema. No pallor.   Psychiatric: He has a normal mood and affect. His behavior is normal. Judgment and thought content normal.   Nursing note and vitals reviewed.      Significant Labs:    Recent Results (from the past 24 hour(s))   Urinalysis    Collection Time: 02/14/18  5:11 PM   Result Value Ref Range    Specimen UA Urine, Clean Catch     Color, UA Krystyna Yellow, Straw, Krystyna    Appearance, UA Clear Clear    pH, UA 5.0 5.0 - 8.0    Specific Gravity, UA 1.015 1.005 - 1.030    Protein, UA Negative Negative    Glucose, UA Negative Negative    Ketones, UA Negative Negative    Bilirubin (UA) Negative Negative    Occult Blood UA Negative Negative    Nitrite, UA Negative Negative    Urobilinogen, UA Negative <2.0 EU/dL    Leukocytes, UA Negative Negative   Sodium, urine, random    Collection Time: 02/14/18  5:11 PM   Result Value Ref Range    Sodium Urine Random  <20 (A) 20 - 250 mmol/L   Potassium, urine, random    Collection Time: 02/14/18  5:11 PM   Result Value Ref Range    Potassium Urine Random 42 15 - 95 mmol/L   Chloride, urine, random    Collection Time: 02/14/18  5:11 PM   Result Value Ref Range    Chloride, Rand Ur <20 (L) 25 - 200 mmol/L   Urea nitrogen, urine    Collection Time: 02/14/18  5:11 PM   Result Value Ref Range    Urine Urea Nitrogen, Random 613 140 - 1050 mg/dL   Creatinine, urine, random    Collection Time: 02/14/18  5:11 PM   Result Value Ref Range    Creatinine, Random Ur 240.0 23.0 - 375.0 mg/dL   POCT glucose    Collection Time: 02/14/18  6:01 PM   Result Value Ref Range    POCT Glucose 158 (H) 70 - 110 mg/dL   Comprehensive Metabolic Panel (CMP)    Collection Time: 02/14/18  8:04 PM   Result Value Ref Range    Sodium 128 (L) 136 - 145 mmol/L    Potassium 4.3 3.5 - 5.1 mmol/L    Chloride 93 (L) 95 - 110 mmol/L    CO2 20 (L) 23 - 29 mmol/L    Glucose 211 (H) 70 - 110 mg/dL    BUN, Bld 118 (H) 8 - 23 mg/dL    Creatinine 3.9 (H) 0.5 - 1.4 mg/dL    Calcium 7.6 (L) 8.7 - 10.5 mg/dL    Total Protein 5.7 (L) 6.0 - 8.4 g/dL    Albumin 1.6 (L) 3.5 - 5.2 g/dL    Total Bilirubin 2.0 (H) 0.1 - 1.0 mg/dL    Alkaline Phosphatase 301 (H) 55 - 135 U/L    AST 52 (H) 10 - 40 U/L    ALT 23 10 - 44 U/L    Anion Gap 15 8 - 16 mmol/L    eGFR if African American 17.5 (A) >60 mL/min/1.73 m^2    eGFR if non  15.2 (A) >60 mL/min/1.73 m^2   POCT glucose    Collection Time: 02/14/18  8:39 PM   Result Value Ref Range    POCT Glucose 201 (H) 70 - 110 mg/dL   Magnesium    Collection Time: 02/15/18  4:04 AM   Result Value Ref Range    Magnesium 3.0 (H) 1.6 - 2.6 mg/dL   Phosphorus    Collection Time: 02/15/18  4:04 AM   Result Value Ref Range    Phosphorus 5.3 (H) 2.7 - 4.5 mg/dL   CBC auto differential    Collection Time: 02/15/18  4:04 AM   Result Value Ref Range    WBC 22.95 (H) 3.90 - 12.70 K/uL    RBC 3.44 (L) 4.60 - 6.20 M/uL    Hemoglobin 9.3 (L) 14.0 -  18.0 g/dL    Hematocrit 28.0 (L) 40.0 - 54.0 %    MCV 81 (L) 82 - 98 fL    MCH 27.0 27.0 - 31.0 pg    MCHC 33.2 32.0 - 36.0 g/dL    RDW 15.7 (H) 11.5 - 14.5 %    Platelets 394 (H) 150 - 350 K/uL    MPV 10.4 9.2 - 12.9 fL    Immature Granulocytes CANCELED 0.0 - 0.5 %    Immature Grans (Abs) CANCELED 0.00 - 0.04 K/uL    nRBC 0 0 /100 WBC    Gran% 96.0 (H) 38.0 - 73.0 %    Lymph% 0.0 (L) 18.0 - 48.0 %    Mono% 3.0 (L) 4.0 - 15.0 %    Eosinophil% 0.0 0.0 - 8.0 %    Basophil% 0.0 0.0 - 1.9 %    Metamyelocytes 1.0 %    Platelet Estimate Increased (A)     Aniso Slight     Differential Method Manual    Brain natriuretic peptide    Collection Time: 02/15/18  4:04 AM   Result Value Ref Range     (H) 0 - 99 pg/mL   C3 complement    Collection Time: 02/15/18  4:04 AM   Result Value Ref Range    Complement (C-3) 96 50 - 180 mg/dL   C4 complement    Collection Time: 02/15/18  4:04 AM   Result Value Ref Range    Complement (C-4) 30 11 - 44 mg/dL   Comprehensive Metabolic Panel (CMP)    Collection Time: 02/15/18  7:44 AM   Result Value Ref Range    Sodium 129 (L) 136 - 145 mmol/L    Potassium 4.3 3.5 - 5.1 mmol/L    Chloride 93 (L) 95 - 110 mmol/L    CO2 21 (L) 23 - 29 mmol/L    Glucose 184 (H) 70 - 110 mg/dL    BUN, Bld 129 (H) 8 - 23 mg/dL    Creatinine 4.3 (H) 0.5 - 1.4 mg/dL    Calcium 7.5 (L) 8.7 - 10.5 mg/dL    Total Protein 5.7 (L) 6.0 - 8.4 g/dL    Albumin 1.6 (L) 3.5 - 5.2 g/dL    Total Bilirubin 2.0 (H) 0.1 - 1.0 mg/dL    Alkaline Phosphatase 296 (H) 55 - 135 U/L    AST 54 (H) 10 - 40 U/L    ALT 23 10 - 44 U/L    Anion Gap 15 8 - 16 mmol/L    eGFR if African American 15.6 (A) >60 mL/min/1.73 m^2    eGFR if non African American 13.5 (A) >60 mL/min/1.73 m^2   POCT glucose    Collection Time: 02/15/18  8:13 AM   Result Value Ref Range    POCT Glucose 186 (H) 70 - 110 mg/dL   2D echo with color flow doppler    Collection Time: 02/15/18 10:00 AM   Result Value Ref Range    EF 58 55 - 65    Diastolic Dysfunction No      Pericardial Effusion TRIVIAL    POCT glucose    Collection Time: 02/15/18 12:57 PM   Result Value Ref Range    POCT Glucose 185 (H) 70 - 110 mg/dL         Significant Imaging:  No new imaging in last 24 hours.

## 2018-02-15 NOTE — PLAN OF CARE
Problem: Diabetes, Type 2 (Adult)  Goal: Signs and Symptoms of Listed Potential Problems Will be Absent, Minimized or Managed (Diabetes, Type 2)  Signs and symptoms of listed potential problems will be absent, minimized or managed by discharge/transition of care (reference Diabetes, Type 2 (Adult) CPG).   Outcome: Ongoing (interventions implemented as appropriate)  Blood sugar monitored as ordered and insulin administered as prescribed.    Problem: Patient Care Overview  Goal: Plan of Care Review  Outcome: Ongoing (interventions implemented as appropriate)  Discussed plan of care with the patient and his family, including medications given.  Albumin administered and ocreotide infusing.  Patient remained stable throughout the shift.

## 2018-02-15 NOTE — PHYSICIAN QUERY
"PT Name: Jose Cruz Lira  MR #: [id number]    Physician Query Form - Nutrition Clarification     CDS/: Bev Jordan RN              Contact information: [phone]    This form is a permanent document in the medical record.     Query Date: February 15, 2018    By submitting this query, we are merely seeking further clarification of documentation.. Please utilize your independent clinical judgment when addressing the question(s) below.    The Medical record contains the following:   Indicators  Supporting Clinical Findings Location in Medical Record   x % of Estimated Energy Intake over a time frame from p.o., TF, or TPN Positive for appetite change 2/11 Hep note   x Weight Status over a time frame Says he has lost approximately 10 pounds in last few months 2/8 Oncology consult    Subcutaneous Fat and/or Muscle Loss     x Fluid Accumulation or Edema 1+ nonpitting edema 2/9 Hosp med note    Reduced  Strength     x Wt / BMI / Usual Body Weight BMI= 34.1  Ht= 130kg  Wt= 6'2" 2/15 Flowsheet    Delayed Wound Healing / Failure to Thrive     x Acute or Chronic Illness Liver mass  ARF  Leukamoid recation- Malignancy?  ARF superimposed on ckd3  Arterial hypotension  Hypoalbuminemia  HTN  DM     2/8 Hosp med note    Medication     x Treatment Boost Glucose Control Supplement all means   2/15 NSG orders    Other Findings are concerning for possible underlying diffuse infiltrating process or multiple hepatic lesions, possibly representing metastatic disease 2/8  Oncology consult     AND / ASPEN Clinical Characteristics (October 2011)  A minimum of two characteristics is recommended for diagnosing either moderate or severe malnutrition   Mild Malnutrition Moderate Malnutrition Severe Malnutrition   Energy Intake from p.o., TF or TPN. < 75% intake of estimated energy needs for less than 7 days < 75% intake of estimated energy needs for greater than 7 days < 50% intake of estimated energy needs for > 5 days "   Weight Loss 1-2% in 1 month  5% in 3 months  7.5% in 6 months  10% in 1 year 1-2 % in 1 week  5% in 1 month  7.5% in 3 months  10% in 6 months  20% in 1 year > 2% in 1 week  > 5% in 1 month  > 7.5% in 3 months  > 10% in 6 months  > 20% in 1 year   Physical Findings     None *Mild subcutaneous fat and/or muscle loss  *Mild fluid accumulation  *Stage II decubitus  *Surgical wound or non-healing wound *Mod/severe subcutaneous fat and/or muscle loss  *Mod/severe fluid accumulation  *Stage III or IV decubitus  *Non-healing surgical wound     Provider, please specify diagnosis or diagnoses associated with above clinical findings.    [ ] Mild Protein-Calorie Malnutrition  [x ] Moderate Protein-Calorie Malnutrition  [ ] Severe Protein-Calorie Malnutrition  [ ] Other: ________________________________  [ ] Clinically Undetermined    Please document in your progress notes daily for the duration of treatment until resolved and include in your discharge summary.

## 2018-02-15 NOTE — PROGRESS NOTES
Progress Note   Hospital Medicine       Team: [facility] [place] [facility] B Easton Andino MD  Admit Date: 2/8/2018  Length of Stay:  LOS: 6 days   DAVIE 2/16/2018  Principal Problem:  Acute renal failure superimposed on stage 3 chronic kidney disease    Interval hx / overnight events: Still feels bad, Will try albumin today.    Areas of concern/ handoff:    Review of Systems   Gastrointestinal: Positive for constipation and nausea.   Musculoskeletal: Positive for myalgias.       Temp:  [96.5 °F (35.8 °C)-98.8 °F (37.1 °C)]   Pulse:  [71-77]   Resp:  [16-18]   BP: ()/(47-55)   SpO2:  [94 %-95 %]       Physical Exam   Constitutional: He is oriented to person, place, and time.   HENT:   Head: Normocephalic.   Eyes: Conjunctivae are normal.   Neck: Normal range of motion. Neck supple.   Cardiovascular: Normal rate and regular rhythm.    Pulmonary/Chest: Effort normal and breath sounds normal.   Abdominal: Soft. Bowel sounds are normal.   Musculoskeletal: Normal range of motion.   Neurological: He is alert and oriented to person, place, and time.   Skin: Skin is warm.       Recent Labs  Lab 02/10/18  0437 02/11/18  0452 02/12/18  0519 02/13/18  0403 02/14/18  0522 02/15/18  0404   WBC 22.23* 21.92* 20.83* 19.45* 20.82* 22.95*   HGB 9.0* 8.8* 9.0* 9.2* 9.0* 9.3*   HCT 26.1* 27.0* 26.4* 27.9* 27.3* 28.0*    394* 401* 373* 383* 394*       Recent Labs  Lab 02/13/18  0403  02/14/18  0522 02/14/18  0811 02/14/18  2004 02/15/18  0404 02/15/18  0744   NA  --   < >  --  130* 128*  --  129*   K  --   < >  --  4.1 4.3  --  4.3   CL  --   < >  --  94* 93*  --  93*   CO2  --   < >  --  21* 20*  --  21*   BUN  --   < >  --  114* 118*  --  129*   CREATININE  --   < >  --  3.5* 3.9*  --  4.3*   GLU  --   < >  --  136* 211*  --  184*   CALCIUM  --   < >  --  7.3* 7.6*  --  7.5*   MG 2.6  --  2.9*  --   --  3.0*  --    PHOS 3.9  --  4.4  --   --  5.3*  --    < > = values in this interval not displayed.    Recent Labs  Lab 02/14/18  0811  02/14/18  2004 02/15/18  0744   ALKPHOS 289* 301* 296*   ALT 24 23 23   AST 57* 52* 54*   ALBUMIN 1.6* 1.6* 1.6*   PROT 5.7* 5.7* 5.7*   BILITOT 2.0* 2.0* 2.0*       Recent Labs  Lab 02/13/18  2235 02/14/18  0843 02/14/18  1124 02/14/18  1801 02/14/18  2039 02/15/18  0813   POCTGLUCOSE 143* 147* 160* 158* 201* 186*            carvedilol  25 mg Oral BID WM    doxazosin  2 mg Oral Nightly    flecainide  50 mg Oral Q12H    insulin detemir  12 Units Subcutaneous Daily    simvastatin  20 mg Oral QHS       Assessment and Plan     Active Hospital Problems    Diagnosis  POA    *Acute renal failure superimposed on stage 3 chronic kidney disease [N17.9, N18.3]  Yes    Arterial hypotension [I95.9]  Unknown    Hyponatremia [E87.1]  Yes    Hypoalbuminemia [E88.09]  Yes    Leukemoid reaction [D72.823]  Yes    Abnormal liver function tests [R79.89]  Yes    Abnormal liver scan [R93.2]  Yes    D-dimer, elevated [R79.89]  Yes    Current use of long term anticoagulation [Z79.01]  Not Applicable    Persistent atrial fibrillation [I48.1]  Yes    Isolated proteinuria without specific morphologic lesion [R80.0]  Yes    BMI 34.0-34.9,adult [Z68.34]  Not Applicable    Hyperlipidemia associated with type 2 diabetes mellitus [E11.69, E78.5]  Yes    Hypertension associated with diabetes [E11.59, I10]  Yes     Chronic    Type 2 diabetes mellitus with stage 3 chronic kidney disease, with long-term current use of insulin [E11.22, N18.3, Z79.4]  Not Applicable      Resolved Hospital Problems    Diagnosis Date Resolved POA   No resolved problems to display.       LIver Mass  Biopsy Pending     ARF  ATN and pre renal  Will try albumin today  Cards consult  Remains unchanged     Leukamoid Recation  Malignacy ?    Disposition: TO home    Time spent in care of the patient (Greater than 1/2 spent in direct face-to-face contact) 30 minutes    Easton Andino MD

## 2018-02-15 NOTE — PROGRESS NOTES
[facility]  Nephrology  Progress Note    Patient Name: Jose Cruz Lira  MRN: [id number]  Admission Date: 2/8/2018  Hospital Length of Stay: 6 days  Attending Provider: Easton Andino MD   Primary Care Physician: Lisa Capone MD  Principal Problem:Acute renal failure superimposed on stage 3 chronic kidney disease    Subjective:     HPI: 68 yo M with PMH of CKD3, left hydronephrosis s/p nephrectomy August 2017, HTN, DMII (long term insulin use), persistent atrial fibrillation (s/p DCCV 2016) on flecainide and Eliquis. He presented for RHC appt, was noted to have worsened LFTs, leukocytosis, and was sent to ED for further workup. Patient has been short of breath for several month with symptoms have been getting worse over the past few weeks. He has dyspnea with exertion and at rest currently, chills, cough off and on, fatigue.  He denies chest pain.  He does take his lasix twice daily and reports urine output has decreased lately in last few days, reports wt loss recently, has had difficulty sleeping lately 2/2 orthopnea, reports stable / decrease in LE swelling, unsure of PND. Patient does report recent travel to [facility] 12/17. He also reports that for the past 2 weeks, he has been having diarrhea.     Patient had recent admission for CHF exacerbation 2/1 and discharged 2/3, recent ED visit for same complaints noted to have leukocytosis with neutrophilic prodominance, bili noted to be 2.0, Cr 2.1, blood cultures drawn NGTD, referred for sleep study however did not receive yet though high suspicion for MONICA. Exam with cards noted for + johnson's sign, HIDA scan ordered was negative for cholecystitis however noted filling defects in liver, recommended f/u imaging. RHC was also ordered and was to be done 2/8 however worsened labs (leukocytosis, LFTs) prompted referral to ED for evaluation.    Nephrology was consulted for ARTUR on CKD. He has been followed by nephrologist Dr. Anderson. Baseline  "Cr is 1.7. He denies NSAID use. He has a history of left total nephrectomy in August 2017 due to left hydronephrosis (etiology is not clear). He notes that his po intake has been poor due to fatigue. Urine output has also decreased. Denies dysuria and hematuria. Endorses nocturia. States that he has been having "loose stools" for the past 2 weeks    Interval History:  Patient in AM without significant complaints.  Still notes having diarrhea.  Otherwise, no acute issues.      Review of patient's allergies indicates:  No Known Allergies  Current Facility-Administered Medications   Medication Frequency    albumin human 25% bottle 12.5 g BID    calcium carbonate 200 mg calcium (500 mg) chewable tablet 500 mg Daily PRN    carvedilol tablet 25 mg BID WM    dextrose 50% injection 12.5 g PRN    dextrose 50% injection 25 g PRN    doxazosin tablet 2 mg Nightly    flecainide tablet 50 mg Q12H    glucagon (human recombinant) injection 1 mg PRN    glucose chewable tablet 16 g PRN    glucose chewable tablet 24 g PRN    insulin aspart pen 1-10 Units QID (AC + HS) PRN    insulin detemir pen 12 Units Daily    midodrine tablet 10 mg TID    octreotide (SANDOSTATIN) 500 mcg in sodium chloride 0.9% 100 mL infusion Continuous    simvastatin tablet 20 mg QHS    sodium chloride 0.9% flush 5 mL PRN    zolpidem tablet 5 mg Nightly PRN       Objective:     Vital Signs (Most Recent):  Temp: 98.4 °F (36.9 °C) (02/15/18 1522)  Pulse: 72 (02/15/18 1522)  Resp: 20 (02/15/18 1522)  BP: (!) 101/52 (02/15/18 1522)  SpO2: (!) 92 % (02/15/18 1522)  O2 Device (Oxygen Therapy): room air (02/15/18 1522) Vital Signs (24h Range):  Temp:  [96.5 °F (35.8 °C)-98.4 °F (36.9 °C)] 98.4 °F (36.9 °C)  Pulse:  [70-77] 72  Resp:  [16-20] 20  SpO2:  [92 %-97 %] 92 %  BP: ()/(47-55) 101/52     Weight: 130 kg (286 lb 9.6 oz) (02/13/18 0424)  Body mass index is 36.8 kg/m².  Body surface area is 2.61 meters squared.    I/O last 3 completed " shifts:  In: 500 [P.O.:500]  Out: -     Physical Exam   Constitutional: He is oriented to person, place, and time. He appears well-developed and well-nourished. No distress.   HENT:   Head: Normocephalic and atraumatic.   Mouth/Throat: Oropharynx is clear and moist. No oropharyngeal exudate.   Eyes: Conjunctivae and EOM are normal. Pupils are equal, round, and reactive to light. Right eye exhibits no discharge. Left eye exhibits no discharge. No scleral icterus.   Neck: Normal range of motion. Neck supple. No JVD present. No tracheal deviation present. No thyromegaly present.   Cardiovascular: Normal rate, regular rhythm and normal heart sounds.  Exam reveals no gallop and no friction rub.    No murmur heard.  +2 LE edema, appears the same to slightly more than yesterday   Pulmonary/Chest: No stridor. No respiratory distress. He has no wheezes. He has no rales. He exhibits no tenderness.   Decreased bs at bases   Abdominal: Soft. Bowel sounds are normal. He exhibits no distension and no mass. There is no tenderness. There is no rebound and no guarding. No hernia.   Musculoskeletal: Normal range of motion. He exhibits no edema or tenderness.   Lymphadenopathy:     He has no cervical adenopathy.   Neurological: He is alert and oriented to person, place, and time. No cranial nerve deficit. He exhibits normal muscle tone. Coordination normal.   Skin: Skin is warm and dry. No rash noted. He is not diaphoretic. No erythema. No pallor.   Psychiatric: He has a normal mood and affect. His behavior is normal. Judgment and thought content normal.   Nursing note and vitals reviewed.      Significant Labs:    Recent Results (from the past 24 hour(s))   Urinalysis    Collection Time: 02/14/18  5:11 PM   Result Value Ref Range    Specimen UA Urine, Clean Catch     Color, UA Krystyna Yellow, Straw, Krystyna    Appearance, UA Clear Clear    pH, UA 5.0 5.0 - 8.0    Specific Gravity, UA 1.015 1.005 - 1.030    Protein, UA Negative Negative     Glucose, UA Negative Negative    Ketones, UA Negative Negative    Bilirubin (UA) Negative Negative    Occult Blood UA Negative Negative    Nitrite, UA Negative Negative    Urobilinogen, UA Negative <2.0 EU/dL    Leukocytes, UA Negative Negative   Sodium, urine, random    Collection Time: 02/14/18  5:11 PM   Result Value Ref Range    Sodium Urine Random <20 (A) 20 - 250 mmol/L   Potassium, urine, random    Collection Time: 02/14/18  5:11 PM   Result Value Ref Range    Potassium Urine Random 42 15 - 95 mmol/L   Chloride, urine, random    Collection Time: 02/14/18  5:11 PM   Result Value Ref Range    Chloride, Rand Ur <20 (L) 25 - 200 mmol/L   Urea nitrogen, urine    Collection Time: 02/14/18  5:11 PM   Result Value Ref Range    Urine Urea Nitrogen, Random 613 140 - 1050 mg/dL   Creatinine, urine, random    Collection Time: 02/14/18  5:11 PM   Result Value Ref Range    Creatinine, Random Ur 240.0 23.0 - 375.0 mg/dL   POCT glucose    Collection Time: 02/14/18  6:01 PM   Result Value Ref Range    POCT Glucose 158 (H) 70 - 110 mg/dL   Comprehensive Metabolic Panel (CMP)    Collection Time: 02/14/18  8:04 PM   Result Value Ref Range    Sodium 128 (L) 136 - 145 mmol/L    Potassium 4.3 3.5 - 5.1 mmol/L    Chloride 93 (L) 95 - 110 mmol/L    CO2 20 (L) 23 - 29 mmol/L    Glucose 211 (H) 70 - 110 mg/dL    BUN, Bld 118 (H) 8 - 23 mg/dL    Creatinine 3.9 (H) 0.5 - 1.4 mg/dL    Calcium 7.6 (L) 8.7 - 10.5 mg/dL    Total Protein 5.7 (L) 6.0 - 8.4 g/dL    Albumin 1.6 (L) 3.5 - 5.2 g/dL    Total Bilirubin 2.0 (H) 0.1 - 1.0 mg/dL    Alkaline Phosphatase 301 (H) 55 - 135 U/L    AST 52 (H) 10 - 40 U/L    ALT 23 10 - 44 U/L    Anion Gap 15 8 - 16 mmol/L    eGFR if African American 17.5 (A) >60 mL/min/1.73 m^2    eGFR if non  15.2 (A) >60 mL/min/1.73 m^2   POCT glucose    Collection Time: 02/14/18  8:39 PM   Result Value Ref Range    POCT Glucose 201 (H) 70 - 110 mg/dL   Magnesium    Collection Time: 02/15/18  4:04 AM    Result Value Ref Range    Magnesium 3.0 (H) 1.6 - 2.6 mg/dL   Phosphorus    Collection Time: 02/15/18  4:04 AM   Result Value Ref Range    Phosphorus 5.3 (H) 2.7 - 4.5 mg/dL   CBC auto differential    Collection Time: 02/15/18  4:04 AM   Result Value Ref Range    WBC 22.95 (H) 3.90 - 12.70 K/uL    RBC 3.44 (L) 4.60 - 6.20 M/uL    Hemoglobin 9.3 (L) 14.0 - 18.0 g/dL    Hematocrit 28.0 (L) 40.0 - 54.0 %    MCV 81 (L) 82 - 98 fL    MCH 27.0 27.0 - 31.0 pg    MCHC 33.2 32.0 - 36.0 g/dL    RDW 15.7 (H) 11.5 - 14.5 %    Platelets 394 (H) 150 - 350 K/uL    MPV 10.4 9.2 - 12.9 fL    Immature Granulocytes CANCELED 0.0 - 0.5 %    Immature Grans (Abs) CANCELED 0.00 - 0.04 K/uL    nRBC 0 0 /100 WBC    Gran% 96.0 (H) 38.0 - 73.0 %    Lymph% 0.0 (L) 18.0 - 48.0 %    Mono% 3.0 (L) 4.0 - 15.0 %    Eosinophil% 0.0 0.0 - 8.0 %    Basophil% 0.0 0.0 - 1.9 %    Metamyelocytes 1.0 %    Platelet Estimate Increased (A)     Aniso Slight     Differential Method Manual    Brain natriuretic peptide    Collection Time: 02/15/18  4:04 AM   Result Value Ref Range     (H) 0 - 99 pg/mL   C3 complement    Collection Time: 02/15/18  4:04 AM   Result Value Ref Range    Complement (C-3) 96 50 - 180 mg/dL   C4 complement    Collection Time: 02/15/18  4:04 AM   Result Value Ref Range    Complement (C-4) 30 11 - 44 mg/dL   Comprehensive Metabolic Panel (CMP)    Collection Time: 02/15/18  7:44 AM   Result Value Ref Range    Sodium 129 (L) 136 - 145 mmol/L    Potassium 4.3 3.5 - 5.1 mmol/L    Chloride 93 (L) 95 - 110 mmol/L    CO2 21 (L) 23 - 29 mmol/L    Glucose 184 (H) 70 - 110 mg/dL    BUN, Bld 129 (H) 8 - 23 mg/dL    Creatinine 4.3 (H) 0.5 - 1.4 mg/dL    Calcium 7.5 (L) 8.7 - 10.5 mg/dL    Total Protein 5.7 (L) 6.0 - 8.4 g/dL    Albumin 1.6 (L) 3.5 - 5.2 g/dL    Total Bilirubin 2.0 (H) 0.1 - 1.0 mg/dL    Alkaline Phosphatase 296 (H) 55 - 135 U/L    AST 54 (H) 10 - 40 U/L    ALT 23 10 - 44 U/L    Anion Gap 15 8 - 16 mmol/L    eGFR if African  American 15.6 (A) >60 mL/min/1.73 m^2    eGFR if non African American 13.5 (A) >60 mL/min/1.73 m^2   POCT glucose    Collection Time: 02/15/18  8:13 AM   Result Value Ref Range    POCT Glucose 186 (H) 70 - 110 mg/dL   2D echo with color flow doppler    Collection Time: 02/15/18 10:00 AM   Result Value Ref Range    EF 58 55 - 65    Diastolic Dysfunction No     Pericardial Effusion TRIVIAL    POCT glucose    Collection Time: 02/15/18 12:57 PM   Result Value Ref Range    POCT Glucose 185 (H) 70 - 110 mg/dL         Significant Imaging:  No new imaging in last 24 hours.      Assessment/Plan:     * Acute renal failure superimposed on stage 3 chronic kidney disease    This patient presents with ARTUR on CKD3, likely from pre-renal causes which include poor po intake, sepsis resulting in renal hypoperfusion, and 2 week history of diarrhea.  Re-evaluation with urine lytes with pre-renal etiology noted.  2D echo with CVP < 3.  BNP improved from admission.  Overall, current working hypothesis for ARTUR would be due to poor renal perfusion from hypoalbuminemia vs HRS.      Plan  -- Continue albumin, midodrine, and octreotide   -- discontinue antihypertensives  -- sodium bicarb 650 PO TID for acidosis  -- continue labs.  8AM cortisol ordered for tomorrow             Jerardo Bueno MD  Nephrology  [facility]

## 2018-02-16 LAB
ALBUMIN SERPL BCP-MCNC: 1.8 G/DL
ALBUMIN SERPL BCP-MCNC: 1.9 G/DL
ALP SERPL-CCNC: 226 U/L
ALP SERPL-CCNC: 232 U/L
ALT SERPL W/O P-5'-P-CCNC: 19 U/L
ALT SERPL W/O P-5'-P-CCNC: 20 U/L
AMMONIA PLAS-SCNC: 36 UMOL/L
ANION GAP SERPL CALC-SCNC: 14 MMOL/L
ANION GAP SERPL CALC-SCNC: 15 MMOL/L
AST SERPL-CCNC: 42 U/L
AST SERPL-CCNC: 44 U/L
BASOPHILS # BLD AUTO: 0.05 K/UL
BASOPHILS NFR BLD: 0.2 %
BILIRUB SERPL-MCNC: 2.2 MG/DL
BILIRUB SERPL-MCNC: 2.8 MG/DL
BUN SERPL-MCNC: 134 MG/DL
BUN SERPL-MCNC: 134 MG/DL
CALCIUM SERPL-MCNC: 7.5 MG/DL
CALCIUM SERPL-MCNC: 7.6 MG/DL
CHLORIDE SERPL-SCNC: 93 MMOL/L
CHLORIDE SERPL-SCNC: 93 MMOL/L
CO2 SERPL-SCNC: 20 MMOL/L
CO2 SERPL-SCNC: 21 MMOL/L
CORTIS SERPL-MCNC: 18.8 UG/DL
CREAT SERPL-MCNC: 4.7 MG/DL
CREAT SERPL-MCNC: 4.8 MG/DL
DIFFERENTIAL METHOD: ABNORMAL
EOSINOPHIL # BLD AUTO: 0 K/UL
EOSINOPHIL NFR BLD: 0.1 %
ERYTHROCYTE [DISTWIDTH] IN BLOOD BY AUTOMATED COUNT: 15.8 %
EST. GFR  (AFRICAN AMERICAN): 13.6 ML/MIN/1.73 M^2
EST. GFR  (AFRICAN AMERICAN): 14 ML/MIN/1.73 M^2
EST. GFR  (NON AFRICAN AMERICAN): 11.8 ML/MIN/1.73 M^2
EST. GFR  (NON AFRICAN AMERICAN): 12.1 ML/MIN/1.73 M^2
GLUCOSE SERPL-MCNC: 137 MG/DL
GLUCOSE SERPL-MCNC: 151 MG/DL
HCT VFR BLD AUTO: 27.4 %
HGB BLD-MCNC: 9 G/DL
IMM GRANULOCYTES # BLD AUTO: 0.98 K/UL
IMM GRANULOCYTES NFR BLD AUTO: 4.3 %
LYMPHOCYTES # BLD AUTO: 0.5 K/UL
LYMPHOCYTES NFR BLD: 2.3 %
MAGNESIUM SERPL-MCNC: 3.2 MG/DL
MCH RBC QN AUTO: 27.4 PG
MCHC RBC AUTO-ENTMCNC: 32.8 G/DL
MCV RBC AUTO: 84 FL
MONOCYTES # BLD AUTO: 1.7 K/UL
MONOCYTES NFR BLD: 7.3 %
NEUTROPHILS # BLD AUTO: 19.6 K/UL
NEUTROPHILS NFR BLD: 85.8 %
NRBC BLD-RTO: 0 /100 WBC
PHOSPHATE SERPL-MCNC: 6.1 MG/DL
PLATELET # BLD AUTO: 357 K/UL
PMV BLD AUTO: 10.3 FL
POCT GLUCOSE: 125 MG/DL (ref 70–110)
POCT GLUCOSE: 143 MG/DL (ref 70–110)
POCT GLUCOSE: 165 MG/DL (ref 70–110)
POCT GLUCOSE: 168 MG/DL (ref 70–110)
POTASSIUM SERPL-SCNC: 4.3 MMOL/L
POTASSIUM SERPL-SCNC: 4.5 MMOL/L
PROT SERPL-MCNC: 5.7 G/DL
PROT SERPL-MCNC: 5.9 G/DL
RBC # BLD AUTO: 3.28 M/UL
SODIUM SERPL-SCNC: 128 MMOL/L
SODIUM SERPL-SCNC: 128 MMOL/L
WBC # BLD AUTO: 22.88 K/UL

## 2018-02-16 PROCEDURE — 84100 ASSAY OF PHOSPHORUS: CPT

## 2018-02-16 PROCEDURE — 99232 SBSQ HOSP IP/OBS MODERATE 35: CPT | Mod: ,,, | Performed by: INTERNAL MEDICINE

## 2018-02-16 PROCEDURE — 63600175 PHARM REV CODE 636 W HCPCS: Performed by: INTERNAL MEDICINE

## 2018-02-16 PROCEDURE — 82533 TOTAL CORTISOL: CPT

## 2018-02-16 PROCEDURE — 36415 COLL VENOUS BLD VENIPUNCTURE: CPT

## 2018-02-16 PROCEDURE — 25000003 PHARM REV CODE 250: Performed by: INTERNAL MEDICINE

## 2018-02-16 PROCEDURE — 25000003 PHARM REV CODE 250

## 2018-02-16 PROCEDURE — 11000001 HC ACUTE MED/SURG PRIVATE ROOM

## 2018-02-16 PROCEDURE — 83735 ASSAY OF MAGNESIUM: CPT

## 2018-02-16 PROCEDURE — 99233 SBSQ HOSP IP/OBS HIGH 50: CPT | Mod: ,,, | Performed by: INTERNAL MEDICINE

## 2018-02-16 PROCEDURE — 82140 ASSAY OF AMMONIA: CPT

## 2018-02-16 PROCEDURE — P9047 ALBUMIN (HUMAN), 25%, 50ML: HCPCS | Mod: JG | Performed by: INTERNAL MEDICINE

## 2018-02-16 PROCEDURE — 80053 COMPREHEN METABOLIC PANEL: CPT | Mod: 91

## 2018-02-16 PROCEDURE — 85025 COMPLETE CBC W/AUTO DIFF WBC: CPT

## 2018-02-16 RX ORDER — SODIUM BICARBONATE 650 MG/1
650 TABLET ORAL 3 TIMES DAILY
Status: DISCONTINUED | OUTPATIENT
Start: 2018-02-16 | End: 2018-02-20

## 2018-02-16 RX ORDER — SEVELAMER CARBONATE 800 MG/1
800 TABLET, FILM COATED ORAL
Status: DISCONTINUED | OUTPATIENT
Start: 2018-02-16 | End: 2018-02-22

## 2018-02-16 RX ADMIN — MIDODRINE HYDROCHLORIDE 10 MG: 5 TABLET ORAL at 05:02

## 2018-02-16 RX ADMIN — INSULIN DETEMIR 12 UNITS: 100 INJECTION, SOLUTION SUBCUTANEOUS at 09:02

## 2018-02-16 RX ADMIN — ALBUMIN (HUMAN) 12.5 G: 12.5 SOLUTION INTRAVENOUS at 08:02

## 2018-02-16 RX ADMIN — OCTREOTIDE ACETATE 50 MCG/HR: 1000 INJECTION, SOLUTION INTRAVENOUS; SUBCUTANEOUS at 11:02

## 2018-02-16 RX ADMIN — SEVELAMER CARBONATE 800 MG: 800 TABLET, FILM COATED ORAL at 05:02

## 2018-02-16 RX ADMIN — INSULIN ASPART 2 UNITS: 100 INJECTION, SOLUTION INTRAVENOUS; SUBCUTANEOUS at 05:02

## 2018-02-16 RX ADMIN — SIMVASTATIN 20 MG: 20 TABLET, FILM COATED ORAL at 08:02

## 2018-02-16 RX ADMIN — OCTREOTIDE ACETATE 50 MCG/HR: 1000 INJECTION, SOLUTION INTRAVENOUS; SUBCUTANEOUS at 10:02

## 2018-02-16 RX ADMIN — FLECAINIDE ACETATE 50 MG: 50 TABLET ORAL at 08:02

## 2018-02-16 RX ADMIN — SODIUM BICARBONATE 650 MG TABLET 650 MG: at 09:02

## 2018-02-16 RX ADMIN — INSULIN ASPART 2 UNITS: 100 INJECTION, SOLUTION INTRAVENOUS; SUBCUTANEOUS at 09:02

## 2018-02-16 RX ADMIN — MIDODRINE HYDROCHLORIDE 10 MG: 5 TABLET ORAL at 03:02

## 2018-02-16 RX ADMIN — MIDODRINE HYDROCHLORIDE 10 MG: 5 TABLET ORAL at 09:02

## 2018-02-16 NOTE — ASSESSMENT & PLAN NOTE
This patient presents with ARTUR on CKD3, likely from pre-renal causes which include poor po intake, sepsis resulting in renal hypoperfusion, and 2 week history of diarrhea.  Re-evaluation with urine lytes with pre-renal etiology noted.  2D echo with CVP < 3.  BNP improved from admission.  Overall, current working hypothesis for ARTUR would be due to poor renal perfusion from hypoalbuminemia vs HRS.  No significant improvement thus far, will need to assess for another day with albumin.  Spoke with pathology, likely results to come back next week.      Plan  -- Continue albumin, midodrine, and octreotide   -- discontinue antihypertensives, will discontinue doxazosin  -- sodium bicarb 650 PO TID for acidosis  -- started sevelamer given hyperphosphatemia   -- ANCA ordered to assess for vasculitis induced renal failure  -- ordered stool ova/parasites given leukocytosis and diarrhea, previously seen by ID when patient without leukocytosis   -- continue labs, patient may need dialysis at a later time.  Spoke with family, agreeable to acute dialysis as needed

## 2018-02-16 NOTE — SUBJECTIVE & OBJECTIVE
Interval History:  Patient in AM without significant complaints.  Still notes having diarrhea.  Otherwise, no acute issues.      Review of patient's allergies indicates:  No Known Allergies  Current Facility-Administered Medications   Medication Frequency    albumin human 25% bottle 12.5 g BID    calcium carbonate 200 mg calcium (500 mg) chewable tablet 500 mg Daily PRN    dextrose 50% injection 12.5 g PRN    dextrose 50% injection 25 g PRN    flecainide tablet 50 mg Q12H    glucagon (human recombinant) injection 1 mg PRN    glucose chewable tablet 16 g PRN    glucose chewable tablet 24 g PRN    insulin aspart pen 1-10 Units QID (AC + HS) PRN    insulin detemir pen 12 Units Daily    midodrine tablet 10 mg TID    octreotide (SANDOSTATIN) 500 mcg in sodium chloride 0.9% 100 mL infusion Continuous    sevelamer carbonate tablet 800 mg TID WM    simvastatin tablet 20 mg QHS    sodium bicarbonate tablet 650 mg TID    sodium chloride 0.9% flush 5 mL PRN    zolpidem tablet 5 mg Nightly PRN       Objective:     Vital Signs (Most Recent):  Temp: 98.6 °F (37 °C) (02/16/18 1601)  Pulse: 73 (02/16/18 1601)  Resp: 18 (02/16/18 1601)  BP: (!) 93/50 (02/16/18 1601)  SpO2: 95 % (02/16/18 1601)  O2 Device (Oxygen Therapy): room air (02/16/18 1601) Vital Signs (24h Range):  Temp:  [97.6 °F (36.4 °C)-98.9 °F (37.2 °C)] 98.6 °F (37 °C)  Pulse:  [] 73  Resp:  [18-20] 18  SpO2:  [92 %-95 %] 95 %  BP: ()/(49-71) 93/50     Weight: 130 kg (286 lb 9.6 oz) (02/13/18 0424)  Body mass index is 36.8 kg/m².  Body surface area is 2.61 meters squared.    I/O last 3 completed shifts:  In: 960 [P.O.:960]  Out: 250 [Urine:250]    Physical Exam   Constitutional: He is oriented to person, place, and time. He appears well-developed and well-nourished. No distress.   HENT:   Head: Normocephalic and atraumatic.   Mouth/Throat: Oropharynx is clear and moist. No oropharyngeal exudate.   Eyes: Conjunctivae and EOM are normal.  Pupils are equal, round, and reactive to light. Right eye exhibits no discharge. Left eye exhibits no discharge. No scleral icterus.   Neck: Normal range of motion. Neck supple. No thyromegaly present.   Cardiovascular: Normal rate, regular rhythm and normal heart sounds.    No murmur heard.  Pulmonary/Chest: No respiratory distress. He has no wheezes. He has no rales.   Decreased bs at bases   Abdominal: Soft. Bowel sounds are normal. He exhibits no distension. There is no tenderness. There is no guarding.   Musculoskeletal: Normal range of motion. He exhibits no edema or tenderness.   Neurological: He is alert and oriented to person, place, and time. No cranial nerve deficit.   Skin: Skin is warm and dry. No rash noted. He is not diaphoretic. No erythema.   Psychiatric: He has a normal mood and affect. His behavior is normal. Judgment and thought content normal.   Nursing note and vitals reviewed.      Significant Labs:    Recent Results (from the past 24 hour(s))   POCT glucose    Collection Time: 02/15/18  5:46 PM   Result Value Ref Range    POCT Glucose 124 (H) 70 - 110 mg/dL   Comprehensive Metabolic Panel (CMP)    Collection Time: 02/15/18  7:30 PM   Result Value Ref Range    Sodium 129 (L) 136 - 145 mmol/L    Potassium 4.4 3.5 - 5.1 mmol/L    Chloride 92 (L) 95 - 110 mmol/L    CO2 23 23 - 29 mmol/L    Glucose 150 (H) 70 - 110 mg/dL    BUN, Bld 128 (H) 8 - 23 mg/dL    Creatinine 4.5 (H) 0.5 - 1.4 mg/dL    Calcium 7.6 (L) 8.7 - 10.5 mg/dL    Total Protein 6.0 6.0 - 8.4 g/dL    Albumin 1.7 (L) 3.5 - 5.2 g/dL    Total Bilirubin 1.8 (H) 0.1 - 1.0 mg/dL    Alkaline Phosphatase 291 (H) 55 - 135 U/L    AST 51 (H) 10 - 40 U/L    ALT 22 10 - 44 U/L    Anion Gap 14 8 - 16 mmol/L    eGFR if African American 14.8 (A) >60 mL/min/1.73 m^2    eGFR if non  12.8 (A) >60 mL/min/1.73 m^2   POCT glucose    Collection Time: 02/15/18  9:16 PM   Result Value Ref Range    POCT Glucose 169 (H) 70 - 110 mg/dL    Magnesium    Collection Time: 02/16/18  4:07 AM   Result Value Ref Range    Magnesium 3.2 (H) 1.6 - 2.6 mg/dL   Phosphorus    Collection Time: 02/16/18  4:07 AM   Result Value Ref Range    Phosphorus 6.1 (H) 2.7 - 4.5 mg/dL   CBC auto differential    Collection Time: 02/16/18  4:07 AM   Result Value Ref Range    WBC 22.88 (H) 3.90 - 12.70 K/uL    RBC 3.28 (L) 4.60 - 6.20 M/uL    Hemoglobin 9.0 (L) 14.0 - 18.0 g/dL    Hematocrit 27.4 (L) 40.0 - 54.0 %    MCV 84 82 - 98 fL    MCH 27.4 27.0 - 31.0 pg    MCHC 32.8 32.0 - 36.0 g/dL    RDW 15.8 (H) 11.5 - 14.5 %    Platelets 357 (H) 150 - 350 K/uL    MPV 10.3 9.2 - 12.9 fL    Immature Granulocytes 4.3 (H) 0.0 - 0.5 %    Gran # (ANC) 19.6 (H) 1.8 - 7.7 K/uL    Immature Grans (Abs) 0.98 (H) 0.00 - 0.04 K/uL    Lymph # 0.5 (L) 1.0 - 4.8 K/uL    Mono # 1.7 (H) 0.3 - 1.0 K/uL    Eos # 0.0 0.0 - 0.5 K/uL    Baso # 0.05 0.00 - 0.20 K/uL    nRBC 0 0 /100 WBC    Gran% 85.8 (H) 38.0 - 73.0 %    Lymph% 2.3 (L) 18.0 - 48.0 %    Mono% 7.3 4.0 - 15.0 %    Eosinophil% 0.1 0.0 - 8.0 %    Basophil% 0.2 0.0 - 1.9 %    Differential Method Automated    Ammonia    Collection Time: 02/16/18  4:07 AM   Result Value Ref Range    Ammonia 36 10 - 50 umol/L   Comprehensive Metabolic Panel (CMP)    Collection Time: 02/16/18  7:45 AM   Result Value Ref Range    Sodium 128 (L) 136 - 145 mmol/L    Potassium 4.3 3.5 - 5.1 mmol/L    Chloride 93 (L) 95 - 110 mmol/L    CO2 20 (L) 23 - 29 mmol/L    Glucose 151 (H) 70 - 110 mg/dL    BUN, Bld 134 (H) 8 - 23 mg/dL    Creatinine 4.8 (H) 0.5 - 1.4 mg/dL    Calcium 7.6 (L) 8.7 - 10.5 mg/dL    Total Protein 5.7 (L) 6.0 - 8.4 g/dL    Albumin 1.8 (L) 3.5 - 5.2 g/dL    Total Bilirubin 2.2 (H) 0.1 - 1.0 mg/dL    Alkaline Phosphatase 232 (H) 55 - 135 U/L    AST 42 (H) 10 - 40 U/L    ALT 20 10 - 44 U/L    Anion Gap 15 8 - 16 mmol/L    eGFR if African American 13.6 (A) >60 mL/min/1.73 m^2    eGFR if non  11.8 (A) >60 mL/min/1.73 m^2   Cortisol     Collection Time: 02/16/18  7:45 AM   Result Value Ref Range    Cortisol 18.8 ug/dL   POCT glucose    Collection Time: 02/16/18  8:16 AM   Result Value Ref Range    POCT Glucose 168 (H) 70 - 110 mg/dL   POCT glucose    Collection Time: 02/16/18 12:58 PM   Result Value Ref Range    POCT Glucose 143 (H) 70 - 110 mg/dL         Significant Imaging:  No new imaging in last 24 hours.

## 2018-02-16 NOTE — PROGRESS NOTES
Progress Note   Hospital Medicine       Team: [facility] Easton Andino MD  Admit Date: 2/8/2018  Length of Stay:  LOS: 7 days   DAVIE 2/17/2018  Principal Problem:  Acute renal failure superimposed on stage 3 chronic kidney disease    Interval hx / overnight events: ARF is worsening. Biopsy pending    Areas of concern/ handoff:    ROS    Temp:  [97.6 °F (36.4 °C)-98.9 °F (37.2 °C)]   Pulse:  []   Resp:  [18-20]   BP: ()/(49-59)   SpO2:  [92 %-97 %]       Physical Exam   HENT:   Head: Normocephalic and atraumatic.   Eyes: EOM are normal. Pupils are equal, round, and reactive to light.   Neck: Normal range of motion. Neck supple.   Cardiovascular: Normal rate and regular rhythm.    Pulmonary/Chest: Effort normal and breath sounds normal.   Abdominal: Soft. Bowel sounds are normal.   Neurological: He is alert.   Skin: Skin is warm and dry.       Recent Labs  Lab 02/11/18  0452 02/12/18  0519 02/13/18  0403 02/14/18  0522 02/15/18  0404 02/16/18  0407   WBC 21.92* 20.83* 19.45* 20.82* 22.95* 22.88*   HGB 8.8* 9.0* 9.2* 9.0* 9.3* 9.0*   HCT 27.0* 26.4* 27.9* 27.3* 28.0* 27.4*   * 401* 373* 383* 394* 357*       Recent Labs  Lab 02/14/18  0522  02/15/18  0404 02/15/18  0744 02/15/18  1930 02/16/18  0407 02/16/18  0745   NA  --   < >  --  129* 129*  --  128*   K  --   < >  --  4.3 4.4  --  4.3   CL  --   < >  --  93* 92*  --  93*   CO2  --   < >  --  21* 23  --  20*   BUN  --   < >  --  129* 128*  --  134*   CREATININE  --   < >  --  4.3* 4.5*  --  4.8*   GLU  --   < >  --  184* 150*  --  151*   CALCIUM  --   < >  --  7.5* 7.6*  --  7.6*   MG 2.9*  --  3.0*  --   --  3.2*  --    PHOS 4.4  --  5.3*  --   --  6.1*  --    < > = values in this interval not displayed.    Recent Labs  Lab 02/15/18  0744 02/15/18  1930 02/16/18  0745   ALKPHOS 296* 291* 232*   ALT 23 22 20   AST 54* 51* 42*   ALBUMIN 1.6* 1.7* 1.8*   PROT 5.7* 6.0 5.7*   BILITOT 2.0* 1.8* 2.2*       Recent Labs  Lab 02/14/18 2039  02/15/18  0813 02/15/18  1257 02/15/18  1746 02/15/18  2116 02/16/18  0816   POCTGLUCOSE 201* 186* 185* 124* 169* 168*            albumin human 25%  12.5 g Intravenous BID    doxazosin  2 mg Oral Nightly    flecainide  50 mg Oral Q12H    insulin detemir  12 Units Subcutaneous Daily    midodrine  10 mg Oral TID    simvastatin  20 mg Oral QHS       Assessment and Plan     Active Hospital Problems    Diagnosis  POA    *Acute renal failure superimposed on stage 3 chronic kidney disease [N17.9, N18.3]  Yes    Arterial hypotension [I95.9]  Yes    Hyponatremia [E87.1]  Yes    Hypoalbuminemia [E88.09]  Yes    Leukemoid reaction [D72.823]  Yes    Abnormal liver function tests [R79.89]  Yes    Abnormal liver scan [R93.2]  Yes    D-dimer, elevated [R79.89]  Yes    Current use of long term anticoagulation [Z79.01]  Not Applicable    Persistent atrial fibrillation [I48.1]  Yes    Isolated proteinuria without specific morphologic lesion [R80.0]  Yes    BMI 34.0-34.9,adult [Z68.34]  Not Applicable    Hyperlipidemia associated with type 2 diabetes mellitus [E11.69, E78.5]  Yes    Hypertension associated with diabetes [E11.59, I10]  Yes     Chronic    Type 2 diabetes mellitus with stage 3 chronic kidney disease, with long-term current use of insulin [E11.22, N18.3, Z79.4]  Not Applicable      Resolved Hospital Problems    Diagnosis Date Resolved POA   No resolved problems to display.     LIver Mass  Biopsy Pending     ARF  ATN and pre renal, worsening  Albumin, midodrine and octreotide started   Cards consult  Remains unchanged  RRT may be needed     Leukamoid Recation  Malignacy ?     Disposition: T0 home     Time spent in care of the patient (Greater than 1/2 spent in direct face-to-face contact) 30 minutes     Easton Andino MD

## 2018-02-16 NOTE — PLAN OF CARE
Problem: Diabetes, Type 2 (Adult)  Goal: Signs and Symptoms of Listed Potential Problems Will be Absent, Minimized or Managed (Diabetes, Type 2)  Signs and symptoms of listed potential problems will be absent, minimized or managed by discharge/transition of care (reference Diabetes, Type 2 (Adult) CPG).   Outcome: Ongoing (interventions implemented as appropriate)  Blood sugar monitored as ordered and insulin given as prescribed.    Problem: Patient Care Overview  Goal: Plan of Care Review  Outcome: Ongoing (interventions implemented as appropriate)  Discussed the plan of care with the patient and his family, including medications given.  Octreotide infusing.  Patient remained stable throughout the shift.

## 2018-02-16 NOTE — NURSING
Dr. Andino on the unit and I notified him that the patient's blood pressure was 80/49 and he has complaints of weakness, he stated blood pressure runs low and he was started on midodrine and it should bring it up.

## 2018-02-16 NOTE — PROGRESS NOTES
[facility]  Nephrology  Progress Note    Patient Name: Jose Cruz Lira  MRN: [id number]  Admission Date: 2/8/2018  Hospital Length of Stay: 7 days  Attending Provider: Easton Andino MD   Primary Care Physician: Lisa Capone MD  Principal Problem:Acute renal failure superimposed on stage 3 chronic kidney disease    Subjective:     Interval History:  Patient in AM without significant complaints.  Still notes having diarrhea.  Otherwise, no acute issues.      Review of patient's allergies indicates:  No Known Allergies  Current Facility-Administered Medications   Medication Frequency    albumin human 25% bottle 12.5 g BID    calcium carbonate 200 mg calcium (500 mg) chewable tablet 500 mg Daily PRN    dextrose 50% injection 12.5 g PRN    dextrose 50% injection 25 g PRN    flecainide tablet 50 mg Q12H    glucagon (human recombinant) injection 1 mg PRN    glucose chewable tablet 16 g PRN    glucose chewable tablet 24 g PRN    insulin aspart pen 1-10 Units QID (AC + HS) PRN    insulin detemir pen 12 Units Daily    midodrine tablet 10 mg TID    octreotide (SANDOSTATIN) 500 mcg in sodium chloride 0.9% 100 mL infusion Continuous    sevelamer carbonate tablet 800 mg TID WM    simvastatin tablet 20 mg QHS    sodium bicarbonate tablet 650 mg TID    sodium chloride 0.9% flush 5 mL PRN    zolpidem tablet 5 mg Nightly PRN       Objective:     Vital Signs (Most Recent):  Temp: 98.6 °F (37 °C) (02/16/18 1601)  Pulse: 73 (02/16/18 1601)  Resp: 18 (02/16/18 1601)  BP: (!) 93/50 (02/16/18 1601)  SpO2: 95 % (02/16/18 1601)  O2 Device (Oxygen Therapy): room air (02/16/18 1601) Vital Signs (24h Range):  Temp:  [97.6 °F (36.4 °C)-98.9 °F (37.2 °C)] 98.6 °F (37 °C)  Pulse:  [] 73  Resp:  [18-20] 18  SpO2:  [92 %-95 %] 95 %  BP: ()/(49-71) 93/50     Weight: 130 kg (286 lb 9.6 oz) (02/13/18 0424)  Body mass index is 36.8 kg/m².  Body surface area is 2.61 meters squared.    I/O last  3 completed shifts:  In: 960 [P.O.:960]  Out: 250 [Urine:250]    Physical Exam   Constitutional: He is oriented to person, place, and time. He appears well-developed and well-nourished. No distress.   HENT:   Head: Normocephalic and atraumatic.   Mouth/Throat: Oropharynx is clear and moist. No oropharyngeal exudate.   Eyes: Conjunctivae and EOM are normal. Pupils are equal, round, and reactive to light. Right eye exhibits no discharge. Left eye exhibits no discharge. No scleral icterus.   Neck: Normal range of motion. Neck supple. No thyromegaly present.   Cardiovascular: Normal rate, regular rhythm and normal heart sounds.    No murmur heard.  Pulmonary/Chest: No respiratory distress. He has no wheezes. He has no rales.   Decreased bs at bases   Abdominal: Soft. Bowel sounds are normal. He exhibits no distension. There is no tenderness. There is no guarding.   Musculoskeletal: Normal range of motion. He exhibits no edema or tenderness.   Neurological: He is alert and oriented to person, place, and time. No cranial nerve deficit.   Skin: Skin is warm and dry. No rash noted. He is not diaphoretic. No erythema.   Psychiatric: He has a normal mood and affect. His behavior is normal. Judgment and thought content normal.   Nursing note and vitals reviewed.      Significant Labs:    Recent Results (from the past 24 hour(s))   POCT glucose    Collection Time: 02/15/18  5:46 PM   Result Value Ref Range    POCT Glucose 124 (H) 70 - 110 mg/dL   Comprehensive Metabolic Panel (CMP)    Collection Time: 02/15/18  7:30 PM   Result Value Ref Range    Sodium 129 (L) 136 - 145 mmol/L    Potassium 4.4 3.5 - 5.1 mmol/L    Chloride 92 (L) 95 - 110 mmol/L    CO2 23 23 - 29 mmol/L    Glucose 150 (H) 70 - 110 mg/dL    BUN, Bld 128 (H) 8 - 23 mg/dL    Creatinine 4.5 (H) 0.5 - 1.4 mg/dL    Calcium 7.6 (L) 8.7 - 10.5 mg/dL    Total Protein 6.0 6.0 - 8.4 g/dL    Albumin 1.7 (L) 3.5 - 5.2 g/dL    Total Bilirubin 1.8 (H) 0.1 - 1.0 mg/dL     Alkaline Phosphatase 291 (H) 55 - 135 U/L    AST 51 (H) 10 - 40 U/L    ALT 22 10 - 44 U/L    Anion Gap 14 8 - 16 mmol/L    eGFR if African American 14.8 (A) >60 mL/min/1.73 m^2    eGFR if non  12.8 (A) >60 mL/min/1.73 m^2   POCT glucose    Collection Time: 02/15/18  9:16 PM   Result Value Ref Range    POCT Glucose 169 (H) 70 - 110 mg/dL   Magnesium    Collection Time: 02/16/18  4:07 AM   Result Value Ref Range    Magnesium 3.2 (H) 1.6 - 2.6 mg/dL   Phosphorus    Collection Time: 02/16/18  4:07 AM   Result Value Ref Range    Phosphorus 6.1 (H) 2.7 - 4.5 mg/dL   CBC auto differential    Collection Time: 02/16/18  4:07 AM   Result Value Ref Range    WBC 22.88 (H) 3.90 - 12.70 K/uL    RBC 3.28 (L) 4.60 - 6.20 M/uL    Hemoglobin 9.0 (L) 14.0 - 18.0 g/dL    Hematocrit 27.4 (L) 40.0 - 54.0 %    MCV 84 82 - 98 fL    MCH 27.4 27.0 - 31.0 pg    MCHC 32.8 32.0 - 36.0 g/dL    RDW 15.8 (H) 11.5 - 14.5 %    Platelets 357 (H) 150 - 350 K/uL    MPV 10.3 9.2 - 12.9 fL    Immature Granulocytes 4.3 (H) 0.0 - 0.5 %    Gran # (ANC) 19.6 (H) 1.8 - 7.7 K/uL    Immature Grans (Abs) 0.98 (H) 0.00 - 0.04 K/uL    Lymph # 0.5 (L) 1.0 - 4.8 K/uL    Mono # 1.7 (H) 0.3 - 1.0 K/uL    Eos # 0.0 0.0 - 0.5 K/uL    Baso # 0.05 0.00 - 0.20 K/uL    nRBC 0 0 /100 WBC    Gran% 85.8 (H) 38.0 - 73.0 %    Lymph% 2.3 (L) 18.0 - 48.0 %    Mono% 7.3 4.0 - 15.0 %    Eosinophil% 0.1 0.0 - 8.0 %    Basophil% 0.2 0.0 - 1.9 %    Differential Method Automated    Ammonia    Collection Time: 02/16/18  4:07 AM   Result Value Ref Range    Ammonia 36 10 - 50 umol/L   Comprehensive Metabolic Panel (CMP)    Collection Time: 02/16/18  7:45 AM   Result Value Ref Range    Sodium 128 (L) 136 - 145 mmol/L    Potassium 4.3 3.5 - 5.1 mmol/L    Chloride 93 (L) 95 - 110 mmol/L    CO2 20 (L) 23 - 29 mmol/L    Glucose 151 (H) 70 - 110 mg/dL    BUN, Bld 134 (H) 8 - 23 mg/dL    Creatinine 4.8 (H) 0.5 - 1.4 mg/dL    Calcium 7.6 (L) 8.7 - 10.5 mg/dL    Total Protein 5.7  (L) 6.0 - 8.4 g/dL    Albumin 1.8 (L) 3.5 - 5.2 g/dL    Total Bilirubin 2.2 (H) 0.1 - 1.0 mg/dL    Alkaline Phosphatase 232 (H) 55 - 135 U/L    AST 42 (H) 10 - 40 U/L    ALT 20 10 - 44 U/L    Anion Gap 15 8 - 16 mmol/L    eGFR if African American 13.6 (A) >60 mL/min/1.73 m^2    eGFR if non  11.8 (A) >60 mL/min/1.73 m^2   Cortisol    Collection Time: 02/16/18  7:45 AM   Result Value Ref Range    Cortisol 18.8 ug/dL   POCT glucose    Collection Time: 02/16/18  8:16 AM   Result Value Ref Range    POCT Glucose 168 (H) 70 - 110 mg/dL   POCT glucose    Collection Time: 02/16/18 12:58 PM   Result Value Ref Range    POCT Glucose 143 (H) 70 - 110 mg/dL         Significant Imaging:  No new imaging in last 24 hours.      Assessment/Plan:     * Acute renal failure superimposed on stage 3 chronic kidney disease    This patient presents with ARTUR on CKD3, likely from pre-renal causes which include poor po intake, sepsis resulting in renal hypoperfusion, and 2 week history of diarrhea.  Re-evaluation with urine lytes with pre-renal etiology noted.  2D echo with CVP < 3.  BNP improved from admission.  Overall, current working hypothesis for ARTUR would be due to poor renal perfusion from hypoalbuminemia vs HRS.  No significant improvement thus far, will need to assess for another day with albumin.  Spoke with pathology, likely results to come back next week.      Plan  -- Continue albumin, midodrine, and octreotide   -- discontinue antihypertensives, will discontinue doxazosin  -- sodium bicarb 650 PO TID for acidosis  -- started sevelamer given hyperphosphatemia   -- ANCA ordered to assess for vasculitis induced renal failure  -- ordered stool ova/parasites given leukocytosis and diarrhea, previously seen by ID when patient without leukocytosis   -- continue labs, patient may need dialysis at a later time.  Spoke with family, agreeable to acute dialysis as needed             Jerardo Bueno MD  Nephrology  [facility]

## 2018-02-16 NOTE — PLAN OF CARE
Problem: Infection, Risk/Actual (Adult)  Goal: Infection Prevention/Resolution  Patient will demonstrate the desired outcomes by discharge/transition of care.   Outcome: Ongoing (interventions implemented as appropriate)   02/16/18 0446   Infection, Risk/Actual (Adult)   Infection Prevention/Resolution making progress toward outcome

## 2018-02-17 LAB
ALBUMIN SERPL BCP-MCNC: 2 G/DL
ALBUMIN SERPL BCP-MCNC: 2.2 G/DL
ALP SERPL-CCNC: 193 U/L
ALP SERPL-CCNC: 196 U/L
ALT SERPL W/O P-5'-P-CCNC: 17 U/L
ALT SERPL W/O P-5'-P-CCNC: 18 U/L
ANION GAP SERPL CALC-SCNC: 14 MMOL/L
ANION GAP SERPL CALC-SCNC: 18 MMOL/L
ANISOCYTOSIS BLD QL SMEAR: SLIGHT
AST SERPL-CCNC: 46 U/L
AST SERPL-CCNC: 46 U/L
BASOPHILS # BLD AUTO: 0.04 K/UL
BASOPHILS NFR BLD: 0.1 %
BILIRUB SERPL-MCNC: 3.8 MG/DL
BILIRUB SERPL-MCNC: 4.2 MG/DL
BUN SERPL-MCNC: 143 MG/DL
BUN SERPL-MCNC: 144 MG/DL
BURR CELLS BLD QL SMEAR: ABNORMAL
CALCIUM SERPL-MCNC: 7.5 MG/DL
CALCIUM SERPL-MCNC: 7.6 MG/DL
CHLORIDE SERPL-SCNC: 91 MMOL/L
CHLORIDE SERPL-SCNC: 94 MMOL/L
CO2 SERPL-SCNC: 20 MMOL/L
CO2 SERPL-SCNC: 22 MMOL/L
CREAT SERPL-MCNC: 4.9 MG/DL
CREAT SERPL-MCNC: 5.2 MG/DL
DIFFERENTIAL METHOD: ABNORMAL
EOSINOPHIL # BLD AUTO: 0 K/UL
EOSINOPHIL NFR BLD: 0.1 %
ERYTHROCYTE [DISTWIDTH] IN BLOOD BY AUTOMATED COUNT: 15.9 %
EST. GFR  (AFRICAN AMERICAN): 12.4 ML/MIN/1.73 M^2
EST. GFR  (AFRICAN AMERICAN): 13.3 ML/MIN/1.73 M^2
EST. GFR  (NON AFRICAN AMERICAN): 10.7 ML/MIN/1.73 M^2
EST. GFR  (NON AFRICAN AMERICAN): 11.5 ML/MIN/1.73 M^2
GLUCOSE SERPL-MCNC: 152 MG/DL
GLUCOSE SERPL-MCNC: 78 MG/DL
HCT VFR BLD AUTO: 26.5 %
HGB BLD-MCNC: 8.9 G/DL
HYPOCHROMIA BLD QL SMEAR: ABNORMAL
IMM GRANULOCYTES # BLD AUTO: 0.94 K/UL
IMM GRANULOCYTES NFR BLD AUTO: 3.5 %
LYMPHOCYTES # BLD AUTO: 0.5 K/UL
LYMPHOCYTES NFR BLD: 2 %
MAGNESIUM SERPL-MCNC: 3.2 MG/DL
MCH RBC QN AUTO: 27.6 PG
MCHC RBC AUTO-ENTMCNC: 33.6 G/DL
MCV RBC AUTO: 82 FL
MONOCYTES # BLD AUTO: 1.9 K/UL
MONOCYTES NFR BLD: 7.2 %
NEUTROPHILS # BLD AUTO: 23.6 K/UL
NEUTROPHILS NFR BLD: 87.1 %
NRBC BLD-RTO: 0 /100 WBC
PHOSPHATE SERPL-MCNC: 5.4 MG/DL
PLATELET # BLD AUTO: 334 K/UL
PLATELET BLD QL SMEAR: ABNORMAL
PMV BLD AUTO: 9.7 FL
POCT GLUCOSE: 139 MG/DL (ref 70–110)
POCT GLUCOSE: 142 MG/DL (ref 70–110)
POCT GLUCOSE: 75 MG/DL (ref 70–110)
POIKILOCYTOSIS BLD QL SMEAR: SLIGHT
POLYCHROMASIA BLD QL SMEAR: ABNORMAL
POTASSIUM SERPL-SCNC: 4.3 MMOL/L
POTASSIUM SERPL-SCNC: 4.7 MMOL/L
PROT SERPL-MCNC: 5.8 G/DL
PROT SERPL-MCNC: 5.8 G/DL
RBC # BLD AUTO: 3.22 M/UL
SODIUM SERPL-SCNC: 129 MMOL/L
SODIUM SERPL-SCNC: 130 MMOL/L
URATE SERPL-MCNC: 21.3 MG/DL
WBC # BLD AUTO: 27.12 K/UL

## 2018-02-17 PROCEDURE — 80053 COMPREHEN METABOLIC PANEL: CPT

## 2018-02-17 PROCEDURE — 25000003 PHARM REV CODE 250: Performed by: INTERNAL MEDICINE

## 2018-02-17 PROCEDURE — 99232 SBSQ HOSP IP/OBS MODERATE 35: CPT | Mod: ,,, | Performed by: INTERNAL MEDICINE

## 2018-02-17 PROCEDURE — 11000001 HC ACUTE MED/SURG PRIVATE ROOM

## 2018-02-17 PROCEDURE — 86255 FLUORESCENT ANTIBODY SCREEN: CPT | Mod: 91

## 2018-02-17 PROCEDURE — P9047 ALBUMIN (HUMAN), 25%, 50ML: HCPCS | Mod: JG | Performed by: INTERNAL MEDICINE

## 2018-02-17 PROCEDURE — 84550 ASSAY OF BLOOD/URIC ACID: CPT

## 2018-02-17 PROCEDURE — 63600175 PHARM REV CODE 636 W HCPCS: Performed by: INTERNAL MEDICINE

## 2018-02-17 PROCEDURE — 85025 COMPLETE CBC W/AUTO DIFF WBC: CPT

## 2018-02-17 PROCEDURE — 25000003 PHARM REV CODE 250

## 2018-02-17 PROCEDURE — 83735 ASSAY OF MAGNESIUM: CPT

## 2018-02-17 PROCEDURE — 84100 ASSAY OF PHOSPHORUS: CPT

## 2018-02-17 PROCEDURE — 36415 COLL VENOUS BLD VENIPUNCTURE: CPT

## 2018-02-17 RX ADMIN — SODIUM BICARBONATE 650 MG TABLET 650 MG: at 03:02

## 2018-02-17 RX ADMIN — MIDODRINE HYDROCHLORIDE 10 MG: 5 TABLET ORAL at 06:02

## 2018-02-17 RX ADMIN — ALBUMIN (HUMAN) 12.5 G: 12.5 SOLUTION INTRAVENOUS at 08:02

## 2018-02-17 RX ADMIN — OCTREOTIDE ACETATE 50 MCG/HR: 1000 INJECTION, SOLUTION INTRAVENOUS; SUBCUTANEOUS at 08:02

## 2018-02-17 RX ADMIN — FLECAINIDE ACETATE 50 MG: 50 TABLET ORAL at 08:02

## 2018-02-17 RX ADMIN — SIMVASTATIN 20 MG: 20 TABLET, FILM COATED ORAL at 08:02

## 2018-02-17 RX ADMIN — OCTREOTIDE ACETATE 50 MCG/HR: 1000 INJECTION, SOLUTION INTRAVENOUS; SUBCUTANEOUS at 11:02

## 2018-02-17 RX ADMIN — SEVELAMER CARBONATE 800 MG: 800 TABLET, FILM COATED ORAL at 01:02

## 2018-02-17 RX ADMIN — SEVELAMER CARBONATE 800 MG: 800 TABLET, FILM COATED ORAL at 06:02

## 2018-02-17 RX ADMIN — MIDODRINE HYDROCHLORIDE 10 MG: 5 TABLET ORAL at 10:02

## 2018-02-17 RX ADMIN — SODIUM BICARBONATE 650 MG TABLET 650 MG: at 06:02

## 2018-02-17 RX ADMIN — SODIUM BICARBONATE 650 MG TABLET 650 MG: at 10:02

## 2018-02-17 RX ADMIN — SEVELAMER CARBONATE 800 MG: 800 TABLET, FILM COATED ORAL at 08:02

## 2018-02-17 RX ADMIN — MIDODRINE HYDROCHLORIDE 10 MG: 5 TABLET ORAL at 03:02

## 2018-02-17 NOTE — PROGRESS NOTES
[facility]  Nephrology  Progress Note    Patient Name: Jose Cruz Lira  MRN: [id number]  Admission Date: 2/8/2018  Hospital Length of Stay: 8 days  Attending Provider: Easton Andino MD   Primary Care Physician: Lisa Capone MD  Principal Problem:Acute renal failure superimposed on stage 3 chronic kidney disease    Subjective:     HPI: 66 yo M with PMH of CKD3, left hydronephrosis s/p nephrectomy August 2017, HTN, DMII (long term insulin use), persistent atrial fibrillation (s/p DCCV 2016) on flecainide and Eliquis. He presented for RHC appt, was noted to have worsened LFTs, leukocytosis, and was sent to ED for further workup. Patient has been short of breath for several month with symptoms have been getting worse over the past few weeks. He has dyspnea with exertion and at rest currently, chills, cough off and on, fatigue.  He denies chest pain.  He does take his lasix twice daily and reports urine output has decreased lately in last few days, reports wt loss recently, has had difficulty sleeping lately 2/2 orthopnea, reports stable / decrease in LE swelling, unsure of PND. Patient does report recent travel to [facility] 12/17. He also reports that for the past 2 weeks, he has been having diarrhea.     Patient had recent admission for CHF exacerbation 2/1 and discharged 2/3, recent ED visit for same complaints noted to have leukocytosis with neutrophilic prodominance, bili noted to be 2.0, Cr 2.1, blood cultures drawn NGTD, referred for sleep study however did not receive yet though high suspicion for MONICA. Exam with cards noted for + johnson's sign, HIDA scan ordered was negative for cholecystitis however noted filling defects in liver, recommended f/u imaging. RHC was also ordered and was to be done 2/8 however worsened labs (leukocytosis, LFTs) prompted referral to ED for evaluation.    Nephrology was consulted for ARTUR on CKD. He has been followed by nephrologist Dr. Anderson. Baseline  "Cr is 1.7. He denies NSAID use. He has a history of left total nephrectomy in August 2017 due to left hydronephrosis (etiology is not clear). He notes that his po intake has been poor due to fatigue. Urine output has also decreased. Denies dysuria and hematuria. Endorses nocturia. States that he has been having "loose stools" for the past 2 weeks    Interval History:  Patient not seen in room this AM.  Seen later with family, on wheelchair going outside.  No significant changes from previous.      Review of patient's allergies indicates:  No Known Allergies  Current Facility-Administered Medications   Medication Frequency    albumin human 25% bottle 12.5 g BID    calcium carbonate 200 mg calcium (500 mg) chewable tablet 500 mg Daily PRN    dextrose 50% injection 12.5 g PRN    dextrose 50% injection 25 g PRN    flecainide tablet 50 mg Q12H    glucagon (human recombinant) injection 1 mg PRN    glucose chewable tablet 16 g PRN    glucose chewable tablet 24 g PRN    insulin aspart pen 1-10 Units QID (AC + HS) PRN    insulin detemir pen 12 Units Daily    midodrine tablet 10 mg TID    octreotide (SANDOSTATIN) 500 mcg in sodium chloride 0.9% 100 mL infusion Continuous    sevelamer carbonate tablet 800 mg TID WM    simvastatin tablet 20 mg QHS    sodium bicarbonate tablet 650 mg TID    sodium chloride 0.9% flush 5 mL PRN    zolpidem tablet 5 mg Nightly PRN       Objective:     Vital Signs (Most Recent):  Temp: 98.6 °F (37 °C) (02/16/18 1601)  Pulse: 73 (02/16/18 1601)  Resp: 18 (02/16/18 1601)  BP: (!) 93/50 (02/16/18 1601)  SpO2: 95 % (02/16/18 1601)  O2 Device (Oxygen Therapy): room air (02/16/18 1601) Vital Signs (24h Range):  Temp:  [97.6 °F (36.4 °C)-98.9 °F (37.2 °C)] 98.6 °F (37 °C)  Pulse:  [69-76] 73  Resp:  [18-20] 18  SpO2:  [92 %-95 %] 95 %  BP: ()/(50-71) 93/50     Weight: 130 kg (286 lb 9.6 oz) (02/13/18 0424)  Body mass index is 36.8 kg/m².  Body surface area is 2.61 meters " squared.    I/O last 3 completed shifts:  In: 960 [P.O.:960]  Out: 250 [Urine:250]    Physical Exam   Constitutional: He is oriented to person, place, and time. He appears well-developed and well-nourished. No distress.   HENT:   Head: Normocephalic and atraumatic.   Mouth/Throat: Oropharynx is clear and moist. No oropharyngeal exudate.   Eyes: Conjunctivae and EOM are normal. Pupils are equal, round, and reactive to light. Right eye exhibits no discharge. Left eye exhibits no discharge. No scleral icterus.   Neck: Normal range of motion. Neck supple. No thyromegaly present.   Cardiovascular: Normal rate, regular rhythm and normal heart sounds.    No murmur heard.  Pulmonary/Chest: No respiratory distress. He has no wheezes. He has no rales.   Decreased bs at bases   Abdominal: Soft. Bowel sounds are normal. He exhibits no distension. There is no tenderness. There is no guarding.   Musculoskeletal: Normal range of motion. He exhibits edema (4+). He exhibits no tenderness.   Neurological: He is alert and oriented to person, place, and time. No cranial nerve deficit.   Skin: Skin is warm and dry. No rash noted. He is not diaphoretic. No erythema.   Psychiatric: He has a normal mood and affect. His behavior is normal. Judgment and thought content normal.   Nursing note and vitals reviewed.      Significant Labs:    Recent Results (from the past 24 hour(s))   Comprehensive Metabolic Panel (CMP)    Collection Time: 02/15/18  7:30 PM   Result Value Ref Range    Sodium 129 (L) 136 - 145 mmol/L    Potassium 4.4 3.5 - 5.1 mmol/L    Chloride 92 (L) 95 - 110 mmol/L    CO2 23 23 - 29 mmol/L    Glucose 150 (H) 70 - 110 mg/dL    BUN, Bld 128 (H) 8 - 23 mg/dL    Creatinine 4.5 (H) 0.5 - 1.4 mg/dL    Calcium 7.6 (L) 8.7 - 10.5 mg/dL    Total Protein 6.0 6.0 - 8.4 g/dL    Albumin 1.7 (L) 3.5 - 5.2 g/dL    Total Bilirubin 1.8 (H) 0.1 - 1.0 mg/dL    Alkaline Phosphatase 291 (H) 55 - 135 U/L    AST 51 (H) 10 - 40 U/L    ALT 22 10 - 44  U/L    Anion Gap 14 8 - 16 mmol/L    eGFR if African American 14.8 (A) >60 mL/min/1.73 m^2    eGFR if non  12.8 (A) >60 mL/min/1.73 m^2   POCT glucose    Collection Time: 02/15/18  9:16 PM   Result Value Ref Range    POCT Glucose 169 (H) 70 - 110 mg/dL   Magnesium    Collection Time: 02/16/18  4:07 AM   Result Value Ref Range    Magnesium 3.2 (H) 1.6 - 2.6 mg/dL   Phosphorus    Collection Time: 02/16/18  4:07 AM   Result Value Ref Range    Phosphorus 6.1 (H) 2.7 - 4.5 mg/dL   CBC auto differential    Collection Time: 02/16/18  4:07 AM   Result Value Ref Range    WBC 22.88 (H) 3.90 - 12.70 K/uL    RBC 3.28 (L) 4.60 - 6.20 M/uL    Hemoglobin 9.0 (L) 14.0 - 18.0 g/dL    Hematocrit 27.4 (L) 40.0 - 54.0 %    MCV 84 82 - 98 fL    MCH 27.4 27.0 - 31.0 pg    MCHC 32.8 32.0 - 36.0 g/dL    RDW 15.8 (H) 11.5 - 14.5 %    Platelets 357 (H) 150 - 350 K/uL    MPV 10.3 9.2 - 12.9 fL    Immature Granulocytes 4.3 (H) 0.0 - 0.5 %    Gran # (ANC) 19.6 (H) 1.8 - 7.7 K/uL    Immature Grans (Abs) 0.98 (H) 0.00 - 0.04 K/uL    Lymph # 0.5 (L) 1.0 - 4.8 K/uL    Mono # 1.7 (H) 0.3 - 1.0 K/uL    Eos # 0.0 0.0 - 0.5 K/uL    Baso # 0.05 0.00 - 0.20 K/uL    nRBC 0 0 /100 WBC    Gran% 85.8 (H) 38.0 - 73.0 %    Lymph% 2.3 (L) 18.0 - 48.0 %    Mono% 7.3 4.0 - 15.0 %    Eosinophil% 0.1 0.0 - 8.0 %    Basophil% 0.2 0.0 - 1.9 %    Differential Method Automated    Ammonia    Collection Time: 02/16/18  4:07 AM   Result Value Ref Range    Ammonia 36 10 - 50 umol/L   Comprehensive Metabolic Panel (CMP)    Collection Time: 02/16/18  7:45 AM   Result Value Ref Range    Sodium 128 (L) 136 - 145 mmol/L    Potassium 4.3 3.5 - 5.1 mmol/L    Chloride 93 (L) 95 - 110 mmol/L    CO2 20 (L) 23 - 29 mmol/L    Glucose 151 (H) 70 - 110 mg/dL    BUN, Bld 134 (H) 8 - 23 mg/dL    Creatinine 4.8 (H) 0.5 - 1.4 mg/dL    Calcium 7.6 (L) 8.7 - 10.5 mg/dL    Total Protein 5.7 (L) 6.0 - 8.4 g/dL    Albumin 1.8 (L) 3.5 - 5.2 g/dL    Total Bilirubin 2.2 (H) 0.1 -  1.0 mg/dL    Alkaline Phosphatase 232 (H) 55 - 135 U/L    AST 42 (H) 10 - 40 U/L    ALT 20 10 - 44 U/L    Anion Gap 15 8 - 16 mmol/L    eGFR if African American 13.6 (A) >60 mL/min/1.73 m^2    eGFR if non  11.8 (A) >60 mL/min/1.73 m^2   Cortisol    Collection Time: 02/16/18  7:45 AM   Result Value Ref Range    Cortisol 18.8 ug/dL   POCT glucose    Collection Time: 02/16/18  8:16 AM   Result Value Ref Range    POCT Glucose 168 (H) 70 - 110 mg/dL   POCT glucose    Collection Time: 02/16/18 12:58 PM   Result Value Ref Range    POCT Glucose 143 (H) 70 - 110 mg/dL   POCT glucose    Collection Time: 02/16/18  5:44 PM   Result Value Ref Range    POCT Glucose 165 (H) 70 - 110 mg/dL         Significant Imaging:  No new imaging in last 24 hours.      Assessment/Plan:     * Acute renal failure superimposed on stage 3 chronic kidney disease    This patient presents with ARTUR on CKD3, likely from pre-renal causes which include poor po intake, sepsis resulting in renal hypoperfusion, and 2 week history of diarrhea.  Re-evaluation with urine lytes with pre-renal etiology noted.  2D echo with CVP < 3.  BNP improved from admission.  Overall, current working hypothesis for ARTUR would be due to poor renal perfusion from hypoalbuminemia vs HRS.  No significant improvement thus far, will need to assess for another day with albumin.  Spoke with pathology, likely results to come back next week.      Plan  -- Continue albumin, midodrine, and octreotide   -- continue to hold antihypertensive medication   -- sodium bicarb 650 PO TID for acidosis  -- started sevelamer given hyperphosphatemia   -- ANCA ordered, in process  -- Worsening leukocytosis with diarrhea concerning, awaiting stool studies  -- Patient may benefit from GI consult for chronic diarrhea of currently unknown etiology.  May also need oncology consult with worsening leukocytosis with no symptoms/source for infection.  Imaging previously without evidence of  lymphadenopathy   -- Given shortness of breath, patient may need to be transferred to ICU for CRRT.  Will need SLED for metabolic clearance and volume removal.  May also need vasopressors given hypotension and requirement for midodrine             Jerardo Bueno MD  Nephrology  [facility]

## 2018-02-17 NOTE — PLAN OF CARE
Problem: Infection, Risk/Actual (Adult)  Goal: Infection Prevention/Resolution  Patient will demonstrate the desired outcomes by discharge/transition of care.   Outcome: Ongoing (interventions implemented as appropriate)   02/17/18 0422   Infection, Risk/Actual (Adult)   Infection Prevention/Resolution making progress toward outcome

## 2018-02-17 NOTE — ASSESSMENT & PLAN NOTE
This patient presents with ARTUR on CKD3, likely from pre-renal causes which include poor po intake, sepsis resulting in renal hypoperfusion, and 2 week history of diarrhea.  Re-evaluation with urine lytes with pre-renal etiology noted.  2D echo with CVP < 3.  BNP improved from admission.  Overall, current working hypothesis for ARTUR would be due to poor renal perfusion from hypoalbuminemia vs HRS.  No significant improvement thus far, will need to assess for another day with albumin.  Spoke with pathology, likely results to come back next week.      Plan  -- Continue albumin, midodrine, and octreotide   -- continue to hold antihypertensive medication   -- sodium bicarb 650 PO TID for acidosis  -- started sevelamer given hyperphosphatemia   -- ANCA ordered, in process  -- Worsening leukocytosis with diarrhea concerning, awaiting stool studies  -- Patient may benefit from GI consult for chronic diarrhea of currently unknown etiology.  May also need oncology consult with worsening leukocytosis with no symptoms/source for infection.  Imaging previously without evidence of lymphadenopathy   -- Given shortness of breath, patient may need to be transferred to ICU for CRRT.  Will need SLED for metabolic clearance and volume removal.  May also need vasopressors given hypotension and requirement for midodrine

## 2018-02-17 NOTE — PROGRESS NOTES
Progress Note   Hospital Medicine       Team: [facility]OS[facility] MED B Easton Andino MD  Admit Date: 2/8/2018  Length of Stay:  LOS: 8 days   DAVIE 2/17/2018  Principal Problem:  Acute renal failure superimposed on stage 3 chronic kidney disease    Interval hx / overnight events: Last renal labs no worse, hopefully stabalizing. No overt uremic symptoms, but appetite is down. Consider RRT. Will discuss with renal today.    Areas of concern/ handoff:    Review of Systems   Musculoskeletal: Positive for myalgias.   Neurological: Positive for headaches.   Psychiatric/Behavioral: The patient is nervous/anxious.        Temp:  [97.6 °F (36.4 °C)-99 °F (37.2 °C)]   Pulse:  [69-78]   Resp:  [17-18]   BP: ()/(46-71)   SpO2:  [92 %-95 %]       Physical Exam   Constitutional: He is oriented to person, place, and time.   HENT:   Head: Atraumatic.   Eyes: Pupils are equal, round, and reactive to light.   Neck: Neck supple.   Cardiovascular: Normal rate and regular rhythm.    Pulmonary/Chest: Breath sounds normal.   Abdominal: Soft.   Neurological: He is alert and oriented to person, place, and time.   Skin: Skin is warm and dry.       Recent Labs  Lab 02/12/18  0519 02/13/18  0403 02/14/18  0522 02/15/18  0404 02/16/18  0407 02/17/18  0321   WBC 20.83* 19.45* 20.82* 22.95* 22.88* 27.12*   HGB 9.0* 9.2* 9.0* 9.3* 9.0* 8.9*   HCT 26.4* 27.9* 27.3* 28.0* 27.4* 26.5*   * 373* 383* 394* 357* 334       Recent Labs  Lab 02/15/18  0404  02/15/18  1930 02/16/18  0407 02/16/18  0745 02/16/18  1940 02/17/18  0321   NA  --   < > 129*  --  128* 128*  --    K  --   < > 4.4  --  4.3 4.5  --    CL  --   < > 92*  --  93* 93*  --    CO2  --   < > 23  --  20* 21*  --    BUN  --   < > 128*  --  134* 134*  --    CREATININE  --   < > 4.5*  --  4.8* 4.7*  --    GLU  --   < > 150*  --  151* 137*  --    CALCIUM  --   < > 7.6*  --  7.6* 7.5*  --    MG 3.0*  --   --  3.2*  --   --  3.2*   PHOS 5.3*  --   --  6.1*  --   --  5.4*   < > = values in this  interval not displayed.    Recent Labs  Lab 02/15/18  1930 02/16/18  0745 02/16/18  1940   ALKPHOS 291* 232* 226*   ALT 22 20 19   AST 51* 42* 44*   ALBUMIN 1.7* 1.8* 1.9*   PROT 6.0 5.7* 5.9*   BILITOT 1.8* 2.2* 2.8*       Recent Labs  Lab 02/15/18  2116 02/16/18  0816 02/16/18  1258 02/16/18  1744 02/16/18  2037 02/17/18  0843   POCTGLUCOSE 169* 168* 143* 165* 125* 75            albumin human 25%  12.5 g Intravenous BID    flecainide  50 mg Oral Q12H    insulin detemir  12 Units Subcutaneous Daily    midodrine  10 mg Oral TID    sevelamer carbonate  800 mg Oral TID WM    simvastatin  20 mg Oral QHS    sodium bicarbonate  650 mg Oral TID       Assessment and Plan     Active Hospital Problems    Diagnosis  POA    *Acute renal failure superimposed on stage 3 chronic kidney disease [N17.9, N18.3]  Yes    Arterial hypotension [I95.9]  Yes    Hyponatremia [E87.1]  Yes    Hypoalbuminemia [E88.09]  Yes    Leukemoid reaction [D72.823]  Yes    Abnormal liver function tests [R79.89]  Yes    Abnormal liver scan [R93.2]  Yes    D-dimer, elevated [R79.89]  Yes    Current use of long term anticoagulation [Z79.01]  Not Applicable    Persistent atrial fibrillation [I48.1]  Yes    Isolated proteinuria without specific morphologic lesion [R80.0]  Yes    BMI 34.0-34.9,adult [Z68.34]  Not Applicable    Hyperlipidemia associated with type 2 diabetes mellitus [E11.69, E78.5]  Yes    Hypertension associated with diabetes [E11.59, I10]  Yes     Chronic    Type 2 diabetes mellitus with stage 3 chronic kidney disease, with long-term current use of insulin [E11.22, N18.3, Z79.4]  Not Applicable      Resolved Hospital Problems    Diagnosis Date Resolved POA   No resolved problems to display.       LIver Mass  Biopsy Pending at [facility]     ARF  ATN and pre renal, worsening  Albumin, midodrine and octreotide started   Cards consult  Remains unchanged, BUN worsening  RRT may be needed, spoke with renal today 2/17, may  start today  Will need line placement  BP is up, CRRT vs HD     Leukamoid Recation  Malignacy ?  Will get Hem Onc to review smear  WBC 27 on 2/17     Disposition: To home     Time spent in care of the patient (Greater than 1/2 spent in direct face-to-face contact) 30 minutes     Easton Andino MD

## 2018-02-17 NOTE — SUBJECTIVE & OBJECTIVE
Interval History:  Patient not seen in room this AM.  Seen later with family, on wheelchair going outside.  No significant changes from previous.      Review of patient's allergies indicates:  No Known Allergies  Current Facility-Administered Medications   Medication Frequency    albumin human 25% bottle 12.5 g BID    calcium carbonate 200 mg calcium (500 mg) chewable tablet 500 mg Daily PRN    dextrose 50% injection 12.5 g PRN    dextrose 50% injection 25 g PRN    flecainide tablet 50 mg Q12H    glucagon (human recombinant) injection 1 mg PRN    glucose chewable tablet 16 g PRN    glucose chewable tablet 24 g PRN    insulin aspart pen 1-10 Units QID (AC + HS) PRN    insulin detemir pen 12 Units Daily    midodrine tablet 10 mg TID    octreotide (SANDOSTATIN) 500 mcg in sodium chloride 0.9% 100 mL infusion Continuous    sevelamer carbonate tablet 800 mg TID WM    simvastatin tablet 20 mg QHS    sodium bicarbonate tablet 650 mg TID    sodium chloride 0.9% flush 5 mL PRN    zolpidem tablet 5 mg Nightly PRN       Objective:     Vital Signs (Most Recent):  Temp: 98.6 °F (37 °C) (02/16/18 1601)  Pulse: 73 (02/16/18 1601)  Resp: 18 (02/16/18 1601)  BP: (!) 93/50 (02/16/18 1601)  SpO2: 95 % (02/16/18 1601)  O2 Device (Oxygen Therapy): room air (02/16/18 1601) Vital Signs (24h Range):  Temp:  [97.6 °F (36.4 °C)-98.9 °F (37.2 °C)] 98.6 °F (37 °C)  Pulse:  [69-76] 73  Resp:  [18-20] 18  SpO2:  [92 %-95 %] 95 %  BP: ()/(50-71) 93/50     Weight: 130 kg (286 lb 9.6 oz) (02/13/18 0424)  Body mass index is 36.8 kg/m².  Body surface area is 2.61 meters squared.    I/O last 3 completed shifts:  In: 960 [P.O.:960]  Out: 250 [Urine:250]    Physical Exam   Constitutional: He is oriented to person, place, and time. He appears well-developed and well-nourished. No distress.   HENT:   Head: Normocephalic and atraumatic.   Mouth/Throat: Oropharynx is clear and moist. No oropharyngeal exudate.   Eyes: Conjunctivae and  EOM are normal. Pupils are equal, round, and reactive to light. Right eye exhibits no discharge. Left eye exhibits no discharge. No scleral icterus.   Neck: Normal range of motion. Neck supple. No thyromegaly present.   Cardiovascular: Normal rate, regular rhythm and normal heart sounds.    No murmur heard.  Pulmonary/Chest: No respiratory distress. He has no wheezes. He has no rales.   Decreased bs at bases   Abdominal: Soft. Bowel sounds are normal. He exhibits no distension. There is no tenderness. There is no guarding.   Musculoskeletal: Normal range of motion. He exhibits edema (4+). He exhibits no tenderness.   Neurological: He is alert and oriented to person, place, and time. No cranial nerve deficit.   Skin: Skin is warm and dry. No rash noted. He is not diaphoretic. No erythema.   Psychiatric: He has a normal mood and affect. His behavior is normal. Judgment and thought content normal.   Nursing note and vitals reviewed.      Significant Labs:    Recent Results (from the past 24 hour(s))   Comprehensive Metabolic Panel (CMP)    Collection Time: 02/15/18  7:30 PM   Result Value Ref Range    Sodium 129 (L) 136 - 145 mmol/L    Potassium 4.4 3.5 - 5.1 mmol/L    Chloride 92 (L) 95 - 110 mmol/L    CO2 23 23 - 29 mmol/L    Glucose 150 (H) 70 - 110 mg/dL    BUN, Bld 128 (H) 8 - 23 mg/dL    Creatinine 4.5 (H) 0.5 - 1.4 mg/dL    Calcium 7.6 (L) 8.7 - 10.5 mg/dL    Total Protein 6.0 6.0 - 8.4 g/dL    Albumin 1.7 (L) 3.5 - 5.2 g/dL    Total Bilirubin 1.8 (H) 0.1 - 1.0 mg/dL    Alkaline Phosphatase 291 (H) 55 - 135 U/L    AST 51 (H) 10 - 40 U/L    ALT 22 10 - 44 U/L    Anion Gap 14 8 - 16 mmol/L    eGFR if African American 14.8 (A) >60 mL/min/1.73 m^2    eGFR if non  12.8 (A) >60 mL/min/1.73 m^2   POCT glucose    Collection Time: 02/15/18  9:16 PM   Result Value Ref Range    POCT Glucose 169 (H) 70 - 110 mg/dL   Magnesium    Collection Time: 02/16/18  4:07 AM   Result Value Ref Range    Magnesium 3.2 (H)  1.6 - 2.6 mg/dL   Phosphorus    Collection Time: 02/16/18  4:07 AM   Result Value Ref Range    Phosphorus 6.1 (H) 2.7 - 4.5 mg/dL   CBC auto differential    Collection Time: 02/16/18  4:07 AM   Result Value Ref Range    WBC 22.88 (H) 3.90 - 12.70 K/uL    RBC 3.28 (L) 4.60 - 6.20 M/uL    Hemoglobin 9.0 (L) 14.0 - 18.0 g/dL    Hematocrit 27.4 (L) 40.0 - 54.0 %    MCV 84 82 - 98 fL    MCH 27.4 27.0 - 31.0 pg    MCHC 32.8 32.0 - 36.0 g/dL    RDW 15.8 (H) 11.5 - 14.5 %    Platelets 357 (H) 150 - 350 K/uL    MPV 10.3 9.2 - 12.9 fL    Immature Granulocytes 4.3 (H) 0.0 - 0.5 %    Gran # (ANC) 19.6 (H) 1.8 - 7.7 K/uL    Immature Grans (Abs) 0.98 (H) 0.00 - 0.04 K/uL    Lymph # 0.5 (L) 1.0 - 4.8 K/uL    Mono # 1.7 (H) 0.3 - 1.0 K/uL    Eos # 0.0 0.0 - 0.5 K/uL    Baso # 0.05 0.00 - 0.20 K/uL    nRBC 0 0 /100 WBC    Gran% 85.8 (H) 38.0 - 73.0 %    Lymph% 2.3 (L) 18.0 - 48.0 %    Mono% 7.3 4.0 - 15.0 %    Eosinophil% 0.1 0.0 - 8.0 %    Basophil% 0.2 0.0 - 1.9 %    Differential Method Automated    Ammonia    Collection Time: 02/16/18  4:07 AM   Result Value Ref Range    Ammonia 36 10 - 50 umol/L   Comprehensive Metabolic Panel (CMP)    Collection Time: 02/16/18  7:45 AM   Result Value Ref Range    Sodium 128 (L) 136 - 145 mmol/L    Potassium 4.3 3.5 - 5.1 mmol/L    Chloride 93 (L) 95 - 110 mmol/L    CO2 20 (L) 23 - 29 mmol/L    Glucose 151 (H) 70 - 110 mg/dL    BUN, Bld 134 (H) 8 - 23 mg/dL    Creatinine 4.8 (H) 0.5 - 1.4 mg/dL    Calcium 7.6 (L) 8.7 - 10.5 mg/dL    Total Protein 5.7 (L) 6.0 - 8.4 g/dL    Albumin 1.8 (L) 3.5 - 5.2 g/dL    Total Bilirubin 2.2 (H) 0.1 - 1.0 mg/dL    Alkaline Phosphatase 232 (H) 55 - 135 U/L    AST 42 (H) 10 - 40 U/L    ALT 20 10 - 44 U/L    Anion Gap 15 8 - 16 mmol/L    eGFR if African American 13.6 (A) >60 mL/min/1.73 m^2    eGFR if non  11.8 (A) >60 mL/min/1.73 m^2   Cortisol    Collection Time: 02/16/18  7:45 AM   Result Value Ref Range    Cortisol 18.8 ug/dL   POCT glucose     Collection Time: 02/16/18  8:16 AM   Result Value Ref Range    POCT Glucose 168 (H) 70 - 110 mg/dL   POCT glucose    Collection Time: 02/16/18 12:58 PM   Result Value Ref Range    POCT Glucose 143 (H) 70 - 110 mg/dL   POCT glucose    Collection Time: 02/16/18  5:44 PM   Result Value Ref Range    POCT Glucose 165 (H) 70 - 110 mg/dL         Significant Imaging:  No new imaging in last 24 hours.

## 2018-02-18 PROBLEM — I35.9 NONRHEUMATIC AORTIC VALVE DISORDER: Status: ACTIVE | Noted: 2018-02-18

## 2018-02-18 PROBLEM — R79.89 ELEVATED D-DIMER: Status: ACTIVE | Noted: 2018-02-18

## 2018-02-18 PROBLEM — R06.02 SOB (SHORTNESS OF BREATH): Status: ACTIVE | Noted: 2018-02-18

## 2018-02-18 PROBLEM — D72.829 LEUKOCYTOSIS: Status: ACTIVE | Noted: 2018-02-18

## 2018-02-18 PROBLEM — N17.9 AKI (ACUTE KIDNEY INJURY): Status: ACTIVE | Noted: 2018-02-18

## 2018-02-18 LAB
ALBUMIN SERPL BCP-MCNC: 2.2 G/DL
ALBUMIN SERPL BCP-MCNC: 2.3 G/DL
ALP SERPL-CCNC: 189 U/L
ALT SERPL W/O P-5'-P-CCNC: 18 U/L
ANION GAP SERPL CALC-SCNC: 13 MMOL/L
ANION GAP SERPL CALC-SCNC: 21 MMOL/L
AST SERPL-CCNC: 48 U/L
BASOPHILS # BLD AUTO: 0.05 K/UL
BASOPHILS NFR BLD: 0.2 %
BILIRUB SERPL-MCNC: 5.3 MG/DL
BUN SERPL-MCNC: 115 MG/DL
BUN SERPL-MCNC: 151 MG/DL
CALCIUM SERPL-MCNC: 7.4 MG/DL
CALCIUM SERPL-MCNC: 7.8 MG/DL
CEA SERPL-MCNC: 5.8 NG/ML
CHLORIDE SERPL-SCNC: 92 MMOL/L
CHLORIDE SERPL-SCNC: 96 MMOL/L
CO2 SERPL-SCNC: 15 MMOL/L
CO2 SERPL-SCNC: 23 MMOL/L
CREAT SERPL-MCNC: 4.2 MG/DL
CREAT SERPL-MCNC: 5.6 MG/DL
DIFFERENTIAL METHOD: ABNORMAL
EOSINOPHIL # BLD AUTO: 0 K/UL
EOSINOPHIL NFR BLD: 0 %
ERYTHROCYTE [DISTWIDTH] IN BLOOD BY AUTOMATED COUNT: 16.6 %
EST. GFR  (AFRICAN AMERICAN): 11.3 ML/MIN/1.73 M^2
EST. GFR  (AFRICAN AMERICAN): 16 ML/MIN/1.73 M^2
EST. GFR  (NON AFRICAN AMERICAN): 13.9 ML/MIN/1.73 M^2
EST. GFR  (NON AFRICAN AMERICAN): 9.8 ML/MIN/1.73 M^2
GLUCOSE SERPL-MCNC: 103 MG/DL
GLUCOSE SERPL-MCNC: 163 MG/DL
HCT VFR BLD AUTO: 28.3 %
HGB BLD-MCNC: 9.4 G/DL
IMM GRANULOCYTES # BLD AUTO: 0.87 K/UL
IMM GRANULOCYTES NFR BLD AUTO: 2.8 %
LDH SERPL L TO P-CCNC: 278 U/L
LYMPHOCYTES # BLD AUTO: 0.5 K/UL
LYMPHOCYTES NFR BLD: 1.6 %
MAGNESIUM SERPL-MCNC: 3 MG/DL
MAGNESIUM SERPL-MCNC: 3.4 MG/DL
MCH RBC QN AUTO: 27.5 PG
MCHC RBC AUTO-ENTMCNC: 33.2 G/DL
MCV RBC AUTO: 83 FL
MONOCYTES # BLD AUTO: 1.9 K/UL
MONOCYTES NFR BLD: 6.2 %
NEUTROPHILS # BLD AUTO: 27.8 K/UL
NEUTROPHILS NFR BLD: 89.2 %
NRBC BLD-RTO: 0 /100 WBC
PHOSPHATE SERPL-MCNC: 5.1 MG/DL
PHOSPHATE SERPL-MCNC: 7 MG/DL
PLATELET # BLD AUTO: 345 K/UL
PMV BLD AUTO: 10.2 FL
POCT GLUCOSE: 107 MG/DL (ref 70–110)
POCT GLUCOSE: 168 MG/DL (ref 70–110)
POCT GLUCOSE: 170 MG/DL (ref 70–110)
POCT GLUCOSE: 173 MG/DL (ref 70–110)
POCT GLUCOSE: 190 MG/DL (ref 70–110)
POTASSIUM SERPL-SCNC: 4.7 MMOL/L
POTASSIUM SERPL-SCNC: 5.1 MMOL/L
PROT SERPL-MCNC: 6 G/DL
RBC # BLD AUTO: 3.42 M/UL
SODIUM SERPL-SCNC: 128 MMOL/L
SODIUM SERPL-SCNC: 132 MMOL/L
WBC # BLD AUTO: 31.17 K/UL

## 2018-02-18 PROCEDURE — 99233 SBSQ HOSP IP/OBS HIGH 50: CPT | Mod: ,,, | Performed by: INTERNAL MEDICINE

## 2018-02-18 PROCEDURE — 85060 BLOOD SMEAR INTERPRETATION: CPT | Mod: ,,, | Performed by: PATHOLOGY

## 2018-02-18 PROCEDURE — 25000003 PHARM REV CODE 250: Performed by: INTERNAL MEDICINE

## 2018-02-18 PROCEDURE — P9047 ALBUMIN (HUMAN), 25%, 50ML: HCPCS | Mod: JG | Performed by: INTERNAL MEDICINE

## 2018-02-18 PROCEDURE — 63600175 PHARM REV CODE 636 W HCPCS: Performed by: GENERAL ACUTE CARE HOSPITAL

## 2018-02-18 PROCEDURE — 20000000 HC ICU ROOM

## 2018-02-18 PROCEDURE — 25000003 PHARM REV CODE 250

## 2018-02-18 PROCEDURE — 99291 CRITICAL CARE FIRST HOUR: CPT | Mod: 25,,, | Performed by: GENERAL ACUTE CARE HOSPITAL

## 2018-02-18 PROCEDURE — 84100 ASSAY OF PHOSPHORUS: CPT

## 2018-02-18 PROCEDURE — 83735 ASSAY OF MAGNESIUM: CPT

## 2018-02-18 PROCEDURE — 25000003 PHARM REV CODE 250: Performed by: GENERAL ACUTE CARE HOSPITAL

## 2018-02-18 PROCEDURE — 80053 COMPREHEN METABOLIC PANEL: CPT

## 2018-02-18 PROCEDURE — 80069 RENAL FUNCTION PANEL: CPT

## 2018-02-18 PROCEDURE — 82378 CARCINOEMBRYONIC ANTIGEN: CPT

## 2018-02-18 PROCEDURE — 99223 1ST HOSP IP/OBS HIGH 75: CPT | Mod: ,,, | Performed by: INTERNAL MEDICINE

## 2018-02-18 PROCEDURE — 85025 COMPLETE CBC W/AUTO DIFF WBC: CPT

## 2018-02-18 PROCEDURE — 83615 LACTATE (LD) (LDH) ENZYME: CPT

## 2018-02-18 PROCEDURE — 36556 INSERT NON-TUNNEL CV CATH: CPT

## 2018-02-18 PROCEDURE — 83735 ASSAY OF MAGNESIUM: CPT | Mod: 91

## 2018-02-18 PROCEDURE — 36415 COLL VENOUS BLD VENIPUNCTURE: CPT

## 2018-02-18 PROCEDURE — 90945 DIALYSIS ONE EVALUATION: CPT

## 2018-02-18 PROCEDURE — 63600175 PHARM REV CODE 636 W HCPCS: Performed by: INTERNAL MEDICINE

## 2018-02-18 PROCEDURE — 36556 INSERT NON-TUNNEL CV CATH: CPT | Mod: ,,, | Performed by: GENERAL ACUTE CARE HOSPITAL

## 2018-02-18 PROCEDURE — 02HV33Z INSERTION OF INFUSION DEVICE INTO SUPERIOR VENA CAVA, PERCUTANEOUS APPROACH: ICD-10-PCS | Performed by: INTERNAL MEDICINE

## 2018-02-18 RX ORDER — PANTOPRAZOLE SODIUM 40 MG/1
40 TABLET, DELAYED RELEASE ORAL DAILY
Status: DISCONTINUED | OUTPATIENT
Start: 2018-02-18 | End: 2018-02-22

## 2018-02-18 RX ORDER — LIDOCAINE HYDROCHLORIDE 10 MG/ML
5 INJECTION INFILTRATION; PERINEURAL ONCE
Status: COMPLETED | OUTPATIENT
Start: 2018-02-18 | End: 2018-02-18

## 2018-02-18 RX ORDER — HYDROCODONE BITARTRATE AND ACETAMINOPHEN 500; 5 MG/1; MG/1
TABLET ORAL CONTINUOUS
Status: DISCONTINUED | OUTPATIENT
Start: 2018-02-18 | End: 2018-02-19

## 2018-02-18 RX ORDER — SODIUM,POTASSIUM PHOSPHATES 280-250MG
1 POWDER IN PACKET (EA) ORAL ONCE
Status: COMPLETED | OUTPATIENT
Start: 2018-02-18 | End: 2018-02-18

## 2018-02-18 RX ORDER — HEPARIN SODIUM 5000 [USP'U]/ML
5000 INJECTION, SOLUTION INTRAVENOUS; SUBCUTANEOUS EVERY 8 HOURS
Status: DISCONTINUED | OUTPATIENT
Start: 2018-02-18 | End: 2018-02-22

## 2018-02-18 RX ORDER — MAGNESIUM SULFATE HEPTAHYDRATE 40 MG/ML
2 INJECTION, SOLUTION INTRAVENOUS
Status: DISCONTINUED | OUTPATIENT
Start: 2018-02-18 | End: 2018-02-19

## 2018-02-18 RX ORDER — ALBUMIN HUMAN 250 G/1000ML
12.5 SOLUTION INTRAVENOUS 2 TIMES DAILY
Status: DISCONTINUED | OUTPATIENT
Start: 2018-02-18 | End: 2018-02-20

## 2018-02-18 RX ADMIN — HEPARIN SODIUM 5000 UNITS: 5000 INJECTION, SOLUTION INTRAVENOUS; SUBCUTANEOUS at 09:02

## 2018-02-18 RX ADMIN — OCTREOTIDE ACETATE 50 MCG/HR: 1000 INJECTION, SOLUTION INTRAVENOUS; SUBCUTANEOUS at 11:02

## 2018-02-18 RX ADMIN — FLECAINIDE ACETATE 50 MG: 50 TABLET ORAL at 09:02

## 2018-02-18 RX ADMIN — INSULIN ASPART 2 UNITS: 100 INJECTION, SOLUTION INTRAVENOUS; SUBCUTANEOUS at 12:02

## 2018-02-18 RX ADMIN — MIDODRINE HYDROCHLORIDE 10 MG: 5 TABLET ORAL at 12:02

## 2018-02-18 RX ADMIN — PANTOPRAZOLE SODIUM 40 MG: 40 TABLET, DELAYED RELEASE ORAL at 12:02

## 2018-02-18 RX ADMIN — OCTREOTIDE ACETATE 50 MCG/HR: 1000 INJECTION, SOLUTION INTRAVENOUS; SUBCUTANEOUS at 08:02

## 2018-02-18 RX ADMIN — ALBUMIN (HUMAN) 12.5 G: 12.5 SOLUTION INTRAVENOUS at 08:02

## 2018-02-18 RX ADMIN — SEVELAMER CARBONATE 800 MG: 800 TABLET, FILM COATED ORAL at 05:02

## 2018-02-18 RX ADMIN — SODIUM BICARBONATE 650 MG TABLET 650 MG: at 09:02

## 2018-02-18 RX ADMIN — SODIUM CHLORIDE: 0.9 INJECTION, SOLUTION INTRAVENOUS at 06:02

## 2018-02-18 RX ADMIN — FLECAINIDE ACETATE 50 MG: 50 TABLET ORAL at 08:02

## 2018-02-18 RX ADMIN — MIDODRINE HYDROCHLORIDE 10 MG: 5 TABLET ORAL at 05:02

## 2018-02-18 RX ADMIN — SEVELAMER CARBONATE 800 MG: 800 TABLET, FILM COATED ORAL at 08:02

## 2018-02-18 RX ADMIN — MIDODRINE HYDROCHLORIDE 10 MG: 5 TABLET ORAL at 09:02

## 2018-02-18 RX ADMIN — INSULIN ASPART 2 UNITS: 100 INJECTION, SOLUTION INTRAVENOUS; SUBCUTANEOUS at 05:02

## 2018-02-18 RX ADMIN — SODIUM CHLORIDE: 0.9 INJECTION, SOLUTION INTRAVENOUS at 04:02

## 2018-02-18 RX ADMIN — OCTREOTIDE ACETATE 50 MCG/HR: 1000 INJECTION, SOLUTION INTRAVENOUS; SUBCUTANEOUS at 04:02

## 2018-02-18 RX ADMIN — SEVELAMER CARBONATE 800 MG: 800 TABLET, FILM COATED ORAL at 12:02

## 2018-02-18 RX ADMIN — INSULIN DETEMIR 12 UNITS: 100 INJECTION, SOLUTION SUBCUTANEOUS at 08:02

## 2018-02-18 RX ADMIN — POTASSIUM & SODIUM PHOSPHATES POWDER PACK 280-160-250 MG 1 PACKET: 280-160-250 PACK at 04:02

## 2018-02-18 RX ADMIN — SODIUM BICARBONATE 650 MG TABLET 650 MG: at 12:02

## 2018-02-18 RX ADMIN — HEPARIN SODIUM 5000 UNITS: 5000 INJECTION, SOLUTION INTRAVENOUS; SUBCUTANEOUS at 12:02

## 2018-02-18 RX ADMIN — SODIUM BICARBONATE 650 MG TABLET 650 MG: at 05:02

## 2018-02-18 NOTE — TREATMENT PLAN
GI Brief Note    Called by primary team and discussed case.  Also discussed case with nephrology.    68 y/o with infiltrative liver process suspicious for metastatic disease.  He has worsening renal function and current plan is move to ICU for RRT.    There is family hx of CRC.  Team has concern that this metastatic disease may be primary colon.  He has FOBT+      Chart reviewed.      Recommend awaiting path results  Appreciate oncology input  Start PPI po daily    If path concerning for possible colon origin, we can further discuss potential colonoscopy pending patients clinical course.    Please call us back with any additional concerns /questions    Easton Robin MD  Gastroenterology Fellow ([id number])  Pager: [phone]

## 2018-02-18 NOTE — SUBJECTIVE & OBJECTIVE
Interval History: patient is still alert but becoming more .  BUNs of 250 creatinine 5,  42 urine appears to be 500 cc in 24 hours.    Review of patient's allergies indicates:  No Known Allergies  Current Facility-Administered Medications   Medication Frequency    0.9%  NaCl infusion (CRRT USE ONLY) Continuous    albumin human 25% bottle 12.5 g BID    calcium carbonate 200 mg calcium (500 mg) chewable tablet 500 mg Daily PRN    dextrose 50% injection 12.5 g PRN    dextrose 50% injection 25 g PRN    flecainide tablet 50 mg Q12H    glucagon (human recombinant) injection 1 mg PRN    glucose chewable tablet 16 g PRN    glucose chewable tablet 24 g PRN    heparin (porcine) injection 5,000 Units Q8H    insulin aspart pen 1-10 Units QID (AC + HS) PRN    insulin detemir pen 12 Units Daily    magnesium sulfate 2g in water 50mL IVPB (premix) PRN    midodrine tablet 10 mg TID    octreotide (SANDOSTATIN) 500 mcg in sodium chloride 0.9% 100 mL infusion Continuous    pantoprazole EC tablet 40 mg Daily    sevelamer carbonate tablet 800 mg TID WM    sodium bicarbonate tablet 650 mg TID    sodium chloride 0.9% flush 5 mL PRN       Objective:     Vital Signs (Most Recent):  Temp: 98.4 °F (36.9 °C) (02/18/18 1525)  Pulse: 67 (02/18/18 1700)  Resp: (!) 23 (02/18/18 1700)  BP: (!) 117/56 (02/18/18 1600)  SpO2: (!) 94 % (02/18/18 1700)  O2 Device (Oxygen Therapy): nasal cannula (02/18/18 1700) Vital Signs (24h Range):  Temp:  [97.6 °F (36.4 °C)-98.6 °F (37 °C)] 98.4 °F (36.9 °C)  Pulse:  [67-73] 67  Resp:  [17-31] 23  SpO2:  [94 %-97 %] 94 %  BP: (109-130)/(53-62) 117/56     Weight: 130 kg (286 lb 9.6 oz) (02/13/18 0424)  Body mass index is 36.8 kg/m².  Body surface area is 2.61 meters squared.    I/O last 3 completed shifts:  In: 1440 [P.O.:1440]  Out: 900 [Urine:900]    Physical Exam   Constitutional: He appears well-developed and well-nourished. No distress.   Alert but fluctuating orientation to person place and  time and slow to respond   HENT:   Head: Normocephalic and atraumatic.   Mouth/Throat: Oropharynx is clear and moist. No oropharyngeal exudate.   Eyes: Conjunctivae and EOM are normal. Pupils are equal, round, and reactive to light. Right eye exhibits no discharge. Left eye exhibits no discharge. No scleral icterus.   Neck: Normal range of motion. Neck supple. No JVD present. No tracheal deviation present. No thyromegaly present.   Cardiovascular: Normal rate, regular rhythm and normal heart sounds.  Exam reveals no gallop and no friction rub.    No murmur heard.  +4 bilateral lower extremity and  sacral edema   Pulmonary/Chest: Breath sounds normal. No stridor. No respiratory distress. He has no wheezes. He has no rales. He exhibits no tenderness.   Decreased effort   Abdominal: Soft. Bowel sounds are normal. He exhibits no distension and no mass. There is no tenderness. There is no rebound and no guarding. No hernia.   Musculoskeletal: Normal range of motion. He exhibits edema. He exhibits no tenderness.   Lymphadenopathy:     He has no cervical adenopathy.   Neurological: He is alert. No cranial nerve deficit. He exhibits normal muscle tone. Coordination normal.   Skin: Skin is warm and dry. No rash noted. He is not diaphoretic. No erythema. No pallor.   Psychiatric: He has a normal mood and affect. His behavior is normal. Judgment and thought content normal.   Nursing note and vitals reviewed.      Significant Labs:  BMP:   Recent Labs  Lab 02/18/18  0545 02/18/18  0807   GLU  --  163*   CL  --  92*   CO2  --  15*   BUN  --  151*   CREATININE  --  5.6*   CALCIUM  --  7.4*   MG 3.4*  --      CBC:   Recent Labs  Lab 02/18/18  0545   WBC 31.17*   RBC 3.42*   HGB 9.4*   HCT 28.3*      MCV 83   MCH 27.5   MCHC 33.2     CMP:   Recent Labs  Lab 02/18/18  0807   *   CALCIUM 7.4*   ALBUMIN 2.2*   PROT 6.0   *   K 5.1   CO2 15*   CL 92*   *   CREATININE 5.6*   ALKPHOS 189*   ALT 18   AST 48*    BILITOT 5.3*     All labs within the past 24 hours have been reviewed.     Significant Imaging:  Labs: Reviewed  X-Ray: Reviewed  Echo: Reviewed

## 2018-02-18 NOTE — PLAN OF CARE
Problem: Diabetes, Type 2 (Adult)  Goal: Signs and Symptoms of Listed Potential Problems Will be Absent, Minimized or Managed (Diabetes, Type 2)  Signs and symptoms of listed potential problems will be absent, minimized or managed by discharge/transition of care (reference Diabetes, Type 2 (Adult) CPG).   Outcome: Ongoing (interventions implemented as appropriate)  Blood sugar monitored as ordered, no insulin required.  Patient blood sugar has been lower today and he is not eating that much.    Problem: Fall Risk (Adult)  Goal: Identify Related Risk Factors and Signs and Symptoms  Related risk factors and signs and symptoms are identified upon initiation of Human Response Clinical Practice Guideline (CPG)   Outcome: Ongoing (interventions implemented as appropriate)  Patient requires assistance when ambulating.  Goal: Absence of Falls  Patient will demonstrate the desired outcomes by discharge/transition of care.   Outcome: Ongoing (interventions implemented as appropriate)  Patient has been free from falls this shift, will continue to monitor throughout his hospitalization.    Problem: Patient Care Overview  Goal: Plan of Care Review  Outcome: Ongoing (interventions implemented as appropriate)  Discussed plan of care with the patient and his family, including medications given.  Patient's kidney function has increased, critical care consulted.

## 2018-02-18 NOTE — PROCEDURES
"Jose Cruz Lira is a 67 y.o. male patient.    Temp: 98.4 °F (36.9 °C) (02/18/18 1525)  Pulse: 71 (02/18/18 1525)  Resp: (!) 21 (02/18/18 1525)  BP: (!) 130/55 (02/18/18 1525)  SpO2: (!) 94 % (02/18/18 1525)  Weight: 130 kg (286 lb 9.6 oz) (02/13/18 0424)  Height: 6' 2" (188 cm) (02/08/18 2300)       Central Line  Date/Time: 2/18/2018 3:48 PM  Location procedure was performed: [facility] ([facility])  Performed by: COLBY ROD  Pre-operative Diagnosis: elsa  Post-operative diagnosis: elsa  Consent Done: Yes  Time out: Immediately prior to procedure a "time out" was called to verify the correct patient, procedure, equipment, support staff and site/side marked as required.  Indications: hemodialysis  Anesthesia: local infiltration    Anesthesia:  Local Anesthetic: lidocaine 1% without epinephrine  Anesthetic total: 3 mL  Preparation: skin prepped with ChloraPrep  Skin prep agent dried: skin prep agent completely dried prior to procedure  Sterile barriers: all five maximum sterile barriers used - cap, mask, sterile gown, sterile gloves, and large sterile sheet  Hand hygiene: hand hygiene performed prior to central venous catheter insertion  Location details: right internal jugular  Catheter type: triple lumen  Catheter size: 12 Fr  Catheter Length: 16cm    Ultrasound guidance: yes  Vessel Caliber: medium, patent, compressibility normal  Vascular Doppler: not done  Needle advanced into vessel with real time Ultrasound guidance.  Guidewire confirmed in vessel.  Sterile sheath used.  Manometry: Yes  Number of attempts: 1  Assessment: placement verified by x-ray,  no pneumothorax on x-ray and successful placement  Complications: none  Estimated blood loss (mL): 3  Specimens: No  Implants: No  Post-procedure: line sutured,  chlorhexidine patch,  sterile dressing applied and blood return through all ports  Complications: No  Comments: MYKE Rod PA-C  Critical Care Medicine  [phone]            Colby" JUDIT Bunn  2/18/2018

## 2018-02-18 NOTE — ASSESSMENT & PLAN NOTE
--Uncertain etiology. Liver?  --Transfer to ICU for SLED.  --Continue midodrine. May need norepinephrine during CRRT.

## 2018-02-18 NOTE — H&P
[facility]  Critical Care Medicine  History & Physical    Patient Name: Jose Cruz Lira  MRN: [id number]  Admission Date: 2/8/2018  Hospital Length of Stay: 9 days  Code Status: Full Code  Attending Physician: Consuelo Hernandez MD   Primary Care Provider: Lisa Capone MD   Principal Problem: Acute renal failure superimposed on stage 3 chronic kidney disease    Subjective:     HPI:  Mr. Lira is a 68 y/o male with a history significant for CKD 3 (s/p nephrectomy August 2017), DMII (long term insulin use), and persistent atrial fibrillation (s/p DCCV 2016) on flecainide and Eliquis. He presented to [facility] on 02/08/18 after he was noted to have worsened LFTs and leukocytosis at an outpatient visit. Mr. Lira reports feeling short of breath for several month with symptoms getting worse over the past few weeks. He has dyspnea with exertion and at rest, chills, cough off and on, and fatigue. He does take his lasix twice daily and reports urine output has decreased lately in last few days, reports wt loss recently, has had difficulty sleeping lately 2/2 orthopnea. He does report recent travel to [facility] 12/17.     Mr. Lira had a recent admission for CHF exacerbation (2/1 - 2/3). Cardiology examined him on arrival and noted a positive Andres's sign. HIDA and other imaging were negative for cholecystitis, but noted multiple liver lesions concerning for metastatic disease. V/Q scan and LE ultrasound were negative and Mr. Lira takes anticoagulation for atrial fibrillation.      Hospital/ICU Course:  Mr. Lira was admitted to Hospital Medicine with an ARTUR (baseline creatinine ~1.6) and leukocytosis. Multiple imaging modalities have noted liver lesions concerning for metastatic disease. He underwent an IR biopsy of the liver lesions on 2/12/18 and pathology is pending. Unfortunately, his renal function has continued to worsen to the point that Nephrology has recommended dialysis. Critical Care was  consulted due to borderline blood pressures and the Nephrology recommendation to initiate SLED.     Past Medical History:   Diagnosis Date    Auricular fibrillation     Bronchitis     CHF (congestive heart failure)     Coronary artery disease     Diabetes mellitus, type 2 2010    Edema     Elevated troponin     Hematuria     Hydronephrosis of left kidney     Hypertension     Non-functioning kidney     Followed by Dr. Silver Anderson    Obesity (BMI 30-39.9)     Sleep apnea     Unspecified disorder of kidney and ureter        Past Surgical History:   Procedure Laterality Date    arm surgery      CARDIOVERSION  11/29/2016    kidney removed Left 0825/2017    knee surgeory N/A 4 to 5 years ago       Review of patient's allergies indicates:  No Known Allergies    Family History     Problem Relation (Age of Onset)    Colon cancer Father    Heart disease Paternal Grandfather    Liver cancer Brother    Ovarian cancer Sister        Social History Main Topics    Smoking status: Never Smoker    Smokeless tobacco: Never Used    Alcohol use No    Drug use: No    Sexual activity: No      Comment:        Review of Systems   Constitutional: Positive for activity change, appetite change, fatigue, fever and unexpected weight change. Negative for chills and diaphoresis.   HENT: Negative for postnasal drip.    Eyes: Negative for visual disturbance.   Respiratory: Positive for shortness of breath.    Cardiovascular: Negative for chest pain.   Gastrointestinal: Positive for abdominal distention. Negative for abdominal pain and constipation.   Genitourinary: Negative for hematuria.   Musculoskeletal: Negative for myalgias.   Skin: Negative for rash.   Neurological: Negative for headaches.   Hematological: Negative for adenopathy.     Objective:     Vital Signs (Most Recent):  Temp: 97.6 °F (36.4 °C) (02/18/18 1113)  Pulse: 73 (02/18/18 1113)  Resp: 20 (02/18/18 1113)  BP: 113/62 (02/18/18 1113)  SpO2: 96 %  (02/18/18 1113) Vital Signs (24h Range):  Temp:  [97.6 °F (36.4 °C)-98.6 °F (37 °C)] 97.6 °F (36.4 °C)  Pulse:  [71-73] 73  Resp:  [17-20] 20  SpO2:  [95 %-96 %] 96 %  BP: ()/(50-62) 113/62   Weight: 130 kg (286 lb 9.6 oz)  Body mass index is 36.8 kg/m².      Intake/Output Summary (Last 24 hours) at 02/18/18 1127  Last data filed at 02/18/18 0600   Gross per 24 hour   Intake              960 ml   Output              500 ml   Net              460 ml       Physical Exam   Constitutional: He appears well-developed and well-nourished.   HENT:   Head: Normocephalic and atraumatic.   Mouth/Throat: Oropharynx is clear and moist.   Eyes: Conjunctivae are normal. Pupils are equal, round, and reactive to light. Right eye exhibits no discharge. Left eye exhibits no discharge. No scleral icterus.   Neck: Trachea normal, normal range of motion and full passive range of motion without pain. Neck supple. No JVD present. No tracheal deviation present. No thyromegaly present.   Cardiovascular: Normal rate, regular rhythm, S1 normal, S2 normal, normal heart sounds and intact distal pulses.  Exam reveals no gallop and no friction rub.    No murmur heard.  Pulmonary/Chest: Effort normal and breath sounds normal. No respiratory distress. He has no wheezes. He has no rales. He exhibits no tenderness.   Abdominal: Soft. Bowel sounds are normal. He exhibits distension. He exhibits no mass. There is no tenderness. There is no rebound and no guarding.   Musculoskeletal: Normal range of motion. He exhibits no tenderness or deformity.   Lymphadenopathy:     He has no cervical adenopathy.   Neurological: No cranial nerve deficit. Coordination normal.   Skin: Skin is warm and dry. No abrasion and no bruising noted.   Psychiatric: He has a normal mood and affect.   Vitals reviewed.      Vents:     Lines/Drains/Airways     Drain                 Ureteral Drain/Stent 05/14/15 Left ureter 6 Fr. 1011 days          Peripheral Intravenous Line                  Peripheral IV - Single Lumen 02/17/18 0834 Left Forearm 1 day              Significant Labs:    CBC/Anemia Profile:    Recent Labs  Lab 02/17/18  0321 02/18/18  0545   WBC 27.12* 31.17*   HGB 8.9* 9.4*   HCT 26.5* 28.3*    345   MCV 82 83   RDW 15.9* 16.6*        Chemistries:    Recent Labs  Lab 02/17/18  0321 02/17/18  0831 02/17/18  1936 02/18/18  0545 02/18/18  0807   NA  --  130* 129*  --  128*   K  --  4.3 4.7  --  5.1   CL  --  94* 91*  --  92*   CO2  --  22* 20*  --  15*   BUN  --  143* 144*  --  151*   CREATININE  --  4.9* 5.2*  --  5.6*   CALCIUM  --  7.6* 7.5*  --  7.4*   ALBUMIN  --  2.2* 2.0*  --  2.2*   PROT  --  5.8* 5.8*  --  6.0   BILITOT  --  3.8* 4.2*  --  5.3*   ALKPHOS  --  196* 193*  --  189*   ALT  --  17 18  --  18   AST  --  46* 46*  --  48*   MG 3.2*  --   --  3.4*  --    PHOS 5.4*  --   --  7.0*  --        Significant Imaging: I have reviewed and interpreted all pertinent imaging results/findings within the past 24 hours.    Assessment/Plan:     Cardiac/Vascular   Arterial hypotension    --Uncertain etiology. Liver?  --Transfer to ICU for SLED.  --Continue midodrine. May need norepinephrine during CRRT.        Persistent atrial fibrillation    --CHADS Vasc score 2, equalling 4% annual stroke risk.  --In light of worsening condition, will continue to hold apixaban and continue flecainide.         Renal/   * Acute renal failure superimposed on stage 3 chronic kidney disease    --Uncertain exact etiology. HRS?  --FeUrea 8.4% suggestive of prerenal with evidence of volume overload on exam.   --Will initiate SLED in ICU today.         Hyponatremia    --Likely hypervolemic hyponatremia.   --Will initiate CRRT for volume removal.         Oncology   Leukemoid reaction    --Suspect 2/2 malignancy.   --All cultures negative. Hold on starting antibiotics.         Endocrine   Type 2 diabetes mellitus with stage 3 chronic kidney disease, with long-term current use of insulin     --Well controlled on levemir 12 units daily + SSI.   --Continue to monitor.         GI   Abnormal liver function tests    --Suspect 2/2 malignancy.   --Follow up on pathology.            Critical Care Time: 65 minutes  Critical secondary to Patient has a condition that poses threat to life and bodily function: Acute Renal Failure     Critical care was time spent personally by me on the following activities: development of treatment plan with patient or surrogate and bedside caregivers, discussions with consultants, evaluation of patient's response to treatment, examination of patient, ordering and performing treatments and interventions, ordering and review of laboratory studies, ordering and review of radiographic studies, pulse oximetry, re-evaluation of patient's condition. This critical care time did not overlap with that of any other provider or involve time for any procedures.     Colby Bunn PA-C  Critical Care Medicine  [facility]

## 2018-02-18 NOTE — CONSULTS
"[facility]  Hematology/Oncology  Consult Note    Patient Name: Jose Cruz Lira  MRN: [id number]  Admission Date: 2/8/2018  Hospital Length of Stay: 9 days  Code Status: Full Code   Attending Provider: Nicole Resendiz MD  Consulting Provider: Maine Wellington MD  Primary Care Physician: Lisa Capone MD  Principal Problem:Acute renal failure superimposed on stage 3 chronic kidney disease    Inpatient consult to Hematology/Oncology  Consult performed by: MAINE WELLINGTON  Consult ordered by: NICOLE RESENDIZ.        Subjective:     HPI: Mr. Lira is a 68 yo male with history of CKD (s/p nephrectomy August 2017), HTN, DMII (long term insulin use), persistent atrial fibrillation (s/p DCCV 2016) on flecainide and Eliquis. At his [facility] appointment was noted to have worsened LFTs and leukocytosis, admitted for further workup prior to having this done. Patient has been short of breath for several months with symptoms that have been getting worse over the past few weeks. He has dyspnea with exertion and at rest currently, chills, non-productive cough off and on, night sweats, and fatigue. He denies chest pain. Has had vague abdominal pain (points to RUQ), as well as several weeks of diarrhea. No melena or BRBPR. Says he has lost approximately 10 pounds in last few months. Exam with cardiology noted positive Andres's sign, but HIDA scan ordered was negative for cholecystitis. However, noted filling defects in liver, so patient underwent CT on 2/9. This exam showed "hepatomegaly with markedly heterogeneity of hepatic parenchyma, noting multiple areas of ill-defined low attenuation. This is incompletely characterized on today's noncontrast examination. Findings are concerning for possible underlying diffuse infiltrating process or multiple hepatic lesions, possibly representing metastatic disease. MRI of the abdomen w/o contrast on 2/11/18 shows similar findings. US guided liver biopsy was " performed 2/12/18 and results are pending. Meanwhile, patients kidney function has continued to worsen (one kidney as s/p left nephrectomy 8/2017 - no malignancy) and liver enzymes as well as WBC have steadily risen. He has been afebrile no night sweats, no lymphadenopathy, no obvious signs of infection.     Oncology Treatment Plan:   [No treatment plan]    Medications:  Continuous Infusions:   octreotide (SANDOSTATIN) infusion 50 mcg/hr (02/18/18 0837)     Scheduled Meds:   albumin human 25%  12.5 g Intravenous BID    flecainide  50 mg Oral Q12H    insulin detemir U-100  12 Units Subcutaneous Daily    midodrine  10 mg Oral TID    sevelamer carbonate  800 mg Oral TID WM    simvastatin  20 mg Oral QHS    sodium bicarbonate  650 mg Oral TID     PRN Meds:calcium carbonate, dextrose 50%, dextrose 50%, glucagon (human recombinant), glucose, glucose, insulin aspart U-100, sodium chloride 0.9%, zolpidem     Review of patient's allergies indicates:  No Known Allergies     Past Medical History:   Diagnosis Date    Auricular fibrillation     Bronchitis     CHF (congestive heart failure)     Coronary artery disease     Diabetes mellitus, type 2 2010    Edema     Elevated troponin     Hematuria     Hydronephrosis of left kidney     Hypertension     Non-functioning kidney     Followed by Dr. Silver Anderson    Obesity (BMI 30-39.9)     Sleep apnea     Unspecified disorder of kidney and ureter      Past Surgical History:   Procedure Laterality Date    arm surgery      CARDIOVERSION  11/29/2016    kidney removed Left 0825/2017    knee surgeory N/A 4 to 5 years ago     Family History     Problem Relation (Age of Onset)    Colon cancer Father    Heart disease Paternal Grandfather    Liver cancer Brother    Ovarian cancer Sister        Social History Main Topics    Smoking status: Never Smoker    Smokeless tobacco: Never Used    Alcohol use No    Drug use: No    Sexual activity: No      Comment:          Review of Systems   Constitutional: Positive for fatigue. Negative for diaphoresis and fever.   HENT: Negative for congestion and trouble swallowing.    Respiratory: Negative for cough and shortness of breath.    Cardiovascular: Positive for leg swelling. Negative for chest pain.   Gastrointestinal: Positive for abdominal distention and abdominal pain.   Genitourinary: Negative for hematuria.   Musculoskeletal: Negative for arthralgias.   Neurological: Negative for light-headedness and headaches.   Hematological: Negative for adenopathy. Does not bruise/bleed easily.   Psychiatric/Behavioral: Negative for agitation and behavioral problems.     Objective:     Vital Signs (Most Recent):  Temp: 97.9 °F (36.6 °C) (02/18/18 0825)  Pulse: 73 (02/18/18 0825)  Resp: 17 (02/18/18 0825)  BP: (!) 109/53 (02/18/18 0825)  SpO2: 95 % (02/18/18 0825) Vital Signs (24h Range):  Temp:  [97.9 °F (36.6 °C)-98.6 °F (37 °C)] 97.9 °F (36.6 °C)  Pulse:  [71-73] 73  Resp:  [17-18] 17  SpO2:  [95 %] 95 %  BP: ()/(50-60) 109/53     Weight: 130 kg (286 lb 9.6 oz)  Body mass index is 36.8 kg/m².  Body surface area is 2.61 meters squared.      Intake/Output Summary (Last 24 hours) at 02/18/18 0951  Last data filed at 02/18/18 0600   Gross per 24 hour   Intake              960 ml   Output              500 ml   Net              460 ml       Physical Exam   Constitutional: He is oriented to person, place, and time. He appears well-developed and well-nourished.   HENT:   Mouth/Throat: Oropharynx is clear and moist. No oropharyngeal exudate.   Eyes: Conjunctivae are normal. Pupils are equal, round, and reactive to light.   Cardiovascular: Normal rate and regular rhythm.    Pulmonary/Chest: Effort normal and breath sounds normal.   Abdominal: Soft. Bowel sounds are normal.   Musculoskeletal: Normal range of motion.   Lymphadenopathy:     He has no cervical adenopathy.   Neurological: He is alert and oriented to person, place, and time.    Skin: Skin is warm.   Psychiatric: He has a normal mood and affect. His behavior is normal.       Significant Labs:   CBC:   Recent Labs  Lab 02/17/18  0321 02/18/18  0545   WBC 27.12* 31.17*   HGB 8.9* 9.4*   HCT 26.5* 28.3*    345   , CMP:   Recent Labs  Lab 02/17/18  0831 02/17/18  1936 02/18/18  0807   * 129* 128*   K 4.3 4.7 5.1   CL 94* 91* 92*   CO2 22* 20* 15*   GLU 78 152* 163*   * 144* 151*   CREATININE 4.9* 5.2* 5.6*   CALCIUM 7.6* 7.5* 7.4*   PROT 5.8* 5.8* 6.0   ALBUMIN 2.2* 2.0* 2.2*   BILITOT 3.8* 4.2* 5.3*   ALKPHOS 196* 193* 189*   AST 46* 46* 48*   ALT 17 18 18   ANIONGAP 14 18* 21*   EGFRNONAA 11.5* 10.7* 9.8*    and Coagulation: No results for input(s): PT, INR, APTT in the last 48 hours.    Diagnostic Results:  I have reviewed all pertinent imaging results/findings within the past 24 hours.    Assessment/Plan:     Active Diagnoses:    Diagnosis Date Noted POA    PRINCIPAL PROBLEM:  Acute renal failure superimposed on stage 3 chronic kidney disease [N17.9, N18.3] 02/09/2018 Yes    Arterial hypotension [I95.9] 02/15/2018 Yes    Hyponatremia [E87.1] 02/10/2018 Yes    Hypoalbuminemia [E88.09] 02/09/2018 Yes    Leukemoid reaction [D72.823] 02/09/2018 Yes    Abnormal liver function tests [R79.89] 02/09/2018 Yes    Abnormal liver scan [R93.2] 02/09/2018 Yes    D-dimer, elevated [R79.89] 02/08/2018 Yes    Current use of long term anticoagulation [Z79.01] 01/29/2018 Not Applicable    Persistent atrial fibrillation [I48.1] 11/28/2016 Yes    Isolated proteinuria without specific morphologic lesion [R80.0] 11/03/2014 Yes    BMI 34.0-34.9,adult [Z68.34] 05/28/2014 Not Applicable    Hyperlipidemia associated with type 2 diabetes mellitus [E11.69, E78.5] 05/28/2014 Yes    Hypertension associated with diabetes [E11.59, I10] 05/28/2014 Yes     Chronic    Type 2 diabetes mellitus with stage 3 chronic kidney disease, with long-term current use of insulin [E11.22, N18.3, Z79.4]  05/28/2014 Not Applicable      Problems Resolved During this Admission:    Diagnosis Date Noted Date Resolved POA       A/P   66 y/o with infiltrative liver process highly suspicious for metastatic disease has had worsening renal function meanwhile steady elevation of liver enzymes and WBC. Have reviewed peripheral smear and notable for increased granulocytes with left shift (significant number of bands). Rare atypical lymphocytes. No blasts. No evidence to suggest leukemia. Will have path review diff as well. Suspect reactive process to underlying disease and must await biopsy results to confirm diagnosis.   Will follow up path from liver biopsy and order peripheral flow should pathology review suggest so.     Thank you for your consult.     Robyn Majano MD  Hematology/Oncology  [facility]    I have reviewed the notes, assessments, and/or procedures performed by the housestaff, as above.  I have personally interviewed and examined the patient at the beside, and rounded with the housestaff. I concur with her/his assessment and plan and the documentation of Jose Cruz Lira.  I, Dr. Mason Horner, personally spent more than  80 mins during this encounter, greater than 50% was spent in direct counseling and/or coordination of care.     Mason Horner M.D., M.S., F.A.C.P.  Hematology/Oncology Attending  [facility]

## 2018-02-18 NOTE — PLAN OF CARE
Problem: Patient Care Overview  Goal: Plan of Care Review  Outcome: Ongoing (interventions implemented as appropriate)  No acute events throughout day. See vital signs and assessments in flowsheets. See below for updates on today's progress.     Pulmonary: 2L NC O2, Sats 93-98%    Cardiovascular: NSR EKG, 60-70s    Neurological: AAOx2, confused about time and situation, re-oriented by RN    Gastrointestinal: Distended abdomen, +BS, 1 BM this AM    Genitourinary: Voids via urinal, plan for 4 hours of CRRT today then re-evaluate tomorrow    Endocrine: BG monitored, PRN insulin administered    Integumentary/Other: Bruising noted to BUE, 3+ pitting edema BLE    Infusions: Octreotide gtt @ 50 mcg/hr    Patient progressing towards goals as tolerated, plan of care communicated and reviewed with Jose Cruz Lira and family. All concerns addressed. Will continue to monitor.

## 2018-02-18 NOTE — HOSPITAL COURSE
Mr. Lira was admitted to Hospital Medicine with an ARTUR (baseline creatinine ~1.6) and leukocytosis. Multiple imaging modalities have noted liver lesions concerning for metastatic disease. He underwent an IR biopsy of the liver lesions on 2/12/18 and pathology is pending. Unfortunately, his renal function has continued to worsen to the point that Nephrology has recommended dialysis. Critical Care was consulted due to borderline blood pressures and the Nephrology recommendation to initiate SLED.  Mr. Lira was transferred to the ICU with critical care medicine on 2/18.  Trialysis line placed and RRT initiated. 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  Mr. Lira is currently not an optimal candidate for systemic therapy.  Oncology recommending palliative care. He continues to be delirious. He is anticipated to discharge to inpatient hospice on 02/22/18.

## 2018-02-18 NOTE — HPI
Mr. Lira is a 66 y/o male with a history significant for CKD 3 (s/p nephrectomy August 2017), DMII (long term insulin use), and persistent atrial fibrillation (s/p DCCV 2016) on flecainide and Eliquis. He presented to [facility] on 02/08/18 after he was noted to have worsened LFTs and leukocytosis at an outpatient visit. Mr. Lira reports feeling short of breath for several month with symptoms getting worse over the past few weeks. He reports dyspnea with exertion and at rest, chills, cough off and on, and fatigue. He does take his lasix twice daily and reports his urine output has decreased lately in last few days. He also reports weight loss recently and has had difficulty sleeping lately 2/2 orthopnea. He does report recent travel to [facility] 12/17.     Mr. Lira had a recent admission for CHF exacerbation (2/1 - 2/3). Cardiology examined him on arrival and noted a positive Andres's sign. HIDA and other imaging were negative for cholecystitis, but noted multiple liver lesions concerning for metastatic disease. V/Q scan and LE ultrasound were negative and Mr. Lira takes anticoagulation for atrial fibrillation.

## 2018-02-18 NOTE — PROGRESS NOTES
[facility]  Nephrology  Progress Note    Patient Name: Jose Cruz Lira  MRN: [id number]  Admission Date: 2/8/2018  Hospital Length of Stay: 9 days  Attending Provider: Consuelo Hernandez MD   Primary Care Physician: Lisa Capone MD  Principal Problem:Acute renal failure superimposed on stage 3 chronic kidney disease    Subjective:     HPI: 68 yo M with PMH of CKD3, left hydronephrosis s/p nephrectomy August 2017, HTN, DMII (long term insulin use), persistent atrial fibrillation (s/p DCCV 2016) on flecainide and Eliquis. He presented for RHC appt, was noted to have worsened LFTs, leukocytosis, and was sent to ED for further workup. Patient has been short of breath for several month with symptoms have been getting worse over the past few weeks. He has dyspnea with exertion and at rest currently, chills, cough off and on, fatigue.  He denies chest pain.  He does take his lasix twice daily and reports urine output has decreased lately in last few days, reports wt loss recently, has had difficulty sleeping lately 2/2 orthopnea, reports stable / decrease in LE swelling, unsure of PND. Patient does report recent travel to [facility] 12/17. He also reports that for the past 2 weeks, he has been having diarrhea.     Patient had recent admission for CHF exacerbation 2/1 and discharged 2/3, recent ED visit for same complaints noted to have leukocytosis with neutrophilic prodominance, bili noted to be 2.0, Cr 2.1, blood cultures drawn NGTD, referred for sleep study however did not receive yet though high suspicion for MONICA. Exam with cards noted for + johnson's sign, HIDA scan ordered was negative for cholecystitis however noted filling defects in liver, recommended f/u imaging. RHC was also ordered and was to be done 2/8 however worsened labs (leukocytosis, LFTs) prompted referral to ED for evaluation.    Nephrology was consulted for ARTUR on CKD. He has been followed by nephrologist Dr. Anderson.  "Baseline Cr is 1.7. He denies NSAID use. He has a history of left total nephrectomy in August 2017 due to left hydronephrosis (etiology is not clear). He notes that his po intake has been poor due to fatigue. Urine output has also decreased. Denies dysuria and hematuria. Endorses nocturia. States that he has been having "loose stools" for the past 2 weeks    Interval History: patient is still alert but becoming more .  BUNs of 250 creatinine 5,  42 urine appears to be 500 cc in 24 hours.    Review of patient's allergies indicates:  No Known Allergies  Current Facility-Administered Medications   Medication Frequency    0.9%  NaCl infusion (CRRT USE ONLY) Continuous    albumin human 25% bottle 12.5 g BID    calcium carbonate 200 mg calcium (500 mg) chewable tablet 500 mg Daily PRN    dextrose 50% injection 12.5 g PRN    dextrose 50% injection 25 g PRN    flecainide tablet 50 mg Q12H    glucagon (human recombinant) injection 1 mg PRN    glucose chewable tablet 16 g PRN    glucose chewable tablet 24 g PRN    heparin (porcine) injection 5,000 Units Q8H    insulin aspart pen 1-10 Units QID (AC + HS) PRN    insulin detemir pen 12 Units Daily    magnesium sulfate 2g in water 50mL IVPB (premix) PRN    midodrine tablet 10 mg TID    octreotide (SANDOSTATIN) 500 mcg in sodium chloride 0.9% 100 mL infusion Continuous    pantoprazole EC tablet 40 mg Daily    sevelamer carbonate tablet 800 mg TID WM    sodium bicarbonate tablet 650 mg TID    sodium chloride 0.9% flush 5 mL PRN       Objective:     Vital Signs (Most Recent):  Temp: 98.4 °F (36.9 °C) (02/18/18 1525)  Pulse: 67 (02/18/18 1700)  Resp: (!) 23 (02/18/18 1700)  BP: (!) 117/56 (02/18/18 1600)  SpO2: (!) 94 % (02/18/18 1700)  O2 Device (Oxygen Therapy): nasal cannula (02/18/18 1700) Vital Signs (24h Range):  Temp:  [97.6 °F (36.4 °C)-98.6 °F (37 °C)] 98.4 °F (36.9 °C)  Pulse:  [67-73] 67  Resp:  [17-31] 23  SpO2:  [94 %-97 %] 94 %  BP: (109-130)/(53-62) " 117/56     Weight: 130 kg (286 lb 9.6 oz) (02/13/18 0424)  Body mass index is 36.8 kg/m².  Body surface area is 2.61 meters squared.    I/O last 3 completed shifts:  In: 1440 [P.O.:1440]  Out: 900 [Urine:900]    Physical Exam   Constitutional: He appears well-developed and well-nourished. No distress.   Alert but fluctuating orientation to person place and time and slow to respond   HENT:   Head: Normocephalic and atraumatic.   Mouth/Throat: Oropharynx is clear and moist. No oropharyngeal exudate.   Eyes: Conjunctivae and EOM are normal. Pupils are equal, round, and reactive to light. Right eye exhibits no discharge. Left eye exhibits no discharge. No scleral icterus.   Neck: Normal range of motion. Neck supple. No JVD present. No tracheal deviation present. No thyromegaly present.   Cardiovascular: Normal rate, regular rhythm and normal heart sounds.  Exam reveals no gallop and no friction rub.    No murmur heard.  +4 bilateral lower extremity and  sacral edema   Pulmonary/Chest: Breath sounds normal. No stridor. No respiratory distress. He has no wheezes. He has no rales. He exhibits no tenderness.   Decreased effort   Abdominal: Soft. Bowel sounds are normal. He exhibits no distension and no mass. There is no tenderness. There is no rebound and no guarding. No hernia.   Musculoskeletal: Normal range of motion. He exhibits edema. He exhibits no tenderness.   Lymphadenopathy:     He has no cervical adenopathy.   Neurological: He is alert. No cranial nerve deficit. He exhibits normal muscle tone. Coordination normal.   Skin: Skin is warm and dry. No rash noted. He is not diaphoretic. No erythema. No pallor.   Psychiatric: He has a normal mood and affect. His behavior is normal. Judgment and thought content normal.   Nursing note and vitals reviewed.      Significant Labs:  BMP:   Recent Labs  Lab 02/18/18  0545 02/18/18  0807   GLU  --  163*   CL  --  92*   CO2  --  15*   BUN  --  151*   CREATININE  --  5.6*    CALCIUM  --  7.4*   MG 3.4*  --      CBC:   Recent Labs  Lab 02/18/18  0545   WBC 31.17*   RBC 3.42*   HGB 9.4*   HCT 28.3*      MCV 83   MCH 27.5   MCHC 33.2     CMP:   Recent Labs  Lab 02/18/18  0807   *   CALCIUM 7.4*   ALBUMIN 2.2*   PROT 6.0   *   K 5.1   CO2 15*   CL 92*   *   CREATININE 5.6*   ALKPHOS 189*   ALT 18   AST 48*   BILITOT 5.3*     All labs within the past 24 hours have been reviewed.     Significant Imaging:  Labs: Reviewed  X-Ray: Reviewed  Echo: Reviewed    Assessment/Plan:     * Acute renal failure superimposed on stage 3 chronic kidney disease    This patient presents with ARTUR on CKD3, likely from pre-renal causes which include poor po intake, sepsis resulting in renal hypoperfusion, and 2 week history of diarrhea.  Re-evaluation with urine lytes with pre-renal etiology noted.  2D echo with CVP < 3.  BNP improved from admission.  Overall, current working hypothesis for ARTUR would be due to poor renal perfusion from hypoalbuminemia vs HRS.  No significant improvement thus far, will need to assess for another day with albumin.  Spoke with pathology, likely results to come back next week.      Worsening oliguric AK I   Single aged kidney, with significant azotemia acidosis potassium starting to rise and hyponatremia worsening as well as increasing and lethargy.    Yesterday afternoon I had a long discus Dr. Uribe at the patient's request regarding worsening renal function and the initiati CRRT for volume management as well as clearance including risks of bleeding infection lo and the patient would like to proceed with dialysis.  Transfer to the ICU dialysis catheter to be placed SL ED will be initiated only for 4 hours given elevated BUN and then reassess in 8 hours.  Agree with GI and hematology evaluation.  Still awaiting liver biopsy results            Thank you for your consult. I will follow-up with patient. Please contact us if you have any additional  questions.    Emily Snyder MD  Nephrology  [facility]

## 2018-02-18 NOTE — PLAN OF CARE
Problem: Diabetes, Type 2 (Adult)  Goal: Signs and Symptoms of Listed Potential Problems Will be Absent, Minimized or Managed (Diabetes, Type 2)  Signs and symptoms of listed potential problems will be absent, minimized or managed by discharge/transition of care (reference Diabetes, Type 2 (Adult) CPG).   Outcome: Ongoing (interventions implemented as appropriate)   02/18/18 1718   Diabetes, Type 2   Problems Assessed (Type 2 Diabetes) all   Problems Present (Type 2 Diabetes) hyperglycemia       Problem: Patient Care Overview  Goal: Plan of Care Review  Outcome: Ongoing (interventions implemented as appropriate)  Pt transferred to CMICU bed 3097, pt escorted down via wheelchair per nurse with belongings, no distress noted

## 2018-02-18 NOTE — ASSESSMENT & PLAN NOTE
--CHADS Vasc score 2, equalling 4% annual stroke risk.  --In light of worsening condition, will continue to hold apixaban and continue flecainide.

## 2018-02-18 NOTE — ASSESSMENT & PLAN NOTE
--Uncertain exact etiology. HRS?  --FeUrea 8.4% suggestive of prerenal with evidence of volume overload on exam.   --Will initiate SLED in ICU today.

## 2018-02-18 NOTE — SUBJECTIVE & OBJECTIVE
Past Medical History:   Diagnosis Date    Auricular fibrillation     Bronchitis     CHF (congestive heart failure)     Coronary artery disease     Diabetes mellitus, type 2 2010    Edema     Elevated troponin     Hematuria     Hydronephrosis of left kidney     Hypertension     Non-functioning kidney     Followed by Dr. Silver Anderson    Obesity (BMI 30-39.9)     Sleep apnea     Unspecified disorder of kidney and ureter        Past Surgical History:   Procedure Laterality Date    arm surgery      CARDIOVERSION  11/29/2016    kidney removed Left 0825/2017    knee surgeory N/A 4 to 5 years ago       Review of patient's allergies indicates:  No Known Allergies    Family History     Problem Relation (Age of Onset)    Colon cancer Father    Heart disease Paternal Grandfather    Liver cancer Brother    Ovarian cancer Sister        Social History Main Topics    Smoking status: Never Smoker    Smokeless tobacco: Never Used    Alcohol use No    Drug use: No    Sexual activity: No      Comment:        Review of Systems   Constitutional: Positive for activity change, appetite change, fatigue, fever and unexpected weight change. Negative for chills and diaphoresis.   HENT: Negative for postnasal drip.    Eyes: Negative for visual disturbance.   Respiratory: Positive for shortness of breath.    Cardiovascular: Negative for chest pain.   Gastrointestinal: Positive for abdominal distention. Negative for abdominal pain and constipation.   Genitourinary: Negative for hematuria.   Musculoskeletal: Negative for myalgias.   Skin: Negative for rash.   Neurological: Negative for headaches.   Hematological: Negative for adenopathy.     Objective:     Vital Signs (Most Recent):  Temp: 97.6 °F (36.4 °C) (02/18/18 1113)  Pulse: 73 (02/18/18 1113)  Resp: 20 (02/18/18 1113)  BP: 113/62 (02/18/18 1113)  SpO2: 96 % (02/18/18 1113) Vital Signs (24h Range):  Temp:  [97.6 °F (36.4 °C)-98.6 °F (37 °C)] 97.6 °F (36.4  °C)  Pulse:  [71-73] 73  Resp:  [17-20] 20  SpO2:  [95 %-96 %] 96 %  BP: ()/(50-62) 113/62   Weight: 130 kg (286 lb 9.6 oz)  Body mass index is 36.8 kg/m².      Intake/Output Summary (Last 24 hours) at 02/18/18 1127  Last data filed at 02/18/18 0600   Gross per 24 hour   Intake              960 ml   Output              500 ml   Net              460 ml       Physical Exam   Constitutional: He appears well-developed and well-nourished.   HENT:   Head: Normocephalic and atraumatic.   Mouth/Throat: Oropharynx is clear and moist.   Eyes: Conjunctivae are normal. Pupils are equal, round, and reactive to light. Right eye exhibits no discharge. Left eye exhibits no discharge. No scleral icterus.   Neck: Trachea normal, normal range of motion and full passive range of motion without pain. Neck supple. No JVD present. No tracheal deviation present. No thyromegaly present.   Cardiovascular: Normal rate, regular rhythm, S1 normal, S2 normal, normal heart sounds and intact distal pulses.  Exam reveals no gallop and no friction rub.    No murmur heard.  Pulmonary/Chest: Effort normal and breath sounds normal. No respiratory distress. He has no wheezes. He has no rales. He exhibits no tenderness.   Abdominal: Soft. Bowel sounds are normal. He exhibits distension. He exhibits no mass. There is no tenderness. There is no rebound and no guarding.   Musculoskeletal: Normal range of motion. He exhibits no tenderness or deformity.   Lymphadenopathy:     He has no cervical adenopathy.   Neurological: No cranial nerve deficit. Coordination normal.   Skin: Skin is warm and dry. No abrasion and no bruising noted.   Psychiatric: He has a normal mood and affect.   Vitals reviewed.      Vents:     Lines/Drains/Airways     Drain                 Ureteral Drain/Stent 05/14/15 Left ureter 6 Fr. 1011 days          Peripheral Intravenous Line                 Peripheral IV - Single Lumen 02/17/18 0834 Left Forearm 1 day              Significant  Labs:    CBC/Anemia Profile:    Recent Labs  Lab 02/17/18  0321 02/18/18  0545   WBC 27.12* 31.17*   HGB 8.9* 9.4*   HCT 26.5* 28.3*    345   MCV 82 83   RDW 15.9* 16.6*        Chemistries:    Recent Labs  Lab 02/17/18  0321 02/17/18  0831 02/17/18  1936 02/18/18  0545 02/18/18  0807   NA  --  130* 129*  --  128*   K  --  4.3 4.7  --  5.1   CL  --  94* 91*  --  92*   CO2  --  22* 20*  --  15*   BUN  --  143* 144*  --  151*   CREATININE  --  4.9* 5.2*  --  5.6*   CALCIUM  --  7.6* 7.5*  --  7.4*   ALBUMIN  --  2.2* 2.0*  --  2.2*   PROT  --  5.8* 5.8*  --  6.0   BILITOT  --  3.8* 4.2*  --  5.3*   ALKPHOS  --  196* 193*  --  189*   ALT  --  17 18  --  18   AST  --  46* 46*  --  48*   MG 3.2*  --   --  3.4*  --    PHOS 5.4*  --   --  7.0*  --        Significant Imaging: I have reviewed and interpreted all pertinent imaging results/findings within the past 24 hours.

## 2018-02-18 NOTE — NURSING
Pt arrived via wheelchair with RN escort. Placed in bed with moderate assistance. Attached to ICU monitor. Room air O2. VSS. Will continue to monitor.

## 2018-02-19 PROBLEM — G93.41 ENCEPHALOPATHY, METABOLIC: Status: ACTIVE | Noted: 2018-02-19

## 2018-02-19 PROBLEM — R19.5 OCCULT BLOOD POSITIVE STOOL: Status: ACTIVE | Noted: 2018-02-19

## 2018-02-19 PROBLEM — R19.7 DIARRHEA: Status: ACTIVE | Noted: 2018-02-19

## 2018-02-19 PROBLEM — D68.9 COAGULOPATHY: Status: ACTIVE | Noted: 2018-02-19

## 2018-02-19 LAB
ABO + RH BLD: NORMAL
ALBUMIN SERPL BCP-MCNC: 2.1 G/DL
ALBUMIN SERPL BCP-MCNC: 2.3 G/DL
ALLENS TEST: ABNORMAL
ALP SERPL-CCNC: 169 U/L
ALT SERPL W/O P-5'-P-CCNC: 18 U/L
AMMONIA PLAS-SCNC: 40 UMOL/L
ANCA AB TITR SER IF: NORMAL TITER
ANION GAP SERPL CALC-SCNC: 14 MMOL/L
ANION GAP SERPL CALC-SCNC: 15 MMOL/L
ANION GAP SERPL CALC-SCNC: 15 MMOL/L
ANISOCYTOSIS BLD QL SMEAR: SLIGHT
AST SERPL-CCNC: 51 U/L
BASOPHILS # BLD AUTO: 0.03 K/UL
BASOPHILS NFR BLD: 0.1 %
BILIRUB DIRECT SERPL-MCNC: 4.7 MG/DL
BILIRUB SERPL-MCNC: 5.7 MG/DL
BLD GP AB SCN CELLS X3 SERPL QL: NORMAL
BUN SERPL-MCNC: 121 MG/DL
BUN SERPL-MCNC: 124 MG/DL
BUN SERPL-MCNC: 95 MG/DL
CALCIUM SERPL-MCNC: 7.4 MG/DL
CALCIUM SERPL-MCNC: 7.4 MG/DL
CALCIUM SERPL-MCNC: 7.5 MG/DL
CHLORIDE SERPL-SCNC: 95 MMOL/L
CHLORIDE SERPL-SCNC: 95 MMOL/L
CHLORIDE SERPL-SCNC: 97 MMOL/L
CO2 SERPL-SCNC: 21 MMOL/L
CO2 SERPL-SCNC: 22 MMOL/L
CO2 SERPL-SCNC: 22 MMOL/L
CREAT SERPL-MCNC: 3.9 MG/DL
CREAT SERPL-MCNC: 5.1 MG/DL
CREAT SERPL-MCNC: 5.2 MG/DL
DELSYS: ABNORMAL
DIFFERENTIAL METHOD: ABNORMAL
EOSINOPHIL # BLD AUTO: 0 K/UL
EOSINOPHIL NFR BLD: 0.1 %
ERYTHROCYTE [DISTWIDTH] IN BLOOD BY AUTOMATED COUNT: 17 %
EST. GFR  (AFRICAN AMERICAN): 12.4 ML/MIN/1.73 M^2
EST. GFR  (AFRICAN AMERICAN): 12.7 ML/MIN/1.73 M^2
EST. GFR  (AFRICAN AMERICAN): 17.5 ML/MIN/1.73 M^2
EST. GFR  (NON AFRICAN AMERICAN): 10.7 ML/MIN/1.73 M^2
EST. GFR  (NON AFRICAN AMERICAN): 11 ML/MIN/1.73 M^2
EST. GFR  (NON AFRICAN AMERICAN): 15.2 ML/MIN/1.73 M^2
FIBRINOGEN PPP-MCNC: 436 MG/DL
FLOW: 2
GLUCOSE SERPL-MCNC: 102 MG/DL
GLUCOSE SERPL-MCNC: 62 MG/DL
GLUCOSE SERPL-MCNC: 92 MG/DL
HCO3 UR-SCNC: 23 MMOL/L (ref 24–28)
HCT VFR BLD AUTO: 26.8 %
HGB BLD-MCNC: 8.9 G/DL
IMM GRANULOCYTES # BLD AUTO: 0.93 K/UL
IMM GRANULOCYTES NFR BLD AUTO: 3.4 %
INR PPP: 1.8
LACTATE SERPL-SCNC: 1.6 MMOL/L
LYMPHOCYTES # BLD AUTO: 0.5 K/UL
LYMPHOCYTES NFR BLD: 1.8 %
MAGNESIUM SERPL-MCNC: 2.7 MG/DL
MAGNESIUM SERPL-MCNC: 3 MG/DL
MAGNESIUM SERPL-MCNC: 3.1 MG/DL
MCH RBC QN AUTO: 27.3 PG
MCHC RBC AUTO-ENTMCNC: 33.2 G/DL
MCV RBC AUTO: 82 FL
MODE: ABNORMAL
MONOCYTES # BLD AUTO: 1.7 K/UL
MONOCYTES NFR BLD: 6.1 %
NEUTROPHILS # BLD AUTO: 24.3 K/UL
NEUTROPHILS NFR BLD: 88.5 %
NRBC BLD-RTO: 0 /100 WBC
P-ANCA TITR SER IF: NORMAL TITER
PATH REV BLD -IMP: NORMAL
PATH REV BLD -IMP: NORMAL
PCO2 BLDA: 42.3 MMHG (ref 35–45)
PH SMN: 7.34 [PH] (ref 7.35–7.45)
PHOSPHATE SERPL-MCNC: 4.7 MG/DL
PHOSPHATE SERPL-MCNC: 6.2 MG/DL
PHOSPHATE SERPL-MCNC: 6.4 MG/DL
PLATELET # BLD AUTO: 310 K/UL
PLATELET BLD QL SMEAR: ABNORMAL
PMV BLD AUTO: 10 FL
PO2 BLDA: 34 MMHG (ref 40–60)
POC BE: -3 MMOL/L
POC SATURATED O2: 61 % (ref 95–100)
POC TCO2: 24 MMOL/L (ref 24–29)
POCT GLUCOSE: 110 MG/DL (ref 70–110)
POCT GLUCOSE: 121 MG/DL (ref 70–110)
POCT GLUCOSE: 148 MG/DL (ref 70–110)
POCT GLUCOSE: 66 MG/DL (ref 70–110)
POCT GLUCOSE: 98 MG/DL (ref 70–110)
POLYCHROMASIA BLD QL SMEAR: ABNORMAL
POTASSIUM SERPL-SCNC: 4.3 MMOL/L
POTASSIUM SERPL-SCNC: 4.8 MMOL/L
POTASSIUM SERPL-SCNC: 4.9 MMOL/L
PROT SERPL-MCNC: 5.5 G/DL
PROTHROMBIN TIME: 18 SEC
RBC # BLD AUTO: 3.26 M/UL
SAMPLE: ABNORMAL
SITE: ABNORMAL
SODIUM SERPL-SCNC: 131 MMOL/L
SODIUM SERPL-SCNC: 132 MMOL/L
SODIUM SERPL-SCNC: 133 MMOL/L
WBC # BLD AUTO: 27.41 K/UL

## 2018-02-19 PROCEDURE — 85610 PROTHROMBIN TIME: CPT

## 2018-02-19 PROCEDURE — 99291 CRITICAL CARE FIRST HOUR: CPT | Mod: ,,, | Performed by: NURSE PRACTITIONER

## 2018-02-19 PROCEDURE — 63600175 PHARM REV CODE 636 W HCPCS: Performed by: NURSE PRACTITIONER

## 2018-02-19 PROCEDURE — 25000003 PHARM REV CODE 250: Performed by: NURSE PRACTITIONER

## 2018-02-19 PROCEDURE — 90945 DIALYSIS ONE EVALUATION: CPT

## 2018-02-19 PROCEDURE — 25000003 PHARM REV CODE 250: Performed by: GENERAL ACUTE CARE HOSPITAL

## 2018-02-19 PROCEDURE — 25000003 PHARM REV CODE 250: Performed by: INTERNAL MEDICINE

## 2018-02-19 PROCEDURE — 97802 MEDICAL NUTRITION INDIV IN: CPT

## 2018-02-19 PROCEDURE — 85384 FIBRINOGEN ACTIVITY: CPT

## 2018-02-19 PROCEDURE — 63600175 PHARM REV CODE 636 W HCPCS: Performed by: GENERAL ACUTE CARE HOSPITAL

## 2018-02-19 PROCEDURE — 99900035 HC TECH TIME PER 15 MIN (STAT)

## 2018-02-19 PROCEDURE — 25000003 PHARM REV CODE 250

## 2018-02-19 PROCEDURE — 83735 ASSAY OF MAGNESIUM: CPT

## 2018-02-19 PROCEDURE — 80076 HEPATIC FUNCTION PANEL: CPT

## 2018-02-19 PROCEDURE — 80069 RENAL FUNCTION PANEL: CPT | Mod: 91

## 2018-02-19 PROCEDURE — 80100008 HC CRRT DAILY MAINTENANCE

## 2018-02-19 PROCEDURE — 25000003 PHARM REV CODE 250: Performed by: STUDENT IN AN ORGANIZED HEALTH CARE EDUCATION/TRAINING PROGRAM

## 2018-02-19 PROCEDURE — 99232 SBSQ HOSP IP/OBS MODERATE 35: CPT | Mod: ,,, | Performed by: INTERNAL MEDICINE

## 2018-02-19 PROCEDURE — P9047 ALBUMIN (HUMAN), 25%, 50ML: HCPCS | Mod: JG | Performed by: INTERNAL MEDICINE

## 2018-02-19 PROCEDURE — 80069 RENAL FUNCTION PANEL: CPT

## 2018-02-19 PROCEDURE — 85025 COMPLETE CBC W/AUTO DIFF WBC: CPT

## 2018-02-19 PROCEDURE — 20000000 HC ICU ROOM

## 2018-02-19 PROCEDURE — 83735 ASSAY OF MAGNESIUM: CPT | Mod: 91

## 2018-02-19 PROCEDURE — 82140 ASSAY OF AMMONIA: CPT

## 2018-02-19 PROCEDURE — 83605 ASSAY OF LACTIC ACID: CPT

## 2018-02-19 PROCEDURE — 87040 BLOOD CULTURE FOR BACTERIA: CPT | Mod: 59

## 2018-02-19 PROCEDURE — 86901 BLOOD TYPING SEROLOGIC RH(D): CPT

## 2018-02-19 PROCEDURE — 63600175 PHARM REV CODE 636 W HCPCS

## 2018-02-19 PROCEDURE — 63600175 PHARM REV CODE 636 W HCPCS: Performed by: INTERNAL MEDICINE

## 2018-02-19 PROCEDURE — 82803 BLOOD GASES ANY COMBINATION: CPT

## 2018-02-19 PROCEDURE — 63600175 PHARM REV CODE 636 W HCPCS: Performed by: STUDENT IN AN ORGANIZED HEALTH CARE EDUCATION/TRAINING PROGRAM

## 2018-02-19 RX ORDER — LORAZEPAM 2 MG/ML
1 INJECTION INTRAMUSCULAR ONCE
Status: COMPLETED | OUTPATIENT
Start: 2018-02-19 | End: 2018-02-19

## 2018-02-19 RX ORDER — MAGNESIUM SULFATE HEPTAHYDRATE 40 MG/ML
2 INJECTION, SOLUTION INTRAVENOUS
Status: DISCONTINUED | OUTPATIENT
Start: 2018-02-19 | End: 2018-02-20

## 2018-02-19 RX ORDER — HYDROCODONE BITARTRATE AND ACETAMINOPHEN 500; 5 MG/1; MG/1
TABLET ORAL CONTINUOUS
Status: DISCONTINUED | OUTPATIENT
Start: 2018-02-19 | End: 2018-02-20

## 2018-02-19 RX ORDER — RAMELTEON 8 MG/1
8 TABLET ORAL ONCE
Status: COMPLETED | OUTPATIENT
Start: 2018-02-19 | End: 2018-02-19

## 2018-02-19 RX ORDER — LORAZEPAM 2 MG/ML
INJECTION INTRAMUSCULAR
Status: COMPLETED
Start: 2018-02-19 | End: 2018-02-19

## 2018-02-19 RX ADMIN — SEVELAMER CARBONATE 800 MG: 800 TABLET, FILM COATED ORAL at 05:02

## 2018-02-19 RX ADMIN — HEPARIN SODIUM 5000 UNITS: 5000 INJECTION, SOLUTION INTRAVENOUS; SUBCUTANEOUS at 09:02

## 2018-02-19 RX ADMIN — ALBUMIN (HUMAN) 12.5 G: 12.5 SOLUTION INTRAVENOUS at 08:02

## 2018-02-19 RX ADMIN — HEPARIN SODIUM 5000 UNITS: 5000 INJECTION, SOLUTION INTRAVENOUS; SUBCUTANEOUS at 01:02

## 2018-02-19 RX ADMIN — MIDODRINE HYDROCHLORIDE 10 MG: 5 TABLET ORAL at 09:02

## 2018-02-19 RX ADMIN — RAMELTEON 8 MG: 8 TABLET, FILM COATED ORAL at 02:02

## 2018-02-19 RX ADMIN — INSULIN DETEMIR 12 UNITS: 100 INJECTION, SOLUTION SUBCUTANEOUS at 09:02

## 2018-02-19 RX ADMIN — OCTREOTIDE ACETATE 50 MCG/HR: 1000 INJECTION, SOLUTION INTRAVENOUS; SUBCUTANEOUS at 04:02

## 2018-02-19 RX ADMIN — MIDODRINE HYDROCHLORIDE 10 MG: 5 TABLET ORAL at 01:02

## 2018-02-19 RX ADMIN — SODIUM BICARBONATE 650 MG TABLET 650 MG: at 01:02

## 2018-02-19 RX ADMIN — PIPERACILLIN AND TAZOBACTAM 4.5 G: 4; .5 INJECTION, POWDER, LYOPHILIZED, FOR SOLUTION INTRAVENOUS; PARENTERAL at 04:02

## 2018-02-19 RX ADMIN — LORAZEPAM 1 MG: 2 INJECTION INTRAMUSCULAR; INTRAVENOUS at 05:02

## 2018-02-19 RX ADMIN — FLECAINIDE ACETATE 50 MG: 50 TABLET ORAL at 09:02

## 2018-02-19 RX ADMIN — SEVELAMER CARBONATE 800 MG: 800 TABLET, FILM COATED ORAL at 08:02

## 2018-02-19 RX ADMIN — ALBUMIN (HUMAN) 12.5 G: 12.5 SOLUTION INTRAVENOUS at 09:02

## 2018-02-19 RX ADMIN — LORAZEPAM 1 MG: 2 INJECTION INTRAMUSCULAR at 08:02

## 2018-02-19 RX ADMIN — PANTOPRAZOLE SODIUM 40 MG: 40 TABLET, DELAYED RELEASE ORAL at 08:02

## 2018-02-19 RX ADMIN — FLECAINIDE ACETATE 50 MG: 50 TABLET ORAL at 08:02

## 2018-02-19 RX ADMIN — SEVELAMER CARBONATE 800 MG: 800 TABLET, FILM COATED ORAL at 01:02

## 2018-02-19 RX ADMIN — SODIUM BICARBONATE 650 MG TABLET 650 MG: at 09:02

## 2018-02-19 RX ADMIN — DEXTROSE MONOHYDRATE 12.5 G: 25 INJECTION, SOLUTION INTRAVENOUS at 09:02

## 2018-02-19 RX ADMIN — HEPARIN SODIUM 5000 UNITS: 5000 INJECTION, SOLUTION INTRAVENOUS; SUBCUTANEOUS at 05:02

## 2018-02-19 RX ADMIN — OCTREOTIDE ACETATE 50 MCG/HR: 1000 INJECTION, SOLUTION INTRAVENOUS; SUBCUTANEOUS at 08:02

## 2018-02-19 RX ADMIN — LORAZEPAM 1 MG: 2 INJECTION INTRAMUSCULAR; INTRAVENOUS at 08:02

## 2018-02-19 RX ADMIN — SODIUM CHLORIDE: 0.9 INJECTION, SOLUTION INTRAVENOUS at 07:02

## 2018-02-19 RX ADMIN — MIDODRINE HYDROCHLORIDE 10 MG: 5 TABLET ORAL at 05:02

## 2018-02-19 RX ADMIN — SODIUM BICARBONATE 650 MG TABLET 650 MG: at 05:02

## 2018-02-19 NOTE — PROGRESS NOTES
[facility]  Nephrology  Progress Note    Patient Name: Jose Cruz Lira  MRN: [id number]  Admission Date: 2/8/2018  Hospital Length of Stay: 10 days  Attending Provider: Surjit Jackson*   Primary Care Physician: Lisa Capone MD  Principal Problem:Acute renal failure superimposed on stage 3 chronic kidney disease    Subjective:     HPI: 68 yo M with PMH of CKD3, left hydronephrosis s/p nephrectomy August 2017, HTN, DMII (long term insulin use), persistent atrial fibrillation (s/p DCCV 2016) on flecainide and Eliquis. He presented for RHC appt, was noted to have worsened LFTs, leukocytosis, and was sent to ED for further workup. Patient has been short of breath for several month with symptoms have been getting worse over the past few weeks. He has dyspnea with exertion and at rest currently, chills, cough off and on, fatigue.  He denies chest pain.  He does take his lasix twice daily and reports urine output has decreased lately in last few days, reports wt loss recently, has had difficulty sleeping lately 2/2 orthopnea, reports stable / decrease in LE swelling, unsure of PND. Patient does report recent travel to [facility] 12/17. He also reports that for the past 2 weeks, he has been having diarrhea.     Patient had recent admission for CHF exacerbation 2/1 and discharged 2/3, recent ED visit for same complaints noted to have leukocytosis with neutrophilic prodominance, bili noted to be 2.0, Cr 2.1, blood cultures drawn NGTD, referred for sleep study however did not receive yet though high suspicion for MONICA. Exam with cards noted for + johnson's sign, HIDA scan ordered was negative for cholecystitis however noted filling defects in liver, recommended f/u imaging. RHC was also ordered and was to be done 2/8 however worsened labs (leukocytosis, LFTs) prompted referral to ED for evaluation.    Nephrology was consulted for ARTUR on CKD. He has been followed by nephrologist Dr. Anderson.  "Baseline Cr is 1.7. He denies NSAID use. He has a history of left total nephrectomy in August 2017 due to left hydronephrosis (etiology is not clear). He notes that his po intake has been poor due to fatigue. Urine output has also decreased. Denies dysuria and hematuria. Endorses nocturia. States that he has been having "loose stools" for the past 2 weeks    Interval History:   4 hour SLED completed overnight with improvement in BUN, he remains confused this morning.  Nursing staff reported 100 ml of UOP overnight.      Review of patient's allergies indicates:  No Known Allergies  Current Facility-Administered Medications   Medication Frequency    0.9%  NaCl infusion (CRRT USE ONLY) Continuous    albumin human 25% bottle 12.5 g BID    calcium carbonate 200 mg calcium (500 mg) chewable tablet 500 mg Daily PRN    dextrose 50% injection 12.5 g PRN    dextrose 50% injection 25 g PRN    flecainide tablet 50 mg Q12H    glucagon (human recombinant) injection 1 mg PRN    glucose chewable tablet 16 g PRN    glucose chewable tablet 24 g PRN    heparin (porcine) injection 5,000 Units Q8H    insulin aspart pen 1-10 Units QID (AC + HS) PRN    insulin detemir pen 12 Units Daily    magnesium sulfate 2g in water 50mL IVPB (premix) PRN    midodrine tablet 10 mg TID    octreotide (SANDOSTATIN) 500 mcg in sodium chloride 0.9% 100 mL infusion Continuous    pantoprazole EC tablet 40 mg Daily    piperacillin-tazobactam 4.5 g in sodium chloride 0.9% 100 mL IVPB (ready to mix system) Q12H    sevelamer carbonate tablet 800 mg TID WM    sodium bicarbonate tablet 650 mg TID    sodium chloride 0.9% flush 5 mL PRN       Objective:     Vital Signs (Most Recent):  Temp: 97.9 °F (36.6 °C) (02/19/18 0701)  Pulse: 65 (02/19/18 1000)  Resp: (!) 24 (02/19/18 1000)  BP: (!) 112/56 (02/19/18 1000)  SpO2: 99 % (02/19/18 1000)  O2 Device (Oxygen Therapy): nasal cannula (02/19/18 1000) Vital Signs (24h Range):  Temp:  [97.8 °F (36.6 " °C)-98.4 °F (36.9 °C)] 97.9 °F (36.6 °C)  Pulse:  [62-76] 65  Resp:  [18-31] 24  SpO2:  [94 %-100 %] 99 %  BP: ()/(52-74) 112/56     Weight: 130 kg (286 lb 9.6 oz) (02/13/18 0424)  Body mass index is 36.8 kg/m².  Body surface area is 2.61 meters squared.    I/O last 3 completed shifts:  In: 3168.7 [P.O.:1210; I.V.:1958.7]  Out: 1591 [Urine:400; Other:1191]    Physical Exam   Constitutional: He appears well-developed and well-nourished. No distress.   HENT:   Head: Normocephalic and atraumatic.   Eyes: Conjunctivae and EOM are normal. Right eye exhibits no discharge. Left eye exhibits no discharge. Scleral icterus is present.   Neck: Normal range of motion. Neck supple.   Cardiovascular: Normal rate, regular rhythm and normal heart sounds.  Exam reveals no gallop and no friction rub.    No murmur heard.  Pulmonary/Chest: Breath sounds normal. No respiratory distress. He has no wheezes. He has no rales.   Abdominal: Soft. Bowel sounds are normal. He exhibits no distension. There is no tenderness.   Musculoskeletal: Normal range of motion. He exhibits edema (+3 LE and scrotal edema). He exhibits no tenderness.   Neurological: He is alert.   Intermittent confusion   Skin: Skin is warm and dry. No rash noted. He is not diaphoretic. No erythema. No pallor.       Significant Labs:  CBC:   Recent Labs  Lab 02/19/18  0522   WBC 27.41*   RBC 3.26*   HGB 8.9*   HCT 26.8*      MCV 82   MCH 27.3   MCHC 33.2     CMP:   Recent Labs  Lab 02/19/18  0523      CALCIUM 7.4*   ALBUMIN 2.1*  2.1*   PROT 5.5*   *   K 4.9   CO2 21*   CL 95   *   CREATININE 5.1*   ALKPHOS 169*   ALT 18   AST 51*   BILITOT 5.7*            Assessment/Plan:     * Acute renal failure superimposed on stage 3 chronic kidney disease    This patient presents with ARTUR on CKD3, likely from pre-renal causes which include poor po intake, sepsis resulting in renal hypoperfusion, and 2 week history of diarrhea.  Re-evaluation with urine  lytes with pre-renal etiology noted.  2D echo with CVP < 3.  BNP improved from admission.  Overall, current working hypothesis for ARTUR would be due to poor renal perfusion from hypoalbuminemia vs HRS.        Oliguric ARTUR with uremia  -4 hour SLED completed overnight with good clearance of BUN.  He remains with AMS this morning, but otherwise stable.    -plan to restart SLED for metabolic clearance x 4 hours to prevent rapid solute removal with his elevated BUN.    -UF goal of 250-300 cc/hr as tolerated.    Still awaiting liver biopsy results          Edgardo Vásquez, ELVIRA  Nephrology  [facility]  Pager:  [phone]

## 2018-02-19 NOTE — ASSESSMENT & PLAN NOTE
--Uncertain etiology. Infections vs liver dysfunction  --Continue midodrine  --has not required vasopressors since admission  --check lactate, repeat blood cultures  --start empiric pip/tazo

## 2018-02-19 NOTE — ASSESSMENT & PLAN NOTE
--suspect secondary to critical illness, suspicious for delirium  --delirium precautions  --received lorazepam 1 mg overnight for agitation  --check ammonia level, previously normal this admission

## 2018-02-19 NOTE — PLAN OF CARE
Patient transferred to ICU for CRRT initiation.  BMT consulted.  CM to inquire if patient will require outpatient HD chair.  Patient will need PT/OT re-eval orders prior to disposition planning.  CM will continue to follow.       02/19/18 1252   Right Care Assessment   Can the patient answer the patient profile reliably? Yes, cognitively intact   How often would a person be available to care for the patient? Whenever needed   Describe the patient's ability to walk at the present time. Minor restrictions or changes   How does the patient rate their overall health at the present time? Fair   Number of comorbid conditions (as recorded on the chart) Four   During the past month, has the patient often been bothered by feeling down, depressed or hopeless? No   During the past month, has the patient often been bothered by little interest or pleasure in doing things? No   Meagan Diana RN, BSN  Case Management  [facility]  Ext. [phone]

## 2018-02-19 NOTE — PHYSICIAN QUERY
PT Name: Jose Cruz Lira  MR #: [id number]    Physician Query Form - Neurological Condition Clarification       CDS/: Bev Jordan RN            Contact information:  [phone]  2/20 MD answered in PN 2/19 JT    This form is a permanent document in the medical record.     Query Date: February 19, 2018    By submitting this query, we are merely seeking further clarification of documentation. Please utilize your independent clinical judgment when addressing the question(s) below.    The Medical record contains the following:   Indicators   Supporting Clinical Findings Location in Medical Record   x AMS, Confusion, LOC, etc. Alert but fluctuating orientation to person place and time and slow to respond     confused about time and situation, re-oriented by RN    Renal function worsening with confusion and volume overload    Noted lethargy and worsening memory     2/18 Renal note      2/18 RN note    2/17 Renal note    2/14 Renal note   x Acute / Chronic Illness Acute renal failure superimposed CKD 3  Hyponateremia  Leukemoid reaction  Abnormal LFT     Radiology Findings     x Electrolyte Imbalance Hd=166, 129, 128, 130, 128, 130, 129, 128  Ca= 7.8, 7.8, 7.4 2/13 - 2/18 Lab   x Medication NS at 200cc/hr- dc  NS at 100cc/hr- dc  NS at 75cc/hr MAR   x Treatment Transfer to the ICU dialysis catheter to be placed SL ED will be initiated only for 4 hours given elevated BUN and then reassess in 8 hours.    CMP daily  Renal consult 2/18 Renal note   x Other Worsening oliguric ARTUR-with significant azotemia acidosis potassium starting to rise and hyponatremia worsening as well as increasing and lethargy.   2/18 Renal note     Provider, please specify the diagnosis or diagnoses associated with above clinical findings.    [  ] Metabolic Encephalopathy    [  ] Toxic Encephalopathy    [  ] Other Encephalopathy    [  ] Unspecified Encephalopathy    [  ] Other (please specify): _____________________________________    [   ] Clinically Undetermined      Please document in your progress notes daily for the duration of treatment until resolved, and  include in your discharge summary.

## 2018-02-19 NOTE — SUBJECTIVE & OBJECTIVE
Interval History/Significant Events: RRT overnight.  Did not require vasopressors.  Agitation and hallucinations overnight.  Lorazepam 1 mg IV given.       Review of Systems   Constitutional: Negative for chills, diaphoresis and fever.   HENT: Negative for congestion.    Eyes: Negative for visual disturbance.   Respiratory: Negative for cough and shortness of breath.    Cardiovascular: Positive for leg swelling. Negative for chest pain.   Gastrointestinal: Positive for abdominal distention. Negative for abdominal pain, diarrhea, nausea and vomiting.   Skin: Negative for rash.   Neurological: Negative for dizziness, seizures, light-headedness, numbness and headaches.   Psychiatric/Behavioral: Positive for confusion.     Objective:     Vital Signs (Most Recent):  Temp: 97.9 °F (36.6 °C) (02/19/18 0701)  Pulse: 65 (02/19/18 1000)  Resp: (!) 24 (02/19/18 1000)  BP: (!) 112/56 (02/19/18 1000)  SpO2: 99 % (02/19/18 1000) Vital Signs (24h Range):  Temp:  [97.6 °F (36.4 °C)-98.4 °F (36.9 °C)] 97.9 °F (36.6 °C)  Pulse:  [62-76] 65  Resp:  [18-31] 24  SpO2:  [94 %-100 %] 99 %  BP: ()/(52-74) 112/56   Weight: 130 kg (286 lb 9.6 oz)  Body mass index is 36.8 kg/m².      Intake/Output Summary (Last 24 hours) at 02/19/18 1102  Last data filed at 02/19/18 1000   Gross per 24 hour   Intake          2368.67 ml   Output             1291 ml   Net          1077.67 ml       Physical Exam   Constitutional: No distress.   HENT:   Head: Normocephalic.   Eyes: Conjunctivae and EOM are normal. Pupils are equal, round, and reactive to light. Scleral icterus is present.   Cardiovascular: Normal rate, regular rhythm and intact distal pulses.    No murmur heard.  Warm extremities, + peripheral edema   Pulmonary/Chest: No accessory muscle usage. No respiratory distress. He has decreased breath sounds in the right lower field and the left lower field. He has no wheezes. He has no rhonchi. He has no rales.   Abdominal: Soft. Bowel sounds are  normal. There is tenderness in the right upper quadrant.   Neurological: No cranial nerve deficit or sensory deficit. GCS eye subscore is 3. GCS verbal subscore is 4. GCS motor subscore is 6.   Drowsy, oriented to person and place.  Following request and moving all extremities equally and symmetrically.  PERRL   Skin: Skin is warm. He is not diaphoretic.       Vents:     Lines/Drains/Airways     Central Venous Catheter Line                 Trialysis (Dialysis) Catheter 02/18/18 1545 right internal jugular less than 1 day          Drain                 Ureteral Drain/Stent 05/14/15 Left ureter 6 Fr. 1012 days          Peripheral Intravenous Line                 Peripheral IV - Single Lumen 02/18/18 0914 Right Forearm 1 day              Significant Labs:    CBC/Anemia Profile:    Recent Labs  Lab 02/18/18  0545 02/19/18  0522   WBC 31.17* 27.41*   HGB 9.4* 8.9*   HCT 28.3* 26.8*    310   MCV 83 82   RDW 16.6* 17.0*        Chemistries:    Recent Labs  Lab 02/17/18  1936 02/18/18  0545 02/18/18  0807 02/18/18  2153 02/19/18  0523   *  --  128* 132* 131*   K 4.7  --  5.1 4.7 4.9   CL 91*  --  92* 96 95   CO2 20*  --  15* 23 21*   *  --  151* 115* 121*   CREATININE 5.2*  --  5.6* 4.2* 5.1*   CALCIUM 7.5*  --  7.4* 7.8* 7.4*   ALBUMIN 2.0*  --  2.2* 2.3* 2.1*  2.1*   PROT 5.8*  --  6.0  --  5.5*   BILITOT 4.2*  --  5.3*  --  5.7*   ALKPHOS 193*  --  189*  --  169*   ALT 18  --  18  --  18   AST 46*  --  48*  --  51*   MG  --  3.4*  --  3.0* 3.1*   PHOS  --  7.0*  --  5.1* 6.2*       All pertinent labs within the past 24 hours have been reviewed.    Significant Imaging:  I have reviewed and interpreted all pertinent imaging results/findings within the past 24 hours.

## 2018-02-19 NOTE — ASSESSMENT & PLAN NOTE
--suspect secondary to underlying liver disease  --INR 1.2-->1.8  --nutrition deficiency possibly contributing

## 2018-02-19 NOTE — PROGRESS NOTES
[facility]  Critical Care Medicine  Progress Note    Patient Name: Jose Cruz Lira  MRN: [id number]  Admission Date: 2/8/2018  Hospital Length of Stay: 10 days  Code Status: Full Code  Attending Provider: Surjit Jackson*  Primary Care Provider: Lisa Capone MD   Principal Problem: Acute renal failure superimposed on stage 3 chronic kidney disease    Subjective:     HPI:  Mr. Lira is a 66 y/o male with a history significant for CKD 3 (s/p nephrectomy August 2017), DMII (long term insulin use), and persistent atrial fibrillation (s/p DCCV 2016) on flecainide and Eliquis. He presented to [facility] on 02/08/18 after he was noted to have worsened LFTs and leukocytosis at an outpatient visit. Mr. Lira reports feeling short of breath for several month with symptoms getting worse over the past few weeks. He reports dyspnea with exertion and at rest, chills, cough off and on, and fatigue. He does take his lasix twice daily and reports his urine output has decreased lately in last few days. He also reports weight loss recently and has had difficulty sleeping lately 2/2 orthopnea. He does report recent travel to [facility] 12/17.     Mr. Lira had a recent admission for CHF exacerbation (2/1 - 2/3). Cardiology examined him on arrival and noted a positive Andres's sign. HIDA and other imaging were negative for cholecystitis, but noted multiple liver lesions concerning for metastatic disease. V/Q scan and LE ultrasound were negative and Mr. Lira takes anticoagulation for atrial fibrillation.      Hospital/ICU Course:  Mr. Lira was admitted to Hospital Medicine with an ARTUR (baseline creatinine ~1.6) and leukocytosis. Multiple imaging modalities have noted liver lesions concerning for metastatic disease. He underwent an IR biopsy of the liver lesions on 2/12/18 and pathology is pending. Unfortunately, his renal function has continued to worsen to the point that Nephrology has recommended dialysis.  Critical Care was consulted due to borderline blood pressures and the Nephrology recommendation to initiate SLED.  Mr. Lira was transferred to the ICU with critical care medicine on 2/18.  Trialysis line placed and RRT initiated.     Interval History/Significant Events: RRT overnight.  Did not require vasopressors.  Agitation and hallucinations overnight.  Lorazepam 1 mg IV given.       Review of Systems   Constitutional: Negative for chills, diaphoresis and fever.   HENT: Negative for congestion.    Eyes: Negative for visual disturbance.   Respiratory: Negative for cough and shortness of breath.    Cardiovascular: Positive for leg swelling. Negative for chest pain.   Gastrointestinal: Positive for abdominal distention. Negative for abdominal pain, diarrhea, nausea and vomiting.   Skin: Negative for rash.   Neurological: Negative for dizziness, seizures, light-headedness, numbness and headaches.   Psychiatric/Behavioral: Positive for confusion.     Objective:     Vital Signs (Most Recent):  Temp: 97.9 °F (36.6 °C) (02/19/18 0701)  Pulse: 65 (02/19/18 1000)  Resp: (!) 24 (02/19/18 1000)  BP: (!) 112/56 (02/19/18 1000)  SpO2: 99 % (02/19/18 1000) Vital Signs (24h Range):  Temp:  [97.6 °F (36.4 °C)-98.4 °F (36.9 °C)] 97.9 °F (36.6 °C)  Pulse:  [62-76] 65  Resp:  [18-31] 24  SpO2:  [94 %-100 %] 99 %  BP: ()/(52-74) 112/56   Weight: 130 kg (286 lb 9.6 oz)  Body mass index is 36.8 kg/m².      Intake/Output Summary (Last 24 hours) at 02/19/18 1102  Last data filed at 02/19/18 1000   Gross per 24 hour   Intake          2368.67 ml   Output             1291 ml   Net          1077.67 ml       Physical Exam   Constitutional: No distress.   HENT:   Head: Normocephalic.   Eyes: Conjunctivae and EOM are normal. Pupils are equal, round, and reactive to light. Scleral icterus is present.   Cardiovascular: Normal rate, regular rhythm and intact distal pulses.    No murmur heard.  Warm extremities, + peripheral edema    Pulmonary/Chest: No accessory muscle usage. No respiratory distress. He has decreased breath sounds in the right lower field and the left lower field. He has no wheezes. He has no rhonchi. He has no rales.   Abdominal: Soft. Bowel sounds are normal. There is tenderness in the right upper quadrant.   Neurological: No cranial nerve deficit or sensory deficit. GCS eye subscore is 3. GCS verbal subscore is 4. GCS motor subscore is 6.   Drowsy, oriented to person and place.  Following request and moving all extremities equally and symmetrically.  PERRL   Skin: Skin is warm. He is not diaphoretic.       Vents:     Lines/Drains/Airways     Central Venous Catheter Line                 Trialysis (Dialysis) Catheter 02/18/18 1545 right internal jugular less than 1 day          Drain                 Ureteral Drain/Stent 05/14/15 Left ureter 6 Fr. 1012 days          Peripheral Intravenous Line                 Peripheral IV - Single Lumen 02/18/18 0914 Right Forearm 1 day              Significant Labs:    CBC/Anemia Profile:    Recent Labs  Lab 02/18/18  0545 02/19/18  0522   WBC 31.17* 27.41*   HGB 9.4* 8.9*   HCT 28.3* 26.8*    310   MCV 83 82   RDW 16.6* 17.0*        Chemistries:    Recent Labs  Lab 02/17/18  1936 02/18/18  0545 02/18/18  0807 02/18/18  2153 02/19/18  0523   *  --  128* 132* 131*   K 4.7  --  5.1 4.7 4.9   CL 91*  --  92* 96 95   CO2 20*  --  15* 23 21*   *  --  151* 115* 121*   CREATININE 5.2*  --  5.6* 4.2* 5.1*   CALCIUM 7.5*  --  7.4* 7.8* 7.4*   ALBUMIN 2.0*  --  2.2* 2.3* 2.1*  2.1*   PROT 5.8*  --  6.0  --  5.5*   BILITOT 4.2*  --  5.3*  --  5.7*   ALKPHOS 193*  --  189*  --  169*   ALT 18  --  18  --  18   AST 46*  --  48*  --  51*   MG  --  3.4*  --  3.0* 3.1*   PHOS  --  7.0*  --  5.1* 6.2*       All pertinent labs within the past 24 hours have been reviewed.    Significant Imaging:  I have reviewed and interpreted all pertinent imaging results/findings within the past 24  hours.    Assessment/Plan:     Neuro   Encephalopathy, metabolic    --suspect secondary to critical illness, suspicious for delirium  --continue delirium precautions  --received lorazepam 1 mg overnight for agitation  --check ammonia level, previously normal this admission  --vbg without hypercapnia        Cardiac/Vascular   Arterial hypotension    --Uncertain etiology. Infections vs liver dysfunction  --Continue midodrine  --has not required vasopressors since admission  --check lactate, repeat blood cultures  --start empiric pip/tazo        Persistent atrial fibrillation    --CHADS Vasc score 4 (CHF, HTN, DM, Age), 5.9 % annual stroke risk.  --In light of worsening condition, will continue to hold apixaban and continue flecainide.   --currently rate controlled  --TSH wnl        Renal/   * Acute renal failure superimposed on stage 3 chronic kidney disease    --Uncertain exact etiology. HRS vs poor renal perfusion from hypoalbuminemia.  --FeUrea 8.4% suggestive of prerenal with evidence of volume overload on exam.   --underwent 4 hours of SLED overnight 2/18.   --appreciate nephrology recommendations  --minimal urine output, bladder scan Q 6 hours  --continue albumin and octreotide per Nephrology.          Hyponatremia    --Likely hypervolemic hyponatremia.   --continue RRT per nephrology  --monitor levels         Hematology   Coagulopathy    --suspect secondary to underlying liver disease  --INR 1.2-->1.8  --nutrition deficiency possibly contributing  --fibrinogen elevated        Oncology   Leukocytosis    --Suspect 2/2 malignancy vs possible infection.   --previous blood and urine cultures negative.  --remains afebrile with borderline hypotension.   --repeat blood cultures, start pip/tazo.                  Endocrine   Type 2 diabetes mellitus with stage 3 chronic kidney disease, with long-term current use of insulin    --Well controlled on levemir 12 units daily + SSI.   --hgb A1c 7.6  -- tolerating diabetic  diet  --Continue to monitor.         GI   Occult blood positive stool    --started on protonix po daily  --hgb grossly unchanged.         Diarrhea    --improved, last bowel movement 2/18 am  --recent travel to the [facility] ~2 months ago.  --C diff negative, Shiga toxin 1 and 2 negative, stool culture + pseudomonas  --CT abdomen/pelvis w/o contrast with diverticulosis without diverticulitis.         Abnormal liver function tests    --Suspect 2/2 malignancy.   --Follow up liver biopsy.   --appreciate hepatology and hematology recommendations  --acute hepatitis panel, leptospira negative  --T bili continues to rise           Critical Care Daily Checklist:    A: Awake: RASS Goal/Actual Goal:  0  Actual: Conde Agitation Sedation Scale (RASS): Alert and calm   B: Spontaneous Breathing Trial Performed?  n/a   C: SAT & SBT Coordinated?  n/a              D: Delirium: CAM-ICU Overall CAM-ICU: Positive   E: Early Mobility Performed? Yes   F: Feeding Goal:    Status:     Current Diet Order   Procedures    Diet Diabetic 1800 Calories      AS: Analgesia/Sedation Received lorazepam this am.   T: Thromboembolic Prophylaxis SCDs, heparin SQ   H: HOB > 300 Yes   U: Stress Ulcer Prophylaxis (if needed) protonix   G: Glucose Control Within goal   B: Bowel Function    I: Indwelling Catheter (Lines & Donato) Necessity Right IJ trialysis   D: De-escalation of Antimicrobials/Pharmacotherapies See above    Plan for the day/ETD Supportive care    Code Status:  Family/Goals of Care: Full Code       Updated family at bedside.     Discussed plan with Dr. Jackson    Critical Care Time: 35  minutes  Critical secondary to Patient has a condition that poses threat to life and bodily function: Acute Renal Failure requiring RRT.     Critical care was time spent personally by me on the following activities: development of treatment plan with patient or surrogate and bedside caregivers, discussions with consultants, evaluation of patient's  response to treatment, examination of patient, ordering and performing treatments and interventions, ordering and review of laboratory studies, ordering and review of radiographic studies, pulse oximetry, re-evaluation of patient's condition. This critical care time did not overlap with that of any other provider or involve time for any procedures.     Charlene Wolff NP  Critical Care Medicine  [facility]

## 2018-02-19 NOTE — ASSESSMENT & PLAN NOTE
--Well controlled on levemir 12 units daily + SSI.   --hgb A1c 7.6  -- tolerating diabetic diet  --Continue to monitor.

## 2018-02-19 NOTE — ASSESSMENT & PLAN NOTE
--CHADS Vasc score 4 (CHF, HTN, DM, Age), 5.9 % annual stroke risk.  --In light of worsening condition, will continue to hold apixaban and continue flecainide.   --currently rate controlled  --TSH wnl

## 2018-02-19 NOTE — SUBJECTIVE & OBJECTIVE
Interval History:   4 hour SLED completed overnight with improvement in BUN, he remains confused this morning.  Nursing staff reported 100 ml of UOP overnight.      Review of patient's allergies indicates:  No Known Allergies  Current Facility-Administered Medications   Medication Frequency    0.9%  NaCl infusion (CRRT USE ONLY) Continuous    albumin human 25% bottle 12.5 g BID    calcium carbonate 200 mg calcium (500 mg) chewable tablet 500 mg Daily PRN    dextrose 50% injection 12.5 g PRN    dextrose 50% injection 25 g PRN    flecainide tablet 50 mg Q12H    glucagon (human recombinant) injection 1 mg PRN    glucose chewable tablet 16 g PRN    glucose chewable tablet 24 g PRN    heparin (porcine) injection 5,000 Units Q8H    insulin aspart pen 1-10 Units QID (AC + HS) PRN    insulin detemir pen 12 Units Daily    magnesium sulfate 2g in water 50mL IVPB (premix) PRN    midodrine tablet 10 mg TID    octreotide (SANDOSTATIN) 500 mcg in sodium chloride 0.9% 100 mL infusion Continuous    pantoprazole EC tablet 40 mg Daily    piperacillin-tazobactam 4.5 g in sodium chloride 0.9% 100 mL IVPB (ready to mix system) Q12H    sevelamer carbonate tablet 800 mg TID WM    sodium bicarbonate tablet 650 mg TID    sodium chloride 0.9% flush 5 mL PRN       Objective:     Vital Signs (Most Recent):  Temp: 97.9 °F (36.6 °C) (02/19/18 0701)  Pulse: 65 (02/19/18 1000)  Resp: (!) 24 (02/19/18 1000)  BP: (!) 112/56 (02/19/18 1000)  SpO2: 99 % (02/19/18 1000)  O2 Device (Oxygen Therapy): nasal cannula (02/19/18 1000) Vital Signs (24h Range):  Temp:  [97.8 °F (36.6 °C)-98.4 °F (36.9 °C)] 97.9 °F (36.6 °C)  Pulse:  [62-76] 65  Resp:  [18-31] 24  SpO2:  [94 %-100 %] 99 %  BP: ()/(52-74) 112/56     Weight: 130 kg (286 lb 9.6 oz) (02/13/18 0424)  Body mass index is 36.8 kg/m².  Body surface area is 2.61 meters squared.    I/O last 3 completed shifts:  In: 3168.7 [P.O.:1210; I.V.:1958.7]  Out: 1591 [Urine:400;  Other:1191]    Physical Exam   Constitutional: He appears well-developed and well-nourished. No distress.   HENT:   Head: Normocephalic and atraumatic.   Eyes: Conjunctivae and EOM are normal. Right eye exhibits no discharge. Left eye exhibits no discharge. Scleral icterus is present.   Neck: Normal range of motion. Neck supple.   Cardiovascular: Normal rate, regular rhythm and normal heart sounds.  Exam reveals no gallop and no friction rub.    No murmur heard.  Pulmonary/Chest: Breath sounds normal. No respiratory distress. He has no wheezes. He has no rales.   Abdominal: Soft. Bowel sounds are normal. He exhibits no distension. There is no tenderness.   Musculoskeletal: Normal range of motion. He exhibits edema (+3 LE and scrotal edema). He exhibits no tenderness.   Neurological: He is alert.   Intermittent confusion   Skin: Skin is warm and dry. No rash noted. He is not diaphoretic. No erythema. No pallor.       Significant Labs:  CBC:   Recent Labs  Lab 02/19/18  0522   WBC 27.41*   RBC 3.26*   HGB 8.9*   HCT 26.8*      MCV 82   MCH 27.3   MCHC 33.2     CMP:   Recent Labs  Lab 02/19/18  0523      CALCIUM 7.4*   ALBUMIN 2.1*  2.1*   PROT 5.5*   *   K 4.9   CO2 21*   CL 95   *   CREATININE 5.1*   ALKPHOS 169*   ALT 18   AST 51*   BILITOT 5.7*

## 2018-02-19 NOTE — ASSESSMENT & PLAN NOTE
--Suspect 2/2 malignancy.   --Follow up liver biopsy.   --appreciate hepatology and hematology recommendations  --acute hepatitis panel, leptospira negative  --T bili continues to rise

## 2018-02-19 NOTE — ASSESSMENT & PLAN NOTE
--improved, last bowel movement 2/18 am  --recent travel to the [facility] ~2 months ago.  --C diff negative, Shiga toxin 1 and 2 negative, stool culture + pseudomonas  --CT abdomen/pelvis w/o contrast with diverticulosis without diverticulitis.

## 2018-02-19 NOTE — ASSESSMENT & PLAN NOTE
This patient presents with ARTUR on CKD3, likely from pre-renal causes which include poor po intake, sepsis resulting in renal hypoperfusion, and 2 week history of diarrhea.  Re-evaluation with urine lytes with pre-renal etiology noted.  2D echo with CVP < 3.  BNP improved from admission.  Overall, current working hypothesis for ARTUR would be due to poor renal perfusion from hypoalbuminemia vs HRS.        Oliguric ARTUR with uremia  -4 hour SLED completed overnight with good clearance of BUN.  He remains with AMS this morning, but otherwise stable.    -plan to restart SLED for metabolic clearance x 4 hours to prevent rapid solute removal with his elevated BUN.    -UF goal of 250-300 cc/hr as tolerated.    Still awaiting liver biopsy results

## 2018-02-19 NOTE — ASSESSMENT & PLAN NOTE
--Suspect 2/2 malignancy.   --blood and urine cultures negative. Hold on starting antibiotics.   --previously evaluated by ID on 2/10.   --remains afebrile, if temperature spikes will re-culture and consider starting antibiotics.

## 2018-02-19 NOTE — ASSESSMENT & PLAN NOTE
--Suspect 2/2 malignancy vs possible infection.   --previous blood and urine cultures negative.  --remains afebrile with borderline hypotension.   --repeat blood cultures, start pip/tazo.

## 2018-02-19 NOTE — PROGRESS NOTES
"  [facility]  Adult Nutrition  Consult Note    SUMMARY     Recommendations    1. Add renal diet restrictions to current diet order & change ONS to Novasource.   2. If PO intake remains poor, consider adding appetite stimulant.   3. RD to monitor & follow-up.    Goals: Meet % EEN, EPN  Nutrition Goal Status: new  Communication of RD Recs: reviewed with RN    Reason for Assessment    Reason for Assessment: length of stay  Diagnosis: other (see comments) (ARF)  Relevent Medical History: DM, HTN, CHF   Interdisciplinary Rounds: attended     General Information Comments: Pt with poor appetite, consuming 0-25% of meals. Receiving CRRT.  Nutrition Discharge Planning: Adequate PO intake    Nutrition Prescription Ordered    Current Diet Order: 1800 kcal ADA  Oral Nutrition Supplement: Boost Glucose Control ONS     Nutrition/Diet History    Patient Reported Diet/Restrictions/Preferences: general     Factors Affecting Nutritional Intake: decreased appetite    Labs/Tests/Procedures/Meds    Pertinent Labs Reviewed: reviewed, pertinent  Pertinent Labs Comments: Na 131, , Creat 5.1, GFR 11, P 6.2, Bili 4.7, A1C 7.6  Pertinent Medications Reviewed: reviewed, pertinent  Pertinent Medications Comments: Sandostatin    Physical Findings    Overall Physical Appearance: edematous  Oral/Mouth Cavity: WDL  Skin: intact    Anthropometrics    Temp: 98.2 °F (36.8 °C)     Height: 6' 2" (188 cm)  Weight Method: Bed Scale  Weight: 130 kg (286 lb 9.6 oz)    Ideal Body Weight (IBW), Male: 190 lb  % Ideal Body Weight, Male (lb): 150.84 lb     BMI (Calculated): 36.9  BMI Grade: 35 - 39.9 - obesity - grade II    Estimated/Assessed Needs    Weight Used For Calorie Calculations: 130 kg (286 lb 9.6 oz)      Energy Calorie Requirements (kcal): 2154 kcal/d  Energy Need Method: [place]-St Jeor (1.0 PAL)     Weight Used For Protein Calculations: 130 kg (286 lb 9.6 oz)  Protein Requirements: 130 g/d (1 g/kg)     Fluid Need " Method: RDA Method, other (see comments) (Per MD or 1 mL/kcal)    Assessment and Plan    Nutrition Problem  Inadequate oral intake    Related to (etiology):   Decreased appetite    Signs and Symptoms (as evidenced by):   Poor PO intake    Nutrition Diagnosis Status:   New    Monitor and Evaluation    Food and Nutrient Intake: energy intake, food and beverage intake  Food and Nutrient Adminstration: diet order     Physical Activity and Function: nutrition-related ADLs and IADLs  Anthropometric Measurements: weight, weight change  Biochemical Data, Medical Tests and Procedures: inflammatory profile, lipid profile, glucose/endocrine profile, gastrointestinal profile, electrolyte and renal panel  Nutrition-Focused Physical Findings: overall appearance    Nutrition Risk    Level of Risk: other (see comments) (2x/week)    Nutrition Follow-Up    RD Follow-up?: Yes

## 2018-02-19 NOTE — ASSESSMENT & PLAN NOTE
--Uncertain exact etiology. HRS vs poor renal perfusion from hypoalbuminemia.  --FeUrea 8.4% suggestive of prerenal with evidence of volume overload on exam.   --underwent 4 hours of SLED overnight 2/18.   --appreciate nephrology recommendations  --minimal urine output, bladder scan Q 6 hours  --continue albumin and octreotide per Nephrology.  Consider discontinue sodium bicarb tablets.

## 2018-02-20 DIAGNOSIS — C22.9 MALIGNANT NEOPLASM OF LIVER, UNSPECIFIED LIVER MALIGNANCY TYPE: Primary | ICD-10-CM

## 2018-02-20 PROBLEM — K76.9 LIVER LESION: Status: ACTIVE | Noted: 2018-02-20

## 2018-02-20 LAB
ALBUMIN SERPL BCP-MCNC: 2 G/DL
ALBUMIN SERPL BCP-MCNC: 2 G/DL
ALLENS TEST: ABNORMAL
ALP SERPL-CCNC: 156 U/L
ALT SERPL W/O P-5'-P-CCNC: 20 U/L
ANION GAP SERPL CALC-SCNC: 12 MMOL/L
ANISOCYTOSIS BLD QL SMEAR: SLIGHT
AST SERPL-CCNC: 74 U/L
BASO STIPL BLD QL SMEAR: ABNORMAL
BASOPHILS # BLD AUTO: 0.04 K/UL
BASOPHILS NFR BLD: 0.1 %
BILIRUB DIRECT SERPL-MCNC: 5.3 MG/DL
BILIRUB SERPL-MCNC: 6.4 MG/DL
BUN SERPL-MCNC: 83 MG/DL
CALCIUM SERPL-MCNC: 7.5 MG/DL
CANCER AG19-9 SERPL-ACNC: 1696 U/ML
CHLORIDE SERPL-SCNC: 99 MMOL/L
CO2 SERPL-SCNC: 24 MMOL/L
CREAT SERPL-MCNC: 4.2 MG/DL
DIFFERENTIAL METHOD: ABNORMAL
EOSINOPHIL # BLD AUTO: 0 K/UL
EOSINOPHIL NFR BLD: 0.1 %
ERYTHROCYTE [DISTWIDTH] IN BLOOD BY AUTOMATED COUNT: 17.5 %
EST. GFR  (AFRICAN AMERICAN): 16 ML/MIN/1.73 M^2
EST. GFR  (NON AFRICAN AMERICAN): 13.9 ML/MIN/1.73 M^2
GLUCOSE SERPL-MCNC: 73 MG/DL
HCO3 UR-SCNC: 23.2 MMOL/L (ref 24–28)
HCT VFR BLD AUTO: 27.7 %
HGB BLD-MCNC: 9.1 G/DL
HYPOCHROMIA BLD QL SMEAR: ABNORMAL
IMM GRANULOCYTES # BLD AUTO: 1.02 K/UL
IMM GRANULOCYTES NFR BLD AUTO: 3.6 %
INR PPP: 1.8
LYMPHOCYTES # BLD AUTO: 0.5 K/UL
LYMPHOCYTES NFR BLD: 1.8 %
MAGNESIUM SERPL-MCNC: 2.5 MG/DL
MAGNESIUM SERPL-MCNC: 2.6 MG/DL
MCH RBC QN AUTO: 27.8 PG
MCHC RBC AUTO-ENTMCNC: 32.9 G/DL
MCV RBC AUTO: 85 FL
MONOCYTES # BLD AUTO: 1.9 K/UL
MONOCYTES NFR BLD: 6.9 %
NEUTROPHILS # BLD AUTO: 24.6 K/UL
NEUTROPHILS NFR BLD: 87.5 %
NRBC BLD-RTO: 0 /100 WBC
OVALOCYTES BLD QL SMEAR: ABNORMAL
PCO2 BLDA: 41.2 MMHG (ref 35–45)
PH SMN: 7.36 [PH] (ref 7.35–7.45)
PHOSPHATE SERPL-MCNC: 5.9 MG/DL
PLATELET # BLD AUTO: 282 K/UL
PLATELET BLD QL SMEAR: ABNORMAL
PMV BLD AUTO: 10.1 FL
PO2 BLDA: 28 MMHG (ref 40–60)
POC BE: -2 MMOL/L
POC SATURATED O2: 51 % (ref 95–100)
POC TCO2: 24 MMOL/L (ref 24–29)
POCT GLUCOSE: 110 MG/DL (ref 70–110)
POCT GLUCOSE: 119 MG/DL (ref 70–110)
POCT GLUCOSE: 69 MG/DL (ref 70–110)
POCT GLUCOSE: 75 MG/DL (ref 70–110)
POCT GLUCOSE: 81 MG/DL (ref 70–110)
POCT GLUCOSE: 96 MG/DL (ref 70–110)
POCT GLUCOSE: 98 MG/DL (ref 70–110)
POIKILOCYTOSIS BLD QL SMEAR: SLIGHT
POLYCHROMASIA BLD QL SMEAR: ABNORMAL
POTASSIUM SERPL-SCNC: 4.8 MMOL/L
PROT SERPL-MCNC: 5.3 G/DL
PROTHROMBIN TIME: 17.8 SEC
RBC # BLD AUTO: 3.27 M/UL
SAMPLE: ABNORMAL
SITE: ABNORMAL
SODIUM SERPL-SCNC: 135 MMOL/L
SPHEROCYTES BLD QL SMEAR: ABNORMAL
URATE SERPL-MCNC: 13.1 MG/DL
WBC # BLD AUTO: 28.1 K/UL

## 2018-02-20 PROCEDURE — 90945 DIALYSIS ONE EVALUATION: CPT

## 2018-02-20 PROCEDURE — 93010 ELECTROCARDIOGRAM REPORT: CPT | Mod: ,,, | Performed by: INTERNAL MEDICINE

## 2018-02-20 PROCEDURE — 94761 N-INVAS EAR/PLS OXIMETRY MLT: CPT

## 2018-02-20 PROCEDURE — 25000003 PHARM REV CODE 250: Performed by: GENERAL ACUTE CARE HOSPITAL

## 2018-02-20 PROCEDURE — 93010 ELECTROCARDIOGRAM REPORT: CPT | Mod: 76,,, | Performed by: INTERNAL MEDICINE

## 2018-02-20 PROCEDURE — 25000003 PHARM REV CODE 250

## 2018-02-20 PROCEDURE — 85610 PROTHROMBIN TIME: CPT

## 2018-02-20 PROCEDURE — 63600175 PHARM REV CODE 636 W HCPCS

## 2018-02-20 PROCEDURE — 85025 COMPLETE CBC W/AUTO DIFF WBC: CPT

## 2018-02-20 PROCEDURE — 20000000 HC ICU ROOM

## 2018-02-20 PROCEDURE — 80100008 HC CRRT DAILY MAINTENANCE

## 2018-02-20 PROCEDURE — 25000003 PHARM REV CODE 250: Performed by: NURSE PRACTITIONER

## 2018-02-20 PROCEDURE — 99291 CRITICAL CARE FIRST HOUR: CPT | Mod: ,,, | Performed by: NURSE PRACTITIONER

## 2018-02-20 PROCEDURE — 84550 ASSAY OF BLOOD/URIC ACID: CPT

## 2018-02-20 PROCEDURE — 97530 THERAPEUTIC ACTIVITIES: CPT

## 2018-02-20 PROCEDURE — 99232 SBSQ HOSP IP/OBS MODERATE 35: CPT | Mod: ,,, | Performed by: INTERNAL MEDICINE

## 2018-02-20 PROCEDURE — 99233 SBSQ HOSP IP/OBS HIGH 50: CPT | Mod: ,,, | Performed by: INTERNAL MEDICINE

## 2018-02-20 PROCEDURE — 63600175 PHARM REV CODE 636 W HCPCS: Performed by: GENERAL ACUTE CARE HOSPITAL

## 2018-02-20 PROCEDURE — 99900035 HC TECH TIME PER 15 MIN (STAT)

## 2018-02-20 PROCEDURE — 25000003 PHARM REV CODE 250: Performed by: INTERNAL MEDICINE

## 2018-02-20 PROCEDURE — 80076 HEPATIC FUNCTION PANEL: CPT

## 2018-02-20 PROCEDURE — 25000242 PHARM REV CODE 250 ALT 637 W/ HCPCS: Performed by: NURSE PRACTITIONER

## 2018-02-20 PROCEDURE — 80069 RENAL FUNCTION PANEL: CPT | Mod: 91

## 2018-02-20 PROCEDURE — 83735 ASSAY OF MAGNESIUM: CPT | Mod: 91

## 2018-02-20 PROCEDURE — 83735 ASSAY OF MAGNESIUM: CPT

## 2018-02-20 PROCEDURE — 94640 AIRWAY INHALATION TREATMENT: CPT

## 2018-02-20 PROCEDURE — 63600175 PHARM REV CODE 636 W HCPCS: Performed by: INTERNAL MEDICINE

## 2018-02-20 PROCEDURE — 97163 PT EVAL HIGH COMPLEX 45 MIN: CPT

## 2018-02-20 PROCEDURE — 86301 IMMUNOASSAY TUMOR CA 19-9: CPT

## 2018-02-20 PROCEDURE — 63600175 PHARM REV CODE 636 W HCPCS: Performed by: NURSE PRACTITIONER

## 2018-02-20 PROCEDURE — P9047 ALBUMIN (HUMAN), 25%, 50ML: HCPCS | Mod: JG | Performed by: INTERNAL MEDICINE

## 2018-02-20 RX ORDER — HYDROCODONE BITARTRATE AND ACETAMINOPHEN 500; 5 MG/1; MG/1
TABLET ORAL CONTINUOUS
Status: DISCONTINUED | OUTPATIENT
Start: 2018-02-20 | End: 2018-02-21

## 2018-02-20 RX ORDER — MAGNESIUM SULFATE HEPTAHYDRATE 40 MG/ML
2 INJECTION, SOLUTION INTRAVENOUS
Status: DISCONTINUED | OUTPATIENT
Start: 2018-02-20 | End: 2018-02-21

## 2018-02-20 RX ORDER — RAMELTEON 8 MG/1
8 TABLET ORAL ONCE
Status: COMPLETED | OUTPATIENT
Start: 2018-02-20 | End: 2018-02-20

## 2018-02-20 RX ORDER — LORAZEPAM 2 MG/ML
INJECTION INTRAMUSCULAR
Status: COMPLETED
Start: 2018-02-20 | End: 2018-02-20

## 2018-02-20 RX ORDER — LORAZEPAM 2 MG/ML
1 INJECTION INTRAMUSCULAR ONCE
Status: COMPLETED | OUTPATIENT
Start: 2018-02-20 | End: 2018-02-20

## 2018-02-20 RX ORDER — IPRATROPIUM BROMIDE AND ALBUTEROL SULFATE 2.5; .5 MG/3ML; MG/3ML
3 SOLUTION RESPIRATORY (INHALATION) ONCE
Status: COMPLETED | OUTPATIENT
Start: 2018-02-20 | End: 2018-02-20

## 2018-02-20 RX ADMIN — MIDODRINE HYDROCHLORIDE 10 MG: 5 TABLET ORAL at 06:02

## 2018-02-20 RX ADMIN — HEPARIN SODIUM 5000 UNITS: 5000 INJECTION, SOLUTION INTRAVENOUS; SUBCUTANEOUS at 06:02

## 2018-02-20 RX ADMIN — OCTREOTIDE ACETATE 50 MCG/HR: 1000 INJECTION, SOLUTION INTRAVENOUS; SUBCUTANEOUS at 01:02

## 2018-02-20 RX ADMIN — PANTOPRAZOLE SODIUM 40 MG: 40 TABLET, DELAYED RELEASE ORAL at 09:02

## 2018-02-20 RX ADMIN — LORAZEPAM 1 MG: 2 INJECTION INTRAMUSCULAR at 01:02

## 2018-02-20 RX ADMIN — PIPERACILLIN AND TAZOBACTAM 4.5 G: 4; .5 INJECTION, POWDER, LYOPHILIZED, FOR SOLUTION INTRAVENOUS; PARENTERAL at 12:02

## 2018-02-20 RX ADMIN — ALBUMIN (HUMAN) 12.5 G: 12.5 SOLUTION INTRAVENOUS at 09:02

## 2018-02-20 RX ADMIN — SEVELAMER CARBONATE 800 MG: 800 TABLET, FILM COATED ORAL at 05:02

## 2018-02-20 RX ADMIN — SEVELAMER CARBONATE 800 MG: 800 TABLET, FILM COATED ORAL at 12:02

## 2018-02-20 RX ADMIN — LORAZEPAM 1 MG: 2 INJECTION INTRAMUSCULAR; INTRAVENOUS at 01:02

## 2018-02-20 RX ADMIN — Medication 16 G: at 09:02

## 2018-02-20 RX ADMIN — RAMELTEON 8 MG: 8 TABLET, FILM COATED ORAL at 11:02

## 2018-02-20 RX ADMIN — SODIUM BICARBONATE 650 MG TABLET 650 MG: at 06:02

## 2018-02-20 RX ADMIN — IPRATROPIUM BROMIDE AND ALBUTEROL SULFATE 3 ML: .5; 3 SOLUTION RESPIRATORY (INHALATION) at 09:02

## 2018-02-20 RX ADMIN — MIDODRINE HYDROCHLORIDE 10 MG: 5 TABLET ORAL at 02:02

## 2018-02-20 RX ADMIN — OCTREOTIDE ACETATE 50 MCG/HR: 1000 INJECTION, SOLUTION INTRAVENOUS; SUBCUTANEOUS at 12:02

## 2018-02-20 RX ADMIN — HEPARIN SODIUM 5000 UNITS: 5000 INJECTION, SOLUTION INTRAVENOUS; SUBCUTANEOUS at 02:02

## 2018-02-20 RX ADMIN — SEVELAMER CARBONATE 800 MG: 800 TABLET, FILM COATED ORAL at 09:02

## 2018-02-20 RX ADMIN — FLECAINIDE ACETATE 50 MG: 50 TABLET ORAL at 08:02

## 2018-02-20 RX ADMIN — HEPARIN SODIUM 5000 UNITS: 5000 INJECTION, SOLUTION INTRAVENOUS; SUBCUTANEOUS at 09:02

## 2018-02-20 RX ADMIN — PIPERACILLIN AND TAZOBACTAM 4.5 G: 4; .5 INJECTION, POWDER, LYOPHILIZED, FOR SOLUTION INTRAVENOUS; PARENTERAL at 01:02

## 2018-02-20 RX ADMIN — MIDODRINE HYDROCHLORIDE 10 MG: 5 TABLET ORAL at 09:02

## 2018-02-20 RX ADMIN — FLECAINIDE ACETATE 50 MG: 50 TABLET ORAL at 09:02

## 2018-02-20 NOTE — PROGRESS NOTES
[facility]  Nephrology  Progress Note    Patient Name: Jose Cruz Lira  MRN: [id number]  Admission Date: 2/8/2018  Hospital Length of Stay: 11 days  Attending Provider: Surjit Jackson*   Primary Care Physician: Lisa Capone MD  Principal Problem:Acute renal failure superimposed on stage 3 chronic kidney disease    Subjective:     HPI: 68 yo M with PMH of CKD3, left hydronephrosis s/p nephrectomy August 2017, HTN, DMII (long term insulin use), persistent atrial fibrillation (s/p DCCV 2016) on flecainide and Eliquis. He presented for RHC appt, was noted to have worsened LFTs, leukocytosis, and was sent to ED for further workup. Patient has been short of breath for several month with symptoms have been getting worse over the past few weeks. He has dyspnea with exertion and at rest currently, chills, cough off and on, fatigue.  He denies chest pain.  He does take his lasix twice daily and reports urine output has decreased lately in last few days, reports wt loss recently, has had difficulty sleeping lately 2/2 orthopnea, reports stable / decrease in LE swelling, unsure of PND. Patient does report recent travel to [facility] 12/17. He also reports that for the past 2 weeks, he has been having diarrhea.     Patient had recent admission for CHF exacerbation 2/1 and discharged 2/3, recent ED visit for same complaints noted to have leukocytosis with neutrophilic prodominance, bili noted to be 2.0, Cr 2.1, blood cultures drawn NGTD, referred for sleep study however did not receive yet though high suspicion for MONICA. Exam with cards noted for + johnson's sign, HIDA scan ordered was negative for cholecystitis however noted filling defects in liver, recommended f/u imaging. RHC was also ordered and was to be done 2/8 however worsened labs (leukocytosis, LFTs) prompted referral to ED for evaluation.    Nephrology was consulted for ARTUR on CKD. He has been followed by nephrologist Dr. Anderson.  "Baseline Cr is 1.7. He denies NSAID use. He has a history of left total nephrectomy in August 2017 due to left hydronephrosis (etiology is not clear). He notes that his po intake has been poor due to fatigue. Urine output has also decreased. Denies dysuria and hematuria. Endorses nocturia. States that he has been having "loose stools" for the past 2 weeks    Interval History:  4 hour SLED completed overnight and tolerated well, net even volume status in 24 hours.  Electrolytes stable this morning.  Remains with intermittent confusion.    Review of patient's allergies indicates:  No Known Allergies  Current Facility-Administered Medications   Medication Frequency    calcium carbonate 200 mg calcium (500 mg) chewable tablet 500 mg Daily PRN    dextrose 50% injection 12.5 g PRN    dextrose 50% injection 25 g PRN    flecainide tablet 50 mg Q12H    glucagon (human recombinant) injection 1 mg PRN    glucose chewable tablet 16 g PRN    glucose chewable tablet 24 g PRN    heparin (porcine) injection 5,000 Units Q8H    insulin aspart pen 1-10 Units QID (AC + HS) PRN    midodrine tablet 10 mg TID    octreotide (SANDOSTATIN) 500 mcg in sodium chloride 0.9% 100 mL infusion Continuous    pantoprazole EC tablet 40 mg Daily    piperacillin-tazobactam 4.5 g in sodium chloride 0.9% 100 mL IVPB (ready to mix system) Q12H    sevelamer carbonate tablet 800 mg TID WM    sodium chloride 0.9% flush 5 mL PRN       Objective:     Vital Signs (Most Recent):  Temp: 97.5 °F (36.4 °C) (02/20/18 1100)  Pulse: 72 (02/20/18 1300)  Resp: (!) 36 (02/20/18 1300)  BP: (!) 105/51 (02/20/18 1300)  SpO2: 98 % (02/20/18 1300)  O2 Device (Oxygen Therapy): room air (02/20/18 1300) Vital Signs (24h Range):  Temp:  [97.5 °F (36.4 °C)-98.5 °F (36.9 °C)] 97.5 °F (36.4 °C)  Pulse:  [65-78] 72  Resp:  [22-44] 36  SpO2:  [89 %-100 %] 98 %  BP: ()/(40-66) 105/51     Weight: 130 kg (286 lb 9.6 oz) (02/19/18 1400)  Body mass index is 36.8 " kg/m².  Body surface area is 2.61 meters squared.    I/O last 3 completed shifts:  In: 2800 [P.O.:240; I.V.:2360; IV Piggyback:200]  Out: 2924 [Urine:200; Other:2724]    Physical Exam   Constitutional: He appears well-developed and well-nourished. No distress.   HENT:   Head: Normocephalic and atraumatic.   Eyes: Conjunctivae and EOM are normal. Right eye exhibits no discharge. Left eye exhibits no discharge. Scleral icterus is present.   Neck: Normal range of motion. Neck supple.   Cardiovascular: Normal rate, regular rhythm and normal heart sounds.  Exam reveals no gallop and no friction rub.    No murmur heard.  Pulmonary/Chest: Breath sounds normal. No respiratory distress. He has no wheezes. He has no rales.   Abdominal: Soft. Bowel sounds are normal. He exhibits no distension. There is no tenderness.   Musculoskeletal: Normal range of motion. He exhibits edema (+3 LE and scrotal edema). He exhibits no tenderness.   Neurological: He is alert.   Intermittent confusion   Skin: Skin is warm and dry. No rash noted. He is not diaphoretic. No erythema. No pallor.       Significant Labs:  CBC:   Recent Labs  Lab 02/20/18  0357   WBC 28.10*   RBC 3.27*   HGB 9.1*   HCT 27.7*      MCV 85   MCH 27.8   MCHC 32.9     CMP:   Recent Labs  Lab 02/20/18  0357   GLU 73   CALCIUM 7.5*   ALBUMIN 2.0*  2.0*   PROT 5.3*   *   K 4.8   CO2 24   CL 99   BUN 83*   CREATININE 4.2*   ALKPHOS 156*   ALT 20   AST 74*   BILITOT 6.4*            Assessment/Plan:     * Acute renal failure superimposed on stage 3 chronic kidney disease    This patient presents with ARTUR on CKD3, likely from pre-renal causes which include poor po intake, sepsis resulting in renal hypoperfusion, and 2 week history of diarrhea.  Re-evaluation with urine lytes with pre-renal etiology noted.  2D echo with CVP < 3.  BNP improved from admission.  Overall, current working hypothesis for ARTUR would be due to poor renal perfusion from hypoalbuminemia vs HRS.         Oliguric ARTUR with uremia  -4 hour SLED completed overnight with good clearance of BUN.  He remains with AMS this morning, but otherwise stable.    -plan to restart SLED for metabolic clearance x 8 hours to prevent rapid solute removal with his elevated BUN.    -UF goal of 250-300 cc/hr as tolerated.              Edgardo Vásquez NP  Nephrology  [facility]  Pager:  [phone]

## 2018-02-20 NOTE — PLAN OF CARE
Problem: Physical Therapy Goal  Goal: Physical Therapy Goal  Goals to be met by: 3/6/2018    Patient will increase functional independence with mobility by performin. Supine to sit with Moderate Assistance - not met  2. Sit to supine with Moderate Assistance - not met  3. Sit to stand transfer with Moderate Assistance with or without RW - not met  4. Bed to chair transfer with Moderate Assistance with or without RW - not met  5. Gait  x 30 feet with Moderate Assistance with or without RW - not met  6. Lower extremity exercise program x10 reps per handout, with independence - not met    Outcome: Ongoing (interventions implemented as appropriate)  Eval completed and goals appropriate.

## 2018-02-20 NOTE — PLAN OF CARE
Problem: Patient Care Overview  Goal: Plan of Care Review  No acute events throughout day. See vital signs and assessments in flowsheets. See below for updates on today's progress.     Pulmonary: Room air O2, Sats 92-96%    Cardiovascular: NSR EKG, 60-70s    Neurological: Alert and anxious, oriented to person and place, reoriented to time and situation by nursing staff    Gastrointestinal: +BS, abdomen round and firm    Genitourinary: Bladder Scan Q6, Dark alfredo urine    Endocrine: BG monitored    Integumentary/Other: Bruising noted in BUE    Infusions: Octreotide gtt @ 50 mcg/hr    Patient progressing towards goals as tolerated, plan of care communicated and reviewed with Jose Cruz Lira and family. All concerns addressed. Will continue to monitor.

## 2018-02-20 NOTE — SUBJECTIVE & OBJECTIVE
Interval History:  4 hour SLED completed overnight and tolerated well, net even volume status in 24 hours.  Electrolytes stable this morning.  Remains with intermittent confusion.    Review of patient's allergies indicates:  No Known Allergies  Current Facility-Administered Medications   Medication Frequency    calcium carbonate 200 mg calcium (500 mg) chewable tablet 500 mg Daily PRN    dextrose 50% injection 12.5 g PRN    dextrose 50% injection 25 g PRN    flecainide tablet 50 mg Q12H    glucagon (human recombinant) injection 1 mg PRN    glucose chewable tablet 16 g PRN    glucose chewable tablet 24 g PRN    heparin (porcine) injection 5,000 Units Q8H    insulin aspart pen 1-10 Units QID (AC + HS) PRN    midodrine tablet 10 mg TID    octreotide (SANDOSTATIN) 500 mcg in sodium chloride 0.9% 100 mL infusion Continuous    pantoprazole EC tablet 40 mg Daily    piperacillin-tazobactam 4.5 g in sodium chloride 0.9% 100 mL IVPB (ready to mix system) Q12H    sevelamer carbonate tablet 800 mg TID WM    sodium chloride 0.9% flush 5 mL PRN       Objective:     Vital Signs (Most Recent):  Temp: 97.5 °F (36.4 °C) (02/20/18 1100)  Pulse: 72 (02/20/18 1300)  Resp: (!) 36 (02/20/18 1300)  BP: (!) 105/51 (02/20/18 1300)  SpO2: 98 % (02/20/18 1300)  O2 Device (Oxygen Therapy): room air (02/20/18 1300) Vital Signs (24h Range):  Temp:  [97.5 °F (36.4 °C)-98.5 °F (36.9 °C)] 97.5 °F (36.4 °C)  Pulse:  [65-78] 72  Resp:  [22-44] 36  SpO2:  [89 %-100 %] 98 %  BP: ()/(40-66) 105/51     Weight: 130 kg (286 lb 9.6 oz) (02/19/18 1400)  Body mass index is 36.8 kg/m².  Body surface area is 2.61 meters squared.    I/O last 3 completed shifts:  In: 2800 [P.O.:240; I.V.:2360; IV Piggyback:200]  Out: 2924 [Urine:200; Other:2724]    Physical Exam   Constitutional: He appears well-developed and well-nourished. No distress.   HENT:   Head: Normocephalic and atraumatic.   Eyes: Conjunctivae and EOM are normal. Right eye exhibits  no discharge. Left eye exhibits no discharge. Scleral icterus is present.   Neck: Normal range of motion. Neck supple.   Cardiovascular: Normal rate, regular rhythm and normal heart sounds.  Exam reveals no gallop and no friction rub.    No murmur heard.  Pulmonary/Chest: Breath sounds normal. No respiratory distress. He has no wheezes. He has no rales.   Abdominal: Soft. Bowel sounds are normal. He exhibits no distension. There is no tenderness.   Musculoskeletal: Normal range of motion. He exhibits edema (+3 LE and scrotal edema). He exhibits no tenderness.   Neurological: He is alert.   Intermittent confusion   Skin: Skin is warm and dry. No rash noted. He is not diaphoretic. No erythema. No pallor.       Significant Labs:  CBC:   Recent Labs  Lab 02/20/18  0357   WBC 28.10*   RBC 3.27*   HGB 9.1*   HCT 27.7*      MCV 85   MCH 27.8   MCHC 32.9     CMP:   Recent Labs  Lab 02/20/18  0357   GLU 73   CALCIUM 7.5*   ALBUMIN 2.0*  2.0*   PROT 5.3*   *   K 4.8   CO2 24   CL 99   BUN 83*   CREATININE 4.2*   ALKPHOS 156*   ALT 20   AST 74*   BILITOT 6.4*

## 2018-02-20 NOTE — ASSESSMENT & PLAN NOTE
--suspect secondary to critical illness, suspicious for delirium  --delirium precautions  --check Qtc and consider starting Seroquel   --ammonia level wnl.

## 2018-02-20 NOTE — ASSESSMENT & PLAN NOTE
--Uncertain exact etiology. HRS vs poor renal perfusion from hypoalbuminemia.  --FeUrea 8.4% suggestive of prerenal with evidence of volume overload on exam.   --plan to repeat RRT treatment today.  --appreciate nephrology recommendations  --minimal urine output, bladder scan Q 6 hours  --continue octreotide per Nephrology.  Discontinue albumin and sodium bicarb tablets.

## 2018-02-20 NOTE — SUBJECTIVE & OBJECTIVE
Interval History/Significant Events: Agitated overnight.  Received lorazepam 1 mg x 2 doses overnight.     Review of Systems   Constitutional: Negative for chills, diaphoresis and fever.   HENT: Negative for congestion.    Eyes: Negative for visual disturbance.   Respiratory: Negative for cough and shortness of breath.    Cardiovascular: Positive for leg swelling. Negative for chest pain.   Gastrointestinal: Positive for abdominal distention. Negative for abdominal pain, diarrhea, nausea and vomiting.   Skin: Negative for rash.   Neurological: Negative for dizziness, seizures, light-headedness, numbness and headaches.   Psychiatric/Behavioral: Positive for confusion.     Objective:     Vital Signs (Most Recent):  Temp: 97.9 °F (36.6 °C) (02/20/18 0701)  Pulse: 72 (02/20/18 0954)  Resp: (!) 36 (02/20/18 0954)  BP: (!) 140/64 (02/20/18 0900)  SpO2: 97 % (02/20/18 0954) Vital Signs (24h Range):  Temp:  [97.7 °F (36.5 °C)-98.5 °F (36.9 °C)] 97.9 °F (36.6 °C)  Pulse:  [65-78] 72  Resp:  [22-44] 36  SpO2:  [89 %-100 %] 97 %  BP: ()/(40-66) 140/64   Weight: 130 kg (286 lb 9.6 oz)  Body mass index is 36.8 kg/m².      Intake/Output Summary (Last 24 hours) at 02/20/18 1007  Last data filed at 02/20/18 0900   Gross per 24 hour   Intake             1715 ml   Output             1733 ml   Net              -18 ml       Physical Exam   Constitutional: No distress.   HENT:   Head: Normocephalic.   Eyes: Conjunctivae and EOM are normal. Pupils are equal, round, and reactive to light. Scleral icterus is present.   Cardiovascular: Normal rate, regular rhythm and intact distal pulses.    No murmur heard.  Warm extremities, + peripheral edema   Pulmonary/Chest: No accessory muscle usage. No respiratory distress. He has decreased breath sounds in the right lower field and the left lower field. He has no wheezes. He has no rhonchi. He has no rales.   Abdominal: Soft. Bowel sounds are normal. There is no tenderness.   Neurological: No  cranial nerve deficit or sensory deficit. GCS eye subscore is 3. GCS verbal subscore is 4. GCS motor subscore is 6.   Drowsy, oriented to person, place, and time.  Following request and moving all extremities equally and symmetrically.  PERRL   Skin: Skin is warm. He is not diaphoretic.       Vents:     Lines/Drains/Airways     Central Venous Catheter Line                 Trialysis (Dialysis) Catheter 02/18/18 1545 right internal jugular 1 day          Drain                 Ureteral Drain/Stent 05/14/15 Left ureter 6 Fr. 1013 days              Significant Labs:    CBC/Anemia Profile:    Recent Labs  Lab 02/19/18  0522 02/20/18  0357   WBC 27.41* 28.10*   HGB 8.9* 9.1*   HCT 26.8* 27.7*    282   MCV 82 85   RDW 17.0* 17.5*        Chemistries:    Recent Labs  Lab 02/19/18  0523 02/19/18  1420 02/19/18  1625 02/19/18  2129 02/20/18  0357   * 132*  --  133* 135*   K 4.9 4.8  --  4.3 4.8   CL 95 95  --  97 99   CO2 21* 22*  --  22* 24   * 124*  --  95* 83*   CREATININE 5.1* 5.2*  --  3.9* 4.2*   CALCIUM 7.4* 7.4*  --  7.5* 7.5*   ALBUMIN 2.1*  2.1* 2.1*  --  2.3* 2.0*  2.0*   PROT 5.5*  --   --   --  5.3*   BILITOT 5.7*  --   --   --  6.4*   ALKPHOS 169*  --   --   --  156*   ALT 18  --   --   --  20   AST 51*  --   --   --  74*   MG 3.1*  --  3.0* 2.7* 2.5   PHOS 6.2* 6.4*  --  4.7* 5.9*       All pertinent labs within the past 24 hours have been reviewed.    Significant Imaging:  I have reviewed and interpreted all pertinent imaging results/findings within the past 24 hours.

## 2018-02-20 NOTE — ASSESSMENT & PLAN NOTE
This patient presents with ARTUR on CKD3, likely from pre-renal causes which include poor po intake, sepsis resulting in renal hypoperfusion, and 2 week history of diarrhea.  Re-evaluation with urine lytes with pre-renal etiology noted.  2D echo with CVP < 3.  BNP improved from admission.  Overall, current working hypothesis for ARTUR would be due to poor renal perfusion from hypoalbuminemia vs HRS.        Oliguric ARTUR with uremia  -4 hour SLED completed overnight with good clearance of BUN.  He remains with AMS this morning, but otherwise stable.    -plan to restart SLED for metabolic clearance x 8 hours to prevent rapid solute removal with his elevated BUN.    -UF goal of 250-300 cc/hr as tolerated.

## 2018-02-20 NOTE — ASSESSMENT & PLAN NOTE
--suspect secondary to underlying liver disease  --nutrition deficiency possibly contributing  --no signs of active bleeding, continue to monitor

## 2018-02-20 NOTE — ASSESSMENT & PLAN NOTE
--Suspect 2/2 malignancy vs possible infection.   --previous blood and urine cultures negative.  --remains afebrile with borderline hypotension.   --blood cultures no growth to date, continue pip/tazo.

## 2018-02-20 NOTE — PROGRESS NOTES
[facility]  Hematology/Oncology  Progress Note    Patient Name: Jose Cruz Lira  Admission Date: 2/8/2018  Hospital Length of Stay: 11 days  Code Status: Full Code     Subjective:     Interval History:     - Remains in ICU.   - Resting comfortably in bed. Appears comfortable.   - Family at bedside.     Medications:  Continuous Infusions:   sodium chloride 0.9%      octreotide (SANDOSTATIN) infusion 50 mcg/hr (02/20/18 1300)     Scheduled Meds:   flecainide  50 mg Oral Q12H    heparin (porcine)  5,000 Units Subcutaneous Q8H    midodrine  10 mg Oral TID    pantoprazole  40 mg Oral Daily    piperacillin-tazobactam (ZOSYN) IVPB  4.5 g Intravenous Q12H    sevelamer carbonate  800 mg Oral TID WM     PRN Meds:calcium carbonate, dextrose 50%, dextrose 50%, glucagon (human recombinant), glucose, glucose, insulin aspart U-100, magnesium sulfate IVPB, sodium chloride 0.9%     Review of Systems   Unable to obtain.   Objective:     Vital Signs (Most Recent):  Temp: 97.5 °F (36.4 °C) (02/20/18 1100)  Pulse: 72 (02/20/18 1300)  Resp: (!) 36 (02/20/18 1300)  BP: (!) 105/51 (02/20/18 1300)  SpO2: 98 % (02/20/18 1300) Vital Signs (24h Range):  Temp:  [97.5 °F (36.4 °C)-98.5 °F (36.9 °C)] 97.5 °F (36.4 °C)  Pulse:  [67-78] 72  Resp:  [22-44] 36  SpO2:  [89 %-100 %] 98 %  BP: ()/(40-66) 105/51     Weight: 130 kg (286 lb 9.6 oz)  Body mass index is 36.8 kg/m².  Body surface area is 2.61 meters squared.      Intake/Output Summary (Last 24 hours) at 02/20/18 1545  Last data filed at 02/20/18 1300   Gross per 24 hour   Intake             1825 ml   Output             1533 ml   Net              292 ml       Physical Exam  Constitutional: He appears well-developed and well-nourished. No distress.    Head: Normocephalic and atraumatic.   Neurological: He is alert. Intermittent confusion.  Musculoskeletal: Normal range of motion. He exhibits edema.  Exam deferred given the nature of the visit.      Significant Labs:   CBC:   Recent Labs  Lab 02/19/18  0522 02/20/18  0357   WBC 27.41* 28.10*   HGB 8.9* 9.1*   HCT 26.8* 27.7*    282    and CMP:   Recent Labs  Lab 02/19/18  0523 02/19/18  1420 02/19/18 2129 02/20/18  0357   * 132* 133* 135*   K 4.9 4.8 4.3 4.8   CL 95 95 97 99   CO2 21* 22* 22* 24    92 62* 73   * 124* 95* 83*   CREATININE 5.1* 5.2* 3.9* 4.2*   CALCIUM 7.4* 7.4* 7.5* 7.5*   PROT 5.5*  --   --  5.3*   ALBUMIN 2.1*  2.1* 2.1* 2.3* 2.0*  2.0*   BILITOT 5.7*  --   --  6.4*   ALKPHOS 169*  --   --  156*   AST 51*  --   --  74*   ALT 18  --   --  20   ANIONGAP 15 15 14 12   EGFRNONAA 11.0* 10.7* 15.2* 13.9*       Diagnostic Results:  I have reviewed all pertinent imaging results/findings within the past 24 hours.    Assessment/Plan:     Active Diagnoses:    Diagnosis Date Noted POA    PRINCIPAL PROBLEM:  Acute renal failure superimposed on stage 3 chronic kidney disease [N17.9, N18.3] 02/09/2018 Yes    Diarrhea [R19.7] 02/19/2018 Unknown    Coagulopathy [D68.9] 02/19/2018 No    Encephalopathy, metabolic [G93.41] 02/19/2018 Unknown    Occult blood positive stool [R19.5] 02/19/2018 Unknown    ARTUR (acute kidney injury) [N17.9] 02/18/2018 Yes    Elevated d-dimer [R79.89] 02/18/2018 Yes    Leukocytosis [D72.829] 02/18/2018 Yes    Nonrheumatic aortic valve disorder [I35.9] 02/18/2018 Yes    SOB (shortness of breath) [R06.02] 02/18/2018 Yes    Arterial hypotension [I95.9] 02/15/2018 Yes    Hyponatremia [E87.1] 02/10/2018 Yes    Hypoalbuminemia [E88.09] 02/09/2018 Yes    Leukemoid reaction [D72.823] 02/09/2018 Yes    Abnormal liver function tests [R79.89] 02/09/2018 Yes    Abnormal liver scan [R93.2] 02/09/2018 Yes    D-dimer, elevated [R79.89] 02/08/2018 Yes    Current use of long term anticoagulation [Z79.01] 01/29/2018 Not Applicable    Persistent atrial fibrillation [I48.1] 11/28/2016 Yes    BMI 34.0-34.9,adult [Z68.34] 05/28/2014 Not Applicable     Type 2 diabetes mellitus with stage 3 chronic kidney disease, with long-term current use of insulin [E11.22, N18.3, Z79.4] 05/28/2014 Not Applicable      Problems Resolved During this Admission:    Diagnosis Date Noted Date Resolved POA     Multiple Liver Lesions   Poorly Differentiated Carcinoma     - reviewed pathology report in detail with patient's wife and daughter today. Liver biopsy suggestive of poorly differentiated carcinoma and what appears be likely of urothelial origin. Although we are awaiting several other staining patterns to help clarify the primary.   - given this is very likely metastatic disease, they understand that disease is not curable and treatment highly depends on patient's performance status and comorbidites.   - we explained to them that given his current clinical condition, he will not be an optimal candidate for any systemic therapy and feel less likely that treatment will have an immediate impact on reversing his disease or his current state.   - will discuss with pathology and follow up with family tomorrow with final path report.    - meanwhile, would recommend early involvement of palliative care.       Thank you for your consult. I will follow-up with patient. Please contact us if you have any additional questions.     Nicholas Ramos MD  Hematology/Oncology  [facility]

## 2018-02-20 NOTE — ASSESSMENT & PLAN NOTE
--Uncertain etiology. Infections vs liver dysfunction  --Continue midodrine  --has not required vasopressors since admission  --lactate level wnl.  Preliminary blood cultures no growth to date  --continue empiric pip/tazo

## 2018-02-20 NOTE — PT/OT/SLP EVAL
"Physical Therapy Evaluation    Patient Name:  Jose Cruz Lira   MRN:  [id number]  RN present  Recommendations:     Discharge Recommendations:  nursing facility, skilled   Discharge Equipment Recommendations:  (TBD)   Barriers to discharge: Decreased caregiver support at current functional level     Assessment:     Jose Cruz Lira is a 67 y.o. male admitted with a medical diagnosis of Acute renal failure superimposed on stage 3 chronic kidney disease.  He presents with the following impairments/functional limitations:  weakness, impaired endurance, impaired self care skills, impaired functional mobilty, gait instability, impaired balance, impaired cognition, decreased safety awareness. Pt oriented to person and place but presented confused in conversation at times. Pt with difficulty following multi step commands. Pt required max assist for bed mobility, max assist to stand, and max assist to t/f to chair. Pt would benefit from a SNF upon d/c to maximize functional mobility an ensure safety.    Rehab Prognosis:  good; patient would benefit from acute skilled PT services to address these deficits and reach maximum level of function.      Recent Surgery: * No surgery found *      Plan:     During this hospitalization, patient to be seen 5 x/week to address the above listed problems via gait training, therapeutic activities, therapeutic exercises, neuromuscular re-education  · Plan of Care Expires:  03/19/18   Plan of Care Reviewed with: patient, spouse, son    Subjective     Communicated with RN prior to session and son and wife present in room.  Patient found in bed upon PT entry to room, agreeable to evaluation.      "I didn't think it was going to be this hard."    Chief Complaint: difficulty of movement   Patient comments/goals: to get better and return home   Pain/Comfort:  · Pain Rating 1: 0/10  · Pain Rating Post-Intervention 1: 0/10    Patients cultural, spiritual, Protestant conflicts given the current " situation: none reported     Living Environment:  Pt lives with wife in 3 story house with elevator access.  Prior to admission, patients level of function was indep with ambulation and ADL's.  Patient has the following equipment: none.  DME owned (not currently used): none.  Upon discharge, patient will have assistance from wife.    Objective:     Patient found with: pulse ox (continuous), telemetry, blood pressure cuff, central line     General Precautions: Standard, fall   Orthopedic Precautions:N/A   Braces: N/A     Exams:  · Gross Motor Coordination:  Impaired; B LE alternating toe taps   · RLE ROM: WFL  · RLE Strength: functionally 3+/5; pt with difficulty following MMT directions  · LLE ROM: WFL  · LLE Strength: functionally 3+/5; pt with difficulty following MMT directions     Functional Mobility:  · Bed Mobility:     · Supine to Sit: maximal assistance  · Transfers:     · Bed to chair: max A x 2 persons  · Pt with loose bowels during t/f. RN present. RN addressed situation   · Chair to bed: max A x 2 persons  · Pt required increased processing time  · Pt with difficulty following directions for sequencing   · Sit to Stand: x4 trials from various surfaces (bed, chair)  ·  maximal assistance   · Pt would frequently return to forward flexed posture requiring tactile cues at hips and chest for upright posture  · Gait: not performed     AM-PAC 6 CLICK MOBILITY  Total Score:11       Therapeutic Activities and Exercises:  Educated pt on PT role/POC  Educated pt on importance of OOB activity  Educated pt on safety with functional mobility  Educated pt on RW management   Pt verbalized understanding. Pt requires reinforcement     Standing x 5 minute with RW to assist RN with thierry cleaning   · Max verbal and tactile cues for RW management and upright posture    Pt sat in chair for ~1 hour. PT return to t/f pt back to bed.    Patient left HOB elevated with all lines intact, call button in reach and RN and family  present.    GOALS:    Physical Therapy Goals        Problem: Physical Therapy Goal    Goal Priority Disciplines Outcome Goal Variances Interventions   Physical Therapy Goal     PT/OT, PT Ongoing (interventions implemented as appropriate)     Description:  Goals to be met by: 3/6/2018    Patient will increase functional independence with mobility by performin. Supine to sit with Moderate Assistance - not met  2. Sit to supine with Moderate Assistance - not met  3. Sit to stand transfer with Moderate Assistance with or without RW - not met  4. Bed to chair transfer with Moderate Assistance with or without RW - not met  5. Gait  x 30 feet with Moderate Assistance with or without RW - not met  6. Lower extremity exercise program x10 reps per handout, with independence - not met                      History:     Past Medical History:   Diagnosis Date    Auricular fibrillation     Bronchitis     CHF (congestive heart failure)     Coronary artery disease     Diabetes mellitus, type 2 2010    Edema     Elevated troponin     Hematuria     Hydronephrosis of left kidney     Hypertension     Non-functioning kidney     Followed by Dr. Silver Anderson    Obesity (BMI 30-39.9)     Sleep apnea     Unspecified disorder of kidney and ureter        Past Surgical History:   Procedure Laterality Date    arm surgery      CARDIOVERSION  2016    kidney removed Left     knee surgeory N/A 4 to 5 years ago       Time Tracking:     PT Received On: 18  PT Start Time: 1227     PT Stop Time: 1255   PT Returned  PT Start Time: 1400  PT Stop Time: 1415  PT Total Time (min): 43 min     Billable Minutes: Evaluation 10 and Therapeutic Activity 30      Evelyn Quigley, PT, DPT  2018  [phone]

## 2018-02-20 NOTE — ASSESSMENT & PLAN NOTE
--improved.  --recent travel to the [facility] ~2 months ago.  --C diff negative, Shiga toxin 1 and 2 negative, stool culture + pseudomonas  --CT abdomen/pelvis w/o contrast with diverticulosis without diverticulitis.

## 2018-02-21 PROBLEM — E87.1 HYPONATREMIA: Status: RESOLVED | Noted: 2018-02-10 | Resolved: 2018-02-21

## 2018-02-21 LAB
ALBUMIN SERPL BCP-MCNC: 2 G/DL
ALP SERPL-CCNC: 158 U/L
ALT SERPL W/O P-5'-P-CCNC: 20 U/L
ANION GAP SERPL CALC-SCNC: 13 MMOL/L
ANION GAP SERPL CALC-SCNC: 15 MMOL/L
ANION GAP SERPL CALC-SCNC: 15 MMOL/L
ANISOCYTOSIS BLD QL SMEAR: SLIGHT
AST SERPL-CCNC: 70 U/L
BASO STIPL BLD QL SMEAR: ABNORMAL
BASOPHILS # BLD AUTO: 0.05 K/UL
BASOPHILS NFR BLD: 0.2 %
BILIRUB DIRECT SERPL-MCNC: 5.6 MG/DL
BILIRUB SERPL-MCNC: 7 MG/DL
BUN SERPL-MCNC: 54 MG/DL
BUN SERPL-MCNC: 61 MG/DL
BUN SERPL-MCNC: 71 MG/DL
CALCIUM SERPL-MCNC: 7.5 MG/DL
CALCIUM SERPL-MCNC: 7.5 MG/DL
CALCIUM SERPL-MCNC: 7.7 MG/DL
CHLORIDE SERPL-SCNC: 101 MMOL/L
CHLORIDE SERPL-SCNC: 101 MMOL/L
CHLORIDE SERPL-SCNC: 102 MMOL/L
CO2 SERPL-SCNC: 20 MMOL/L
CREAT SERPL-MCNC: 3.3 MG/DL
CREAT SERPL-MCNC: 3.9 MG/DL
CREAT SERPL-MCNC: 4.3 MG/DL
DIFFERENTIAL METHOD: ABNORMAL
EOSINOPHIL # BLD AUTO: 0 K/UL
EOSINOPHIL NFR BLD: 0.1 %
ERYTHROCYTE [DISTWIDTH] IN BLOOD BY AUTOMATED COUNT: 18 %
EST. GFR  (AFRICAN AMERICAN): 15.6 ML/MIN/1.73 M^2
EST. GFR  (AFRICAN AMERICAN): 17.5 ML/MIN/1.73 M^2
EST. GFR  (AFRICAN AMERICAN): 21.5 ML/MIN/1.73 M^2
EST. GFR  (NON AFRICAN AMERICAN): 13.5 ML/MIN/1.73 M^2
EST. GFR  (NON AFRICAN AMERICAN): 15.2 ML/MIN/1.73 M^2
EST. GFR  (NON AFRICAN AMERICAN): 18.6 ML/MIN/1.73 M^2
FIBRINOGEN PPP-MCNC: 438 MG/DL
GLUCOSE SERPL-MCNC: 88 MG/DL
GLUCOSE SERPL-MCNC: 92 MG/DL
GLUCOSE SERPL-MCNC: 96 MG/DL
HCT VFR BLD AUTO: 28.5 %
HGB BLD-MCNC: 9.2 G/DL
HYPOCHROMIA BLD QL SMEAR: ABNORMAL
IMM GRANULOCYTES # BLD AUTO: 0.93 K/UL
IMM GRANULOCYTES NFR BLD AUTO: 2.8 %
INR PPP: 1.9
LYMPHOCYTES # BLD AUTO: 0.6 K/UL
LYMPHOCYTES NFR BLD: 1.8 %
MAGNESIUM SERPL-MCNC: 2.4 MG/DL
MCH RBC QN AUTO: 27.5 PG
MCHC RBC AUTO-ENTMCNC: 32.3 G/DL
MCV RBC AUTO: 85 FL
MONOCYTES # BLD AUTO: 2.2 K/UL
MONOCYTES NFR BLD: 6.5 %
NEUTROPHILS # BLD AUTO: 29.3 K/UL
NEUTROPHILS NFR BLD: 88.6 %
NRBC BLD-RTO: 0 /100 WBC
PHOSPHATE SERPL-MCNC: 4.4 MG/DL
PHOSPHATE SERPL-MCNC: 5.1 MG/DL
PHOSPHATE SERPL-MCNC: 5.9 MG/DL
PLATELET # BLD AUTO: 282 K/UL
PLATELET BLD QL SMEAR: ABNORMAL
PMV BLD AUTO: 10.2 FL
POCT GLUCOSE: 78 MG/DL (ref 70–110)
POCT GLUCOSE: 94 MG/DL (ref 70–110)
POLYCHROMASIA BLD QL SMEAR: ABNORMAL
POTASSIUM SERPL-SCNC: 5 MMOL/L
POTASSIUM SERPL-SCNC: 5 MMOL/L
POTASSIUM SERPL-SCNC: 5.4 MMOL/L
PROT SERPL-MCNC: 5.4 G/DL
PROTHROMBIN TIME: 18.6 SEC
RBC # BLD AUTO: 3.34 M/UL
SODIUM SERPL-SCNC: 135 MMOL/L
SODIUM SERPL-SCNC: 136 MMOL/L
SODIUM SERPL-SCNC: 136 MMOL/L
URATE SERPL-MCNC: 7.9 MG/DL
WBC # BLD AUTO: 33.05 K/UL

## 2018-02-21 PROCEDURE — 84550 ASSAY OF BLOOD/URIC ACID: CPT

## 2018-02-21 PROCEDURE — 80069 RENAL FUNCTION PANEL: CPT

## 2018-02-21 PROCEDURE — 25000003 PHARM REV CODE 250: Performed by: NURSE PRACTITIONER

## 2018-02-21 PROCEDURE — 63600175 PHARM REV CODE 636 W HCPCS: Performed by: NURSE PRACTITIONER

## 2018-02-21 PROCEDURE — 25000003 PHARM REV CODE 250: Performed by: INTERNAL MEDICINE

## 2018-02-21 PROCEDURE — 85610 PROTHROMBIN TIME: CPT

## 2018-02-21 PROCEDURE — 20000000 HC ICU ROOM

## 2018-02-21 PROCEDURE — 97110 THERAPEUTIC EXERCISES: CPT

## 2018-02-21 PROCEDURE — 99233 SBSQ HOSP IP/OBS HIGH 50: CPT | Mod: ,,, | Performed by: INTERNAL MEDICINE

## 2018-02-21 PROCEDURE — 80076 HEPATIC FUNCTION PANEL: CPT

## 2018-02-21 PROCEDURE — 99233 SBSQ HOSP IP/OBS HIGH 50: CPT | Mod: ,,, | Performed by: NURSE PRACTITIONER

## 2018-02-21 PROCEDURE — 63600175 PHARM REV CODE 636 W HCPCS: Performed by: GENERAL ACUTE CARE HOSPITAL

## 2018-02-21 PROCEDURE — 85025 COMPLETE CBC W/AUTO DIFF WBC: CPT

## 2018-02-21 PROCEDURE — 85384 FIBRINOGEN ACTIVITY: CPT

## 2018-02-21 PROCEDURE — 25000003 PHARM REV CODE 250: Performed by: GENERAL ACUTE CARE HOSPITAL

## 2018-02-21 RX ORDER — HYDROCODONE BITARTRATE AND ACETAMINOPHEN 500; 5 MG/1; MG/1
TABLET ORAL CONTINUOUS
Status: DISCONTINUED | OUTPATIENT
Start: 2018-02-21 | End: 2018-02-21

## 2018-02-21 RX ORDER — LORAZEPAM 0.5 MG/1
0.5 TABLET ORAL ONCE
Status: COMPLETED | OUTPATIENT
Start: 2018-02-21 | End: 2018-02-21

## 2018-02-21 RX ORDER — RAMELTEON 8 MG/1
8 TABLET ORAL NIGHTLY PRN
Status: DISCONTINUED | OUTPATIENT
Start: 2018-02-21 | End: 2018-02-22

## 2018-02-21 RX ORDER — MAGNESIUM SULFATE HEPTAHYDRATE 40 MG/ML
2 INJECTION, SOLUTION INTRAVENOUS
Status: DISCONTINUED | OUTPATIENT
Start: 2018-02-21 | End: 2018-02-21

## 2018-02-21 RX ORDER — MIDODRINE HYDROCHLORIDE 10 MG/1
10 TABLET ORAL 3 TIMES DAILY
Qty: 30 TABLET | Refills: 0
Start: 2018-02-21 | End: 2019-02-21

## 2018-02-21 RX ADMIN — PIPERACILLIN AND TAZOBACTAM 4.5 G: 4; .5 INJECTION, POWDER, LYOPHILIZED, FOR SOLUTION INTRAVENOUS; PARENTERAL at 12:02

## 2018-02-21 RX ADMIN — PANTOPRAZOLE SODIUM 40 MG: 40 TABLET, DELAYED RELEASE ORAL at 08:02

## 2018-02-21 RX ADMIN — MIDODRINE HYDROCHLORIDE 10 MG: 5 TABLET ORAL at 02:02

## 2018-02-21 RX ADMIN — LORAZEPAM 0.5 MG: 0.5 TABLET ORAL at 12:02

## 2018-02-21 RX ADMIN — MIDODRINE HYDROCHLORIDE 10 MG: 5 TABLET ORAL at 06:02

## 2018-02-21 RX ADMIN — FLECAINIDE ACETATE 50 MG: 50 TABLET ORAL at 08:02

## 2018-02-21 RX ADMIN — HEPARIN SODIUM 5000 UNITS: 5000 INJECTION, SOLUTION INTRAVENOUS; SUBCUTANEOUS at 02:02

## 2018-02-21 RX ADMIN — HEPARIN SODIUM 5000 UNITS: 5000 INJECTION, SOLUTION INTRAVENOUS; SUBCUTANEOUS at 06:02

## 2018-02-21 RX ADMIN — PIPERACILLIN AND TAZOBACTAM 4.5 G: 4; .5 INJECTION, POWDER, LYOPHILIZED, FOR SOLUTION INTRAVENOUS; PARENTERAL at 02:02

## 2018-02-21 NOTE — PLAN OF CARE
Per CM, pt will do ip hospice.  SW met w/family to bring a list for ip hospice agencies.  Family has selected [facility] ip; SW provided family w/contact info should any questions arise.  WALLY send referral to [facility] through [facility].  WALLY spoke w/Enedina, admission coordinator ph# [phone], to confirm receipt of referral.  [facility] has requested a hospice order. WALLY has notified team [phone] to place hospice order.    Jose Zimmerman LMSW  Ext. [phone]

## 2018-02-21 NOTE — PLAN OF CARE
Problem: Physical Therapy Goal  Goal: Physical Therapy Goal  Goals to be met by: 3/6/2018    Patient will increase functional independence with mobility by performin. Supine to sit with Moderate Assistance - not met  2. Sit to supine with Moderate Assistance - not met  3. Sit to stand transfer with Moderate Assistance with or without RW - not met  4. Bed to chair transfer with Moderate Assistance with or without RW - not met  5. Gait  x 30 feet with Moderate Assistance with or without RW - not met  6. Lower extremity exercise program x10 reps per handout, with independence - not met     Outcome: Ongoing (interventions implemented as appropriate)  Treatment completed and no goals met. Goals appropriate.

## 2018-02-21 NOTE — PT/OT/SLP PROGRESS
Physical Therapy Treatment    Patient Name:  Jose Cruz Lira   MRN:  [id number]    Recommendations:     Discharge Recommendations:  nursing facility, skilled   Discharge Equipment Recommendations:  (TBD)   Barriers to discharge: Decreased caregiver support at current functional level     Assessment:     Jose Cruz Lira is a 67 y.o. male admitted with a medical diagnosis of Acute renal failure superimposed on stage 3 chronic kidney disease.  He presents with the following impairments/functional limitations:  weakness, impaired endurance, impaired self care skills, impaired functional mobilty, gait instability, impaired balance, impaired cognition, decreased lower extremity function. Pt progressing towards goals, but not at PLOF. Pt tolerated session well. Pt is improving with therapy evidenced by increased tolerance to therex. Pt's eyes closed throughout most of session even after multiple requests to open eyes. Recommend d/c to Skilled nursing facility to maximize functional independence.      Rehab Prognosis:  good; patient would benefit from acute skilled PT services to address these deficits and reach maximum level of function.      Recent Surgery: * No surgery found *      Plan:     During this hospitalization, patient to be seen 5 x/week to address the above listed problems via gait training, therapeutic activities, therapeutic exercises, neuromuscular re-education  · Plan of Care Expires:  03/19/18   Plan of Care Reviewed with: patient, spouse, daughter    Subjective     Communicated with RN prior to session and wife and daughter present in room.  Patient found in chair upon PT entry to room, agreeable to treatment.      Chief Complaint: none reported   Patient comments/goals: to get better and return home   Pain/Comfort:  · Pain Rating 1: 0/10  · Pain Rating Post-Intervention 1: 0/10    Patients cultural, spiritual, Episcopal conflicts given the current situation: none reported     Objective:      Patient found with: pulse ox (continuous), telemetry, blood pressure cuff, central line     General Precautions: Standard, fall   Orthopedic Precautions:N/A   Braces: N/A     Functional Mobility:    Scooting pt back into chair: total A with blue pad   · Bed Mobility:  NT 2nd to pt found in chair  · Transfers: not performed   · Gait: not performed      AM-PAC 6 CLICK MOBILITY  Turning over in bed (including adjusting bedclothes, sheets and blankets)?: 2  Sitting down on and standing up from a chair with arms (e.g., wheelchair, bedside commode, etc.): 2  Moving from lying on back to sitting on the side of the bed?: 2  Moving to and from a bed to a chair (including a wheelchair)?: 2  Need to walk in hospital room?: 2  Climbing 3-5 steps with a railing?: 1  Total Score: 11       Therapeutic Activities and Exercises:  Educated pt on PT POC    Sitting in chair B LE AAROM/AROM  Ankle pumps x 10 reps  LAQ's x 10 reps  Marching x 10 reps    Sitting in chair B UE AROM  Shoulder flexion/extension x 10 reps  Elbow flexion/extension x 10 reps   Hand squeezes x 10 reps    Patient left up in chair with all lines intact, call button in reach, RN notified and family present..    GOALS:    Physical Therapy Goals        Problem: Physical Therapy Goal    Goal Priority Disciplines Outcome Goal Variances Interventions   Physical Therapy Goal     PT/OT, PT Ongoing (interventions implemented as appropriate)     Description:  Goals to be met by: 3/6/2018    Patient will increase functional independence with mobility by performin. Supine to sit with Moderate Assistance - not met  2. Sit to supine with Moderate Assistance - not met  3. Sit to stand transfer with Moderate Assistance with or without RW - not met  4. Bed to chair transfer with Moderate Assistance with or without RW - not met  5. Gait  x 30 feet with Moderate Assistance with or without RW - not met  6. Lower extremity exercise program x10 reps per handout, with  independence - not met                      Time Tracking:     PT Received On: 02/21/18  PT Start Time: 1340     PT Stop Time: 1357  PT Total Time (min): 17 min     Billable Minutes: Therapeutic Exercise 15    Treatment Type: Treatment  PT/PTA: PT           Evelyn Quigley PT, DPT  2/21/2018  [phone]

## 2018-02-21 NOTE — PROGRESS NOTES
[facility]  Critical Care Medicine  Progress Note    Patient Name: Jose Cruz Lira  MRN: [id number]  Admission Date: 2/8/2018  Hospital Length of Stay: 11 days  Code Status: Full Code  Attending Provider: Surjit Jackson*  Primary Care Provider: Lsia Capone MD   Principal Problem: Acute renal failure superimposed on stage 3 chronic kidney disease    Subjective:     HPI:  Mr. Lira is a 66 y/o male with a history significant for CKD 3 (s/p nephrectomy August 2017), DMII (long term insulin use), and persistent atrial fibrillation (s/p DCCV 2016) on flecainide and Eliquis. [facility]18 after he was noted to have worsened LFTs and leukocytosis at an outpatient visit. Mr. Lira reports feeling short of breath for several month with symptoms getting worse over the past few weeks. He reports dyspnea with exertion and at rest, chills, cough off and on, and fatigue. He does take his lasix twice daily and reports his urine output has decreased lately in last few days. He also reports weight loss recently and has had difficulty sleeping lately 2/2 orthopnea. He does report recent travel to [facility] 12/17.     Mr. Lira had a recent admission for CHF exacerbation (2/1 - 2/3). Cardiology examined him on arrival and noted a positive Andres's sign. HIDA and other imaging were negative for cholecystitis, but noted multiple liver lesions concerning for metastatic disease. V/Q scan and LE ultrasound were negative and Mr. Lira takes anticoagulation for atrial fibrillation.      Hospital/ICU Course:  Mr. Lira was admitted to Hospital Medicine with an ARTUR (baseline creatinine ~1.6) and leukocytosis. Multiple imaging modalities have noted liver lesions concerning for metastatic disease. He underwent an IR biopsy of the liver lesions on 2/12/18 and pathology is pending. Unfortunately, his renal function has continued to worsen to the point that Nephrology has recommended dialysis.  Critical Care was consulted due to borderline blood pressures and the Nephrology recommendation to initiate SLED.  Mr. Lira was transferred to the ICU with critical care medicine on 2/18.  Trialysis line placed and RRT initiated.     Interval History/Significant Events: Agitated overnight.  Received lorazepam 1 mg x 2 doses overnight.     Review of Systems   Constitutional: Negative for chills, diaphoresis and fever.   HENT: Negative for congestion.    Eyes: Negative for visual disturbance.   Respiratory: Negative for cough and shortness of breath.    Cardiovascular: Positive for leg swelling. Negative for chest pain.   Gastrointestinal: Positive for abdominal distention. Negative for abdominal pain, diarrhea, nausea and vomiting.   Skin: Negative for rash.   Neurological: Negative for dizziness, seizures, light-headedness, numbness and headaches.   Psychiatric/Behavioral: Positive for confusion.     Objective:     Vital Signs (Most Recent):  Temp: 97.9 °F (36.6 °C) (02/20/18 0701)  Pulse: 72 (02/20/18 0954)  Resp: (!) 36 (02/20/18 0954)  BP: (!) 140/64 (02/20/18 0900)  SpO2: 97 % (02/20/18 0954) Vital Signs (24h Range):  Temp:  [97.7 °F (36.5 °C)-98.5 °F (36.9 °C)] 97.9 °F (36.6 °C)  Pulse:  [65-78] 72  Resp:  [22-44] 36  SpO2:  [89 %-100 %] 97 %  BP: ()/(40-66) 140/64   Weight: 130 kg (286 lb 9.6 oz)  Body mass index is 36.8 kg/m².      Intake/Output Summary (Last 24 hours) at 02/20/18 1007  Last data filed at 02/20/18 0900   Gross per 24 hour   Intake             1715 ml   Output             1733 ml   Net              -18 ml       Physical Exam   Constitutional: No distress.   HENT:   Head: Normocephalic.   Eyes: Conjunctivae and EOM are normal. Pupils are equal, round, and reactive to light. Scleral icterus is present.   Cardiovascular: Normal rate, regular rhythm and intact distal pulses.    No murmur heard.  Warm extremities, + peripheral edema   Pulmonary/Chest: No accessory muscle usage. No respiratory  distress. He has decreased breath sounds in the right lower field and the left lower field. He has no wheezes. He has no rhonchi. He has no rales.   Abdominal: Soft. Bowel sounds are normal. There is no tenderness.   Neurological: No cranial nerve deficit or sensory deficit. GCS eye subscore is 3. GCS verbal subscore is 4. GCS motor subscore is 6.   Drowsy, oriented to person, place, and time.  Following request and moving all extremities equally and symmetrically.  PERRL. CAM-ICU +  Skin: Skin is warm. He is not diaphoretic.       Vents:     Lines/Drains/Airways     Central Venous Catheter Line                 Trialysis (Dialysis) Catheter 02/18/18 1545 right internal jugular 1 day          Drain                 Ureteral Drain/Stent 05/14/15 Left ureter 6 Fr. 1013 days              Significant Labs:    CBC/Anemia Profile:    Recent Labs  Lab 02/19/18  0522 02/20/18  0357   WBC 27.41* 28.10*   HGB 8.9* 9.1*   HCT 26.8* 27.7*    282   MCV 82 85   RDW 17.0* 17.5*        Chemistries:    Recent Labs  Lab 02/19/18  0523 02/19/18  1420 02/19/18  1625 02/19/18  2129 02/20/18  0357   * 132*  --  133* 135*   K 4.9 4.8  --  4.3 4.8   CL 95 95  --  97 99   CO2 21* 22*  --  22* 24   * 124*  --  95* 83*   CREATININE 5.1* 5.2*  --  3.9* 4.2*   CALCIUM 7.4* 7.4*  --  7.5* 7.5*   ALBUMIN 2.1*  2.1* 2.1*  --  2.3* 2.0*  2.0*   PROT 5.5*  --   --   --  5.3*   BILITOT 5.7*  --   --   --  6.4*   ALKPHOS 169*  --   --   --  156*   ALT 18  --   --   --  20   AST 51*  --   --   --  74*   MG 3.1*  --  3.0* 2.7* 2.5   PHOS 6.2* 6.4*  --  4.7* 5.9*       All pertinent labs within the past 24 hours have been reviewed.    Significant Imaging:  I have reviewed and interpreted all pertinent imaging results/findings within the past 24 hours.    Assessment/Plan:     Neuro   Encephalopathy, metabolic    --suspect secondary to critical illness, suspicious for delirium  --delirium precautions  --check Qtc and consider starting  Seroquel vs precedex if Qtc prolonged.   --ammonia level wnl.           Cardiac/Vascular   Arterial hypotension    --Uncertain etiology. Infections vs liver dysfunction  --Continue midodrine  --has not required vasopressors since admission  --lactate level wnl.  Preliminary blood cultures no growth to date  --continue empiric pip/tazo        Persistent atrial fibrillation    --CHADS Vasc score 4 (CHF, HTN, DM, Age), 5.9 % annual stroke risk.  --In light of worsening condition, will continue to hold apixaban and continue flecainide.   --currently rate controlled  --TSH wnl        Renal/   * Acute renal failure superimposed on stage 3 chronic kidney disease    --Uncertain exact etiology. HRS vs poor renal perfusion from hypoalbuminemia.  --FeUrea 8.4% suggestive of prerenal with evidence of volume overload on exam.   --plan to repeat RRT treatment today.  --appreciate nephrology recommendations  --minimal urine output, bladder scan Q 6 hours  --continue octreotide per Nephrology.  Discontinue albumin and sodium bicarb tablets.         Hyponatremia    --Likely hypervolemic hyponatremia.   --continue RRT per nephrology  --monitor levels         Hematology   Coagulopathy    --suspect secondary to underlying liver disease  --nutrition deficiency possibly contributing  --no signs of active bleeding, continue to monitor        Oncology   Leukocytosis    --Suspect 2/2 malignancy vs possible infection.   --previous blood and urine cultures negative.  --remains afebrile with borderline hypotension.   --blood cultures no growth to date, continue pip/tazo.                  Endocrine   Type 2 diabetes mellitus with stage 3 chronic kidney disease, with long-term current use of insulin    --decreased oral intake.  Hypoglycemia despite lowering levemir.   --hold levemir and monitor glucose with correction scale.   --hgb A1c 7.6         GI   multiple liver lesions with poorly differentiated carcinoma    --preliminary liver biopsy  suggestive of poorly differentiated carcinoma with likely metastatic disease.  Mr. Lira is currently not an optimal candidate for systemic therapy.  Oncology recommending palliative care consultation.   --awaiting final biopsy results.         Occult blood positive stool    --started on protonix po daily  --hgb grossly unchanged.   --no active signs of bleeding.        Diarrhea    --improved.  --recent travel to the [facility] ~2 months ago.  --C diff negative, Shiga toxin 1 and 2 negative, stool culture + pseudomonas  --CT abdomen/pelvis w/o contrast with diverticulosis without diverticulitis.         Abnormal liver function tests    --Suspect 2/2 malignancy.              Critical Care Daily Checklist:    A: Awake: RASS Goal/Actual Goal:  0  Actual: Conde Agitation Sedation Scale (RASS): Drowsy   B: Spontaneous Breathing Trial Performed?  n/a   C: SAT & SBT Coordinated?  n/a                   D: Delirium: CAM-ICU Overall CAM-ICU: Positive   E: Early Mobility Performed? Yes   F: Feeding Goal: Goals: Meet % EEN, EPN  Status: Nutrition Goal Status: new   Current Diet Order   Procedures    Diet Diabetic 1800 Calories      AS: Analgesia/Sedation n/a   T: Thromboembolic Prophylaxis Heparin SQ   H: HOB > 300 Yes   U: Stress Ulcer Prophylaxis (if needed) protonix   G: Glucose Control Hypoglycemia see above    B: Bowel Function Stool Occurrence: 1   I: Indwelling Catheter (Lines & Donato) Necessity Right trialysis   D: De-escalation of Antimicrobials/Pharmacotherapies See above    Plan for the day/ETD Supportive care    Code Status:  Family/Goals of Care: Full Code       Discussed plan with Dr. Jackson    Patient's wife and daughter updated regarding liver biopsy results by Dr. Jackson.     Critical Care Time: 35 minutes  Critical secondary to Patient has a condition that poses threat to life and bodily function: Acute Renal Failure requiring RRT      Critical care was time spent personally by me on the  following activities: development of treatment plan with patient or surrogate and bedside caregivers, discussions with consultants, evaluation of patient's response to treatment, examination of patient, ordering and performing treatments and interventions, ordering and review of laboratory studies, ordering and review of radiographic studies, pulse oximetry, re-evaluation of patient's condition. This critical care time did not overlap with that of any other provider or involve time for any procedures.     Charlene Wolff NP  Critical Care Medicine  [facility]

## 2018-02-21 NOTE — ASSESSMENT & PLAN NOTE
This patient presents with ARTUR on CKD3, likely from pre-renal causes which include poor po intake, sepsis resulting in renal hypoperfusion, and 2 week history of diarrhea.  Re-evaluation with urine lytes with pre-renal etiology noted.  2D echo with CVP < 3.  BNP improved from admission.  Overall, current working hypothesis for ARTUR would be due to poor renal perfusion from hypoalbuminemia vs HRS.        ARTUR with uremia  -8 hour SLED overnight and tolerated well with adequate clearance.  Net negative 680 ml/24h.  Remains hypervolemic on examination.    -will restart SLED for metabolic clearance/volume management.  Will re-evaluate in AM for need to continue.  --350 cc/hr.  3K bath.

## 2018-02-21 NOTE — PLAN OF CARE
See vital signs and assessments in flowsheets. See below for updates on today's progress.     Pulmonary: on 2L NC sats % no SOB reported    Cardiovascular: Sr: 60s-80s, no PVCs, no ectopies, no chest pain. SBP>90, MAP > 65    Neurological: Disoriented to time situation and place, Afebrile    Gastrointestinal: on diabetic diet, 1x BM    Genitourinary: Anuric, Nocturnal CRRT x 8 hours    Endocrine: Last Accucheck was 78    Integumentary/Other: Intact    Infusions: Octreotide / KVO / Abx    Plan of care communicated and reviewed with Jose Cruz Lira and family. All concerns addressed. Will continue to monitor.

## 2018-02-21 NOTE — PLAN OF CARE
[facility]     Department of Hospital Medicine     [address] [place] [address]     [phone] after hours  [phone] fax                                   HOSPICE  ORDERS     02/21/2018    Admit to Hospice:    Inpatient Service     Diagnoses:  Active Hospital Problems    Diagnosis  POA    *Acute renal failure superimposed on stage 3 chronic kidney disease [N17.9, N18.3]  Yes    multiple liver lesions with poorly differentiated carcinoma [K76.9]  Yes    Diarrhea [R19.7]  Unknown    Coagulopathy [D68.9]  No    Encephalopathy, metabolic [G93.41]  Unknown    Occult blood positive stool [R19.5]  Unknown    ARTUR (acute kidney injury) [N17.9]  Yes    Elevated d-dimer [R79.89]  Yes    Leukocytosis [D72.829]  Yes    Nonrheumatic aortic valve disorder [I35.9]  Yes    SOB (shortness of breath) [R06.02]  Yes    Arterial hypotension [I95.9]  Yes    Hyponatremia [E87.1]  Yes    Hypoalbuminemia [E88.09]  Yes    Leukemoid reaction [D72.823]  Yes    Abnormal liver function tests [R79.89]  Yes    Abnormal liver scan [R93.2]  Yes    D-dimer, elevated [R79.89]  Yes    Current use of long term anticoagulation [Z79.01]  Not Applicable    Persistent atrial fibrillation [I48.1]  Yes    BMI 34.0-34.9,adult [Z68.34]  Not Applicable    Type 2 diabetes mellitus with stage 3 chronic kidney disease, with long-term current use of insulin [E11.22, N18.3, Z79.4]  Not Applicable      Resolved Hospital Problems    Diagnosis Date Resolved POA   No resolved problems to display.       Hospice Qualifying Diagnoses:        Patient has a life expectancy < 6 months due to these conditions.    Vital Signs: Routine per Hospice Protocol.    Allergies:Review of patient's allergies indicates:  No Known Allergies    Diet:  Diet as tolerated    Activities: As tolerated    Nursing: Per Hospice Routine    Future Orders:  Hospice Medical Director may dictate new orders for comfortable  care measures & sign death certificate.    Medications:         Comfort Care Medications     Jose Cruz Lira   Home Medication Instructions OSCAR:[id number]    Printed on:02/21/18 4693   Medication Information                      flecainide (TAMBOCOR) 50 MG Tab  Take 1 tablet (50 mg total) by mouth every 12 (twelve) hours.             midodrine (PROAMATINE) 10 MG tablet  Take 1 tablet (10 mg total) by mouth 3 (three) times daily.                         DIABETES CARE:     Nurse to perform and educate diabetic management with blood glucose monitoring:            Fingerstick blood sugar every 6 hours if patient is unable to eat    Report CBG < 60 or > 350 to physician.         Insulin Sliding Scale         Glucose  Novolog Insulin Subcutaneous        0 - 60   Orange juice or glucose tablet      No insulin   201-250  2 units   251-300  4 units   301-350  6 units   351-400  8 units   >400   10 units then call physician        _________________________________  Charlene Wolff NP  02/21/2018

## 2018-02-21 NOTE — SUBJECTIVE & OBJECTIVE
Interval History:   SLED overnight x 8 hours with adequate metabolic clearance, net negative 680 ml/24h.  Liver biopsy prelim report liver mets, awaiting on family conference for goals of care.  Sitting in bedside chair this morning.    Review of patient's allergies indicates:  No Known Allergies  Current Facility-Administered Medications   Medication Frequency    0.9%  NaCl infusion (CRRT USE ONLY) Continuous    calcium carbonate 200 mg calcium (500 mg) chewable tablet 500 mg Daily PRN    dextrose 50% injection 12.5 g PRN    dextrose 50% injection 25 g PRN    flecainide tablet 50 mg Q12H    glucagon (human recombinant) injection 1 mg PRN    glucose chewable tablet 16 g PRN    glucose chewable tablet 24 g PRN    heparin (porcine) injection 5,000 Units Q8H    insulin aspart pen 1-10 Units QID (AC + HS) PRN    magnesium sulfate 2g in water 50mL IVPB (premix) PRN    midodrine tablet 10 mg TID    octreotide (SANDOSTATIN) 500 mcg in sodium chloride 0.9% 100 mL infusion Continuous    pantoprazole EC tablet 40 mg Daily    piperacillin-tazobactam 4.5 g in sodium chloride 0.9% 100 mL IVPB (ready to mix system) Q12H    ramelteon tablet 8 mg Nightly PRN    sevelamer carbonate tablet 800 mg TID WM    sodium chloride 0.9% flush 5 mL PRN       Objective:     Vital Signs (Most Recent):  Temp: 97 °F (36.1 °C) (02/21/18 1100)  Pulse: 75 (02/21/18 1300)  Resp: 16 (02/21/18 1300)  BP: (!) 93/46 (02/21/18 1200)  SpO2: 96 % (02/21/18 1300)  O2 Device (Oxygen Therapy): room air (02/21/18 1200) Vital Signs (24h Range):  Temp:  [96.9 °F (36.1 °C)-98.1 °F (36.7 °C)] 97 °F (36.1 °C)  Pulse:  [67-76] 75  Resp:  [11-37] 16  SpO2:  [93 %-100 %] 96 %  BP: ()/(46-78) 93/46     Weight: 131 kg (288 lb 12.8 oz) (02/20/18 1901)  Body mass index is 37.08 kg/m².  Body surface area is 2.62 meters squared.    I/O last 3 completed shifts:  In: 3510 [P.O.:120; I.V.:3190; IV Piggyback:200]  Out: 4243 [Other:4243]    Physical Exam    Constitutional: He appears well-developed and well-nourished. No distress.   HENT:   Head: Normocephalic and atraumatic.   Eyes: Conjunctivae and EOM are normal. Right eye exhibits no discharge. Left eye exhibits no discharge. Scleral icterus is present.   Neck: Normal range of motion. Neck supple.   Cardiovascular: Normal rate, regular rhythm and normal heart sounds.  Exam reveals no gallop and no friction rub.    No murmur heard.  Pulmonary/Chest: Breath sounds normal. No respiratory distress. He has no wheezes. He has no rales.   Abdominal: Soft. Bowel sounds are normal. He exhibits no distension. There is no tenderness.   Musculoskeletal: Normal range of motion. He exhibits edema (+3 LE and scrotal edema). He exhibits no tenderness.   Neurological: He is alert.   Intermittent confusion   Skin: Skin is warm and dry. No rash noted. He is not diaphoretic. No erythema. No pallor.       Significant Labs:  CBC:   Recent Labs  Lab 02/21/18 0222   WBC 33.05*   RBC 3.34*   HGB 9.2*   HCT 28.5*      MCV 85   MCH 27.5   MCHC 32.3     CMP:   Recent Labs  Lab 02/21/18 0222   GLU 88   CALCIUM 7.5*   ALBUMIN 2.0*  2.0*   PROT 5.4*   *   K 5.0   CO2 20*      BUN 54*   CREATININE 3.3*   ALKPHOS 158*   ALT 20   AST 70*   BILITOT 7.0*

## 2018-02-21 NOTE — PROGRESS NOTES
[facility]  Hematology/Oncology  Progress Note    Patient Name: Jose Cruz Lira  Admission Date: 2/8/2018  Hospital Length of Stay: 12 days  Code Status: Full Code     Subjective:     Interval History:     - Remains in ICU. Sitting up in chair today. Family at bedside.     Medications:  Continuous Infusions:    Scheduled Meds:   flecainide  50 mg Oral Q12H    heparin (porcine)  5,000 Units Subcutaneous Q8H    midodrine  10 mg Oral TID    pantoprazole  40 mg Oral Daily    piperacillin-tazobactam (ZOSYN) IVPB  4.5 g Intravenous Q12H    sevelamer carbonate  800 mg Oral TID WM     PRN Meds:calcium carbonate, dextrose 50%, dextrose 50%, glucagon (human recombinant), glucose, glucose, insulin aspart U-100, ramelteon, sodium chloride 0.9%     Review of Systems   Unable to obtain.   Objective:     Vital Signs (Most Recent):  Temp: 98 °F (36.7 °C) (02/21/18 1500)  Pulse: 75 (02/21/18 1600)  Resp: (!) 33 (02/21/18 1600)  BP: (!) 98/53 (02/21/18 1600)  SpO2: 97 % (02/21/18 1600) Vital Signs (24h Range):  Temp:  [96.9 °F (36.1 °C)-98.1 °F (36.7 °C)] 98 °F (36.7 °C)  Pulse:  [68-76] 75  Resp:  [11-37] 33  SpO2:  [93 %-100 %] 97 %  BP: ()/(46-78) 98/53     Weight: 131 kg (288 lb 12.8 oz)  Body mass index is 37.08 kg/m².  Body surface area is 2.62 meters squared.      Intake/Output Summary (Last 24 hours) at 02/21/18 1642  Last data filed at 02/21/18 1413   Gross per 24 hour   Intake          2082.33 ml   Output             2810 ml   Net          -727.67 ml       Physical Exam  Constitutional: Appears weak.    Neurological: He is alert. Intermittent confusion.  Exam deferred given the nature of the visit.     Significant Labs:   CBC:     Recent Labs  Lab 02/20/18  0357 02/21/18  0222   WBC 28.10* 33.05*   HGB 9.1* 9.2*   HCT 27.7* 28.5*    282    and CMP:     Recent Labs  Lab 02/20/18  0357 02/20/18  2354 02/21/18  0222 02/21/18  1411   * 136 135* 136   K 4.8 5.0 5.0 5.4*   CL 99  101 102 101   CO2 24 20* 20* 20*   GLU 73 92 88 96   BUN 83* 71* 54* 61*   CREATININE 4.2* 3.9* 3.3* 4.3*   CALCIUM 7.5* 7.5* 7.5* 7.7*   PROT 5.3*  --  5.4*  --    ALBUMIN 2.0*  2.0* 2.0* 2.0*  2.0* 2.0*   BILITOT 6.4*  --  7.0*  --    ALKPHOS 156*  --  158*  --    AST 74*  --  70*  --    ALT 20  --  20  --    ANIONGAP 12 15 13 15   EGFRNONAA 13.9* 15.2* 18.6* 13.5*       Diagnostic Results:  I have reviewed all pertinent imaging results/findings within the past 24 hours.    Assessment/Plan:     Active Diagnoses:    Diagnosis Date Noted POA    PRINCIPAL PROBLEM:  Acute renal failure superimposed on stage 3 chronic kidney disease [N17.9, N18.3] 02/09/2018 Yes    multiple liver lesions with poorly differentiated carcinoma [K76.9] 02/20/2018 Yes    Diarrhea [R19.7] 02/19/2018 Unknown    Coagulopathy [D68.9] 02/19/2018 No    Encephalopathy, metabolic [G93.41] 02/19/2018 Unknown    Occult blood positive stool [R19.5] 02/19/2018 Unknown    ARTUR (acute kidney injury) [N17.9] 02/18/2018 Yes    Elevated d-dimer [R79.89] 02/18/2018 Yes    Leukocytosis [D72.829] 02/18/2018 Yes    Nonrheumatic aortic valve disorder [I35.9] 02/18/2018 Yes    SOB (shortness of breath) [R06.02] 02/18/2018 Yes    Arterial hypotension [I95.9] 02/15/2018 Yes    Hyponatremia [E87.1] 02/10/2018 Yes    Hypoalbuminemia [E88.09] 02/09/2018 Yes    Leukemoid reaction [D72.823] 02/09/2018 Yes    Abnormal liver function tests [R79.89] 02/09/2018 Yes    Abnormal liver scan [R93.2] 02/09/2018 Yes    D-dimer, elevated [R79.89] 02/08/2018 Yes    Current use of long term anticoagulation [Z79.01] 01/29/2018 Not Applicable    Persistent atrial fibrillation [I48.1] 11/28/2016 Yes    BMI 34.0-34.9,adult [Z68.34] 05/28/2014 Not Applicable    Type 2 diabetes mellitus with stage 3 chronic kidney disease, with long-term current use of insulin [E11.22, N18.3, Z79.4] 05/28/2014 Not Applicable      Problems Resolved During this Admission:    Diagnosis  Date Noted Date Resolved POA     Multiple Liver Lesions   Poorly Differentiated Carcinoma likely Urothelial Primary     - reviewed pathology report in detail with patient's wife and daughter today. No significant change in the path report, appears to be consistent with Metastatic Urothelial Cancer. One additional stain (BAO) is sent to [facility]. Final result may be available tomorrow. However, unlike this will change his overall prognosis and management.  - Dr. Armstrong had an extensive discussion with patient's family and answered all questions to their satisfaction.   - they understand that this is an incurable disease and unlikely Mr. Lira would be a candidate for any systemic treatment given his current performance status and comorbidites.   - agreed on proceeding towards comfort measures and hospice referral.    - we will gladly stop by tomorrow to answer any questions or concerns the family may have.       Thank you for your consult. I will follow-up with patient. Please contact us if you have any additional questions.     Nicholas Ramos MD  Hematology/Oncology  [facility]

## 2018-02-21 NOTE — PROGRESS NOTES
[facility]  Nephrology  Progress Note    Patient Name: Jose Cruz Lira  MRN: [id number]  Admission Date: 2/8/2018  Hospital Length of Stay: 12 days  Attending Provider: Surjit Jackson*   Primary Care Physician: Lisa Capone MD  Principal Problem:Acute renal failure superimposed on stage 3 chronic kidney disease    Subjective:     HPI: 68 yo M with PMH of CKD3, left hydronephrosis s/p nephrectomy August 2017, HTN, DMII (long term insulin use), persistent atrial fibrillation (s/p DCCV 2016) on flecainide and Eliquis. He presented for RHC appt, was noted to have worsened LFTs, leukocytosis, and was sent to ED for further workup. Patient has been short of breath for several month with symptoms have been getting worse over the past few weeks. He has dyspnea with exertion and at rest currently, chills, cough off and on, fatigue.  He denies chest pain.  He does take his lasix twice daily and reports urine output has decreased lately in last few days, reports wt loss recently, has had difficulty sleeping lately 2/2 orthopnea, reports stable / decrease in LE swelling, unsure of PND. Patient does report recent travel to [facility] 12/17. He also reports that for the past 2 weeks, he has been having diarrhea.     Patient had recent admission for CHF exacerbation 2/1 and discharged 2/3, recent ED visit for same complaints noted to have leukocytosis with neutrophilic prodominance, bili noted to be 2.0, Cr 2.1, blood cultures drawn NGTD, referred for sleep study however did not receive yet though high suspicion for MONICA. Exam with cards noted for + johnson's sign, HIDA scan ordered was negative for cholecystitis however noted filling defects in liver, recommended f/u imaging. RHC was also ordered and was to be done 2/8 however worsened labs (leukocytosis, LFTs) prompted referral to ED for evaluation.    Nephrology was consulted for ARTUR on CKD. He has been followed by nephrologist Dr. Anderson.  "Baseline Cr is 1.7. He denies NSAID use. He has a history of left total nephrectomy in August 2017 due to left hydronephrosis (etiology is not clear). He notes that his po intake has been poor due to fatigue. Urine output has also decreased. Denies dysuria and hematuria. Endorses nocturia. States that he has been having "loose stools" for the past 2 weeks    Interval History:   SLED overnight x 8 hours with adequate metabolic clearance, net negative 680 ml/24h.  Liver biopsy prelim report liver mets, awaiting on family conference for goals of care.  Sitting in bedside chair this morning.    Review of patient's allergies indicates:  No Known Allergies  Current Facility-Administered Medications   Medication Frequency    0.9%  NaCl infusion (CRRT USE ONLY) Continuous    calcium carbonate 200 mg calcium (500 mg) chewable tablet 500 mg Daily PRN    dextrose 50% injection 12.5 g PRN    dextrose 50% injection 25 g PRN    flecainide tablet 50 mg Q12H    glucagon (human recombinant) injection 1 mg PRN    glucose chewable tablet 16 g PRN    glucose chewable tablet 24 g PRN    heparin (porcine) injection 5,000 Units Q8H    insulin aspart pen 1-10 Units QID (AC + HS) PRN    magnesium sulfate 2g in water 50mL IVPB (premix) PRN    midodrine tablet 10 mg TID    octreotide (SANDOSTATIN) 500 mcg in sodium chloride 0.9% 100 mL infusion Continuous    pantoprazole EC tablet 40 mg Daily    piperacillin-tazobactam 4.5 g in sodium chloride 0.9% 100 mL IVPB (ready to mix system) Q12H    ramelteon tablet 8 mg Nightly PRN    sevelamer carbonate tablet 800 mg TID WM    sodium chloride 0.9% flush 5 mL PRN       Objective:     Vital Signs (Most Recent):  Temp: 97 °F (36.1 °C) (02/21/18 1100)  Pulse: 75 (02/21/18 1300)  Resp: 16 (02/21/18 1300)  BP: (!) 93/46 (02/21/18 1200)  SpO2: 96 % (02/21/18 1300)  O2 Device (Oxygen Therapy): room air (02/21/18 1200) Vital Signs (24h Range):  Temp:  [96.9 °F (36.1 °C)-98.1 °F (36.7 °C)] 97 " °F (36.1 °C)  Pulse:  [67-76] 75  Resp:  [11-37] 16  SpO2:  [93 %-100 %] 96 %  BP: ()/(46-78) 93/46     Weight: 131 kg (288 lb 12.8 oz) (02/20/18 1901)  Body mass index is 37.08 kg/m².  Body surface area is 2.62 meters squared.    I/O last 3 completed shifts:  In: 3510 [P.O.:120; I.V.:3190; IV Piggyback:200]  Out: 4243 [Other:4243]    Physical Exam   Constitutional: He appears well-developed and well-nourished. No distress.   HENT:   Head: Normocephalic and atraumatic.   Eyes: Conjunctivae and EOM are normal. Right eye exhibits no discharge. Left eye exhibits no discharge. Scleral icterus is present.   Neck: Normal range of motion. Neck supple.   Cardiovascular: Normal rate, regular rhythm and normal heart sounds.  Exam reveals no gallop and no friction rub.    No murmur heard.  Pulmonary/Chest: Breath sounds normal. No respiratory distress. He has no wheezes. He has no rales.   Abdominal: Soft. Bowel sounds are normal. He exhibits no distension. There is no tenderness.   Musculoskeletal: Normal range of motion. He exhibits edema (+3 LE and scrotal edema). He exhibits no tenderness.   Neurological: He is alert.   Intermittent confusion   Skin: Skin is warm and dry. No rash noted. He is not diaphoretic. No erythema. No pallor.       Significant Labs:  CBC:   Recent Labs  Lab 02/21/18 0222   WBC 33.05*   RBC 3.34*   HGB 9.2*   HCT 28.5*      MCV 85   MCH 27.5   MCHC 32.3     CMP:   Recent Labs  Lab 02/21/18 0222   GLU 88   CALCIUM 7.5*   ALBUMIN 2.0*  2.0*   PROT 5.4*   *   K 5.0   CO2 20*      BUN 54*   CREATININE 3.3*   ALKPHOS 158*   ALT 20   AST 70*   BILITOT 7.0*          Assessment/Plan:     * Acute renal failure superimposed on stage 3 chronic kidney disease    This patient presents with ARTUR on CKD3, likely from pre-renal causes which include poor po intake, sepsis resulting in renal hypoperfusion, and 2 week history of diarrhea.  Re-evaluation with urine lytes with pre-renal  etiology noted.  2D echo with CVP < 3.  BNP improved from admission.  Overall, current working hypothesis for ARTUR would be due to poor renal perfusion from hypoalbuminemia vs HRS.        ARTUR with uremia  -8 hour SLED overnight and tolerated well with adequate clearance.  Net negative 680 ml/24h.  Remains hypervolemic on examination.    -will restart SLED for metabolic clearance/volume management.  Will re-evaluate in AM for need to continue.  --350 cc/hr.  3K bath.          Edgardo Vásquez NP  Nephrology  [facility]  Pager:  [phone]

## 2018-02-21 NOTE — ASSESSMENT & PLAN NOTE
--preliminary liver biopsy suggestive of poorly differentiated carcinoma with likely metastatic disease.  Mr. Lira is currently not an optimal candidate for systemic therapy.  Oncology recommending palliative care consultation.   --awaiting final biopsy results.

## 2018-02-22 ENCOUNTER — TELEPHONE (OUTPATIENT)
Dept: SLEEP MEDICINE | Facility: CLINIC | Age: 68
End: 2018-02-22

## 2018-02-22 VITALS
WEIGHT: 288.81 LBS | HEART RATE: 79 BPM | BODY MASS INDEX: 37.06 KG/M2 | SYSTOLIC BLOOD PRESSURE: 84 MMHG | DIASTOLIC BLOOD PRESSURE: 56 MMHG | TEMPERATURE: 98 F | RESPIRATION RATE: 32 BRPM | OXYGEN SATURATION: 96 % | HEIGHT: 74 IN

## 2018-02-22 DIAGNOSIS — G47.30 SLEEP APNEA, UNSPECIFIED TYPE: Primary | ICD-10-CM

## 2018-02-22 PROBLEM — G93.41 ENCEPHALOPATHY, METABOLIC: Status: ACTIVE | Noted: 2018-02-22

## 2018-02-22 PROBLEM — R19.5 OCCULT BLOOD POSITIVE STOOL: Status: ACTIVE | Noted: 2018-02-22

## 2018-02-22 PROBLEM — R19.7 DIARRHEA: Status: ACTIVE | Noted: 2018-02-22

## 2018-02-22 PROCEDURE — 25000003 PHARM REV CODE 250: Performed by: GENERAL ACUTE CARE HOSPITAL

## 2018-02-22 PROCEDURE — 99239 HOSP IP/OBS DSCHRG MGMT >30: CPT | Mod: ,,, | Performed by: GENERAL ACUTE CARE HOSPITAL

## 2018-02-22 RX ORDER — BISACODYL 10 MG
10 SUPPOSITORY, RECTAL RECTAL DAILY
Status: DISCONTINUED | OUTPATIENT
Start: 2018-02-22 | End: 2018-02-22 | Stop reason: HOSPADM

## 2018-02-22 RX ORDER — OXYCODONE HYDROCHLORIDE 5 MG/1
5 TABLET ORAL EVERY 4 HOURS PRN
Status: DISCONTINUED | OUTPATIENT
Start: 2018-02-22 | End: 2018-02-22 | Stop reason: HOSPADM

## 2018-02-22 RX ORDER — LORAZEPAM 0.5 MG/1
0.5 TABLET ORAL EVERY 4 HOURS PRN
Status: DISCONTINUED | OUTPATIENT
Start: 2018-02-22 | End: 2018-02-22 | Stop reason: HOSPADM

## 2018-02-22 RX ADMIN — LORAZEPAM 0.5 MG: 0.5 TABLET ORAL at 05:02

## 2018-02-22 RX ADMIN — LORAZEPAM 0.5 MG: 0.5 TABLET ORAL at 01:02

## 2018-02-22 NOTE — NURSING
[facility] El and pt's parish  came to bedside; administered the anointing of the sick as well as gave pt sm piece of communion host.  Pt tolerated all well - appeared to have calmed pt to some extent.

## 2018-02-22 NOTE — ASSESSMENT & PLAN NOTE
-- decreased oral intake.   --continue to hold levemir and monitor glucose with correction scale.   --glucose within goal  --hgb A1c 7.6

## 2018-02-22 NOTE — NURSING
Report was rec'd from PM RN.  Pt noted to be up in bedside easy chair.  Per report, pt  Was assisted to bsec by PM RN because he was not able to get comfortable and was pulling everything off.  Per report, pt is going to hospice today and CCS MD aware that pt was refusing meds.  No labs from this am

## 2018-02-22 NOTE — PROGRESS NOTES
[facility]  Critical Care Medicine  Progress Note    Patient Name: Jose Cruz Lira  MRN: [id number]  Admission Date: 2/8/2018  Hospital Length of Stay: 12 days  Code Status: DNR  Attending Provider: Surjit Jackson*  Primary Care Provider: Lisa Capone MD   Principal Problem: Acute renal failure superimposed on stage 3 chronic kidney disease    Subjective:     HPI:  Mr. Lira is a 68 y/o male with a history significant for CKD 3 (s/p nephrectomy August 2017), DMII (long term insulin use), and persistent atrial fibrillation (s/p DCCV 2016) on flecainide and Eliquis. He presented to [facility] on 02/08/18 after he was noted to have worsened LFTs and leukocytosis at an outpatient visit. Mr. Lira reports feeling short of breath for several month with symptoms getting worse over the past few weeks. He reports dyspnea with exertion and at rest, chills, cough off and on, and fatigue. He does take his lasix twice daily and reports his urine output has decreased lately in last few days. He also reports weight loss recently and has had difficulty sleeping lately 2/2 orthopnea. He does report recent travel to [facility] 12/17.     Mr. Lira had a recent admission for CHF exacerbation (2/1 - 2/3). Cardiology examined him on arrival and noted a positive Andres's sign. HIDA and other imaging were negative for cholecystitis, but noted multiple liver lesions concerning for metastatic disease. V/Q scan and LE ultrasound were negative and Mr. Lira takes anticoagulation for atrial fibrillation.      Hospital/ICU Course:  Mr. Lira was admitted to Hospital Medicine with an ARTUR (baseline creatinine ~1.6) and leukocytosis. Multiple imaging modalities have noted liver lesions concerning for metastatic disease. He underwent an IR biopsy of the liver lesions on 2/12/18 and pathology is pending. Unfortunately, his renal function has continued to worsen to the point that Nephrology has recommended dialysis. Critical  Care was consulted due to borderline blood pressures and the Nephrology recommendation to initiate SLED.  Mr. Lira was transferred to the ICU with critical care medicine on 2/18.  Trialysis line placed and RRT initiated. 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  Mr. Lira is currently not an optimal candidate for systemic therapy.  Oncology recommending palliative care. He continues to be delirious.     Interval History/Significant Events: Agitated overnight, lorazepam 0.5 mg IV and ramelteon given with improvement.     Review of Systems   Constitutional: Negative for chills, diaphoresis and fever.   HENT: Negative for congestion.    Eyes: Negative for visual disturbance.   Respiratory: Positive for mild shortness of breath. Negative for cough.    Cardiovascular: Positive for leg swelling. Negative for chest pain.   Gastrointestinal: Positive for abdominal distention. Negative for abdominal pain, diarrhea, nausea and vomiting.   Skin: Negative for rash.   Neurological: Negative for dizziness, seizures, light-headedness, numbness and headaches.   Psychiatric/Behavioral: Positive for confusion.     Objective:     Vital Signs (Most Recent):  Temp: 98 °F (36.7 °C) (02/21/18 1500)  Pulse: 75 (02/21/18 1800)  Resp: 20 (02/21/18 1800)  BP: (!) 104/50 (02/21/18 1800)  SpO2: (!) 92 % (02/21/18 1800) Vital Signs (24h Range):  Temp:  [96.9 °F (36.1 °C)-98.1 °F (36.7 °C)] 98 °F (36.7 °C)  Pulse:  [69-76] 75  Resp:  [11-37] 20  SpO2:  [92 %-100 %] 92 %  BP: ()/(46-78) 104/50   Weight: 131 kg (288 lb 12.8 oz)  Body mass index is 37.08 kg/m².      Intake/Output Summary (Last 24 hours) at 02/21/18 1930  Last data filed at 02/21/18 1413   Gross per 24 hour   Intake          2037.16 ml   Output             2810 ml   Net          -772.84 ml       Physical Exam   Constitutional: No distress.   HENT:   Head: Normocephalic.   Eyes: Conjunctivae and EOM are  normal. Pupils are equal, round, and reactive to light. Scleral icterus is present.   Cardiovascular: Normal rate and intact distal pulses.    No murmur heard.  Warm extremities, + peripheral edema   Pulmonary/Chest: No accessory muscle usage. No respiratory distress. He has decreased breath sounds in the right lower field and the left lower field. He has no wheezes. He has no rhonchi. He has no rales.   Abdominal: Soft. Bowel sounds are normal. There is no tenderness.   Neurological: No cranial nerve deficit or sensory deficit. GCS eye subscore is 3. GCS verbal subscore is 4. GCS motor subscore is 6.   Drowsy, oriented to person and time.  Following request and moving all extremities equally and symmetrically.  PERRL   Skin: Skin is warm. He is not diaphoretic.       Vents:     Lines/Drains/Airways     Central Venous Catheter Line                 Trialysis (Dialysis) Catheter 02/18/18 1545 right internal jugular 3 days          Drain                 Ureteral Drain/Stent 05/14/15 Left ureter 6 Fr. 1014 days              Significant Labs:    CBC/Anemia Profile:    Recent Labs  Lab 02/20/18  0357 02/21/18  0222   WBC 28.10* 33.05*   HGB 9.1* 9.2*   HCT 27.7* 28.5*    282   MCV 85 85   RDW 17.5* 18.0*        Chemistries:    Recent Labs  Lab 02/20/18  0357 02/20/18  1600 02/20/18  2354 02/21/18  0222 02/21/18  1411   *  --  136 135* 136   K 4.8  --  5.0 5.0 5.4*   CL 99  --  101 102 101   CO2 24  --  20* 20* 20*   BUN 83*  --  71* 54* 61*   CREATININE 4.2*  --  3.9* 3.3* 4.3*   CALCIUM 7.5*  --  7.5* 7.5* 7.7*   ALBUMIN 2.0*  2.0*  --  2.0* 2.0*  2.0* 2.0*   PROT 5.3*  --   --  5.4*  --    BILITOT 6.4*  --   --  7.0*  --    ALKPHOS 156*  --   --  158*  --    ALT 20  --   --  20  --    AST 74*  --   --  70*  --    MG 2.5 2.6 2.4  --   --    PHOS 5.9*  --  5.1* 4.4 5.9*       All pertinent labs within the past 24 hours have been reviewed.    Significant Imaging:  I have reviewed and interpreted all  pertinent imaging results/findings within the past 24 hours.    Assessment/Plan:       Neuro   Encephalopathy, metabolic    --suspect secondary to critical illness and delirium  --continue delirium precautions  --Qtc prolonged, seroquel contraindicated.  Ramelteon Q HS for insomnia.  --ammonia level wnl.   --non-focal on examination.          Cardiac/Vascular   Arterial hypotension    --Uncertain etiology. Infection vs liver dysfunction  --Continue midodrine  --has not required vasopressors since admission  --Preliminary blood cultures no growth to date  --continue empiric pip/tazo        Persistent atrial fibrillation    --CHADS Vasc score 4 (CHF, HTN, DM, Age), 5.9 % annual stroke risk.  --In light of worsening condition, will continue to hold apixaban and continue flecainide.   --currently rate controlled  --TSH wnl        Renal/   * Acute renal failure superimposed on stage 3 chronic kidney disease    --Uncertain exact etiology.  Likely poor renal perfusion.  --evidence of volume overload on exam.   --plan to repeat RRT treatment today.  --appreciate nephrology recommendations  --minimal urine output, bladder scan Q 6 hours. I&O for urinary retention.          Hematology   Coagulopathy    --suspect secondary to underlying liver disease  --nutrition deficiency possibly contributing  --no signs of active bleeding, continue to monitor        Oncology   Leukocytosis    --Suspect 2/2 malignancy vs possible infection.   --remains afebrile with borderline hypotension.   --blood cultures no growth to date, continue pip/tazo.                  Endocrine   Type 2 diabetes mellitus with stage 3 chronic kidney disease, with long-term current use of insulin    -- decreased oral intake.   --continue to hold levemir and monitor glucose with correction scale.   --glucose levels within goal  --hgb A1c 7.6         GI   multiple liver lesions with poorly differentiated carcinoma    --liver biopsy consistent with urothelial  cancer; awaiting additional stain from [place].  Mr. Lira is currently not an optimal candidate for systemic therapy.  Oncology recommending comfort measures with hospice which family is in agreement.           Occult blood positive stool    --continue protonix po daily  --hgb grossly unchanged.   --no active signs of bleeding.        Diarrhea    --improved.  --recent travel to the [facility] ~2 months ago.  --C diff negative, Shiga toxin 1 and 2 negative, stool culture + pseudomonas  --CT abdomen/pelvis w/o contrast with diverticulosis without diverticulitis.         Abnormal liver function tests    --Suspect 2/2 malignancy.              Critical Care Daily Checklist:    A: Awake: RASS Goal/Actual Goal:  0  Actual: [place] Agitation Sedation Scale (RASS): Drowsy   B: Spontaneous Breathing Trial Performed?  n/a   C: SAT & SBT Coordinated?  n/a                   D: Delirium: CAM-ICU Overall CAM-ICU: Positive   E: Early Mobility Performed? Yes   F: Feeding Goal: Goals: Meet % EEN, EPN  Status: Nutrition Goal Status: new   Current Diet Order   Procedures    Diet Diabetic 1800 Calories      AS: Analgesia/Sedation n/a   T: Thromboembolic Prophylaxis Heparin SQ   H: HOB > 300 Yes   U: Stress Ulcer Prophylaxis (if needed) protonix   G: Glucose Control Within goal   B: Bowel Function Stool Occurrence: 1   I: Indwelling Catheter (Lines & Donato) Necessity trialysis   D: De-escalation of Antimicrobials/Pharmacotherapies See above    Plan for the day/ETD Supportive care    Code Status:  Family/Goals of Care: DNR       Dr. Jackson met with Mr. Lira's wife and children this afternoon. Family is in agreement with transfer to inpatient hospice given incurable malignancy.  No further dialysis treatments per family's request.   Plan for transfer to hospice in am. Code status changed to DNR. I also contacted child life for assistance given young grandchildren per family request.     Plan discussed with Dr. Jackson.     I  spent >70 minutes reviewing patient records, examining, and counseling the patient with greater than 50% of the time spent with direct patient care and coordination.

## 2018-02-22 NOTE — ASSESSMENT & PLAN NOTE
--preliminary liver biopsy suggestive of poorly differentiated carcinoma with likely metastatic disease.  Mr. Lira is currently not an optimal candidate for systemic therapy.  Oncology recommending palliative care.   --awaiting final biopsy results.

## 2018-02-22 NOTE — TELEPHONE ENCOUNTER
----- Message from Tony Awan sent at 2/21/2018 12:56 PM CST -----  Regarding: Home sleep study   Dr. Monroe              I know patient was not able to use the home sleep study device.  He was admitted into the hospital.  I no longer see an order in Epic.  Do you want him to have a home sleep study?  If so can you put in another order.      Thank You,  Tony

## 2018-02-22 NOTE — ASSESSMENT & PLAN NOTE
--improved.  --recent travel to the [facility] ~2 months ago.  --C diff negative, Shiga toxin 1 and 2 negative, stool culture + pseudomonas  --CT abdomen/pelvis w/o contrast with diverticulosis without diverticulitis.

## 2018-02-22 NOTE — PT/OT/SLP PROGRESS
Physical Therapy Not Seen/Discharge      Patient Name:  Jose Cruz Lira   MRN:  [id number]    Patient not seen today. Per chart review and confirmation with family, pt transitioning to hospice. PT asked daughter and wife if they wanted to continue with therapy services for pt. Family agreed for therapy services to be discontinued at this time. D/c from acute PT 2nd to poor prognosis.       Evelyn Quigley, PT, DPT  2/22/2018  [phone]

## 2018-02-22 NOTE — PROGRESS NOTES
[facility]  Critical Care Medicine  Progress Note    Patient Name: Jose Cruz Lira  MRN: [id number]  Admission Date: 2/8/2018  Hospital Length of Stay: 13 days  Code Status: DNR  Attending Provider: Surjit Jackson*  Primary Care Provider: Lisa Capone MD   Principal Problem: Acute renal failure superimposed on stage 3 chronic kidney disease    Subjective:     HPI:  Mr. Lira is a 66 y/o male with a history significant for CKD 3 (s/p nephrectomy August 2017), DMII (long term insulin use), and persistent atrial fibrillation (s/p DCCV 2016) on flecainide and Eliquis. He presented to [facility] on 02/08/18 after he was noted to have worsened LFTs and leukocytosis at an outpatient visit. Mr. Lira reports feeling short of breath for several month with symptoms getting worse over the past few weeks. He reports dyspnea with exertion and at rest, chills, cough off and on, and fatigue. He does take his lasix twice daily and reports his urine output has decreased lately in last few days. He also reports weight loss recently and has had difficulty sleeping lately 2/2 orthopnea. He does report recent travel to [facility] 12/17.     Mr. Lira had a recent admission for CHF exacerbation (2/1 - 2/3). Cardiology examined him on arrival and noted a positive Andres's sign. HIDA and other imaging were negative for cholecystitis, but noted multiple liver lesions concerning for metastatic disease. V/Q scan and LE ultrasound were negative and Mr. Lira takes anticoagulation for atrial fibrillation.      Hospital/ICU Course:  Mr. Lira was admitted to Hospital Medicine with an ARTUR (baseline creatinine ~1.6) and leukocytosis. Multiple imaging modalities have noted liver lesions concerning for metastatic disease. He underwent an IR biopsy of the liver lesions on 2/12/18 and pathology is pending. Unfortunately, his renal function has continued to worsen to the point that Nephrology has recommended dialysis. Critical  Care was consulted due to borderline blood pressures and the Nephrology recommendation to initiate SLED.  Mr. Lira was transferred to the ICU with critical care medicine on 2/18.  Trialysis line placed and RRT initiated. 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  Mr. Lira is currently not an optimal candidate for systemic therapy.  Oncology recommending palliative care. He continues to be delirious. He is anticipated to discharge to inpatient hospice on 02/22/18.    Interval History/Significant Events: No significant events overnight. Anticipated to go to inpatient hospice today.     Review of Systems   Constitutional: Negative for chills and diaphoresis.   HENT: Negative for postnasal drip.    Eyes: Negative for visual disturbance.   Respiratory: Negative for shortness of breath.    Cardiovascular: Negative for chest pain.   Gastrointestinal: Negative for abdominal pain and constipation.   Genitourinary: Negative for hematuria.   Musculoskeletal: Negative for myalgias.   Skin: Negative for rash.   Neurological: Negative for headaches.   Hematological: Negative for adenopathy.     Objective:     Vital Signs (Most Recent):  Temp: 97.5 °F (36.4 °C) (02/22/18 0800)  Pulse: 71 (02/22/18 0815)  Resp: (!) 37 (02/22/18 0815)  BP: (!) 86/47 (02/22/18 0815)  SpO2: 97 % (02/22/18 0300) Vital Signs (24h Range):  Temp:  [97 °F (36.1 °C)-98 °F (36.7 °C)] 97.5 °F (36.4 °C)  Pulse:  [68-76] 71  Resp:  [11-38] 37  SpO2:  [92 %-98 %] 97 %  BP: ()/(46-68) 86/47   Weight: 131 kg (288 lb 12.8 oz)  Body mass index is 37.08 kg/m².      Intake/Output Summary (Last 24 hours) at 02/22/18 0829  Last data filed at 02/22/18 0800   Gross per 24 hour   Intake           332.33 ml   Output              100 ml   Net           232.33 ml       Physical Exam   Constitutional: He appears well-developed and well-nourished.   HENT:   Head: Normocephalic and atraumatic.    Mouth/Throat: Oropharynx is clear and moist.   Eyes: Conjunctivae are normal. Pupils are equal, round, and reactive to light. Right eye exhibits no discharge. Left eye exhibits no discharge. No scleral icterus.   Neck: Trachea normal, normal range of motion and full passive range of motion without pain. Neck supple. No JVD present. No tracheal deviation present. No thyromegaly present.   Cardiovascular: Normal rate, regular rhythm, S1 normal, S2 normal, normal heart sounds and intact distal pulses.  Exam reveals no gallop and no friction rub.    No murmur heard.  Pulmonary/Chest: Effort normal and breath sounds normal. No respiratory distress. He has no wheezes. He has no rales. He exhibits no tenderness.   Abdominal: Soft. Bowel sounds are normal. He exhibits distension. He exhibits no mass. There is no tenderness. There is no rebound and no guarding.   Musculoskeletal: Normal range of motion. He exhibits no tenderness or deformity.   Lymphadenopathy:     He has no cervical adenopathy.   Neurological: No cranial nerve deficit. Coordination normal.   Skin: Skin is warm and dry. No abrasion and no bruising noted.   Psychiatric: He has a normal mood and affect.   Vitals reviewed.      Vents:     Lines/Drains/Airways     Central Venous Catheter Line                 Trialysis (Dialysis) Catheter 02/18/18 1545 right internal jugular 3 days          Drain                 Ureteral Drain/Stent 05/14/15 Left ureter 6 Fr. 1015 days              Significant Labs:    CBC/Anemia Profile:    Recent Labs  Lab 02/21/18 0222   WBC 33.05*   HGB 9.2*   HCT 28.5*      MCV 85   RDW 18.0*        Chemistries:    Recent Labs  Lab 02/20/18  1600 02/20/18  2354 02/21/18  0222 02/21/18  1411   NA  --  136 135* 136   K  --  5.0 5.0 5.4*   CL  --  101 102 101   CO2  --  20* 20* 20*   BUN  --  71* 54* 61*   CREATININE  --  3.9* 3.3* 4.3*   CALCIUM  --  7.5* 7.5* 7.7*   ALBUMIN  --  2.0* 2.0*  2.0* 2.0*   PROT  --   --  5.4*  --     BILITOT  --   --  7.0*  --    ALKPHOS  --   --  158*  --    ALT  --   --  20  --    AST  --   --  70*  --    MG 2.6 2.4  --   --    PHOS  --  5.1* 4.4 5.9*     Significant Imaging:  I have reviewed and interpreted all pertinent imaging results/findings within the past 24 hours.    Assessment/Plan:     Neuro   Encephalopathy, metabolic    --suspect secondary to critical illness, suspicious for delirium  --delirium precautions  --Qtc prolonged. Seroquel held.  Ramelteon given.   --ammonia level wnl.           Cardiac/Vascular   Arterial hypotension    --Uncertain etiology. Infections vs liver dysfunction  --Continue midodrine  --has not required vasopressors since admission  --lactate level wnl.  Preliminary blood cultures no growth to date  --continue empiric pip/tazo        Persistent atrial fibrillation    --CHADS Vasc score 4 (CHF, HTN, DM, Age), 5.9 % annual stroke risk.  --In light of worsening condition, will continue to hold apixaban and continue flecainide.   --currently rate controlled  --TSH wnl        Renal/   * Acute renal failure superimposed on stage 3 chronic kidney disease    --Uncertain exact etiology. HRS vs poor renal perfusion from hypoalbuminemia.  --FeUrea 8.4% suggestive of prerenal with evidence of volume overload on exam.   --plan to repeat RRT treatment today.  --appreciate nephrology recommendations  --minimal urine output, bladder scan Q 6 hours. I&O for urinary retention  --discontinue octreotide.          Hematology   Coagulopathy    --suspect secondary to underlying liver disease  --nutrition deficiency possibly contributing  --no signs of active bleeding, continue to monitor        Oncology   Leukocytosis    --Suspect 2/2 malignancy vs possible infection.   --previous blood and urine cultures negative.  --remains afebrile with borderline hypotension.   --blood cultures no growth to date, continue pip/tazo.        Leukemoid reaction    --Suspect 2/2 malignancy.   --blood and urine  cultures negative. Hold on starting antibiotics.   --previously evaluated by ID on 2/10.   --remains afebrile, if temperature spikes will re-culture and consider starting antibiotics.         Endocrine   Type 2 diabetes mellitus with stage 3 chronic kidney disease, with long-term current use of insulin    -- decreased oral intake.   --continue to hold levemir and monitor glucose with correction scale.   --glucose within goal  --hgb A1c 7.6         GI   multiple liver lesions with poorly differentiated carcinoma    --Preliminary liver biopsy suggestive of poorly differentiated carcinoma with likely metastatic disease.  Mr. Lira is currently not an optimal candidate for systemic therapy.  Oncology recommending palliative care.   --Anticipating discharge to inpatient hospice today.         Occult blood positive stool    --continue protonix po daily  --hgb grossly unchanged.   --no active signs of bleeding.        Diarrhea    --improved.  --recent travel to [facility] ~2 months ago.  --C diff negative, Shiga toxin 1 and 2 negative, stool culture + pseudomonas  --CT abdomen/pelvis w/o contrast with diverticulosis without diverticulitis.         Abnormal liver function tests    --Suspect 2/2 malignancy.             Discharge took >30 minutes.     Colby Bunn PA-C  Critical Care Medicine  [facility]

## 2018-02-22 NOTE — SUBJECTIVE & OBJECTIVE
Interval History/Significant Events: No significant events overnight. Anticipated to go to inpatient hospice today.     Review of Systems   Constitutional: Negative for chills and diaphoresis.   HENT: Negative for postnasal drip.    Eyes: Negative for visual disturbance.   Respiratory: Negative for shortness of breath.    Cardiovascular: Negative for chest pain.   Gastrointestinal: Negative for abdominal pain and constipation.   Genitourinary: Negative for hematuria.   Musculoskeletal: Negative for myalgias.   Skin: Negative for rash.   Neurological: Negative for headaches.   Hematological: Negative for adenopathy.     Objective:     Vital Signs (Most Recent):  Temp: 97.5 °F (36.4 °C) (02/22/18 0800)  Pulse: 71 (02/22/18 0815)  Resp: (!) 37 (02/22/18 0815)  BP: (!) 86/47 (02/22/18 0815)  SpO2: 97 % (02/22/18 0300) Vital Signs (24h Range):  Temp:  [97 °F (36.1 °C)-98 °F (36.7 °C)] 97.5 °F (36.4 °C)  Pulse:  [68-76] 71  Resp:  [11-38] 37  SpO2:  [92 %-98 %] 97 %  BP: ()/(46-68) 86/47   Weight: 131 kg (288 lb 12.8 oz)  Body mass index is 37.08 kg/m².      Intake/Output Summary (Last 24 hours) at 02/22/18 0829  Last data filed at 02/22/18 0800   Gross per 24 hour   Intake           332.33 ml   Output              100 ml   Net           232.33 ml       Physical Exam   Constitutional: He appears well-developed and well-nourished.   HENT:   Head: Normocephalic and atraumatic.   Mouth/Throat: Oropharynx is clear and moist.   Eyes: Conjunctivae are normal. Pupils are equal, round, and reactive to light. Right eye exhibits no discharge. Left eye exhibits no discharge. No scleral icterus.   Neck: Trachea normal, normal range of motion and full passive range of motion without pain. Neck supple. No JVD present. No tracheal deviation present. No thyromegaly present.   Cardiovascular: Normal rate, regular rhythm, S1 normal, S2 normal, normal heart sounds and intact distal pulses.  Exam reveals no gallop and no friction rub.     No murmur heard.  Pulmonary/Chest: Effort normal and breath sounds normal. No respiratory distress. He has no wheezes. He has no rales. He exhibits no tenderness.   Abdominal: Soft. Bowel sounds are normal. He exhibits distension. He exhibits no mass. There is no tenderness. There is no rebound and no guarding.   Musculoskeletal: Normal range of motion. He exhibits no tenderness or deformity.   Lymphadenopathy:     He has no cervical adenopathy.   Neurological: No cranial nerve deficit. Coordination normal.   Skin: Skin is warm and dry. No abrasion and no bruising noted.   Psychiatric: He has a normal mood and affect.   Vitals reviewed.      Vents:     Lines/Drains/Airways     Central Venous Catheter Line                 Trialysis (Dialysis) Catheter 02/18/18 1545 right internal jugular 3 days          Drain                 Ureteral Drain/Stent 05/14/15 Left ureter 6 Fr. 1015 days              Significant Labs:    CBC/Anemia Profile:    Recent Labs  Lab 02/21/18  0222   WBC 33.05*   HGB 9.2*   HCT 28.5*      MCV 85   RDW 18.0*        Chemistries:    Recent Labs  Lab 02/20/18  1600 02/20/18  2354 02/21/18  0222 02/21/18  1411   NA  --  136 135* 136   K  --  5.0 5.0 5.4*   CL  --  101 102 101   CO2  --  20* 20* 20*   BUN  --  71* 54* 61*   CREATININE  --  3.9* 3.3* 4.3*   CALCIUM  --  7.5* 7.5* 7.7*   ALBUMIN  --  2.0* 2.0*  2.0* 2.0*   PROT  --   --  5.4*  --    BILITOT  --   --  7.0*  --    ALKPHOS  --   --  158*  --    ALT  --   --  20  --    AST  --   --  70*  --    MG 2.6 2.4  --   --    PHOS  --  5.1* 4.4 5.9*     Significant Imaging:  I have reviewed and interpreted all pertinent imaging results/findings within the past 24 hours.

## 2018-02-22 NOTE — ASSESSMENT & PLAN NOTE
--suspect secondary to critical illness, suspicious for delirium  --delirium precautions  --Qtc prolonged. Seroquel held.  Ramelteon given.   --ammonia level wnl.

## 2018-02-22 NOTE — ASSESSMENT & PLAN NOTE
--Preliminary liver biopsy suggestive of poorly differentiated carcinoma with likely metastatic disease.  Mr. Lira is currently not an optimal candidate for systemic therapy.  Oncology recommending palliative care.   --Anticipating discharge to inpatient hospice today.

## 2018-02-22 NOTE — SUBJECTIVE & OBJECTIVE
Interval History/Significant Events: Agitated overnight, lorazepam 0.5 mg IV given with improvement.     Review of Systems   Constitutional: Negative for chills, diaphoresis and fever.   HENT: Negative for congestion.    Eyes: Negative for visual disturbance.   Respiratory: Positive for shortness of breath. Negative for cough.    Cardiovascular: Positive for leg swelling. Negative for chest pain.   Gastrointestinal: Positive for abdominal distention. Negative for abdominal pain, diarrhea, nausea and vomiting.   Skin: Negative for rash.   Neurological: Negative for dizziness, seizures, light-headedness, numbness and headaches.   Psychiatric/Behavioral: Positive for confusion.     Objective:     Vital Signs (Most Recent):  Temp: 98 °F (36.7 °C) (02/21/18 1500)  Pulse: 75 (02/21/18 1800)  Resp: 20 (02/21/18 1800)  BP: (!) 104/50 (02/21/18 1800)  SpO2: (!) 92 % (02/21/18 1800) Vital Signs (24h Range):  Temp:  [96.9 °F (36.1 °C)-98.1 °F (36.7 °C)] 98 °F (36.7 °C)  Pulse:  [69-76] 75  Resp:  [11-37] 20  SpO2:  [92 %-100 %] 92 %  BP: ()/(46-78) 104/50   Weight: 131 kg (288 lb 12.8 oz)  Body mass index is 37.08 kg/m².      Intake/Output Summary (Last 24 hours) at 02/21/18 1930  Last data filed at 02/21/18 1413   Gross per 24 hour   Intake          2037.16 ml   Output             2810 ml   Net          -772.84 ml       Physical Exam   Constitutional: No distress.   HENT:   Head: Normocephalic.   Eyes: Conjunctivae and EOM are normal. Pupils are equal, round, and reactive to light. Scleral icterus is present.   Cardiovascular: Normal rate and intact distal pulses.    No murmur heard.  Warm extremities, + peripheral edema   Pulmonary/Chest: No accessory muscle usage. No respiratory distress. He has decreased breath sounds in the right lower field and the left lower field. He has no wheezes. He has no rhonchi. He has no rales.   Abdominal: Soft. Bowel sounds are normal. There is no tenderness.   Neurological: No cranial  nerve deficit or sensory deficit. GCS eye subscore is 3. GCS verbal subscore is 4. GCS motor subscore is 6.   Drowsy, oriented to person and time.  Following request and moving all extremities equally and symmetrically.  PERRL   Skin: Skin is warm. He is not diaphoretic.       Vents:     Lines/Drains/Airways     Central Venous Catheter Line                 Trialysis (Dialysis) Catheter 02/18/18 1545 right internal jugular 3 days          Drain                 Ureteral Drain/Stent 05/14/15 Left ureter 6 Fr. 1014 days              Significant Labs:    CBC/Anemia Profile:    Recent Labs  Lab 02/20/18  0357 02/21/18  0222   WBC 28.10* 33.05*   HGB 9.1* 9.2*   HCT 27.7* 28.5*    282   MCV 85 85   RDW 17.5* 18.0*        Chemistries:    Recent Labs  Lab 02/20/18  0357 02/20/18  1600 02/20/18  2354 02/21/18  0222 02/21/18  1411   *  --  136 135* 136   K 4.8  --  5.0 5.0 5.4*   CL 99  --  101 102 101   CO2 24  --  20* 20* 20*   BUN 83*  --  71* 54* 61*   CREATININE 4.2*  --  3.9* 3.3* 4.3*   CALCIUM 7.5*  --  7.5* 7.5* 7.7*   ALBUMIN 2.0*  2.0*  --  2.0* 2.0*  2.0* 2.0*   PROT 5.3*  --   --  5.4*  --    BILITOT 6.4*  --   --  7.0*  --    ALKPHOS 156*  --   --  158*  --    ALT 20  --   --  20  --    AST 74*  --   --  70*  --    MG 2.5 2.6 2.4  --   --    PHOS 5.9*  --  5.1* 4.4 5.9*       All pertinent labs within the past 24 hours have been reviewed.    Significant Imaging:  I have reviewed and interpreted all pertinent imaging results/findings within the past 24 hours.

## 2018-02-22 NOTE — PLAN OF CARE
Covering SW spoke with [facility] admissions to follow up on referral. SW informed that family meeting was scheduled for 1:00 PM with hospice representative. SW will be informed once meeting is over. Sw will continue to follow.    2:52 PM  SW spoke with [facility] admissions. Family has completed the paperwork for inpatient hospice. SW will be informed of when patient can be admitted. SW waiting to heat back.     Genet Roth, TITA  [facility]

## 2018-02-22 NOTE — PLAN OF CARE
WALLY informed by  that RN can call report to the Charge RN at [phone].    Wally arranged [facility] for pickup within the hour.     RN Dee Dee aware.    Genet Roth LMSW  [facility]  Ext. [phone]

## 2018-02-22 NOTE — NURSING
"Left VM for Kochera re: status of transfer to [facility] inpt hospice.  Hilario from [facility] has been to unit, rec'd insurance card copies since pt is [employer] and benefits needed to be reviewed.  Dr Monson has signed the certificate for terminal illness as requested by Hilario.Grandchildren have visited with pt but dtr and wife are hoping for expedited transfer.  Requested [facility] gets a "big boy bed" to enhance pt's comfort, Hilario schulte and trying to secure.  "

## 2018-02-22 NOTE — ASSESSMENT & PLAN NOTE
--Uncertain exact etiology. HRS vs poor renal perfusion from hypoalbuminemia.  --FeUrea 8.4% suggestive of prerenal with evidence of volume overload on exam.   --plan to repeat RRT treatment today.  --appreciate nephrology recommendations  --minimal urine output, bladder scan Q 6 hours. I&O for urinary retention  --discontinue octreotide.

## 2018-02-22 NOTE — PLAN OF CARE
MARION rec'd a VM from nurse Funez about hospice and family concerns. MARION placed a call to [facility] and spoke to Natalee. She transferred me to [facility] with admissions. [facility] is out. Ernesto (female) answered and took the information. MARION let her know that the referral was sent through [facility] yesterday evening, 2/21 @ 4:29pm. She also verbally took information. CM asked that she expedite the process and reach out to the family. MARION left her contact information with Ernesto. MARION also called Dee Dee back to update her.   Hospice orders sent to [facility] via [facility]

## 2018-02-22 NOTE — DISCHARGE SUMMARY
Discharge Summary  Critical Care    Admit Date: 2/8/2018    Discharge Date:     LOS: 13    Principle Diagnosis: Acute renal failure superimposed on stage 3 chronic kidney disease    Secondary Diagnoses:   Active Hospital Problems    Diagnosis  POA    *Acute renal failure superimposed on stage 3 chronic kidney disease [N17.9, N18.3]  Yes    multiple liver lesions with poorly differentiated carcinoma [K76.9]  Yes    Diarrhea [R19.7]  Unknown    Coagulopathy [D68.9]  No    Encephalopathy, metabolic [G93.41]  Unknown    Occult blood positive stool [R19.5]  Unknown    ARTUR (acute kidney injury) [N17.9]  Yes    Elevated d-dimer [R79.89]  Yes    Leukocytosis [D72.829]  Yes    Nonrheumatic aortic valve disorder [I35.9]  Yes    SOB (shortness of breath) [R06.02]  Yes    Arterial hypotension [I95.9]  Yes    Hypoalbuminemia [E88.09]  Yes    Abnormal liver function tests [R79.89]  Yes    Abnormal liver scan [R93.2]  Yes    D-dimer, elevated [R79.89]  Yes    Current use of long term anticoagulation [Z79.01]  Not Applicable    Persistent atrial fibrillation [I48.1]  Yes    BMI 34.0-34.9,adult [Z68.34]  Not Applicable    Type 2 diabetes mellitus with stage 3 chronic kidney disease, with long-term current use of insulin [E11.22, N18.3, Z79.4]  Not Applicable      Resolved Hospital Problems    Diagnosis Date Resolved POA    Hyponatremia [E87.1] 02/21/2018 Yes        HPI:  Mr. Lira is a 66 y/o male with a history significant for CKD 3 (s/p nephrectomy August 2017), DMII (long term insulin use), and persistent atrial fibrillation (s/p DCCV 2016) on flecainide and Eliquis. He presented to [facility] on 02/08/18 after he was noted to have worsened LFTs and leukocytosis at an outpatient visit. Mr. Lira reports feeling short of breath for several month with symptoms getting worse over the past few weeks. He reports dyspnea with exertion and at rest, chills, cough off and on, and fatigue. He does take his lasix twice daily and  reports his urine output has decreased lately in last few days. He also reports weight loss recently and has had difficulty sleeping lately 2/2 orthopnea. He does report recent travel to [facility] 12/17.     Mr. Lira had a recent admission for CHF exacerbation (2/1 - 2/3). Cardiology examined him on arrival and noted a positive Andres's sign. HIDA and other imaging were negative for cholecystitis, but noted multiple liver lesions concerning for metastatic disease. V/Q scan and LE ultrasound were negative and Mr. Lira takes anticoagulation for atrial fibrillation.      Hospital/ICU Course:  Mr. Lira was admitted to Hospital Medicine with an ARTUR (baseline creatinine ~1.6) and leukocytosis. Multiple imaging modalities have noted liver lesions concerning for metastatic disease. He underwent an IR biopsy of the liver lesions on 2/12/18 and pathology is pending. Unfortunately, his renal function has continued to worsen to the point that Nephrology has recommended dialysis. Critical Care was consulted due to borderline blood pressures and the Nephrology recommendation to initiate SLED.  Mr. Lira was transferred to the ICU with critical care medicine on 2/18.  Trialysis line placed and RRT initiated. 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  Mr. Lira is currently not an optimal candidate for systemic therapy.  Oncology recommending palliative care. He continues to be delirious. He is anticipated to discharge to inpatient hospice on 02/22/18.    Significant Imaging:  MRI Abdomen WO 02/11/18:   Fluid around a nondistended gallbladder. There is a small amount of ascites with no definite wall thickening of the gallbladder. Nuclear medicine HIDA scan could be helpful.    Liver lesions worrisome for metastatic disease. MRI with contrast or CT with contrast is recommended.    Significant Laboratory Data:  No results found for this or any previous visit (from  the past 336 hour(s)).  Recent Results (from the past 336 hour(s))   CBC auto differential    Collection Time: 02/21/18  2:22 AM   Result Value Ref Range    WBC 33.05 (H) 3.90 - 12.70 K/uL    Hemoglobin 9.2 (L) 14.0 - 18.0 g/dL    Hematocrit 28.5 (L) 40.0 - 54.0 %    Platelets 282 150 - 350 K/uL   CBC auto differential    Collection Time: 02/20/18  3:57 AM   Result Value Ref Range    WBC 28.10 (H) 3.90 - 12.70 K/uL    Hemoglobin 9.1 (L) 14.0 - 18.0 g/dL    Hematocrit 27.7 (L) 40.0 - 54.0 %    Platelets 282 150 - 350 K/uL   CBC auto differential    Collection Time: 02/19/18  5:22 AM   Result Value Ref Range    WBC 27.41 (H) 3.90 - 12.70 K/uL    Hemoglobin 8.9 (L) 14.0 - 18.0 g/dL    Hematocrit 26.8 (L) 40.0 - 54.0 %    Platelets 310 150 - 350 K/uL     Lab Results   Component Value Date    HGBA1C 7.6 (H) 02/01/2018     Microbiology Results (last 7 days)     Procedure Component Value Units Date/Time    Blood culture [[id number]] Collected:  02/19/18 1240    Order Status:  Completed Specimen:  Blood from Peripheral, Hand, Right Updated:  02/21/18 1612     Blood Culture, Routine No Growth to date     Blood Culture, Routine No Growth to date     Blood Culture, Routine No Growth to date    Narrative:       Blood cultures from 2 different sites. 4 bottles total.  Please draw before starting antibiotics.    Blood culture [[id number]] Collected:  02/19/18 1330    Order Status:  Completed Specimen:  Blood from Peripheral, Antecubital, Left Updated:  02/21/18 1422     Blood Culture, Routine No Growth to date     Blood Culture, Routine No Growth to date     Blood Culture, Routine No Growth to date    Narrative:       Blood cultures x 2 different sites. 4 bottles total. Please  draw cultures before administering antibiotics.            Consultations:  IP CONSULT TO NEPHROLOGY  IP CONSULT TO INFECTIOUS DISEASES  IP CONSULT TO HEPATOLOGY  IP CONSULT TO HEM/ONC  IP CONSULT TO INTERVENTIONAL RADIOLOGY  IP CONSULT TO CRITICAL CARE  MEDICINE  IP CONSULT TO HEM/ONC  IP CONSULT TO HOSPICE      Procedures:  Liver biopsy 02/12/18.    Discharge Medications:   Jose Cruz Lira   Home Medication Instructions OSCAR:[id number]    Printed on:02/22/18 0831   Medication Information                      doxazosin (CARDURA) 4 MG tablet  Take 0.5 tablets (2 mg total) by mouth once daily.             flecainide (TAMBOCOR) 50 MG Tab  Take 1 tablet (50 mg total) by mouth every 12 (twelve) hours.             midodrine (PROAMATINE) 10 MG tablet  Take 1 tablet (10 mg total) by mouth 3 (three) times daily.               Diet: Regular    Level of Activity: As tolerated.    Follow up Plan:  1) None. Pt discharged to inpatient hospice.     Studies Pending: None    Discharge Disposition:  Patient was discharged to inpatient hospice in guarded condition.    This discharge took > than 30 minutes to complete.     MYKE Bunn PA-C  Critical Care Medicine  [phone]

## 2018-02-22 NOTE — PROGRESS NOTES
[facility]  Adult Nutrition  Consult Note    SUMMARY     Reason for Assessment    Reason for Assessment: RD follow-up     Diagnosis: other (see comments) (ARF)     Relevent Medical History: DM, HTN, CHF     Interdisciplinary Rounds: attended     General Information Comments: Pt on 1800 kcal ADA diet w/ poor PO intake. Pt scheduled to go to hospice today.   Please re-consult RD if pt situation changes & further nutrition intervention warranted.     Nutrition Follow-Up    RD Follow-up?: No    Thanks! EDIN Bartholomew [id number]

## 2018-02-22 NOTE — NURSING
Verified with LIGIA Bunn PA-C: VS only needed Q shift.    Pt does not have any care to withdraw or turn off but he is to have visitation liberalized and ensure comfort measures are in place.     SW Kochera, RN,  -  [facility] before 5pm.  Charlie called [facility] this AM and they will be sending out a representative to [facility] for admission.  Wally pt's dtr and informed.  (Family is wanting pt's transfer to be sooner rather than later so grandchildren can see him-also offered for the grandchildren to come to our unit to see him. Family will address and make decision.)      Have informed Fe LUJAN of all.

## 2018-02-22 NOTE — PLAN OF CARE
Problem: Patient Care Overview  Goal: Plan of Care Review  Outcome: Ongoing (interventions implemented as appropriate)  No acute events throughout day, VS and assessment per flow sheet. Hospice was consulted today. Plan of care reviewed with Jose Cruz Lira and family, all concerns addressed, will continue to monitor.

## 2018-02-23 NOTE — NURSING
Pt and family were in agreement ot leave for [facility] in hospice at [place].  Report was called before amb transport arrived to [facility].  Rpeort given to amb staff upon their arrival.  Pt was given ativan per pt's acceptance to ease transport to facility.  His son will stay with pt in the ambulance while wife and dtr will follow.  Pt had partial bath as well as new sheets and gown placed before leaving r/t incont of bm and URINE before ambulance arrived.     Pt tolerated tranfer from bed to stretcher well and pt was sat up straight to afford comfort/resp ease.      Pt left unit with s/s of distress, son at side with ambulance staff.

## 2018-02-23 NOTE — PLAN OF CARE
Follow-up With  Details  Why  Contact Info   [facility]    Hospice  [address]  [address]  [facility] [address]  [phone]        02/23/18 0930   Final Note   Assessment Type Final Discharge Note   Discharge Disposition HospiceMedic   What phone number can be called within the next 1-3 days to see how you are doing after discharge? [phone]   Hospital Follow Up  Appt(s) scheduled? No   Discharge plans and expectations educations in teach back method with documentation complete? No   Right Care Referral Info   Post Acute Recommendation Other   Referral Type Inpatient Hospice   [place] [address]   Meagan Diana, RN, BSN  Case Management  [facility]  Ext. [phone]

## 2018-02-23 NOTE — NURSING
"Pt did not have restraints at all during the day.  [facility]S is prompting that pt has an active restraint order.  Verification that restraints were d/c'd by [facility] was found and verified with TAI Chan RN.  DO not know how to remove the "active restraint flag".  "

## 2018-02-24 LAB
BACTERIA BLD CULT: NORMAL
BACTERIA BLD CULT: NORMAL

## 2020-01-01 NOTE — ANESTHESIA PROCEDURE NOTES
"Jewel Petersen is a 2 days male patient.    Temp: 98.5 °F (36.9 °C)(post bath) (20 1005)  Pulse: 140 (20 0850)  Resp: 60 (20 0850)  Weight: 4.01 kg (8 lb 13.5 oz) (20 2000)  Height: 1' 9.5" (54.6 cm) (20 1800)       Circumcision  Date/Time: 2020 11:40 AM  Location procedure was performed: [facility]  NURSERY  Performed by: Comfort Melendez MD  Authorized by: Nicole Harkins MD   Pre-operative diagnosis: Term infant  Post-operative diagnosis: Term infant  Consent: Written consent obtained.  Risks and benefits: risks, benefits and alternatives were discussed  Consent given by: parent  Patient identity confirmed: arm band  Time out: Immediately prior to procedure a "time out" was called to verify the correct patient, procedure, equipment, support staff and site/side marked as required.  Description of findings: Normal male genitalia   Anatomy: penis normal  Vitamin K administration confirmed  Restraint: standard molded circumcision board  Pain Management: 1 mL 1% lidocaine  Prep used: Betadine  Clamp(s) used: Gomco  Gomco clamp size: 1.3 cm  Significant surgical tasks conducted by the assistant(s): None  Complications: No  Estimated blood loss (mL): 1  Specimens: No  Implants: No  Comments: Bleeding noted at 6 o'clock. Held pressure for 5 minutes. Did not stop oozing. Silver nitrate applied. Pressure held again for 5 minutes. Bleeding ceased. Disclosed to parents. Questions answered.          Comfort Melendez  2020    " Procedures

## 2020-08-10 NOTE — DISCHARGE SUMMARY
[facility]  Urology  Discharge Summary      Patient Name: Jose Cruz Lira  MRN: [id number]  Admission Date: 8/25/2017  Hospital Length of Stay: 3 days  Discharge Date and Time: 8/28/2017 11:19 AM  Attending Physician: Ernesto Curtis MD  Discharging Provider: Jodi Webster MD  Primary Care Physician: Tena Phillips MD    HPI:   Jose Cruz Lira is a 66 y.o. male with Afib,  insulin dependent DM2, CKD, and a left non-functional kidney.     He was initially referred to Dr. Curtis, by Dr. Polanco for evaluation of left non-functioning kidney. Recent gross hematuria workup was negative for any malignant causes. He's had long-standing left-sided severe hydronephrosis with renal parenchymal thinning.  He has undergone previous stent placement in the remote past with no improvement in his hydronephrosis.       He occasionally has left-sided flank aching pain. It is exacerbated by movement. However this does not occur often.     He takes Eliquis for atrial fibrillation and is s/p ablation. Hx of CHF.       He quit drinking.  He has lost a proximally 40 pounds over the course the past year through diet.     Denies any previous abdominal surgery.  He does think he has a hernia.     Procedure(s) (LRB):  XI ROBOTIC ASSISTED LAPAROSCOPIC NEPHRECTOMY (Left)     Indwelling Lines/Drains at time of discharge:   Lines/Drains/Airways     Drain                 Ureteral Drain/Stent 05/14/15 Left ureter 6 Fr. 837 days                Hospital Course (synopsis of major diagnoses, care, treatment, and services provided during the course of the hospital stay): Patient is admitted following left nephrectomy. He tolerated the procedure well with no complications. On POD 1 his pain was well controlled. His ayala was removed and he passed a voiding trial. He was tolerating a regular diet and ambulating well. He was discharged home on POD 2.     Consults:     Significant Diagnostic Studies: see chart  Rodrigo Hinds is a 67 year old male presents complaining of: left leg pain. Was seen in  yesterday for same. Continued pain and has not slept due to pain.    Triage Questions:  1. Do you have a fever, chills, repeated shaking with chills or have you had a fever reducer within the last 12 hours? none   Temperature 97.7 °F (36.5 °C) (Skin)  2. Do you have a new cough, cold symptoms, flu symptoms, runny/stuffy nose, bronchitis, sore throat, difficulty breathing? none  3. Do you have a new onset of extreme fatigue? No  4. Do you have any nausea, vomiting, abdominal pain, lack of appetite, diarrhea?  none  5. Have you had a sudden onset of loss of taste or smell, muscle pain or headache? none  6. Have you had contact with a known positive COVID-19 case within the last 14 days OR do any of your household members have any of the above symptoms or tested positive for COVID-19 in the last 14 days? No    Other pertinent clinical findings: n/a    Disposition:  Case discussed with provider: No  If patient answered no to any triage question and temperature < 100F OR medical emergency, then patient is appropriate for non-URI site.     This patient is appropriate for non-URI site.  Since the patient is appropriate to be seen, the patient is directed to general waiting room to wait for available room.    Patient was masked.    Triage RN wearing full Personal Protective Equipment.       review    Pending Diagnostic Studies:     None          Final Active Diagnoses:    Diagnosis Date Noted POA    PRINCIPAL PROBLEM:  Non-functioning kidney [N28.9] 04/16/2015 Yes    Sleep apnea, unspecified [G47.30] 01/23/2017 Yes    Persistent atrial fibrillation [I48.1] 11/28/2016 Yes    Hyperlipidemia associated with type 2 diabetes mellitus [E11.69, E78.5] 05/28/2014 Yes    Hypertension associated with diabetes [E11.59, I10] 05/28/2014 Yes     Chronic    Obesity (BMI 30-39.9) [E66.9] 05/28/2014 Yes    Type 2 diabetes mellitus with stage 3 chronic kidney disease, with long-term current use of insulin [E11.22, N18.3, Z79.4] 05/28/2014 Not Applicable      Problems Resolved During this Admission:    Diagnosis Date Noted Date Resolved POA       Discharged Condition: good    Disposition: Home or Self Care    Follow Up:  Follow-up Information     Ernesto Curtis MD In 2 weeks.    Specialty:  Urology  Why:  post op  Contact information:  [address]  [address]  [facility] [id number] [phone]                 Patient Instructions:     Diet general     Lifting restrictions   Order Comments: Nothing over 10 pounds for 6 weeks     Call MD for:  temperature >100.4     Call MD for:  persistent nausea and vomiting or diarrhea     Call MD for:  severe uncontrolled pain     Call MD for:  redness, tenderness, or signs of infection (pain, swelling, redness, odor or green/yellow discharge around incision site)     Call MD for:  difficulty breathing or increased cough     Call MD for:  severe persistent headache     Call MD for:  worsening rash     Call MD for:  persistent dizziness, light-headedness, or visual disturbances     Call MD for:  increased confusion or weakness     No dressing needed       Medications:  Reconciled Home Medications:   Discharge Medication List as of 8/28/2017  9:36 AM      START taking these medications    Details   !! oxycodone-acetaminophen (PERCOCET) 5-325 mg per tablet Take 1 tablet by  "mouth every 4 (four) hours as needed for Pain., Starting Sun 8/27/2017, Print      polyethylene glycol (GLYCOLAX) 17 gram PwPk Take 17 g by mouth once daily., Starting Sun 8/27/2017, Print       !! - Potential duplicate medications found. Please discuss with provider.      CONTINUE these medications which have NOT CHANGED    Details   amlodipine (NORVASC) 10 MG tablet Take 1 tablet (10 mg total) by mouth once daily., Starting Tue 4/11/2017, Normal      apixaban (ELIQUIS) 5 mg Tab Take 1 tablet (5 mg total) by mouth 2 (two) times daily., Starting 4/5/2017, Until Discontinued, Normal      blood sugar diagnostic Strp 1 strip by Misc.(Non-Drug; Combo Route) route once daily. For accucheck Patient checks BS twice a day. Please provide a 3 month supply., Starting Tue 4/11/2017, Normal      BYDUREON 2 mg/0.65 mL PnIj INJECT 2MG WEEKLY, Normal      carvedilol (COREG) 25 MG tablet Take 1 tablet (25 mg total) by mouth 2 (two) times daily with meals., Starting 2/20/2017, Until Tue 2/20/18, Normal      doxazosin (CARDURA) 4 MG tablet Take 0.5 tablets (2 mg total) by mouth once daily., Starting 11/30/2016, Until Discontinued, No Print      flecainide (TAMBOCOR) 100 MG Tab Take 1 tablet (100 mg total) by mouth every 12 (twelve) hours., Starting 2/20/2017, Until Tue 2/20/18, Normal      furosemide (LASIX) 40 MG tablet Take 1 tablet (40 mg total) by mouth 2 (two) times daily., Starting 11/30/2016, Until Thu 11/30/17, Normal      insulin detemir (LEVEMIR FLEXTOUCH) 100 unit/mL (3 mL) SubQ InPn pen INJECT 25 UNITS INTO THE SKIN EVERY EVENING, Normal      insulin needles, disposable, (BD ULTRA-FINE RODRIGUEZ PEN NEEDLES) 32 x 5/32 " Ndle Patient injects insulin once a day. Please provide 3 month supply., Normal      lancets Misc Patient checks BS twice a day. Please provide a 3 month supply., Normal      !! oxycodone-acetaminophen (PERCOCET) 5-325 mg per tablet Take 1 tablet by mouth every 4 (four) hours as needed for Pain., Starting Tue " 7/11/2017, Print      simvastatin (ZOCOR) 20 MG tablet Take 1 tablet (20 mg total) by mouth every evening., Starting Tue 4/11/2017, Normal      zolpidem (AMBIEN) 10 mg Tab Take 1 tablet (10 mg total) by mouth nightly as needed., Starting Tue 8/22/2017, Until Tue 2/20/2018, Normal       !! - Potential duplicate medications found. Please discuss with provider.          Time spent on the discharge of patient: 20 minutes    Jodi Webster MD  Urology  [facility]

## 2021-01-21 NOTE — PROGRESS NOTES
CRRT started per orders. Lines extremely positional. Lines reversed, still positional, arterial pressure flucuation while patient turns head and coughs. Patient and family educated to try for patient to stay still. BFR at 150 per orders. Nurse at bedside, alerted of positional cath, raised patient in bed, flow still positional.   Pt needs refill on insulin. He has all other supplies.

## 2021-10-27 NOTE — PROGRESS NOTES
"Patient arrived to floor via stretcher. Patient is awake, alert, and oriented. Skin is warm and dry. Pulses are present in all extremities. Neurovascularly intact. Some crackles noted to lung fields. Abdomen is large and round with hypoactive bowel sounds. Patient ambulated in room but refused to go in the andrade because he noted that he was "too sore" to go any further at the present. Will encourage patient to ambulate further later on. Patient does complain of discomfort when ambulating at present. Will medicate as ordered. Ivf's infusing without difficulty. No skin breakdown noted. Donato present draining darker yellow urine. Patient and wife oriented to environment. Will continue to monitor.  " Independent    Long Term Goal                  6- Modified Independent    Social Interaction    Current Status  4--Minimal Assistance    Short Term Goal 5--Supervision    Long Term Goal 6--Modified Independent         Problem Solving    Current Status     2- Maximal  Assistance    Short Term Goal                 3- Moderate Assistance    Long Term Goal          4-Minimal  Assistance      Memory    Current Status       4- Minimal Assistance    Short Term Goal t      5-Supervision   Long Term Goal         5-Supervision  ? SWALLOWING:      Diet:  Regular consistency solids with thin liquids (IDDSI level 0)       Assistance with tray set up and supervision required with all po intake d/t left neglect and impulsivity. No immediate dysphagia needs at this time. LANGUAGE:     Patient was able to express her wants and needs without word finding difficulties. She displayed impulsivity when responding to questions, requiring repetition and directions to think about answer. COGNITION:       SLP and all stimulus material positioned/placed on left side in order to cross midline and help increase attention and awareness to left however appeared frustrating to Pt today. Pt actively engaged in discussion about safety modifications. Given decreased v/c from yesterday, patient identified actions depicted on photo cards that could pose a safety risk. She required some v/c to provide reasonable adaptations to environment/procedure to promote increased safety. Pt completed various cognitive tasks requiring them to read and interpret multi-step directions and pair corresponding picture with written stimuli. Tasks include: mental manipulation, time management, attention, and problem solving/sequencing. Pt required mod-max v/c and some repetition of task direction to complete task. Overall, fair outcome.          Safety:  Poor    SPEECH:     Speech intelligibility was good during structured tasks and conversational speech. SP recommended after discharge:  yes  Supervision recommended at discharge: 24 hour    Will continue SP intervention as per previously established POC. EDUCATION: Speech Pathologist (SLP) completed education with the patient and/or family regarding identified cognitive linguistic deficits and subsequent need for speech pathology intervention. Discussed deficit areas to be targeted by formal intervention and established short/long term goals. Reviewed compensatory strategies to improve functional outcome (as appropriate). Encouraged patient and/or family to engage SLP in structured Q&A session relative to identified deficit areas. Patient and/or family indicated understanding of all information provided via satisfactory verbal response. Minute Tracking:    Individual therapy:   45 minutes  Concurrent therapy:    0 minutes  Group therapy:     0 minutes  Co-treatment therapy:    0 minutes    Total minutes for 10/23/2021: 45 minutes    The admitting diagnosis and active problem list, as listed below have been reviewed prior to initiation of this evaluation.         ACTIVE PROBLEM LIST:   Patient Active Problem List   Diagnosis    Acute cerebrovascular accident (CVA) ([facility] [address]

## 2024-12-22 NOTE — PROGRESS NOTES
Subjective:       Patient ID: Jose Cruz Lira is a 66 y.o. male.    Chief Complaint:  Follow-up      History of Present Illness  HPI  Patient is a 66 y.o. male who is established to our clinic and was initially referred by Dr. Polanco for evaluation of a left non-functioning kidney.  Patient underwent a left robotic-assisted lab or scopic simple nephrectomy on August 25, 2017.  He's done well since his surgery.  He is requiring less pain medication.   Patient reports some swelling of his right lower extremity more so than his left lower extremity.  No lower extremity pain.  He has been on his Eliquis for over a week.      Review of Systems  Review of Systems  -fever, +edema LE, -chest pain, -shortness of breath     Objective:     Physical Exam   Nursing note and vitals reviewed.  Constitutional: He is oriented to person, place, and time. Vital signs are normal. He appears well-developed and well-nourished. He is cooperative.   HENT:   Head: Normocephalic.   Neck: No tracheal deviation present.   Cardiovascular: Normal rate and intact distal pulses.    Pulmonary/Chest: Effort normal. No accessory muscle usage. No tachypnea. No respiratory distress.   Abdominal: Soft. Normal appearance. He exhibits no distension, no fluid wave, no ascites and no mass. There is no tenderness. There is no rebound and no CVA tenderness. No hernia.       Liver/spleen non-palpable.   Musculoskeletal: Normal range of motion. He exhibits edema (Right greater than left, pitting).   Lymphadenopathy:        Right: No inguinal adenopathy present.        Left: No inguinal adenopathy present.   Neurological: He is alert and oriented to person, place, and time. He has normal strength.   Skin: No bruising and no rash noted.     Psychiatric: He has a normal mood and affect. His speech is normal and behavior is normal. Thought content normal.       Lab Review  Lab Results   Component Value Date    COLORU Yellow 07/17/2017    SPECGRAV 1.015  07/17/2017    PHUR 6.0 07/17/2017    WBCUR n 03/13/2015    NITRITE Negative 07/17/2017    PROTEINUR + 03/13/2015    GLUCOSEUR n 03/13/2015    KETONESU Negative 07/17/2017    UROBILINOGEN Negative 07/17/2017    BILIRUBINUR n 03/13/2015    RBCUR n 03/13/2015         Assessment:        1. Non-functioning kidney    2. Edema of right lower extremity          Plan:     Non-functioning kidney    Edema of right lower extremity  -     Cardiology Lab US Lower Extremity Veins Bilateral; Future  -     Comprehensive metabolic panel; Future; Expected date: 09/07/2017    Other orders  -     oxycodone-acetaminophen (PERCOCET) 5-325 mg per tablet; Take 1 tablet by mouth every 4 (four) hours as needed for Pain.  Dispense: 30 tablet; Refill: 0     ultrasound lower extremity to rule out DVT  Recommends follow-up with primary care physician and/or cardiologist  -Pathology reviewed with the patient and benign  -Follow-up when necessary     [phone]

## 2025-03-03 NOTE — PROGRESS NOTES
"Relay     \"  I sent in the lower dose. He can try taking it again at the lower dose. If symptoms return recommend to stop the med. He can restart the magnesium but not at the same time.   \"                 " Pt arrives to [facility] for recovery, report recived from Kalie RN.  Pt resting comfortably, VSS.  Drsg to left abd c/d/i.
